# Patient Record
Sex: MALE | Race: WHITE | Employment: OTHER | URBAN - METROPOLITAN AREA
[De-identification: names, ages, dates, MRNs, and addresses within clinical notes are randomized per-mention and may not be internally consistent; named-entity substitution may affect disease eponyms.]

---

## 2017-02-13 LAB — PROSTATE SPECIFIC ANTIGEN: 4.3 NG/ML

## 2017-02-14 RX ORDER — CLOPIDOGREL BISULFATE 75 MG/1
TABLET ORAL
Qty: 90 TABLET | Refills: 2 | Status: ON HOLD | OUTPATIENT
Start: 2017-02-14 | End: 2017-06-11 | Stop reason: HOSPADM

## 2017-03-22 ENCOUNTER — OFFICE VISIT (OUTPATIENT)
Dept: FAMILY MEDICINE CLINIC | Age: 77
End: 2017-03-22

## 2017-03-22 VITALS
DIASTOLIC BLOOD PRESSURE: 92 MMHG | OXYGEN SATURATION: 98 % | SYSTOLIC BLOOD PRESSURE: 180 MMHG | WEIGHT: 167.2 LBS | TEMPERATURE: 97.7 F | HEART RATE: 80 BPM | HEIGHT: 68 IN | BODY MASS INDEX: 25.34 KG/M2 | RESPIRATION RATE: 16 BRPM

## 2017-03-22 DIAGNOSIS — K21.9 GASTROESOPHAGEAL REFLUX DISEASE WITHOUT ESOPHAGITIS: ICD-10-CM

## 2017-03-22 DIAGNOSIS — I10 UNSPECIFIED ESSENTIAL HYPERTENSION: ICD-10-CM

## 2017-03-22 DIAGNOSIS — I10 ESSENTIAL HYPERTENSION: ICD-10-CM

## 2017-03-22 DIAGNOSIS — J44.0 CHRONIC OBSTRUCTIVE PULMONARY DISEASE WITH ACUTE LOWER RESPIRATORY INFECTION (HCC): ICD-10-CM

## 2017-03-22 DIAGNOSIS — Z01.818 PRE-OP EXAM: Primary | ICD-10-CM

## 2017-03-22 DIAGNOSIS — J44.9 OBSTRUCTIVE CHRONIC BRONCHITIS WITHOUT EXACERBATION (HCC): ICD-10-CM

## 2017-03-22 DIAGNOSIS — L40.9 PSORIASIS: ICD-10-CM

## 2017-03-22 DIAGNOSIS — E78.5 HYPERLIPIDEMIA, UNSPECIFIED HYPERLIPIDEMIA TYPE: ICD-10-CM

## 2017-03-22 PROCEDURE — G8427 DOCREV CUR MEDS BY ELIG CLIN: HCPCS | Performed by: FAMILY MEDICINE

## 2017-03-22 PROCEDURE — 93000 ELECTROCARDIOGRAM COMPLETE: CPT | Performed by: FAMILY MEDICINE

## 2017-03-22 PROCEDURE — G8598 ASA/ANTIPLAT THER USED: HCPCS | Performed by: FAMILY MEDICINE

## 2017-03-22 PROCEDURE — G8420 CALC BMI NORM PARAMETERS: HCPCS | Performed by: FAMILY MEDICINE

## 2017-03-22 PROCEDURE — 3023F SPIROM DOC REV: CPT | Performed by: FAMILY MEDICINE

## 2017-03-22 PROCEDURE — G8926 SPIRO NO PERF OR DOC: HCPCS | Performed by: FAMILY MEDICINE

## 2017-03-22 PROCEDURE — 99214 OFFICE O/P EST MOD 30 MIN: CPT | Performed by: FAMILY MEDICINE

## 2017-03-22 PROCEDURE — G8484 FLU IMMUNIZE NO ADMIN: HCPCS | Performed by: FAMILY MEDICINE

## 2017-03-22 PROCEDURE — 1123F ACP DISCUSS/DSCN MKR DOCD: CPT | Performed by: FAMILY MEDICINE

## 2017-03-22 PROCEDURE — 1036F TOBACCO NON-USER: CPT | Performed by: FAMILY MEDICINE

## 2017-03-22 PROCEDURE — 4040F PNEUMOC VAC/ADMIN/RCVD: CPT | Performed by: FAMILY MEDICINE

## 2017-03-22 RX ORDER — LISINOPRIL 40 MG/1
40 TABLET ORAL DAILY
Qty: 30 TABLET | Refills: 3 | Status: ON HOLD | OUTPATIENT
Start: 2017-03-22 | End: 2017-06-10 | Stop reason: DRUGHIGH

## 2017-03-22 RX ORDER — AMLODIPINE BESYLATE 10 MG/1
10 TABLET ORAL DAILY
Qty: 30 TABLET | Refills: 3 | Status: SHIPPED | OUTPATIENT
Start: 2017-03-22 | End: 2017-07-12 | Stop reason: SDUPTHER

## 2017-03-22 RX ORDER — AMLODIPINE BESYLATE 5 MG/1
TABLET ORAL
Qty: 90 TABLET | Refills: 3 | Status: CANCELLED | OUTPATIENT
Start: 2017-03-22

## 2017-03-22 RX ORDER — LISINOPRIL 20 MG/1
20 TABLET ORAL DAILY
Qty: 90 TABLET | Refills: 3 | Status: CANCELLED | OUTPATIENT
Start: 2017-03-22

## 2017-03-27 ENCOUNTER — OFFICE VISIT (OUTPATIENT)
Dept: FAMILY MEDICINE CLINIC | Age: 77
End: 2017-03-27

## 2017-03-27 VITALS
RESPIRATION RATE: 16 BRPM | SYSTOLIC BLOOD PRESSURE: 170 MMHG | WEIGHT: 169 LBS | DIASTOLIC BLOOD PRESSURE: 80 MMHG | OXYGEN SATURATION: 98 % | HEART RATE: 78 BPM | BODY MASS INDEX: 23.66 KG/M2 | HEIGHT: 71 IN

## 2017-03-27 DIAGNOSIS — I10 ESSENTIAL HYPERTENSION: Primary | ICD-10-CM

## 2017-03-27 PROCEDURE — G8484 FLU IMMUNIZE NO ADMIN: HCPCS | Performed by: FAMILY MEDICINE

## 2017-03-27 PROCEDURE — 4040F PNEUMOC VAC/ADMIN/RCVD: CPT | Performed by: FAMILY MEDICINE

## 2017-03-27 PROCEDURE — G8420 CALC BMI NORM PARAMETERS: HCPCS | Performed by: FAMILY MEDICINE

## 2017-03-27 PROCEDURE — G8427 DOCREV CUR MEDS BY ELIG CLIN: HCPCS | Performed by: FAMILY MEDICINE

## 2017-03-27 PROCEDURE — 1123F ACP DISCUSS/DSCN MKR DOCD: CPT | Performed by: FAMILY MEDICINE

## 2017-03-27 PROCEDURE — 99213 OFFICE O/P EST LOW 20 MIN: CPT | Performed by: FAMILY MEDICINE

## 2017-03-27 PROCEDURE — 1036F TOBACCO NON-USER: CPT | Performed by: FAMILY MEDICINE

## 2017-03-27 PROCEDURE — G8598 ASA/ANTIPLAT THER USED: HCPCS | Performed by: FAMILY MEDICINE

## 2017-03-27 RX ORDER — HYDROCHLOROTHIAZIDE 12.5 MG/1
12.5 CAPSULE, GELATIN COATED ORAL DAILY
Qty: 30 CAPSULE | Refills: 3 | Status: SHIPPED | OUTPATIENT
Start: 2017-03-27 | End: 2017-09-06

## 2017-03-27 ASSESSMENT — PATIENT HEALTH QUESTIONNAIRE - PHQ9
SUM OF ALL RESPONSES TO PHQ QUESTIONS 1-9: 0
SUM OF ALL RESPONSES TO PHQ9 QUESTIONS 1 & 2: 0
1. LITTLE INTEREST OR PLEASURE IN DOING THINGS: 0
2. FEELING DOWN, DEPRESSED OR HOPELESS: 0

## 2017-03-31 ENCOUNTER — OFFICE VISIT (OUTPATIENT)
Dept: VASCULAR SURGERY | Age: 77
End: 2017-03-31

## 2017-03-31 ENCOUNTER — HOSPITAL ENCOUNTER (OUTPATIENT)
Dept: VASCULAR LAB | Age: 77
Discharge: HOME | End: 2017-03-31
Attending: SURGERY | Admitting: SURGERY

## 2017-03-31 VITALS
SYSTOLIC BLOOD PRESSURE: 132 MMHG | WEIGHT: 168 LBS | HEIGHT: 71 IN | DIASTOLIC BLOOD PRESSURE: 64 MMHG | BODY MASS INDEX: 23.52 KG/M2

## 2017-03-31 DIAGNOSIS — I65.23 CAROTID ATHEROSCLEROSIS, BILATERAL: Primary | ICD-10-CM

## 2017-03-31 DIAGNOSIS — I65.23 CAROTID ATHEROSCLEROSIS, BILATERAL: ICD-10-CM

## 2017-03-31 DIAGNOSIS — I65.23 OCCLUSION AND STENOSIS OF BILATERAL CAROTID ARTERIES: ICD-10-CM

## 2017-03-31 PROCEDURE — G8427 DOCREV CUR MEDS BY ELIG CLIN: HCPCS | Performed by: SURGERY

## 2017-03-31 PROCEDURE — 99213 OFFICE O/P EST LOW 20 MIN: CPT | Performed by: SURGERY

## 2017-03-31 PROCEDURE — 1036F TOBACCO NON-USER: CPT | Performed by: SURGERY

## 2017-03-31 PROCEDURE — 4040F PNEUMOC VAC/ADMIN/RCVD: CPT | Performed by: SURGERY

## 2017-03-31 PROCEDURE — 1123F ACP DISCUSS/DSCN MKR DOCD: CPT | Performed by: SURGERY

## 2017-03-31 PROCEDURE — G8484 FLU IMMUNIZE NO ADMIN: HCPCS | Performed by: SURGERY

## 2017-03-31 PROCEDURE — G8598 ASA/ANTIPLAT THER USED: HCPCS | Performed by: SURGERY

## 2017-03-31 PROCEDURE — G8420 CALC BMI NORM PARAMETERS: HCPCS | Performed by: SURGERY

## 2017-04-04 ENCOUNTER — TELEPHONE (OUTPATIENT)
Dept: CARDIOTHORACIC SURGERY | Age: 77
End: 2017-04-04

## 2017-06-10 PROBLEM — R31.9 HEMATURIA: Status: ACTIVE | Noted: 2017-06-10

## 2017-06-12 ENCOUNTER — TELEPHONE (OUTPATIENT)
Dept: PHARMACY | Age: 77
End: 2017-06-12

## 2017-06-13 ENCOUNTER — TELEPHONE (OUTPATIENT)
Dept: VASCULAR SURGERY | Age: 77
End: 2017-06-13

## 2017-07-12 ENCOUNTER — OFFICE VISIT (OUTPATIENT)
Dept: FAMILY MEDICINE CLINIC | Age: 77
End: 2017-07-12

## 2017-07-12 VITALS
HEART RATE: 83 BPM | WEIGHT: 162.4 LBS | TEMPERATURE: 98.4 F | HEIGHT: 67 IN | RESPIRATION RATE: 17 BRPM | DIASTOLIC BLOOD PRESSURE: 50 MMHG | BODY MASS INDEX: 25.49 KG/M2 | OXYGEN SATURATION: 98 % | SYSTOLIC BLOOD PRESSURE: 98 MMHG

## 2017-07-12 DIAGNOSIS — R31.9 HEMATURIA: ICD-10-CM

## 2017-07-12 DIAGNOSIS — E78.5 HYPERLIPIDEMIA, UNSPECIFIED HYPERLIPIDEMIA TYPE: ICD-10-CM

## 2017-07-12 DIAGNOSIS — I10 ESSENTIAL HYPERTENSION: ICD-10-CM

## 2017-07-12 DIAGNOSIS — K21.9 GASTROESOPHAGEAL REFLUX DISEASE WITHOUT ESOPHAGITIS: ICD-10-CM

## 2017-07-12 DIAGNOSIS — C61 PROSTATE CA (HCC): ICD-10-CM

## 2017-07-12 DIAGNOSIS — J44.0 CHRONIC OBSTRUCTIVE PULMONARY DISEASE WITH ACUTE LOWER RESPIRATORY INFECTION (HCC): ICD-10-CM

## 2017-07-12 DIAGNOSIS — Z01.818 PRE-OP EXAM: Primary | ICD-10-CM

## 2017-07-12 PROCEDURE — G8419 CALC BMI OUT NRM PARAM NOF/U: HCPCS | Performed by: FAMILY MEDICINE

## 2017-07-12 PROCEDURE — G8926 SPIRO NO PERF OR DOC: HCPCS | Performed by: FAMILY MEDICINE

## 2017-07-12 PROCEDURE — G8598 ASA/ANTIPLAT THER USED: HCPCS | Performed by: FAMILY MEDICINE

## 2017-07-12 PROCEDURE — 1036F TOBACCO NON-USER: CPT | Performed by: FAMILY MEDICINE

## 2017-07-12 PROCEDURE — 3023F SPIROM DOC REV: CPT | Performed by: FAMILY MEDICINE

## 2017-07-12 PROCEDURE — G8427 DOCREV CUR MEDS BY ELIG CLIN: HCPCS | Performed by: FAMILY MEDICINE

## 2017-07-12 PROCEDURE — 1123F ACP DISCUSS/DSCN MKR DOCD: CPT | Performed by: FAMILY MEDICINE

## 2017-07-12 PROCEDURE — 4040F PNEUMOC VAC/ADMIN/RCVD: CPT | Performed by: FAMILY MEDICINE

## 2017-07-12 PROCEDURE — 99214 OFFICE O/P EST MOD 30 MIN: CPT | Performed by: FAMILY MEDICINE

## 2017-07-12 PROCEDURE — 93000 ELECTROCARDIOGRAM COMPLETE: CPT | Performed by: FAMILY MEDICINE

## 2017-07-12 RX ORDER — HYDROCHLOROTHIAZIDE 12.5 MG/1
12.5 CAPSULE, GELATIN COATED ORAL DAILY
Qty: 90 CAPSULE | Refills: 3 | Status: CANCELLED | OUTPATIENT
Start: 2017-07-12

## 2017-07-12 RX ORDER — AMLODIPINE BESYLATE 10 MG/1
10 TABLET ORAL DAILY
Qty: 90 TABLET | Refills: 3 | Status: SHIPPED | OUTPATIENT
Start: 2017-07-12 | End: 2017-08-01 | Stop reason: SDUPTHER

## 2017-07-12 RX ORDER — CALCIPOTRIENE 50 UG/G
CREAM TOPICAL 2 TIMES DAILY
Status: ON HOLD | COMMUNITY
End: 2017-10-26 | Stop reason: HOSPADM

## 2017-07-12 RX ORDER — LISINOPRIL 40 MG/1
40 TABLET ORAL DAILY
Qty: 90 TABLET | Refills: 3 | Status: SHIPPED | OUTPATIENT
Start: 2017-07-12 | End: 2019-01-03 | Stop reason: SDUPTHER

## 2017-07-12 RX ORDER — BUDESONIDE AND FORMOTEROL FUMARATE DIHYDRATE 160; 4.5 UG/1; UG/1
AEROSOL RESPIRATORY (INHALATION)
Qty: 1 INHALER | Refills: 3 | Status: SHIPPED | OUTPATIENT
Start: 2017-07-12 | End: 2017-09-06 | Stop reason: SDUPTHER

## 2017-08-01 RX ORDER — LISINOPRIL 20 MG/1
40 TABLET ORAL DAILY
Qty: 30 TABLET | Refills: 5 | Status: SHIPPED | OUTPATIENT
Start: 2017-08-01 | End: 2017-08-23 | Stop reason: SDUPTHER

## 2017-08-01 RX ORDER — AMLODIPINE BESYLATE 10 MG/1
10 TABLET ORAL DAILY
Qty: 90 TABLET | Refills: 3 | Status: ON HOLD | OUTPATIENT
Start: 2017-08-01 | End: 2017-10-26

## 2017-08-10 ENCOUNTER — HOSPITAL ENCOUNTER (OUTPATIENT)
Dept: CT IMAGING | Age: 77
Discharge: HOME | End: 2017-08-10
Attending: INTERNAL MEDICINE | Admitting: INTERNAL MEDICINE

## 2017-08-10 DIAGNOSIS — R91.8 PULMONARY NODULES: ICD-10-CM

## 2017-08-10 DIAGNOSIS — R91.8 OTHER NONSPECIFIC ABNORMAL FINDING OF LUNG FIELD: ICD-10-CM

## 2017-08-23 ENCOUNTER — OFFICE VISIT (OUTPATIENT)
Dept: FAMILY MEDICINE CLINIC | Age: 77
End: 2017-08-23

## 2017-08-23 VITALS
DIASTOLIC BLOOD PRESSURE: 70 MMHG | BODY MASS INDEX: 22.82 KG/M2 | TEMPERATURE: 98.4 F | HEART RATE: 92 BPM | WEIGHT: 163 LBS | SYSTOLIC BLOOD PRESSURE: 124 MMHG | HEIGHT: 71 IN

## 2017-08-23 DIAGNOSIS — I65.22 OCCLUSION OF LEFT CAROTID ARTERY: ICD-10-CM

## 2017-08-23 DIAGNOSIS — C61 PROSTATE CA (HCC): ICD-10-CM

## 2017-08-23 DIAGNOSIS — Z01.818 PREOP EXAMINATION: Primary | ICD-10-CM

## 2017-08-23 DIAGNOSIS — R91.8 PULMONARY NODULES: ICD-10-CM

## 2017-08-23 DIAGNOSIS — J43.2 CENTRILOBULAR EMPHYSEMA (HCC): ICD-10-CM

## 2017-08-23 DIAGNOSIS — J44.0 CHRONIC OBSTRUCTIVE PULMONARY DISEASE WITH ACUTE LOWER RESPIRATORY INFECTION (HCC): ICD-10-CM

## 2017-08-23 PROCEDURE — 4040F PNEUMOC VAC/ADMIN/RCVD: CPT | Performed by: NURSE PRACTITIONER

## 2017-08-23 PROCEDURE — G8420 CALC BMI NORM PARAMETERS: HCPCS | Performed by: NURSE PRACTITIONER

## 2017-08-23 PROCEDURE — 3023F SPIROM DOC REV: CPT | Performed by: NURSE PRACTITIONER

## 2017-08-23 PROCEDURE — G8926 SPIRO NO PERF OR DOC: HCPCS | Performed by: NURSE PRACTITIONER

## 2017-08-23 PROCEDURE — 1036F TOBACCO NON-USER: CPT | Performed by: NURSE PRACTITIONER

## 2017-08-23 PROCEDURE — 99214 OFFICE O/P EST MOD 30 MIN: CPT | Performed by: NURSE PRACTITIONER

## 2017-08-23 PROCEDURE — G8427 DOCREV CUR MEDS BY ELIG CLIN: HCPCS | Performed by: NURSE PRACTITIONER

## 2017-08-23 PROCEDURE — 1123F ACP DISCUSS/DSCN MKR DOCD: CPT | Performed by: NURSE PRACTITIONER

## 2017-08-23 PROCEDURE — G8598 ASA/ANTIPLAT THER USED: HCPCS | Performed by: NURSE PRACTITIONER

## 2017-09-06 ENCOUNTER — TELEPHONE (OUTPATIENT)
Dept: PULMONOLOGY | Age: 77
End: 2017-09-06

## 2017-09-06 ENCOUNTER — OFFICE VISIT (OUTPATIENT)
Dept: PULMONOLOGY | Age: 77
End: 2017-09-06

## 2017-09-06 VITALS
HEART RATE: 95 BPM | DIASTOLIC BLOOD PRESSURE: 71 MMHG | HEIGHT: 70 IN | SYSTOLIC BLOOD PRESSURE: 129 MMHG | BODY MASS INDEX: 22.9 KG/M2 | RESPIRATION RATE: 18 BRPM | WEIGHT: 160 LBS | OXYGEN SATURATION: 99 %

## 2017-09-06 DIAGNOSIS — J44.9 MODERATE COPD (CHRONIC OBSTRUCTIVE PULMONARY DISEASE) (HCC): Primary | ICD-10-CM

## 2017-09-06 DIAGNOSIS — R91.8 PULMONARY NODULES: ICD-10-CM

## 2017-09-06 DIAGNOSIS — J44.9 COPD, MODERATE (HCC): Primary | ICD-10-CM

## 2017-09-06 PROCEDURE — 3023F SPIROM DOC REV: CPT | Performed by: INTERNAL MEDICINE

## 2017-09-06 PROCEDURE — 1036F TOBACCO NON-USER: CPT | Performed by: INTERNAL MEDICINE

## 2017-09-06 PROCEDURE — G8926 SPIRO NO PERF OR DOC: HCPCS | Performed by: INTERNAL MEDICINE

## 2017-09-06 PROCEDURE — 99213 OFFICE O/P EST LOW 20 MIN: CPT | Performed by: INTERNAL MEDICINE

## 2017-09-06 PROCEDURE — G8427 DOCREV CUR MEDS BY ELIG CLIN: HCPCS | Performed by: INTERNAL MEDICINE

## 2017-09-06 PROCEDURE — 1123F ACP DISCUSS/DSCN MKR DOCD: CPT | Performed by: INTERNAL MEDICINE

## 2017-09-06 PROCEDURE — 4040F PNEUMOC VAC/ADMIN/RCVD: CPT | Performed by: INTERNAL MEDICINE

## 2017-09-06 PROCEDURE — G8598 ASA/ANTIPLAT THER USED: HCPCS | Performed by: INTERNAL MEDICINE

## 2017-09-06 PROCEDURE — G8420 CALC BMI NORM PARAMETERS: HCPCS | Performed by: INTERNAL MEDICINE

## 2017-09-06 RX ORDER — BUDESONIDE AND FORMOTEROL FUMARATE DIHYDRATE 160; 4.5 UG/1; UG/1
AEROSOL RESPIRATORY (INHALATION)
Qty: 1 INHALER | Refills: 11 | Status: SHIPPED | OUTPATIENT
Start: 2017-09-06 | End: 2020-02-18

## 2017-09-06 RX ORDER — CLOBETASOL PROPIONATE 0.5 MG/G
OINTMENT TOPICAL 2 TIMES DAILY
Status: ON HOLD | COMMUNITY
End: 2017-10-26 | Stop reason: HOSPADM

## 2017-09-06 RX ORDER — ALBUTEROL SULFATE 90 UG/1
AEROSOL, METERED RESPIRATORY (INHALATION)
Qty: 1 INHALER | Refills: 11 | Status: SHIPPED | OUTPATIENT
Start: 2017-09-06 | End: 2018-12-09

## 2017-09-07 PROBLEM — N30.90 CYSTITIS: Status: ACTIVE | Noted: 2017-09-07

## 2017-09-11 ENCOUNTER — TELEPHONE (OUTPATIENT)
Dept: PHARMACY | Facility: CLINIC | Age: 77
End: 2017-09-11

## 2017-10-04 ENCOUNTER — TELEPHONE (OUTPATIENT)
Dept: PULMONOLOGY | Age: 77
End: 2017-10-04

## 2017-10-04 DIAGNOSIS — J44.9 CHRONIC OBSTRUCTIVE PULMONARY DISEASE, UNSPECIFIED COPD TYPE (HCC): Primary | ICD-10-CM

## 2017-10-04 NOTE — TELEPHONE ENCOUNTER
The patient wife started pulmonary rehab and feels that he would benefit from it also. He is asking if this is a possibility.

## 2017-10-05 NOTE — TELEPHONE ENCOUNTER
Pt's wife is doing Pulmonary Rehab and wanted to know if he would qualify - Pt had his PFT on 8/19/16 please advise

## 2017-10-06 ENCOUNTER — HOSPITAL ENCOUNTER (OUTPATIENT)
Dept: VASCULAR LAB | Age: 77
Discharge: HOME | End: 2017-10-06
Attending: SURGERY | Admitting: SURGERY

## 2017-10-06 ENCOUNTER — OFFICE VISIT (OUTPATIENT)
Dept: VASCULAR SURGERY | Age: 77
End: 2017-10-06

## 2017-10-06 VITALS
SYSTOLIC BLOOD PRESSURE: 132 MMHG | HEIGHT: 70 IN | BODY MASS INDEX: 23.42 KG/M2 | DIASTOLIC BLOOD PRESSURE: 60 MMHG | WEIGHT: 163.6 LBS

## 2017-10-06 DIAGNOSIS — I65.23 CAROTID ATHEROSCLEROSIS, BILATERAL: Primary | ICD-10-CM

## 2017-10-06 DIAGNOSIS — I65.23 OCCLUSION AND STENOSIS OF BILATERAL CAROTID ARTERIES: ICD-10-CM

## 2017-10-06 DIAGNOSIS — I65.23 CAROTID ATHEROSCLEROSIS, BILATERAL: ICD-10-CM

## 2017-10-06 PROCEDURE — G8598 ASA/ANTIPLAT THER USED: HCPCS | Performed by: SURGERY

## 2017-10-06 PROCEDURE — 4040F PNEUMOC VAC/ADMIN/RCVD: CPT | Performed by: SURGERY

## 2017-10-06 PROCEDURE — G8420 CALC BMI NORM PARAMETERS: HCPCS | Performed by: SURGERY

## 2017-10-06 PROCEDURE — G8484 FLU IMMUNIZE NO ADMIN: HCPCS | Performed by: SURGERY

## 2017-10-06 PROCEDURE — 1123F ACP DISCUSS/DSCN MKR DOCD: CPT | Performed by: SURGERY

## 2017-10-06 PROCEDURE — G8427 DOCREV CUR MEDS BY ELIG CLIN: HCPCS | Performed by: SURGERY

## 2017-10-06 PROCEDURE — 1036F TOBACCO NON-USER: CPT | Performed by: SURGERY

## 2017-10-06 PROCEDURE — 1111F DSCHRG MED/CURRENT MED MERGE: CPT | Performed by: SURGERY

## 2017-10-06 PROCEDURE — 99213 OFFICE O/P EST LOW 20 MIN: CPT | Performed by: SURGERY

## 2017-10-06 NOTE — PROGRESS NOTES
amLODIPine (NORVASC) 10 MG tablet Take 1 tablet by mouth daily 90 tablet 3    aspirin 81 MG tablet Take 81 mg by mouth daily      Artificial Tear Ointment (DRY EYES OP) Apply to eye      calcipotriene (DOVONEX) 0.005 % cream Apply topically 2 times daily Apply topically 2 times daily.  lisinopril (PRINIVIL;ZESTRIL) 40 MG tablet Take 1 tablet by mouth daily 90 tablet 3    Denosumab (PROLIA SC) Inject into the skin      Leuprolide Acetate (LUPRON IJ) Inject as directed every 3 months      vitamin E 400 UNIT capsule Take 400 Units by mouth daily.  calcium carbonate-vitamin D (CALCIUM 600+D) 600-200 MG-UNIT TABS Take by mouth daily       betamethasone dipropionate 0.05 % lotion Apply  topically 2 times daily. Apply topically 2 times daily.  triamcinolone (KENALOG) 0.1 % cream Apply  topically 2 times daily. Apply topically 2 times daily.  fish oil-omega-3 fatty acids 1000 MG capsule Take 2 g by mouth daily.  omeprazole (PRILOSEC) 10 MG capsule Take 10 mg by mouth daily. No current facility-administered medications for this visit. Allergies:  Review of patient's allergies indicates no known allergies. Review of Systems:   · Constitutional: there has been no unanticipated weight loss. There's been no change in energy level, sleep pattern, or activity level. · Eyes: No visual changes or diplopia. No scleral icterus. · ENT: No Headaches, hearing loss or vertigo. No mouth sores or sore throat. · Cardiovascular: Reviewed in HPI  · Respiratory: No cough or wheezing, no sputum production. No hematemesis. · Gastrointestinal: No abdominal pain, appetite loss, blood in stools. No change in bowel or bladder habits. · Genitourinary: No dysuria, trouble voiding, or hematuria. · Musculoskeletal:  No gait disturbance, weakness or joint complaints. · Integumentary: No rash or pruritis. · Neurological: No headache, diplopia, change in muscle strength, numbness or tingling. No change in gait, balance, coordination, mood, affect, memory, mentation, behavior. · Psychiatric: No anxiety, no depression. · Endocrine: No malaise, fatigue or temperature intolerance. No excessive thirst, fluid intake, or urination. No tremor. · Hematologic/Lymphatic: No abnormal bruising or bleeding, blood clots or swollen lymph nodes. · Allergic/Immunologic: No nasal congestion or hives. Physical Examination:    Vitals:    10/06/17 0951   BP: 132/60          General appearance: alert, appears stated age, cooperative and no distress  Head: Normocephalic, without obvious abnormality, atraumatic  Eyes: conjunctivae/corneas clear. PERRL, EOM's intact. Fundi benign  Neck: no adenopathy, no carotid bruit, no JVD, supple, symmetrical, trachea midline, well-healed left neck surgical scar and thyroid: not enlarged, symmetric, no tenderness/mass/nodules  Lungs: clear to auscultation bilaterally  Heart: regular rate and rhythm  Abdomen: soft, non-tender. Bowel sounds normal. No masses,  no organomegaly  Extremities: extremities normal, atraumatic, no cyanosis or edema    Pulses:   L brachial 2 R brachial 2   L radial 2 R radial 2   L femoral 2 R femoral 2   L popliteal 2 R popliteal 2   L posterior tibial 2 R posterior tibial 2   L dorsalis pedis 2 R dorsalis pedis 2   Doppler Signals:  +    Neurologic: Grossly normal    MEDICAL DECISION MAKING/TESTING  I have reviewed the testing personally and my interpretation is below. Carotid duplex today was personally reviewed and shows a right greater than 70% stenosis. Left, less than 50% stenosis.     Assessment:     Patient Active Problem List   Diagnosis    Psoriasis    Prostate CA ([address]    GERD (gastroesophageal reflux disease)    ED (erectile dysfunction)    Hypertension    Hyperlipidemia    Carotid artery occlusion and stenosis    COPD (chronic obstructive pulmonary disease) ([address]    Emphysema lung ([address]    Hypoxia    COPD exacerbation ([address]   

## 2017-10-06 NOTE — MR AVS SNAPSHOT
After Visit Summary             Vona Babinski   10/6/2017 10:00 AM   Office Visit    Description:  Male : 1940   Provider:  Gigi Correa MD   Department:  [facility] VASC [facility] ENDO              Your Follow-Up and Future Appointments         Below is a list of your follow-up and future appointments. This may not be a complete list as you may have made appointments directly with providers that we are not aware of or your providers may have made some for you. Please call your providers to confirm appointments. It is important to keep your appointments. Please bring your current insurance card, photo ID, co-pay, and all medication bottles to your appointment. If self-pay, payment is expected at the time of service. Your To-Do List     Future Appointments Provider Department Dept Phone    2018 1:00 PM Ivelisse Quezada MD Pulmonary, Critical Care & Sleep [phone]    It is important to keep your appointment. Please bring a list of your medications to your appointment. Medications will not be filled prior to seeing the physician. Please bring your current insurance card, photo ID, and co-pay. If self-pay, payment is  expected at time of service. Information from Your Visit        Department     Name Address Phone Fax    [address] [place] [address] [phone]      You Were Seen for:         Comments    Carotid atherosclerosis, bilateral   [[id number]]         Vital Signs     Blood Pressure Height Weight Body Mass Index Smoking Status       132/60 5' 10\" (1.778 m) 163 lb 9.6 oz (74.2 kg) 23.47 kg/m2 Former Smoker          Medications and Orders      Your Current Medications Are              clobetasol (TEMOVATE) 0.05 % ointment Apply topically 2 times daily Apply topically 2 times daily. hydrocortisone 2.5 % ointment apply bid to sensitive area psoriasis. Apply sparingly. budesonide-formoterol (SYMBICORT) 160-4.5 MCG/ACT AERO INHALE 2 PUFFS INTO THE LUNGS TWICE DAILY    albuterol sulfate HFA (PROVENTIL HFA) 108 (90 Base) MCG/ACT inhaler INHALE 2 PUFFS EVERY 6 HOURS AS NEEDED    amLODIPine (NORVASC) 10 MG tablet Take 1 tablet by mouth daily    aspirin 81 MG tablet Take 81 mg by mouth daily    Artificial Tear Ointment (DRY EYES OP) Apply to eye    calcipotriene (DOVONEX) 0.005 % cream Apply topically 2 times daily Apply topically 2 times daily. lisinopril (PRINIVIL;ZESTRIL) 40 MG tablet Take 1 tablet by mouth daily    Denosumab (PROLIA SC) Inject into the skin    Leuprolide Acetate (LUPRON IJ) Inject as directed every 3 months    vitamin E 400 UNIT capsule Take 400 Units by mouth daily. calcium carbonate-vitamin D (CALCIUM 600+D) 600-200 MG-UNIT TABS Take by mouth daily     betamethasone dipropionate 0.05 % lotion Apply  topically 2 times daily. Apply topically 2 times daily. triamcinolone (KENALOG) 0.1 % cream Apply  topically 2 times daily. Apply topically 2 times daily. fish oil-omega-3 fatty acids 1000 MG capsule Take 2 g by mouth daily. omeprazole (PRILOSEC) 10 MG capsule Take 10 mg by mouth daily.       Allergies           No Known Allergies         Additional Information        Basic Information     Date Of Birth Sex Race Ethnicity Preferred Language    1940 Male White Non-/Non  English      Problem List as of 10/6/2017  Date Reviewed: 9/6/2017                Hematuria    Moderate COPD (chronic obstructive pulmonary disease) (HCC)    Hypoxia    COPD (chronic obstructive pulmonary disease) (HCC)    Emphysema lung (HCC)    Carotid artery occlusion and stenosis    Psoriasis    Prostate CA ([facility]    GERD (gastroesophageal reflux disease)    ED (erectile dysfunction)    Hypertension    Hyperlipidemia      Your Goals as of 10/6/2017 at 10:06 AM              7/12/17    3/27/17    3/22/17       Lifestyle pt will try to reduce salt intake. 3/24/16       Not on track    Continue to decrease salt intake. 03/22/17   On track  On track        Immunizations as of 10/6/2017     Name Date    Influenza Virus Vaccine 10/15/2014, 11/28/2013, 10/27/2011    Influenza, High Dose 10/5/2016, 10/1/2015    Pneumococcal 13-valent Conjugate ([id number]) 11/19/2015    Pneumococcal Conjugate 7-valent 11/12/2014    Pneumococcal Polysaccharide ([id number]) 11/5/2008    Td 6/15/2015, 8/22/2005    Zoster 11/1/2012      Preventive Care        Date Due    Tetanus Combination Vaccine (1 - Tdap) 6/16/2015    Yearly Flu Vaccine (1) 9/1/2017    Cholesterol Screening 10/14/2021            MyChart Signup           Our records indicate that you have declined MyChart signup.

## 2017-10-06 NOTE — PROGRESS NOTES
Yandy Hardy MD   Denosumab (PROLIA SC) Inject into the skin   Yes Historical Provider, MD   Leuprolide Acetate (LUPRON IJ) Inject as directed every 3 months   Yes Historical Provider, MD   vitamin E 400 UNIT capsule Take 400 Units by mouth daily. Yes Historical Provider, MD   calcium carbonate-vitamin D (CALCIUM 600+D) 600-200 MG-UNIT TABS Take by mouth daily    Yes Historical Provider, MD   betamethasone dipropionate 0.05 % lotion Apply  topically 2 times daily. Apply topically 2 times daily. Yes Historical Provider, MD   triamcinolone (KENALOG) 0.1 % cream Apply  topically 2 times daily. Apply topically 2 times daily. Yes Historical Provider, MD   fish oil-omega-3 fatty acids 1000 MG capsule Take 2 g by mouth daily. Yes Historical Provider, MD   omeprazole (PRILOSEC) 10 MG capsule Take 10 mg by mouth daily. Yes Historical Provider, MD        [facility] Meds:    Current Outpatient Prescriptions   Medication Sig Dispense Refill    clobetasol (TEMOVATE) 0.05 % ointment Apply topically 2 times daily Apply topically 2 times daily.  hydrocortisone 2.5 % ointment apply bid to sensitive area psoriasis. Apply sparingly.  budesonide-formoterol (SYMBICORT) 160-4.5 MCG/ACT AERO INHALE 2 PUFFS INTO THE LUNGS TWICE DAILY 1 Inhaler 11    albuterol sulfate HFA (PROVENTIL HFA) 108 (90 Base) MCG/ACT inhaler INHALE 2 PUFFS EVERY 6 HOURS AS NEEDED 1 Inhaler 11    amLODIPine (NORVASC) 10 MG tablet Take 1 tablet by mouth daily 90 tablet 3    aspirin 81 MG tablet Take 81 mg by mouth daily      Artificial Tear Ointment (DRY EYES OP) Apply to eye      calcipotriene (DOVONEX) 0.005 % cream Apply topically 2 times daily Apply topically 2 times daily.       lisinopril (PRINIVIL;ZESTRIL) 40 MG tablet Take 1 tablet by mouth daily 90 tablet 3    Denosumab (PROLIA SC) Inject into the skin      Leuprolide Acetate (LUPRON IJ) Inject as directed every 3 months      vitamin E 400 UNIT capsule Take 400 Units by mouth daily.  calcium carbonate-vitamin D (CALCIUM 600+D) 600-200 MG-UNIT TABS Take by mouth daily       betamethasone dipropionate 0.05 % lotion Apply  topically 2 times daily. Apply topically 2 times daily.  triamcinolone (KENALOG) 0.1 % cream Apply  topically 2 times daily. Apply topically 2 times daily.  fish oil-omega-3 fatty acids 1000 MG capsule Take 2 g by mouth daily.  omeprazole (PRILOSEC) 10 MG capsule Take 10 mg by mouth daily. No current facility-administered medications for this visit. Social History:       Social History     Social History    Marital status:      Spouse name: N/A    Number of children: N/A    Years of education: N/A     Occupational History    retired,       Social History Main Topics    Smoking status: Former Smoker     Packs/day: 0.50     Years: 30.00     Types: Cigarettes     Quit date: 9/1/1987    Smokeless tobacco: Never Used    Alcohol use Yes      Comment: FEW BEERS PER WEEK    Drug use: No    Sexual activity: Yes     Partners: Female     Other Topics Concern    None     Social History Narrative       Family Histroy:      Family History   Problem Relation Age of Onset    Diabetes Mother     High Blood Pressure Mother     Heart Disease Father      myxoma    Heart Failure Father        Review of Systems:  A 14 point review of systems was completed. Pertinent positives identified in the HPI, all other review of symptoms negative. Physical Exam   Vital Signs: /60  Ht 5' 10\" (1.778 m)  Wt 163 lb 9.6 oz (74.2 kg)  BMI 23.47 kg/m2       Admission Weight: 163 lb 9.6 oz (74.2 kg)     General appearance: {general:67156::\"alert\",\"appears stated age\",\"cooperative\",\"no distress\"}  Head: {Exam; head:36906::\"Normocephalic, without obvious abnormality\",\"atraumatic\"}  Eyes: {Exam; eye:201::\"conjunctivae/corneas clear. PERRL, EOM's intact.  Fundi benign\"}  Neck: {exam; neck:32016::\"thyroid: not enlarged, symmetric, no tenderness/mass/nodules\",\"no adenopathy\",\"no carotid bruit\",\"no JVD\",\"supple, symmetrical, trachea midline\"}  Lungs: {Exam; lun::\"clear to auscultation bilaterally\"}  Heart: {Exam; heart:5510::\"regular rate and rhythm, S1, S2 normal, no murmur, click, rub or gallop\"}  Abdomen: {Exam; abdomen:46617::\"soft, non-tender.  Bowel sounds normal. No masses,  no organomegaly\"}  Extremities: {Exam; extremity:5109::\"extremities normal, atraumatic, no cyanosis or edema\"}    Pulses:   L brachial *** R brachial ***   L radial *** R radial ***   L femoral *** R femoral ***   L popliteal *** R popliteal ***   L posterior tibial *** R posterior tibial ***   L dorsalis pedis *** R dorsalis pedis ***     Neurologic: {Exam; neuro:43682::\"Grossly normal\"}     MEDICAL DECISION MAKING/TESTING    CXR:  ***    EKG:  ***    CT/MRI Scan:  ***    Carotid:  ***    Labs:    BMP:   Lab Results   Component Value Date     2017    K 4.1 2017     2017    CO2 23 2017    BUN 13 2017    CREATININE 0.9 2017      No components found for: GLUNo results found for: MG   CBC:   Lab Results   Component Value Date    WBC 8.1 2017    HGB 9.8 2017    HCT 29.6 2017    MCV 95.5 2017     2017      Coagulation:   Lab Results   Component Value Date    INR 1.04 06/10/2017    APTT 24.9 06/10/2017     Cardiac markers:   Lab Results   Component Value Date    TROPONINI <0.01 2016     Lab Results   Component Value Date    LABA1C 5.5 10/14/2016    No results found for: ALB    Lab Results   Component Value Date    BILITOT 0.5 2017    AST 14 2017    ALT 17 2017    ALKPHOS 61 2017      Lab Results   Component Value Date    CHOL 241 10/14/2016    CHOL 149 10/14/2016    HDL 92 10/14/2016    [address] 138 10/14/2016    TRIG 56 10/14/2016          Diagnosis:  Patient Active Problem List   Diagnosis    Psoriasis    Prostate CA ([facility]    GERD (gastroesophageal

## 2017-10-11 ENCOUNTER — HOSPITAL ENCOUNTER (OUTPATIENT)
Dept: PREADMISSION TESTING | Age: 77
Discharge: HOME | End: 2017-10-11
Attending: SURGERY | Admitting: SURGERY

## 2017-10-11 VITALS — BODY MASS INDEX: 23.34 KG/M2 | HEIGHT: 70 IN | WEIGHT: 163 LBS

## 2017-10-11 LAB
ABO/RH: NORMAL
ANION GAP SERPL CALCULATED.3IONS-SCNC: 11 MMOL/L (ref 3–16)
ANTIBODY SCREEN: NORMAL
APTT: 28.3 SEC (ref 24.1–34.9)
BUN BLDV-MCNC: 20 MG/DL (ref 7–20)
CALCIUM SERPL-MCNC: 9 MG/DL (ref 8.3–10.6)
CHLORIDE BLD-SCNC: 102 MMOL/L (ref 99–110)
CO2: 27 MMOL/L (ref 21–32)
CREAT SERPL-MCNC: 1.1 MG/DL (ref 0.8–1.3)
GFR AFRICAN AMERICAN: >60
GFR NON-AFRICAN AMERICAN: >60
GLUCOSE BLD-MCNC: 98 MG/DL (ref 70–99)
HCT VFR BLD CALC: 37.7 % (ref 40.5–52.5)
HEMOGLOBIN: 12.6 G/DL (ref 13.5–17.5)
INR BLD: 1.02 (ref 0.85–1.15)
MCH RBC QN AUTO: 31.1 PG (ref 26–34)
MCHC RBC AUTO-ENTMCNC: 33.4 G/DL (ref 31–36)
MCV RBC AUTO: 93 FL (ref 80–100)
PDW BLD-RTO: 13.9 % (ref 12.4–15.4)
PLATELET # BLD: 285 K/UL (ref 135–450)
PMV BLD AUTO: 7.7 FL (ref 5–10.5)
POTASSIUM SERPL-SCNC: 4.3 MMOL/L (ref 3.5–5.1)
PROTHROMBIN TIME: 11.5 SEC (ref 9.6–13)
RBC # BLD: 4.05 M/UL (ref 4.2–5.9)
SODIUM BLD-SCNC: 140 MMOL/L (ref 136–145)
WBC # BLD: 7.5 K/UL (ref 4–11)

## 2017-10-11 ASSESSMENT — COPD QUESTIONNAIRES: CAT_SEVERITY: MODERATE

## 2017-10-11 NOTE — PRE-PROCEDURE INSTRUCTIONS
Name_______________________________________Printed:____________________  Date and time of sxryttc__23-30-86 1000_Arrival Time:__0800 nain_   1. Do not eat or drink anything after 12 midnight (or____hours) prior to surgery. This includes no water, chewing gum or mints. 2. Take the following pills with a small sip of water on the morning of surgery____amlodipine omeprazole_   3. Aspirin, Ibuprofen, Advil, Naproxen, Vitamin E and other Anti-inflammatory products should be stopped for 5 days before surgery or as directed by your physician. 4. Check with your Doctor regarding stopping Plavix, Coumadin,Eliquis, Lovenox,Effient,Pradaxa,Xarelto, Fragmin or other blood thinners and follow their instructions. 5. Do not smoke, and do not drink any alcoholic beverages 24 hours prior to surgery. This includes NA Beer. 6. You may brush your teeth and gargle the morning of surgery. DO NOT SWALLOW WATER   7. You MUST make arrangements for a responsible adult to stay on site while you are here and take you home after your surgery. You will not be allowed to leave alone or drive yourself home. It is strongly suggested someone stay with you the first 24 hrs. Your surgery will be cancelled if you do not have a ride home. 8. A parent/legal guardian must accompany a child scheduled for surgery and plan to stay at the hospital until the child is discharged. Please do not bring other children with you. 9. Please wear simple, loose fitting clothing to the hospital.  Verlon Snuffer not bring valuables (money, credit cards, checkbooks, etc.) Do not wear any makeup (including no eye makeup) or nail polish on your fingers or toes. 10. DO NOT wear any jewelry or piercings on day of surgery. All body piercing jewelry must be removed. 11. If you have ___dentures, they will be removed before going to the OR; we will provide you a container.   If you wear ___contact lenses or ___glasses, they will be removed; please bring [facility]   [facility]ZNorthern [facility] CenterjessicaNov[facility] [address]    [phone] [facility]  [phone]   [address] [place] [address]       All above information reviewed with patient in person or by phone. Patient verbalizes understanding. All questions and concerns addressed.                                                                                                  Patient/Rep____________________                                                                                                                                    PRE OP INSTRUCTIONS

## 2017-10-11 NOTE — ANESTHESIA PRE-OP
Take 2 g by mouth daily. Historical Provider, MD   omeprazole (PRILOSEC) 10 MG capsule Take 10 mg by mouth daily. Historical Provider, MD       Current medications:    Current Outpatient Prescriptions   Medication Sig Dispense Refill    clobetasol (TEMOVATE) 0.05 % ointment Apply topically 2 times daily Apply topically 2 times daily.  hydrocortisone 2.5 % ointment apply bid to sensitive area psoriasis. Apply sparingly.  budesonide-formoterol (SYMBICORT) 160-4.5 MCG/ACT AERO INHALE 2 PUFFS INTO THE LUNGS TWICE DAILY 1 Inhaler 11    albuterol sulfate HFA (PROVENTIL HFA) 108 (90 Base) MCG/ACT inhaler INHALE 2 PUFFS EVERY 6 HOURS AS NEEDED 1 Inhaler 11    amLODIPine (NORVASC) 10 MG tablet Take 1 tablet by mouth daily 90 tablet 3    aspirin 81 MG tablet Take 81 mg by mouth daily      calcipotriene (DOVONEX) 0.005 % cream Apply topically 2 times daily Apply topically 2 times daily.  lisinopril (PRINIVIL;ZESTRIL) 40 MG tablet Take 1 tablet by mouth daily 90 tablet 3    Denosumab (PROLIA SC) Inject into the skin      Leuprolide Acetate (LUPRON IJ) Inject as directed every 3 months      vitamin E 400 UNIT capsule Take 400 Units by mouth daily.  calcium carbonate-vitamin D (CALCIUM 600+D) 600-200 MG-UNIT TABS Take by mouth daily       betamethasone dipropionate 0.05 % lotion Apply  topically 2 times daily. Apply topically 2 times daily.  triamcinolone (KENALOG) 0.1 % cream Apply  topically 2 times daily. Apply topically 2 times daily.  fish oil-omega-3 fatty acids 1000 MG capsule Take 2 g by mouth daily.  omeprazole (PRILOSEC) 10 MG capsule Take 10 mg by mouth daily. No current facility-administered medications for this encounter.         Allergies:  No Known Allergies    Problem List:    Patient Active Problem List   Diagnosis Code    Psoriasis L40.9    Prostate CA ([address] C61    GERD (gastroesophageal reflux disease) K21.9    ED (erectile dysfunction) N52.9   

## 2017-10-16 ENCOUNTER — OFFICE VISIT (OUTPATIENT)
Dept: FAMILY MEDICINE CLINIC | Age: 77
End: 2017-10-16

## 2017-10-16 VITALS
DIASTOLIC BLOOD PRESSURE: 60 MMHG | BODY MASS INDEX: 23.34 KG/M2 | SYSTOLIC BLOOD PRESSURE: 100 MMHG | HEART RATE: 118 BPM | HEIGHT: 70 IN | WEIGHT: 163 LBS | OXYGEN SATURATION: 96 %

## 2017-10-16 DIAGNOSIS — I65.29 OCCLUSION AND STENOSIS OF CAROTID ARTERY: Primary | ICD-10-CM

## 2017-10-16 DIAGNOSIS — J44.9 CHRONIC OBSTRUCTIVE PULMONARY DISEASE, UNSPECIFIED COPD TYPE (HCC): ICD-10-CM

## 2017-10-16 DIAGNOSIS — I10 ESSENTIAL HYPERTENSION: ICD-10-CM

## 2017-10-16 DIAGNOSIS — D50.8 OTHER IRON DEFICIENCY ANEMIA: ICD-10-CM

## 2017-10-16 PROCEDURE — G8484 FLU IMMUNIZE NO ADMIN: HCPCS | Performed by: FAMILY MEDICINE

## 2017-10-16 PROCEDURE — 99214 OFFICE O/P EST MOD 30 MIN: CPT | Performed by: FAMILY MEDICINE

## 2017-10-16 PROCEDURE — 4040F PNEUMOC VAC/ADMIN/RCVD: CPT | Performed by: FAMILY MEDICINE

## 2017-10-16 PROCEDURE — G8926 SPIRO NO PERF OR DOC: HCPCS | Performed by: FAMILY MEDICINE

## 2017-10-16 PROCEDURE — 1123F ACP DISCUSS/DSCN MKR DOCD: CPT | Performed by: FAMILY MEDICINE

## 2017-10-16 PROCEDURE — G8427 DOCREV CUR MEDS BY ELIG CLIN: HCPCS | Performed by: FAMILY MEDICINE

## 2017-10-16 PROCEDURE — 3023F SPIROM DOC REV: CPT | Performed by: FAMILY MEDICINE

## 2017-10-16 PROCEDURE — G8420 CALC BMI NORM PARAMETERS: HCPCS | Performed by: FAMILY MEDICINE

## 2017-10-16 PROCEDURE — G8598 ASA/ANTIPLAT THER USED: HCPCS | Performed by: FAMILY MEDICINE

## 2017-10-16 PROCEDURE — 1036F TOBACCO NON-USER: CPT | Performed by: FAMILY MEDICINE

## 2017-10-16 NOTE — Clinical Note
Rich Mr. Jin Aleman looks good for surgery Sl anemic- we'll have him take some slow release iron and check a CBC a month after surgery Also he needs another lipid checkprobably needs to be on a statin Thinks much [employer]

## 2017-10-16 NOTE — PROGRESS NOTES
Yes      Comment: FEW BEERS PER WEEK    Drug use: No    Sexual activity: Yes     Partners: Female     Other Topics Concern    Not on file     Social History Narrative    No narrative on file       REVIEW OF SYSTEMS:   CONSTITUTIONAL: No major weight gain or loss, fatigue, weakness, night sweats or fever. HEENT: No new vision difficulties or ringing in the ears. RESPIRATORY: No new SOB, PND, orthopnea or cough. CARDIOVASCULAR: no CP, palpitations or SOB with exertion  GI: No nausea, vomiting, diarrhea, constipation, abdominal pain or changes in bowel habits. : No urinary frequency, urgency, incontinence hematuria or dysuria. SKIN: No cyanosis or skin lesions. MUSCULOSKELETAL: No new muscle or joint pain. NEUROLOGICAL: No syncope or TIA-like symptoms. PSYCHIATRIC: No anxiety, insomnia or depression     Physical Exam   /60   Pulse 118   Ht 5' 10\" (1.778 m)   Wt 163 lb (73.9 kg)   SpO2 96%   BMI 23.39 kg/m²   Constitutional: Patient is oriented to person, place, and time. He appears in no distress. Head: Normocephalic and atraumatic. Right Ear: Tympanic membrane, external ear and ear canal normal.   Left Ear: Tympanic membrane, external ear and ear canal normal.   Nose: Nose normal.   Mouth/Throat: Oropharynx is clear and moist, and mucous membranes are normal.  There is no cervical adenopathy. There are no loose teeth. Eyes: Conjunctivae and extraocular motions are normal. Pupils are equal, round, and reactive to light bilaterally. Neck: Neck supple. No JVD present. No mass and no thyromegaly present. Right carotid bruit  Cardiovascular: Normal rate, regular rhythm, normal heart sounds and intact distal pulses. Exam reveals no gallop and no friction rub. No murmur heard. Pulmonary/Chest: Effort normal and breath sounds normal. No respiratory distress. There are no wheezes, rhonchi or rales. Abdominal: Soft, non-tender. Normal bowel sounds and aorta.  There is no organomegaly, bruit or palpable mass. Neurological: He is alert and oriented to person, place, and time. He has normal reflexes. No cranial nerve deficit. Coordination normal.   Skin: Skin is warm and dry. There is no rash or erythema. No suspicious lesions noted. Psychiatric: He has a normal mood and affect.  Speech and behavior are normal. Judgment, cognition and memory are normal.     EKG Interpretation: 7/2017  NSR, no acute changes/no change/LAD  Comparison to prior EKG: unchanged     Lab Review   Hospital Outpatient Visit on 10/11/2017   Component Date Value    Sodium 10/11/2017 140     Potassium 10/11/2017 4.3     Chloride 10/11/2017 102     CO2 10/11/2017 27     Anion Gap 10/11/2017 11     Glucose 10/11/2017 98     BUN 10/11/2017 20     CREATININE 10/11/2017 1.1     GFR Non- 10/11/2017 >60     GFR  10/11/2017 >60     Calcium 10/11/2017 9.0     WBC 10/11/2017 7.5     RBC 10/11/2017 4.05*    Hemoglobin 10/11/2017 12.6*    Hematocrit 10/11/2017 37.7*    MCV 10/11/2017 93.0     MCH 10/11/2017 31.1     MCHC 10/11/2017 33.4     RDW 10/11/2017 13.9     Platelets 85/78/9171 285     MPV 10/11/2017 7.7     Protime 10/11/2017 11.5     INR 10/11/2017 1.02     aPTT 10/11/2017 28.3     ABO/Rh 10/11/2017 A POS     Antibody Screen 10/11/2017 NEG    Admission on 09/07/2017, Discharged on 09/09/2017   Component Date Value    WBC 09/07/2017 14.9*    RBC 09/07/2017 3.98*    Hemoglobin 09/07/2017 12.4*    Hematocrit 09/07/2017 37.3*    MCV 09/07/2017 93.6     MCH 09/07/2017 31.1     MCHC 09/07/2017 33.2     RDW 09/07/2017 13.1     Platelets 78/65/7443 387     MPV 09/07/2017 7.5     Neutrophils % 09/07/2017 86.3     Lymphocytes % 09/07/2017 4.8     Monocytes % 09/07/2017 7.9     Eosinophils % 09/07/2017 0.6     Basophils % 09/07/2017 0.4     Neutrophils # 09/07/2017 12.8*    Lymphocytes # 09/07/2017 0.7*    Monocytes # 09/07/2017 1.2     Eosinophils 5.7*    Alb 09/08/2017 3.3*    Albumin/Globulin Ratio 09/08/2017 1.4     Total Bilirubin 09/08/2017 0.5     Alkaline Phosphatase 09/08/2017 61     ALT 09/08/2017 17     AST 09/08/2017 14*    Globulin 09/08/2017 2.4     WBC 09/08/2017 11.0     RBC 09/08/2017 3.14*    Hemoglobin 09/08/2017 9.4*    Hematocrit 09/08/2017 29.7*    MCV 09/08/2017 94.5     MCH 09/08/2017 30.0     MCHC 09/08/2017 31.8     RDW 09/08/2017 13.0     Platelets 97/08/3169 261     MPV 09/08/2017 7.6     Neutrophils % 09/08/2017 82.5     Lymphocytes % 09/08/2017 7.9     Monocytes % 09/08/2017 8.6     Eosinophils % 09/08/2017 0.6     Basophils % 09/08/2017 0.4     Neutrophils # 09/08/2017 9.1*    Lymphocytes # 09/08/2017 0.9*    Monocytes # 09/08/2017 0.9     Eosinophils # 09/08/2017 0.1     Basophils # 09/08/2017 0.0     WBC 09/09/2017 8.1     RBC 09/09/2017 3.10*    Hemoglobin 09/09/2017 9.8*    Hematocrit 09/09/2017 29.6*    MCV 09/09/2017 95.5     MCH 09/09/2017 31.5     MCHC 09/09/2017 33.0     RDW 09/09/2017 13.0     Platelets 21/99/8661 244     MPV 09/09/2017 7.5           Assessment:  Encounter Diagnoses   Name Primary?  Occlusion and stenosis of carotid artery Yes    Chronic obstructive pulmonary disease, unspecified COPD type ([address]     Essential hypertension         76 y.o. patient with planned surgery as above. Known risk factors for perioperative complications: HTN= stable       Plan:    1. Preoperative workup as follows: hemoglobin, hematocrit, electrolytes, creatinine, glucose. 2. Change in medication regimen before surgery: discontinue  (asa) 1-2 d before surgery. If BP low, take 1/2 dose norvasc  3. Prophylaxis for cardiac events with perioperative beta-blockers: not indicated. 4. Invasive hemodynamic monitoring perioperatively: not indicated. 5. Patient is cleared for upcoming surgery.    6. Slow release iron daily- recheck CBC/LIPID    If you have questions, please do not hesitate to call me. Sincerely,        Hitesh Motta.  Ritu Kwok M.D.

## 2017-10-27 ENCOUNTER — TELEPHONE (OUTPATIENT)
Dept: PHARMACY | Facility: CLINIC | Age: 77
End: 2017-10-27

## 2017-10-27 ENCOUNTER — CARE COORDINATION (OUTPATIENT)
Dept: CASE MANAGEMENT | Age: 77
End: 2017-10-27

## 2017-10-27 NOTE — TELEPHONE ENCOUNTER
CLINICAL PHARMACY NOTE  Post-Discharge Transitions of Care (CHETAN)    Subjective/Objective:  John Singh is a 76 y.o. male. Patient was discharged from [facility] on 10/26/17 with a diagnosis of carotid endarterectomy. Patient outreach to review discharge medications and provide medication review and management. Spoke with patient. Patient politely refused completed medication review. Patient states that his wife is a nurse and that she is taking care of all of his medications and he doesn't have any questions at this time. No further intervention indicated by PharmD. Will sign off.      Ignacio Antonio PharmD   [address] [facility]  Phone: [phone] or [phone], option 7      CLINICAL PHARMACY NOTE   POST-DISCHARGE TELEPHONE FOLLOW-UP ADDENDUM    For Pharmacy Admin Tracking Only    TCM Call Made?: Yes  [facility] Select Patient?: Yes  Total # of Interventions Recommended: 0  Total # Interventions Accepted: 0  Intervention Severity:   - Level 1 Intervention Present?: No   - Level 2 #: 0   - Level 3 #: 0  Outreach Status: Patient Refused  Care Coordinator Outreach to Patient?: Yes  Provider Contacted?: No  Time Spent (min): 5

## 2017-10-27 NOTE — CARE COORDINATION
[facility] Transitions Initial Follow Up Call    Call within 2 business days of discharge: Yes    Patient: Dorota Pabon Patient : 1940   MRN: [id number]  Reason for Admission: Carotid Endarterectomy  Discharge Date: 10/26/17 RARS: [facility] Risk Score: 12.25     Spoke with: [address] [place] [address] Drive: [address] [facility]    Non-face-to-face services provided:  Obtained and reviewed discharge summary and/or continuity of care documents    Inpatient Assessment  Care Transitions 24 Hour Call    Do you have support at home?:  Partner/Spouse/SO  Are you an active caregiver in your home?:  No  Care Transitions Interventions         Follow Up  Future Appointments  Date Time Provider Javan Chacon   2017 8:00 AM Yovanny Crow MD FF VASC/ENDO [facility]   2018 1:00 PM MD [address] [facility]       Virginie Scott RN

## 2017-10-31 ENCOUNTER — OFFICE VISIT (OUTPATIENT)
Dept: VASCULAR SURGERY | Age: 77
End: 2017-10-31

## 2017-10-31 VITALS
WEIGHT: 159 LBS | DIASTOLIC BLOOD PRESSURE: 64 MMHG | SYSTOLIC BLOOD PRESSURE: 128 MMHG | BODY MASS INDEX: 22.76 KG/M2 | HEIGHT: 70 IN

## 2017-10-31 DIAGNOSIS — I65.23 CAROTID ATHEROSCLEROSIS, BILATERAL: Primary | ICD-10-CM

## 2017-10-31 PROCEDURE — 99024 POSTOP FOLLOW-UP VISIT: CPT | Performed by: SURGERY

## 2017-10-31 NOTE — PROGRESS NOTES
[facility])   Vascular Surgery Followup    Referring Provider:  Dora Brown MD     Chief Complaint   Patient presents with    Post-Op Check        History of Present Illness:   68-year-old male here today for follow-up with history of right carotid endarterectomy October 25. He has been complaining of a upper respiratory infection. He's noted some mild increased swelling at his surgical site. He wanted this evaluated. He has no other complaints today. Denies TIA, stroke or amaurosis. Denies difficulty swallowing or talking. Past Medical History:   has a past medical history of Carotid artery occlusion and stenosis; COPD (chronic obstructive pulmonary disease) ([facility] [address] ED (erectile dysfunction); Emphysema lung ([facility] [address] GERD (gastroesophageal reflux disease); Hyperlipidemia; Hypertension; Prostate CA ([facility] [address] Psoriasis; Pulmonary nodules; and Radiation. Surgical History:   has a past surgical history that includes ostatectomy (2003); Tonsillectomy; Carotid endarterectomy (5/3/12); Cystoscopy (07/2017); Colonoscopy; Endoscopy, colon, diagnostic; and Carotid endarterectomy (Right, 10/25/2017). Social History:   reports that he quit smoking about 30 years ago. His smoking use included Cigarettes. He has a 15.00 pack-year smoking history. He has never used smokeless tobacco. He reports that he drinks alcohol. He reports that he does not use drugs. Family History:  family history includes Diabetes in his mother; Heart Disease in his father; Heart Failure in his father; High Blood Pressure in his mother. Home Medications:  Current Outpatient Prescriptions   Medication Sig Dispense Refill    oxyCODONE-acetaminophen (PERCOCET) 5-325 MG per tablet Take 1 tablet by mouth every 4 hours as needed for Pain .  20 tablet 0    amLODIPine (NORVASC) 10 MG tablet Take 0.5 tablets by mouth daily 90 tablet 3    budesonide-formoterol (SYMBICORT) 160-4.5 MCG/ACT AERO INHALE 2 PUFFS INTO THE LUNGS TWICE DAILY 1

## 2017-12-08 ENCOUNTER — TELEPHONE (OUTPATIENT)
Dept: FAMILY MEDICINE CLINIC | Age: 77
End: 2017-12-08

## 2017-12-08 DIAGNOSIS — D50.8 OTHER IRON DEFICIENCY ANEMIA: ICD-10-CM

## 2017-12-08 DIAGNOSIS — I65.29 OCCLUSION AND STENOSIS OF CAROTID ARTERY: ICD-10-CM

## 2017-12-08 LAB
CHOLESTEROL, TOTAL: 255 MG/DL (ref 0–199)
HCT VFR BLD CALC: 38.6 % (ref 40.5–52.5)
HDLC SERPL-MCNC: 76 MG/DL (ref 40–60)
HEMOGLOBIN: 12.6 G/DL (ref 13.5–17.5)
LDL CHOLESTEROL CALCULATED: 163 MG/DL
MCH RBC QN AUTO: 31.2 PG (ref 26–34)
MCHC RBC AUTO-ENTMCNC: 32.6 G/DL (ref 31–36)
MCV RBC AUTO: 95.8 FL (ref 80–100)
PDW BLD-RTO: 14.2 % (ref 12.4–15.4)
PLATELET # BLD: 262 K/UL (ref 135–450)
PMV BLD AUTO: 8.1 FL (ref 5–10.5)
RBC # BLD: 4.03 M/UL (ref 4.2–5.9)
TRIGL SERPL-MCNC: 80 MG/DL (ref 0–150)
VLDLC SERPL CALC-MCNC: 16 MG/DL
WBC # BLD: 6.8 K/UL (ref 4–11)

## 2018-01-10 ENCOUNTER — OFFICE VISIT (OUTPATIENT)
Dept: FAMILY MEDICINE CLINIC | Age: 78
End: 2018-01-10

## 2018-01-10 VITALS
BODY MASS INDEX: 23.34 KG/M2 | SYSTOLIC BLOOD PRESSURE: 138 MMHG | OXYGEN SATURATION: 97 % | WEIGHT: 163 LBS | HEIGHT: 70 IN | RESPIRATION RATE: 14 BRPM | DIASTOLIC BLOOD PRESSURE: 64 MMHG

## 2018-01-10 DIAGNOSIS — M81.0 OSTEOPOROSIS WITHOUT CURRENT PATHOLOGICAL FRACTURE, UNSPECIFIED OSTEOPOROSIS TYPE: ICD-10-CM

## 2018-01-10 DIAGNOSIS — K21.9 GASTROESOPHAGEAL REFLUX DISEASE WITHOUT ESOPHAGITIS: ICD-10-CM

## 2018-01-10 DIAGNOSIS — C61 PROSTATE CA (HCC): Primary | ICD-10-CM

## 2018-01-10 DIAGNOSIS — I10 ESSENTIAL HYPERTENSION: ICD-10-CM

## 2018-01-10 DIAGNOSIS — J44.9 MODERATE COPD (CHRONIC OBSTRUCTIVE PULMONARY DISEASE) (HCC): ICD-10-CM

## 2018-01-10 DIAGNOSIS — E78.2 MIXED HYPERLIPIDEMIA: ICD-10-CM

## 2018-01-10 PROCEDURE — G8598 ASA/ANTIPLAT THER USED: HCPCS | Performed by: FAMILY MEDICINE

## 2018-01-10 PROCEDURE — G8427 DOCREV CUR MEDS BY ELIG CLIN: HCPCS | Performed by: FAMILY MEDICINE

## 2018-01-10 PROCEDURE — 1123F ACP DISCUSS/DSCN MKR DOCD: CPT | Performed by: FAMILY MEDICINE

## 2018-01-10 PROCEDURE — G8926 SPIRO NO PERF OR DOC: HCPCS | Performed by: FAMILY MEDICINE

## 2018-01-10 PROCEDURE — 1036F TOBACCO NON-USER: CPT | Performed by: FAMILY MEDICINE

## 2018-01-10 PROCEDURE — 4040F PNEUMOC VAC/ADMIN/RCVD: CPT | Performed by: FAMILY MEDICINE

## 2018-01-10 PROCEDURE — G8484 FLU IMMUNIZE NO ADMIN: HCPCS | Performed by: FAMILY MEDICINE

## 2018-01-10 PROCEDURE — 99214 OFFICE O/P EST MOD 30 MIN: CPT | Performed by: FAMILY MEDICINE

## 2018-01-10 PROCEDURE — 3023F SPIROM DOC REV: CPT | Performed by: FAMILY MEDICINE

## 2018-01-10 PROCEDURE — G8420 CALC BMI NORM PARAMETERS: HCPCS | Performed by: FAMILY MEDICINE

## 2018-02-11 PROBLEM — K80.50 BILIARY COLIC: Status: ACTIVE | Noted: 2018-02-11

## 2018-02-11 PROBLEM — R10.9 ABDOMINAL PAIN: Status: ACTIVE | Noted: 2018-02-11

## 2018-02-12 PROBLEM — K81.9 CHOLECYSTITIS: Status: ACTIVE | Noted: 2018-02-12

## 2018-02-15 ENCOUNTER — OFFICE VISIT (OUTPATIENT)
Dept: FAMILY MEDICINE CLINIC | Age: 78
End: 2018-02-15

## 2018-02-15 ENCOUNTER — HOSPITAL ENCOUNTER (OUTPATIENT)
Dept: OTHER | Age: 78
Discharge: HOME | End: 2018-02-15
Attending: FAMILY MEDICINE | Admitting: FAMILY MEDICINE

## 2018-02-15 ENCOUNTER — CARE COORDINATION (OUTPATIENT)
Dept: CASE MANAGEMENT | Age: 78
End: 2018-02-15

## 2018-02-15 VITALS
HEART RATE: 127 BPM | BODY MASS INDEX: 22.76 KG/M2 | HEIGHT: 70 IN | DIASTOLIC BLOOD PRESSURE: 50 MMHG | WEIGHT: 159 LBS | OXYGEN SATURATION: 96 % | SYSTOLIC BLOOD PRESSURE: 130 MMHG | TEMPERATURE: 99.7 F

## 2018-02-15 DIAGNOSIS — J18.1 LOBAR PNEUMONIA (HCC): ICD-10-CM

## 2018-02-15 DIAGNOSIS — J18.9 PNEUMONIA OF LEFT LOWER LOBE DUE TO INFECTIOUS ORGANISM: ICD-10-CM

## 2018-02-15 DIAGNOSIS — J18.9 PNEUMONIA OF LEFT LOWER LOBE DUE TO INFECTIOUS ORGANISM: Primary | ICD-10-CM

## 2018-02-15 DIAGNOSIS — R68.89 FLU-LIKE SYMPTOMS: ICD-10-CM

## 2018-02-15 PROBLEM — J96.01 ACUTE HYPOXEMIC RESPIRATORY FAILURE (HCC): Status: ACTIVE | Noted: 2018-02-15

## 2018-02-15 LAB
INFLUENZA A ANTIBODY: NORMAL
INFLUENZA B ANTIBODY: NORMAL

## 2018-02-15 PROCEDURE — 1123F ACP DISCUSS/DSCN MKR DOCD: CPT | Performed by: FAMILY MEDICINE

## 2018-02-15 PROCEDURE — 1111F DSCHRG MED/CURRENT MED MERGE: CPT | Performed by: FAMILY MEDICINE

## 2018-02-15 PROCEDURE — G8427 DOCREV CUR MEDS BY ELIG CLIN: HCPCS | Performed by: FAMILY MEDICINE

## 2018-02-15 PROCEDURE — G8598 ASA/ANTIPLAT THER USED: HCPCS | Performed by: FAMILY MEDICINE

## 2018-02-15 PROCEDURE — 99214 OFFICE O/P EST MOD 30 MIN: CPT | Performed by: FAMILY MEDICINE

## 2018-02-15 PROCEDURE — G8420 CALC BMI NORM PARAMETERS: HCPCS | Performed by: FAMILY MEDICINE

## 2018-02-15 PROCEDURE — 1036F TOBACCO NON-USER: CPT | Performed by: FAMILY MEDICINE

## 2018-02-15 PROCEDURE — G8484 FLU IMMUNIZE NO ADMIN: HCPCS | Performed by: FAMILY MEDICINE

## 2018-02-15 PROCEDURE — 87804 INFLUENZA ASSAY W/OPTIC: CPT | Performed by: FAMILY MEDICINE

## 2018-02-15 PROCEDURE — 4040F PNEUMOC VAC/ADMIN/RCVD: CPT | Performed by: FAMILY MEDICINE

## 2018-02-15 RX ORDER — LEVOFLOXACIN 750 MG/1
750 TABLET ORAL DAILY
Qty: 7 TABLET | Refills: 0 | Status: ON HOLD | OUTPATIENT
Start: 2018-02-15 | End: 2018-02-18

## 2018-02-15 NOTE — PROGRESS NOTES
HYDROcodone-acetaminophen (NORCO) 5-325 MG per tablet Take 1 tablet by mouth every 4 hours as needed (For moderate pain level 4-6) for up to 7 days. 15 tablet 0    triamcinolone (KENALOG) 0.1 % ointment       amLODIPine (NORVASC) 10 MG tablet Take 0.5 tablets by mouth daily (Patient taking differently: Take 10 mg by mouth daily ) 90 tablet 3    budesonide-formoterol (SYMBICORT) 160-4.5 MCG/ACT AERO INHALE 2 PUFFS INTO THE LUNGS TWICE DAILY 1 Inhaler 11    albuterol sulfate HFA (PROVENTIL HFA) 108 (90 Base) MCG/ACT inhaler INHALE 2 PUFFS EVERY 6 HOURS AS NEEDED 1 Inhaler 11    aspirin 81 MG tablet Take 81 mg by mouth daily      lisinopril (PRINIVIL;ZESTRIL) 40 MG tablet Take 1 tablet by mouth daily 90 tablet 3    Denosumab (PROLIA SC) Inject into the skin      Leuprolide Acetate (LUPRON IJ) Inject as directed every 3 months      vitamin E 400 UNIT capsule Take 400 Units by mouth daily.  calcium carbonate-vitamin D (CALCIUM 600+D) 600-200 MG-UNIT TABS Take 2 tablets by mouth daily       fish oil-omega-3 fatty acids 1000 MG capsule Take 1 g by mouth daily       omeprazole (PRILOSEC) 10 MG capsule Take 10 mg by mouth daily. No current facility-administered medications for this visit. No Known Allergies    Review of Systems   No vomiting, +lightheadedness transiently    Vitals:  BP (!) 130/50   Pulse 127   Temp 99.7 °F (37.6 °C)   Ht 5' 10\" (1.778 m)   Wt 159 lb (72.1 kg)   SpO2 96%   BMI 22.81 kg/m²     Physical Exam   General:  Well-appearing, NAD, alert, non-toxic  HEENT:  Normocephalic, atraumatic. Pupils equal and round. CHEST/LUNGS: +crackles in L base, no wheeze, no rhonchi.  Symmetric rise  CARDIOVASCULAR: RRR,  no murmur, no rub  EXTREMETIES: Normal movement of all extremities  SKIN:  No rash, no cellulitis, no bruising, no petechiae/purpura/vesicles/pustules/abscess  PSYCH:  A+O x 3; normal affect  NEURO:  GCS 15, CN2-12 grossly intact, no focal motor/sensory deficits, no

## 2018-02-19 ENCOUNTER — CARE COORDINATION (OUTPATIENT)
Dept: CASE MANAGEMENT | Age: 78
End: 2018-02-19

## 2018-02-19 ENCOUNTER — TELEPHONE (OUTPATIENT)
Dept: PULMONOLOGY | Age: 78
End: 2018-02-19
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
## 2018-02-19 DIAGNOSIS — J18.9 HOSPITAL-ACQUIRED PNEUMONIA: Primary | ICD-10-CM

## 2018-02-19 DIAGNOSIS — R93.89 ABNORMAL CXR: ICD-10-CM

## 2018-02-19 PROCEDURE — 1111F DSCHRG MED/CURRENT MED MERGE: CPT

## 2018-02-19 NOTE — CARE COORDINATION
[facility] Transitions Initial Follow Up Call    Call within 2 business days of discharge: Yes    Patient: Polo Spear Patient : 1940   MRN: [id number]  Reason for Admission: HCAP  Discharge Date: 18 RARS: [facility] Risk Score: 22.25     Spoke with: [address] [facility]: [facility]    Non-face-to-face services provided:  Scheduled appointment with PCP-Dr. Clara Gardner and reviewed discharge summary and/or continuity of care documents  Education of patient/family/caregiver/guardian to support self-management-Medication reconciliation    Care Transitions 24 Hour Call    Schedule Follow Up Appointment with PCP:  Completed  Do you have any ongoing symptoms?:  No  Do you have a copy of your discharge instructions?:  Yes  Do you have all of your prescriptions and are they filled?:  Yes  Have you been contacted by a [facility] Pharmacist?:  No  Have you scheduled your follow up appointment?:  Yes (Comment: CTC scheduled patient with first available hospital follow up)  How are you going to get to your appointment?:  Car - family or friend to transport  Were you discharged with any Home Care or Post Acute Services:  No  Do you have support at home?:  Partner/Spouse/SO  Do you feel like you have everything you need to keep you well at home?:  Yes  Are you an active caregiver in your home?:  No  Care Transitions Interventions  No Identified Needs       Reports he is doing well, breathing better,  has all medications and taking as prescribed, states his wife is a nurse and manages all of his medications with him. Still feels a little weak and fatigued but managing okay. Hospital follow up scheduled with PCP as listed below 18;    Care transition will continue to follow  Follow Up  Future Appointments  Date Time Provider Javan Chacon   2018 11:30 AM Manuel Dempsey MD FF G&L SURG [facility]   2018 2:30 PM Tisha Ndiaye MD [facility] FP MMA   3/1/2018 11:45 Erum Welch MD

## 2018-02-20 LAB — BLOOD CULTURE, ROUTINE: NORMAL

## 2018-02-23 ENCOUNTER — CARE COORDINATION (OUTPATIENT)
Dept: CASE MANAGEMENT | Age: 78
End: 2018-02-23

## 2018-02-26 NOTE — CARE COORDINATION
[facility] Transitions Follow Up Call    2018    Patient: Sofiya Greenwood  Patient : 1940   MRN: [id number]  Reason for Admission: HCAP  Discharge Date: 18 RARS: Risk Score: 22.25       Spoke with: Rosina Lambert Transitions Subsequent and Final Call    Subsequent and Final Calls  Do you have any ongoing symptoms?:  No  Have your medications changed?:  No  Do you have any questions related to your medications?:  No  Do you currently have any active services?:  No  Do you have any needs or concerns that I can assist you with?:  No  Identified Barriers:  None  Care Transitions Interventions  No Identified Needs  Other Interventions:          Pt states doing well, no issues or concerns. F/U appts listed below. Agreed to more CTC f/u calls      Follow Up  Future Appointments  Date Time Provider Javan Chacon   2018 11:30 AM Sarah Daniels MD FF G&L SURG [facility]   2018 2:30 PM Blaine Stevens MD [facility] FP MMA   3/1/2018 11:45 AM Evelyn Bay MD PULM & CC [facility]   2018 10:30 AM Audrey Sorensen MD FF VASC/ENDO [facility]   2018 1:00 PM Evelyn Bay MD PULM & CC [facility]       Monika Brantley, RN

## 2018-02-27 ENCOUNTER — OFFICE VISIT (OUTPATIENT)
Dept: SURGERY | Age: 78
End: 2018-02-27

## 2018-02-27 VITALS — SYSTOLIC BLOOD PRESSURE: 129 MMHG | DIASTOLIC BLOOD PRESSURE: 60 MMHG | WEIGHT: 156 LBS | BODY MASS INDEX: 22.38 KG/M2

## 2018-02-27 DIAGNOSIS — K81.0 ACUTE CHOLECYSTITIS: Primary | ICD-10-CM

## 2018-02-27 PROCEDURE — 99024 POSTOP FOLLOW-UP VISIT: CPT | Performed by: SURGERY

## 2018-02-28 ENCOUNTER — OFFICE VISIT (OUTPATIENT)
Dept: FAMILY MEDICINE CLINIC | Age: 78
End: 2018-02-28

## 2018-02-28 VITALS
HEART RATE: 102 BPM | HEIGHT: 70 IN | OXYGEN SATURATION: 95 % | WEIGHT: 157 LBS | BODY MASS INDEX: 22.48 KG/M2 | SYSTOLIC BLOOD PRESSURE: 102 MMHG | DIASTOLIC BLOOD PRESSURE: 52 MMHG

## 2018-02-28 DIAGNOSIS — J18.9 HAP (HOSPITAL-ACQUIRED PNEUMONIA): ICD-10-CM

## 2018-02-28 DIAGNOSIS — R53.1 GENERAL WEAKNESS: ICD-10-CM

## 2018-02-28 DIAGNOSIS — J41.0 SIMPLE CHRONIC BRONCHITIS (HCC): ICD-10-CM
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
## 2018-02-28 DIAGNOSIS — I10 ESSENTIAL HYPERTENSION: Primary | ICD-10-CM

## 2018-02-28 DIAGNOSIS — K21.9 GASTROESOPHAGEAL REFLUX DISEASE WITHOUT ESOPHAGITIS: ICD-10-CM

## 2018-02-28 PROCEDURE — 4040F PNEUMOC VAC/ADMIN/RCVD: CPT | Performed by: FAMILY MEDICINE

## 2018-02-28 PROCEDURE — G8484 FLU IMMUNIZE NO ADMIN: HCPCS | Performed by: FAMILY MEDICINE

## 2018-02-28 PROCEDURE — 1111F DSCHRG MED/CURRENT MED MERGE: CPT | Performed by: FAMILY MEDICINE

## 2018-02-28 PROCEDURE — G8427 DOCREV CUR MEDS BY ELIG CLIN: HCPCS | Performed by: FAMILY MEDICINE

## 2018-02-28 PROCEDURE — 99214 OFFICE O/P EST MOD 30 MIN: CPT | Performed by: FAMILY MEDICINE

## 2018-02-28 PROCEDURE — G8598 ASA/ANTIPLAT THER USED: HCPCS | Performed by: FAMILY MEDICINE

## 2018-02-28 PROCEDURE — G8420 CALC BMI NORM PARAMETERS: HCPCS | Performed by: FAMILY MEDICINE

## 2018-02-28 PROCEDURE — 3023F SPIROM DOC REV: CPT | Performed by: FAMILY MEDICINE

## 2018-02-28 PROCEDURE — G8926 SPIRO NO PERF OR DOC: HCPCS | Performed by: FAMILY MEDICINE

## 2018-02-28 PROCEDURE — 1123F ACP DISCUSS/DSCN MKR DOCD: CPT | Performed by: FAMILY MEDICINE

## 2018-02-28 PROCEDURE — 1036F TOBACCO NON-USER: CPT | Performed by: FAMILY MEDICINE

## 2018-02-28 NOTE — PROGRESS NOTES
Hughie Holter is a 76 y.o. male. HPI: Here for complex medical visit/hospital follow-up  On January 1 he had a cough which is treated as an outpatient to better on February 12 he had abdominal pain went to the emergency room was found to have acute cholecystitis underwent a laparoscopic cholecystectomy was discharged on February 14  On February 15 she is seen in the office for presumed outpatient pneumonia started on antibiotics and given specific warning signs  He got worse more short of breath or cough went to the emergency room where his O2 sats were 85% and he was admitted for hospital-acquired pneumonia  He was initially treated with vancomycin, Zosyn, and Levaquin but as the cultures came back including MRSA that were all negative he was treated with Levaquin only he was sent home on outpatient Levaquin and he's here today for follow-up  His cough is better he still taken Mucinex he still feels a little weak back to maybe 80% of his regular strength  He is to see his point Dr. Dr. Anurag Farrell tomorrow for routine follow-up  Meds, vitamins and allergies reviewed with pt    ROS: No TIA's or unusual headaches, no dysphagia. No prolonged cough. No dyspnea or chest pain on exertion. No abdominal pain, change in bowel habits, black or bloody stools. No urinary tract symptoms. No new or unusual musculoskeletal symptoms. Prior to Visit Medications    Medication Sig Taking?  Authorizing Provider   guaiFENesin (MUCINEX) 600 MG extended release tablet Take 1 tablet by mouth 2 times daily Yes Grady Arvizu MD   Ferrous Sulfate (IRON SLOW RELEASE PO) Take by mouth daily Yes Historical Provider, MD   triamcinolone (KENALOG) 0.1 % ointment  Yes Historical Provider, MD   amLODIPine (NORVASC) 10 MG tablet Take 0.5 tablets by mouth daily  Patient taking differently: Take 10 mg by mouth daily  Yes Angeles Anton MD   budesonide-formoterol (SYMBICORT) 160-4.5 MCG/ACT AERO INHALE 2 PUFFS INTO THE LUNGS TWICE DAILY Yes Katelyn Scott MD   albuterol sulfate HFA (PROVENTIL HFA) 108 (90 Base) MCG/ACT inhaler INHALE 2 PUFFS EVERY 6 HOURS AS NEEDED Yes Katelyn Scott MD   aspirin 81 MG tablet Take 81 mg by mouth daily Yes Historical Provider, MD   lisinopril (PRINIVIL;ZESTRIL) 40 MG tablet Take 1 tablet by mouth daily Yes Rodrigo Torres MD   Denosumab (PROLIA SC) Inject into the skin Yes Historical Provider, MD   Leuprolide Acetate (LUPRON IJ) Inject as directed every 3 months Yes Historical Provider, MD   vitamin E 400 UNIT capsule Take 400 Units by mouth daily. Yes Historical Provider, MD   calcium carbonate-vitamin D (CALCIUM 600+D) 600-200 MG-UNIT TABS Take 2 tablets by mouth daily  Yes Historical Provider, MD   fish oil-omega-3 fatty acids 1000 MG capsule Take 1 g by mouth daily  Yes Historical Provider, MD   omeprazole (PRILOSEC) 10 MG capsule Take 10 mg by mouth daily. Yes Historical Provider, MD       Past Medical History:   Diagnosis Date    Carotid artery occlusion and stenosis 5/3/2012    COPD (chronic obstructive pulmonary disease) ([facility] [address]     ED (erectile dysfunction)     Emphysema lung ([facility] [address] 8/8/2014    GERD (gastroesophageal reflux disease)     Hyperlipidemia     pt denies    Hypertension     Prostate CA ([facility] [address] 2003    Psoriasis     Pulmonary nodules     Radiation 2004       Social History   Substance Use Topics    Smoking status: Former Smoker     Packs/day: 0.50     Years: 30.00     Types: Cigarettes     Quit date: 9/1/1987    Smokeless tobacco: Never Used    Alcohol use Yes      Comment: FEW BEERS PER WEEK       Family History   Problem Relation Age of Onset    Diabetes Mother     High Blood Pressure Mother     Heart Disease Father      myxoma    Heart Failure Father        No Known Allergies    OBJECTIVE:  BP (!) 102/52   Pulse 102   Ht 5' 10\" (1.778 m)   Wt 157 lb (71.2 kg)   SpO2 95%   BMI 22.53 kg/m²   GEN:  in NAD, Slightly weak  NECK:  Supple without adenopathy.  No bruits  CV:  Regular

## 2018-03-01 ENCOUNTER — HOSPITAL ENCOUNTER (OUTPATIENT)
Dept: OTHER | Age: 78
Discharge: HOME | End: 2018-03-01
Attending: INTERNAL MEDICINE | Admitting: INTERNAL MEDICINE

## 2018-03-01 ENCOUNTER — OFFICE VISIT (OUTPATIENT)
Dept: PULMONOLOGY | Age: 78
End: 2018-03-01

## 2018-03-01 VITALS
HEART RATE: 94 BPM | DIASTOLIC BLOOD PRESSURE: 72 MMHG | HEIGHT: 70 IN | BODY MASS INDEX: 22.48 KG/M2 | OXYGEN SATURATION: 97 % | SYSTOLIC BLOOD PRESSURE: 108 MMHG | RESPIRATION RATE: 18 BRPM | TEMPERATURE: 97.2 F | WEIGHT: 157 LBS

## 2018-03-01 DIAGNOSIS — J18.9 PNEUMONIA, UNSPECIFIED ORGANISM: ICD-10-CM

## 2018-03-01 DIAGNOSIS — Z09 HOSPITAL DISCHARGE FOLLOW-UP: Primary | ICD-10-CM

## 2018-03-01 DIAGNOSIS — J18.9 HOSPITAL-ACQUIRED PNEUMONIA: ICD-10-CM

## 2018-03-01 DIAGNOSIS — J44.9 MODERATE COPD (CHRONIC OBSTRUCTIVE PULMONARY DISEASE) (HCC): ICD-10-CM
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
## 2018-03-01 PROCEDURE — G8926 SPIRO NO PERF OR DOC: HCPCS | Performed by: INTERNAL MEDICINE

## 2018-03-01 PROCEDURE — 99214 OFFICE O/P EST MOD 30 MIN: CPT | Performed by: INTERNAL MEDICINE

## 2018-03-01 PROCEDURE — 3023F SPIROM DOC REV: CPT | Performed by: INTERNAL MEDICINE

## 2018-03-01 PROCEDURE — G8484 FLU IMMUNIZE NO ADMIN: HCPCS | Performed by: INTERNAL MEDICINE

## 2018-03-01 PROCEDURE — 4040F PNEUMOC VAC/ADMIN/RCVD: CPT | Performed by: INTERNAL MEDICINE

## 2018-03-01 PROCEDURE — G8420 CALC BMI NORM PARAMETERS: HCPCS | Performed by: INTERNAL MEDICINE

## 2018-03-01 PROCEDURE — 1111F DSCHRG MED/CURRENT MED MERGE: CPT | Performed by: INTERNAL MEDICINE

## 2018-03-01 PROCEDURE — G8598 ASA/ANTIPLAT THER USED: HCPCS | Performed by: INTERNAL MEDICINE

## 2018-03-01 PROCEDURE — 1123F ACP DISCUSS/DSCN MKR DOCD: CPT | Performed by: INTERNAL MEDICINE

## 2018-03-01 PROCEDURE — 1036F TOBACCO NON-USER: CPT | Performed by: INTERNAL MEDICINE

## 2018-03-01 PROCEDURE — G8427 DOCREV CUR MEDS BY ELIG CLIN: HCPCS | Performed by: INTERNAL MEDICINE

## 2018-03-01 NOTE — PROGRESS NOTES
Areli Pike is 76 y.o. male here for hospital follow-up, COPD. Patient required lap cholecystectomy February 2018 and post op he was readmitted to the hospital with respiratory distress  He was diagnosed with aspiration pneumonia and COPD exacerbation due to that  He was seen by Dr. Alexsandra Kolb and he was discharged on oral antibiotic  He feels better today and he is finished with his antibiotic course  He denies any shortness of breath, dyspnea on exertion, fever, chills, diaphoresis or hemoptysis  He is on room air    He was seen initially for Pulmonary Medicine consultation referred by Dr. Yang Carrera for evaluation of S. O.B  He did smoke for 30 years and worked as a  volunteer for 13 years  He has been on Symbicort 160 BID and Albuterol PRN    His CT chest was read as:  EXAMINATION:   CTA OF THE CHEST 2/15/2018 9:58 pm       TECHNIQUE:   CTA of the chest was performed after the administration of intravenous   contrast.  Multiplanar reformatted images are provided for review.  MIP   images are provided for review. Dose modulation, iterative reconstruction,   and/or weight based adjustment of the mA/kV was utilized to reduce the   radiation dose to as low as reasonably achievable.       COMPARISON:   Multiple recent prior imaging exams from 02/11/2018, 02/12/2018 ; CT   abdomen/pelvis 09/07/2017 ; CT chest 08/10/2017       HISTORY:   ORDERING PHYSICIAN PROVIDED HISTORY: dyspnea   TECHNOLOGIST PROVIDED HISTORY:   Technologist Provided Reason for Exam: shortness of breath   dyspnea   Acuity: Unknown   Type of Encounter: Unknown   Relevant Medical/Surgical History: (lap pasha Monday, released yesterday, sob   today, pcp this afternoon, antibiotics, still sob.  )       FINDINGS:   Pulmonary Arteries: Pulmonary arteries are adequately opacified for   evaluation.  No evidence of intraluminal filling defect to suggest pulmonary   embolism.  Main pulmonary artery is normal in caliber.       Mediastinum: No evidence of mediastinal lymphadenopathy.  The heart and   pericardium demonstrate no acute abnormality.  Mild multivessel coronary   calcifications. Michael Kassy is no acute abnormality of the thoracic aorta. Segmental gaseous distention of the esophagus with air-fluid levels.       Lungs/pleura: No pneumothorax, pleural effusion or airspace consolidation. Mild-to-moderate bronchial wall thickening, most pronounced centrally and at   the lung bases.  There are few scattered areas of mucoid impaction. Additional clustered nodularity along the left lung base.  3 mm subpleural   lymph node in middle lobe demonstrated a pleural tail.  Clear central   airways.  Unchanged upper lung predominant pleuroparenchymal scarring with   scattered calcifications, favored to reflect sequela of remote granulomatous   disease.       Upper Abdomen: Status post cholecystectomy with postoperative inflammatory   stranding along the gallbladder fossa.  No bile duct dilatation.       Soft Tissues/Bones: Mild symmetric gynecomastia.  No enlarged axillary or   supraclavicular lymph nodes.  Normal thyroid.  No acute osseous abnormality.    Moderate kyphoscoliosis and spondylosis.  Lower cervical spondylosis   appreciated.           Impression   Negative for acute pulmonary embolism.       Mild clustered nodularity at the left lung base, suspicious for aspiration   sequela given basal predominant bronchial wall thickening and esophageal   features suggesting dysmotility and reflux.       Partially imaged postsurgical changes of cholecystectomy     His F/U PFT 8-2016 showed moderate COPD     Past Medical History:   Diagnosis Date    Carotid artery occlusion and stenosis 5/3/2012    COPD (chronic obstructive pulmonary disease) ([address]     ED (erectile dysfunction)     Emphysema lung ([address] 8/8/2014    GERD (gastroesophageal reflux disease)     Hyperlipidemia     pt denies    Hypertension     Prostate CA ([address] 2003    Psoriasis     Pulmonary nodules  Radiation 2004     Current Outpatient Prescriptions   Medication Sig Dispense Refill    guaiFENesin (MUCINEX) 600 MG extended release tablet Take 1 tablet by mouth 2 times daily 60 tablet 0    Ferrous Sulfate (IRON SLOW RELEASE PO) Take by mouth daily      triamcinolone (KENALOG) 0.1 % ointment       amLODIPine (NORVASC) 10 MG tablet Take 0.5 tablets by mouth daily (Patient taking differently: Take 10 mg by mouth daily ) 90 tablet 3    budesonide-formoterol (SYMBICORT) 160-4.5 MCG/ACT AERO INHALE 2 PUFFS INTO THE LUNGS TWICE DAILY 1 Inhaler 11    albuterol sulfate HFA (PROVENTIL HFA) 108 (90 Base) MCG/ACT inhaler INHALE 2 PUFFS EVERY 6 HOURS AS NEEDED 1 Inhaler 11    aspirin 81 MG tablet Take 81 mg by mouth daily      lisinopril (PRINIVIL;ZESTRIL) 40 MG tablet Take 1 tablet by mouth daily 90 tablet 3    Denosumab (PROLIA SC) Inject into the skin      Leuprolide Acetate (LUPRON IJ) Inject as directed every 3 months      vitamin E 400 UNIT capsule Take 400 Units by mouth daily.  calcium carbonate-vitamin D (CALCIUM 600+D) 600-200 MG-UNIT TABS Take 2 tablets by mouth daily       fish oil-omega-3 fatty acids 1000 MG capsule Take 1 g by mouth daily       omeprazole (PRILOSEC) 10 MG capsule Take 10 mg by mouth daily. No current facility-administered medications for this visit.         Family History   Problem Relation Age of Onset    Diabetes Mother     High Blood Pressure Mother     Heart Disease Father      myxoma    Heart Failure Father      Social History     Social History    Marital status:      Spouse name: N/A    Number of children: N/A    Years of education: N/A     Occupational History    retired,       Social History Main Topics    Smoking status: Former Smoker     Packs/day: 0.50     Years: 30.00     Types: Cigarettes     Quit date: 9/1/1987    Smokeless tobacco: Never Used    Alcohol use Yes      Comment: FEW BEERS PER WEEK    Drug use: No    Sexual

## 2018-03-05 ENCOUNTER — CARE COORDINATION (OUTPATIENT)
Dept: CASE MANAGEMENT | Age: 78
End: 2018-03-05

## 2018-03-08 ENCOUNTER — TELEPHONE (OUTPATIENT)
Dept: VASCULAR SURGERY | Age: 78
End: 2018-03-08

## 2018-05-01 ENCOUNTER — HOSPITAL ENCOUNTER (OUTPATIENT)
Dept: VASCULAR LAB | Age: 78
Discharge: HOME | End: 2018-05-01
Attending: SURGERY | Admitting: SURGERY

## 2018-05-01 ENCOUNTER — OFFICE VISIT (OUTPATIENT)
Dept: VASCULAR SURGERY | Age: 78
End: 2018-05-01

## 2018-05-01 VITALS
BODY MASS INDEX: 22.9 KG/M2 | HEIGHT: 70 IN | SYSTOLIC BLOOD PRESSURE: 126 MMHG | DIASTOLIC BLOOD PRESSURE: 64 MMHG | WEIGHT: 160 LBS

## 2018-05-01 DIAGNOSIS — I65.23 CAROTID ATHEROSCLEROSIS, BILATERAL: Primary | ICD-10-CM

## 2018-05-01 DIAGNOSIS — I65.23 CAROTID ATHEROSCLEROSIS, BILATERAL: ICD-10-CM

## 2018-05-01 DIAGNOSIS — I65.23 OCCLUSION AND STENOSIS OF BILATERAL CAROTID ARTERIES: ICD-10-CM

## 2018-05-01 PROCEDURE — 99213 OFFICE O/P EST LOW 20 MIN: CPT | Performed by: SURGERY

## 2018-05-01 PROCEDURE — G8427 DOCREV CUR MEDS BY ELIG CLIN: HCPCS | Performed by: SURGERY

## 2018-05-01 PROCEDURE — G8420 CALC BMI NORM PARAMETERS: HCPCS | Performed by: SURGERY

## 2018-05-01 PROCEDURE — 1036F TOBACCO NON-USER: CPT | Performed by: SURGERY

## 2018-05-01 PROCEDURE — G8598 ASA/ANTIPLAT THER USED: HCPCS | Performed by: SURGERY

## 2018-05-01 PROCEDURE — 1123F ACP DISCUSS/DSCN MKR DOCD: CPT | Performed by: SURGERY

## 2018-05-01 PROCEDURE — 4040F PNEUMOC VAC/ADMIN/RCVD: CPT | Performed by: SURGERY

## 2018-07-23 RX ORDER — AMLODIPINE BESYLATE 10 MG/1
10 TABLET ORAL DAILY
Qty: 90 TABLET | Refills: 3 | Status: SHIPPED | OUTPATIENT
Start: 2018-07-23 | End: 2019-08-27 | Stop reason: SDUPTHER

## 2018-08-02 ENCOUNTER — HOSPITAL ENCOUNTER (OUTPATIENT)
Dept: PULMONOLOGY | Age: 78
Discharge: HOME | End: 2018-08-02
Attending: INTERNAL MEDICINE | Admitting: INTERNAL MEDICINE

## 2018-08-02 VITALS — OXYGEN SATURATION: 98 %

## 2018-08-02 DIAGNOSIS — J44.9 COPD, MODERATE (HCC): ICD-10-CM

## 2018-08-02 DIAGNOSIS — I65.1 OCCLUSION AND STENOSIS OF BASILAR ARTERY: ICD-10-CM

## 2018-08-02 RX ORDER — ALBUTEROL SULFATE 90 UG/1
4 AEROSOL, METERED RESPIRATORY (INHALATION) ONCE
Status: COMPLETED | OUTPATIENT
Start: 2018-08-02 | End: 2018-08-02

## 2018-08-02 RX ADMIN — ALBUTEROL SULFATE 4 PUFF: 90 AEROSOL, METERED RESPIRATORY (INHALATION) at 11:24

## 2018-09-07 ENCOUNTER — OFFICE VISIT (OUTPATIENT)
Dept: PULMONOLOGY | Age: 78
End: 2018-09-07

## 2018-09-07 VITALS
DIASTOLIC BLOOD PRESSURE: 62 MMHG | HEART RATE: 93 BPM | WEIGHT: 159 LBS | SYSTOLIC BLOOD PRESSURE: 115 MMHG | HEIGHT: 70 IN | RESPIRATION RATE: 18 BRPM | OXYGEN SATURATION: 95 % | BODY MASS INDEX: 22.76 KG/M2

## 2018-09-07 DIAGNOSIS — J44.9 MODERATE COPD (CHRONIC OBSTRUCTIVE PULMONARY DISEASE) (HCC): Primary | ICD-10-CM

## 2018-09-07 PROCEDURE — 4040F PNEUMOC VAC/ADMIN/RCVD: CPT | Performed by: INTERNAL MEDICINE

## 2018-09-07 PROCEDURE — G8598 ASA/ANTIPLAT THER USED: HCPCS | Performed by: INTERNAL MEDICINE

## 2018-09-07 PROCEDURE — 3023F SPIROM DOC REV: CPT | Performed by: INTERNAL MEDICINE

## 2018-09-07 PROCEDURE — 1101F PT FALLS ASSESS-DOCD LE1/YR: CPT | Performed by: INTERNAL MEDICINE

## 2018-09-07 PROCEDURE — G8427 DOCREV CUR MEDS BY ELIG CLIN: HCPCS | Performed by: INTERNAL MEDICINE

## 2018-09-07 PROCEDURE — 1036F TOBACCO NON-USER: CPT | Performed by: INTERNAL MEDICINE

## 2018-09-07 PROCEDURE — G8420 CALC BMI NORM PARAMETERS: HCPCS | Performed by: INTERNAL MEDICINE

## 2018-09-07 PROCEDURE — 1123F ACP DISCUSS/DSCN MKR DOCD: CPT | Performed by: INTERNAL MEDICINE

## 2018-09-07 PROCEDURE — G8926 SPIRO NO PERF OR DOC: HCPCS | Performed by: INTERNAL MEDICINE

## 2018-09-07 PROCEDURE — 99213 OFFICE O/P EST LOW 20 MIN: CPT | Performed by: INTERNAL MEDICINE

## 2018-09-07 NOTE — PROGRESS NOTES
gaseous distention of the esophagus with air-fluid levels.       Lungs/pleura: No pneumothorax, pleural effusion or airspace consolidation. Mild-to-moderate bronchial wall thickening, most pronounced centrally and at   the lung bases.  There are few scattered areas of mucoid impaction. Additional clustered nodularity along the left lung base.  3 mm subpleural   lymph node in middle lobe demonstrated a pleural tail.  Clear central   airways.  Unchanged upper lung predominant pleuroparenchymal scarring with   scattered calcifications, favored to reflect sequela of remote granulomatous   disease.       Upper Abdomen: Status post cholecystectomy with postoperative inflammatory   stranding along the gallbladder fossa.  No bile duct dilatation.       Soft Tissues/Bones: Mild symmetric gynecomastia.  No enlarged axillary or   supraclavicular lymph nodes.  Normal thyroid.  No acute osseous abnormality.    Moderate kyphoscoliosis and spondylosis.  Lower cervical spondylosis   appreciated.           Impression   Negative for acute pulmonary embolism.       Mild clustered nodularity at the left lung base, suspicious for aspiration   sequela given basal predominant bronchial wall thickening and esophageal   features suggesting dysmotility and reflux.       Partially imaged postsurgical changes of cholecystectomy     His F/U PFT 8-2018 showed stable moderate COPD     Past Medical History:   Diagnosis Date    Carotid artery occlusion and stenosis 5/3/2012    COPD (chronic obstructive pulmonary disease) ([address]     ED (erectile dysfunction)     Emphysema lung ([address] 8/8/2014    GERD (gastroesophageal reflux disease)     Hyperlipidemia     pt denies    Hypertension     Prostate CA ([address] 2003    Psoriasis     Pulmonary nodules     Radiation 2004     Current Outpatient Prescriptions   Medication Sig Dispense Refill    amLODIPine (NORVASC) 10 MG tablet Take 1 tablet by mouth daily 90 tablet 3    guaiFENesin (MUCINEX) 600 change, fatigue and unexpected weight change. HENT: Negative. Negative for hearing loss, ear pain, nosebleeds, congestion, facial swelling, rhinorrhea, sneezing, neck pain, neck stiffness, postnasal drip, sinus pressure and ear discharge. Eyes: Negative. Negative for photophobia, pain, discharge, redness, itching and visual disturbance. Respiratory: As per HPI  Cardiovascular: Negative. Negative for chest pain, palpitations and leg swelling. Gastrointestinal: Negative. Negative for abdominal pain, blood in stool, abdominal distention and anal bleeding. Genitourinary: Negative. Negative for dysuria, urgency, frequency, hematuria, decreased urine volume, enuresis and difficulty urinating. Musculoskeletal: Negative. Negative for myalgias, back pain, joint swelling, arthralgias and gait problem. Skin: Negative. Negative for color change and pallor. Neurological: Negative. Negative for dizziness, tremors, seizures, syncope, facial asymmetry, speech difficulty, weakness, light-headedness, numbness and headaches. Hematological: Negative. Negative for adenopathy. Psychiatric/Behavioral: Negative. Negative for hallucinations, behavioral problems, confusion and agitation. The patient is not nervous/anxious and is not hyperactive. Blood pressure 115/62, pulse 93, resp. rate 18, height 5' 10\" (1.778 m), weight 159 lb (72.1 kg), SpO2 95 %. Physical Exam   Constitutional: Oriented. Well-developed and well-nourished. No distress. HENT:   Head: Normocephalic and atraumatic. Mouth/Throat: Oropharynx is clear and moist. No oropharyngeal exudate. Eyes: Conjunctivae and extraocular motions are normal. Pupils are equal, round, and reactive to light. Right eye exhibits no discharge. Left eye exhibits no discharge. Neck: Normal range of motion. Neck supple. No JVD present. Carotid bruit is not present. No rigidity. No tracheal deviation present. No thyromegaly present.    Cardiovascular:

## 2018-09-20 ENCOUNTER — TELEPHONE (OUTPATIENT)
Dept: PULMONOLOGY | Age: 78
End: 2018-09-20

## 2018-09-20 NOTE — TELEPHONE ENCOUNTER
Dr Klever Begum please see Maryann July below in regards to hyperbaric oxygen therapy for the pt's radiation cystitis - Dr Klever Begum please advise

## 2018-09-24 ENCOUNTER — TELEPHONE (OUTPATIENT)
Dept: PULMONOLOGY | Age: 78
End: 2018-09-24

## 2018-10-08 RX ORDER — BUDESONIDE AND FORMOTEROL FUMARATE DIHYDRATE 160; 4.5 UG/1; UG/1
AEROSOL RESPIRATORY (INHALATION)
Qty: 10.2 G | Refills: 0 | OUTPATIENT
Start: 2018-10-08

## 2018-10-29 ENCOUNTER — TELEPHONE (OUTPATIENT)
Dept: FAMILY MEDICINE CLINIC | Age: 78
End: 2018-10-29

## 2018-10-29 ENCOUNTER — NURSE ONLY (OUTPATIENT)
Dept: FAMILY MEDICINE CLINIC | Age: 78
End: 2018-10-29
Payer: MEDICARE

## 2018-10-29 VITALS — DIASTOLIC BLOOD PRESSURE: 78 MMHG | HEART RATE: 103 BPM | OXYGEN SATURATION: 100 % | SYSTOLIC BLOOD PRESSURE: 142 MMHG

## 2018-10-29 DIAGNOSIS — Z00.00 WELL ADULT EXAM: Primary | ICD-10-CM

## 2018-10-29 DIAGNOSIS — I10 ESSENTIAL HYPERTENSION: Primary | ICD-10-CM

## 2018-10-29 DIAGNOSIS — Z23 NEED FOR VACCINATION: Primary | ICD-10-CM

## 2018-10-29 DIAGNOSIS — Z12.5 PROSTATE CANCER SCREENING: ICD-10-CM

## 2018-10-29 PROCEDURE — G0008 ADMIN INFLUENZA VIRUS VAC: HCPCS | Performed by: FAMILY MEDICINE

## 2018-10-29 PROCEDURE — 90662 IIV NO PRSV INCREASED AG IM: CPT | Performed by: FAMILY MEDICINE

## 2018-10-29 RX ORDER — METOPROLOL SUCCINATE 25 MG/1
25 TABLET, EXTENDED RELEASE ORAL DAILY
Qty: 30 TABLET | Refills: 3 | Status: SHIPPED | OUTPATIENT
Start: 2018-10-29 | End: 2018-11-27 | Stop reason: SDUPTHER

## 2018-11-09 DIAGNOSIS — Z12.5 PROSTATE CANCER SCREENING: ICD-10-CM

## 2018-11-09 DIAGNOSIS — Z00.00 WELL ADULT EXAM: ICD-10-CM

## 2018-11-09 LAB
A/G RATIO: 2 (ref 1.1–2.2)
ALBUMIN SERPL-MCNC: 4.4 G/DL (ref 3.4–5)
ALP BLD-CCNC: 60 U/L (ref 40–129)
ALT SERPL-CCNC: 19 U/L (ref 10–40)
ANION GAP SERPL CALCULATED.3IONS-SCNC: 13 MMOL/L (ref 3–16)
AST SERPL-CCNC: 17 U/L (ref 15–37)
BASOPHILS ABSOLUTE: 0 K/UL (ref 0–0.2)
BASOPHILS RELATIVE PERCENT: 0.8 %
BILIRUB SERPL-MCNC: 0.7 MG/DL (ref 0–1)
BUN BLDV-MCNC: 18 MG/DL (ref 7–20)
CALCIUM SERPL-MCNC: 9.2 MG/DL (ref 8.3–10.6)
CHLORIDE BLD-SCNC: 101 MMOL/L (ref 99–110)
CHOLESTEROL, TOTAL: 227 MG/DL (ref 0–199)
CO2: 25 MMOL/L (ref 21–32)
CREAT SERPL-MCNC: 0.9 MG/DL (ref 0.8–1.3)
EOSINOPHILS ABSOLUTE: 0.1 K/UL (ref 0–0.6)
EOSINOPHILS RELATIVE PERCENT: 2.3 %
ESTIMATED AVERAGE GLUCOSE: 108.3 MG/DL
GFR AFRICAN AMERICAN: >60
GFR NON-AFRICAN AMERICAN: >60
GLOBULIN: 2.2 G/DL
GLUCOSE BLD-MCNC: 99 MG/DL (ref 70–99)
HBA1C MFR BLD: 5.4 %
HCT VFR BLD CALC: 36.9 % (ref 40.5–52.5)
HDLC SERPL-MCNC: 73 MG/DL (ref 40–60)
HEMOGLOBIN: 12.4 G/DL (ref 13.5–17.5)
LDL CHOLESTEROL CALCULATED: 140 MG/DL
LYMPHOCYTES ABSOLUTE: 1.3 K/UL (ref 1–5.1)
LYMPHOCYTES RELATIVE PERCENT: 21.2 %
MCH RBC QN AUTO: 31.9 PG (ref 26–34)
MCHC RBC AUTO-ENTMCNC: 33.5 G/DL (ref 31–36)
MCV RBC AUTO: 95.3 FL (ref 80–100)
MONOCYTES ABSOLUTE: 0.6 K/UL (ref 0–1.3)
MONOCYTES RELATIVE PERCENT: 10.4 %
NEUTROPHILS ABSOLUTE: 4 K/UL (ref 1.7–7.7)
NEUTROPHILS RELATIVE PERCENT: 65.3 %
PDW BLD-RTO: 13.8 % (ref 12.4–15.4)
PLATELET # BLD: 236 K/UL (ref 135–450)
PMV BLD AUTO: 8.1 FL (ref 5–10.5)
POTASSIUM SERPL-SCNC: 4.4 MMOL/L (ref 3.5–5.1)
PROSTATE SPECIFIC ANTIGEN: 4.6 NG/ML (ref 0–4)
RBC # BLD: 3.87 M/UL (ref 4.2–5.9)
SODIUM BLD-SCNC: 139 MMOL/L (ref 136–145)
TOTAL PROTEIN: 6.6 G/DL (ref 6.4–8.2)
TRIGL SERPL-MCNC: 68 MG/DL (ref 0–150)
VLDLC SERPL CALC-MCNC: 14 MG/DL
WBC # BLD: 6.2 K/UL (ref 4–11)

## 2018-11-15 ENCOUNTER — OFFICE VISIT (OUTPATIENT)
Dept: FAMILY MEDICINE CLINIC | Age: 78
End: 2018-11-15
Payer: MEDICARE

## 2018-11-15 VITALS
HEIGHT: 70 IN | DIASTOLIC BLOOD PRESSURE: 60 MMHG | SYSTOLIC BLOOD PRESSURE: 138 MMHG | OXYGEN SATURATION: 97 % | BODY MASS INDEX: 23.42 KG/M2 | HEART RATE: 92 BPM | WEIGHT: 163.6 LBS

## 2018-11-15 DIAGNOSIS — C61 PROSTATE CA (HCC): Primary | ICD-10-CM

## 2018-11-15 DIAGNOSIS — I10 ESSENTIAL HYPERTENSION: ICD-10-CM

## 2018-11-15 DIAGNOSIS — J41.0 SIMPLE CHRONIC BRONCHITIS (HCC): ICD-10-CM

## 2018-11-15 PROCEDURE — 1123F ACP DISCUSS/DSCN MKR DOCD: CPT | Performed by: FAMILY MEDICINE

## 2018-11-15 PROCEDURE — 4040F PNEUMOC VAC/ADMIN/RCVD: CPT | Performed by: FAMILY MEDICINE

## 2018-11-15 PROCEDURE — 1101F PT FALLS ASSESS-DOCD LE1/YR: CPT | Performed by: FAMILY MEDICINE

## 2018-11-15 PROCEDURE — G8926 SPIRO NO PERF OR DOC: HCPCS | Performed by: FAMILY MEDICINE

## 2018-11-15 PROCEDURE — 3023F SPIROM DOC REV: CPT | Performed by: FAMILY MEDICINE

## 2018-11-15 PROCEDURE — G8427 DOCREV CUR MEDS BY ELIG CLIN: HCPCS | Performed by: FAMILY MEDICINE

## 2018-11-15 PROCEDURE — G8510 SCR DEP NEG, NO PLAN REQD: HCPCS | Performed by: FAMILY MEDICINE

## 2018-11-15 PROCEDURE — G8420 CALC BMI NORM PARAMETERS: HCPCS | Performed by: FAMILY MEDICINE

## 2018-11-15 PROCEDURE — 99213 OFFICE O/P EST LOW 20 MIN: CPT | Performed by: FAMILY MEDICINE

## 2018-11-15 PROCEDURE — 1036F TOBACCO NON-USER: CPT | Performed by: FAMILY MEDICINE

## 2018-11-15 PROCEDURE — G8598 ASA/ANTIPLAT THER USED: HCPCS | Performed by: FAMILY MEDICINE

## 2018-11-15 PROCEDURE — G8482 FLU IMMUNIZE ORDER/ADMIN: HCPCS | Performed by: FAMILY MEDICINE

## 2018-11-15 ASSESSMENT — PATIENT HEALTH QUESTIONNAIRE - PHQ9
1. LITTLE INTEREST OR PLEASURE IN DOING THINGS: 0
SUM OF ALL RESPONSES TO PHQ QUESTIONS 1-9: 0
SUM OF ALL RESPONSES TO PHQ9 QUESTIONS 1 & 2: 0
2. FEELING DOWN, DEPRESSED OR HOPELESS: 0
SUM OF ALL RESPONSES TO PHQ QUESTIONS 1-9: 0

## 2018-11-27 DIAGNOSIS — I10 ESSENTIAL HYPERTENSION: ICD-10-CM

## 2018-11-27 RX ORDER — METOPROLOL SUCCINATE 25 MG/1
25 TABLET, EXTENDED RELEASE ORAL DAILY
Qty: 90 TABLET | Refills: 3 | Status: SHIPPED | OUTPATIENT
Start: 2018-11-27 | End: 2020-01-14

## 2018-11-27 NOTE — TELEPHONE ENCOUNTER
Medication and Quantity requested:  metoprolol succinate (TOPROL XL) 25 MG extended release tablet    Pt requesting 90 day supply    Last Visit-11/15/18    Pharmacy and phone number updated in EPIC:  Yes    Please send rx to: [facility] [address] [facility], [address] [facility] [address]

## 2018-12-09 ENCOUNTER — APPOINTMENT (OUTPATIENT)
Dept: GENERAL RADIOLOGY | Age: 78
DRG: 189 | End: 2018-12-09
Payer: MEDICARE

## 2018-12-09 ENCOUNTER — HOSPITAL ENCOUNTER (INPATIENT)
Age: 78
LOS: 3 days | Discharge: HOME | DRG: 189 | End: 2018-12-12
Attending: EMERGENCY MEDICINE | Admitting: INTERNAL MEDICINE
Payer: MEDICARE

## 2018-12-09 DIAGNOSIS — J44.1 COPD EXACERBATION (HCC): Primary | ICD-10-CM

## 2018-12-09 PROBLEM — J20.9 ACUTE BRONCHITIS: Status: ACTIVE | Noted: 2018-12-09

## 2018-12-09 PROBLEM — Z78.9 FAILURE OF OUTPATIENT TREATMENT: Status: ACTIVE | Noted: 2018-12-09

## 2018-12-09 LAB
ANION GAP SERPL CALCULATED.3IONS-SCNC: 14 MMOL/L (ref 3–16)
BASE EXCESS VENOUS: -1.1 MMOL/L (ref -3–3)
BASOPHILS ABSOLUTE: 0 K/UL (ref 0–0.2)
BASOPHILS RELATIVE PERCENT: 0.4 %
BUN BLDV-MCNC: 19 MG/DL (ref 7–20)
CALCIUM SERPL-MCNC: 9.1 MG/DL (ref 8.3–10.6)
CARBOXYHEMOGLOBIN: 4.7 % (ref 0–1.5)
CHLORIDE BLD-SCNC: 102 MMOL/L (ref 99–110)
CO2: 23 MMOL/L (ref 21–32)
CREAT SERPL-MCNC: 1.1 MG/DL (ref 0.8–1.3)
EOSINOPHILS ABSOLUTE: 0.1 K/UL (ref 0–0.6)
EOSINOPHILS RELATIVE PERCENT: 1.2 %
GFR AFRICAN AMERICAN: >60
GFR NON-AFRICAN AMERICAN: >60
GLUCOSE BLD-MCNC: 173 MG/DL (ref 70–99)
HCO3 VENOUS: 23.8 MMOL/L (ref 23–29)
HCT VFR BLD CALC: 37 % (ref 40.5–52.5)
HEMOGLOBIN, VEN, REDUCED: 8 %
HEMOGLOBIN: 12.6 G/DL (ref 13.5–17.5)
INR BLD: 1.12 (ref 0.86–1.14)
LACTIC ACID: 2 MMOL/L (ref 0.4–2)
LYMPHOCYTES ABSOLUTE: 0.6 K/UL (ref 1–5.1)
LYMPHOCYTES RELATIVE PERCENT: 6.6 %
MCH RBC QN AUTO: 32.3 PG (ref 26–34)
MCHC RBC AUTO-ENTMCNC: 34.1 G/DL (ref 31–36)
MCV RBC AUTO: 94.6 FL (ref 80–100)
METHEMOGLOBIN VENOUS: 0.3 %
MONOCYTES ABSOLUTE: 0.8 K/UL (ref 0–1.3)
MONOCYTES RELATIVE PERCENT: 8.8 %
NEUTROPHILS ABSOLUTE: 7.5 K/UL (ref 1.7–7.7)
NEUTROPHILS RELATIVE PERCENT: 83 %
O2 CONTENT, VEN: 16 VOL %
O2 SAT, VEN: 92 %
O2 THERAPY: ABNORMAL
PCO2, VEN: 40 MMHG (ref 40–50)
PDW BLD-RTO: 13.3 % (ref 12.4–15.4)
PH VENOUS: 7.38 (ref 7.35–7.45)
PLATELET # BLD: 243 K/UL (ref 135–450)
PMV BLD AUTO: 7.9 FL (ref 5–10.5)
PO2, VEN: 63.4 MMHG (ref 25–40)
POTASSIUM SERPL-SCNC: 4 MMOL/L (ref 3.5–5.1)
PRO-BNP: 142 PG/ML (ref 0–449)
PROTHROMBIN TIME: 12.8 SEC (ref 9.8–13)
RBC # BLD: 3.91 M/UL (ref 4.2–5.9)
SODIUM BLD-SCNC: 139 MMOL/L (ref 136–145)
TCO2 CALC VENOUS: 56 MMOL/L
TROPONIN: <0.01 NG/ML
WBC # BLD: 9 K/UL (ref 4–11)

## 2018-12-09 PROCEDURE — 94640 AIRWAY INHALATION TREATMENT: CPT

## 2018-12-09 PROCEDURE — 6370000000 HC RX 637 (ALT 250 FOR IP): Performed by: INTERNAL MEDICINE

## 2018-12-09 PROCEDURE — 84484 ASSAY OF TROPONIN QUANT: CPT

## 2018-12-09 PROCEDURE — 80048 BASIC METABOLIC PNL TOTAL CA: CPT

## 2018-12-09 PROCEDURE — 94664 DEMO&/EVAL PT USE INHALER: CPT

## 2018-12-09 PROCEDURE — 96374 THER/PROPH/DIAG INJ IV PUSH: CPT

## 2018-12-09 PROCEDURE — 1200000000 HC SEMI PRIVATE

## 2018-12-09 PROCEDURE — 2580000003 HC RX 258

## 2018-12-09 PROCEDURE — 83880 ASSAY OF NATRIURETIC PEPTIDE: CPT

## 2018-12-09 PROCEDURE — 2580000003 HC RX 258: Performed by: INTERNAL MEDICINE

## 2018-12-09 PROCEDURE — 6360000002 HC RX W HCPCS: Performed by: EMERGENCY MEDICINE

## 2018-12-09 PROCEDURE — 6370000000 HC RX 637 (ALT 250 FOR IP): Performed by: NURSE PRACTITIONER

## 2018-12-09 PROCEDURE — 87040 BLOOD CULTURE FOR BACTERIA: CPT

## 2018-12-09 PROCEDURE — 6370000000 HC RX 637 (ALT 250 FOR IP): Performed by: EMERGENCY MEDICINE

## 2018-12-09 PROCEDURE — 99285 EMERGENCY DEPT VISIT HI MDM: CPT

## 2018-12-09 PROCEDURE — 93005 ELECTROCARDIOGRAM TRACING: CPT | Performed by: EMERGENCY MEDICINE

## 2018-12-09 PROCEDURE — 2700000000 HC OXYGEN THERAPY PER DAY

## 2018-12-09 PROCEDURE — 6360000002 HC RX W HCPCS: Performed by: INTERNAL MEDICINE

## 2018-12-09 PROCEDURE — 2580000003 HC RX 258: Performed by: EMERGENCY MEDICINE

## 2018-12-09 PROCEDURE — 94760 N-INVAS EAR/PLS OXIMETRY 1: CPT

## 2018-12-09 PROCEDURE — 82803 BLOOD GASES ANY COMBINATION: CPT

## 2018-12-09 PROCEDURE — 83605 ASSAY OF LACTIC ACID: CPT

## 2018-12-09 PROCEDURE — 85025 COMPLETE CBC W/AUTO DIFF WBC: CPT

## 2018-12-09 PROCEDURE — 71046 X-RAY EXAM CHEST 2 VIEWS: CPT

## 2018-12-09 PROCEDURE — 85610 PROTHROMBIN TIME: CPT

## 2018-12-09 RX ORDER — VITAMIN E 268 MG
400 CAPSULE ORAL DAILY
Status: DISCONTINUED | OUTPATIENT
Start: 2018-12-10 | End: 2018-12-12 | Stop reason: HOSPADM

## 2018-12-09 RX ORDER — IPRATROPIUM BROMIDE AND ALBUTEROL SULFATE 2.5; .5 MG/3ML; MG/3ML
1 SOLUTION RESPIRATORY (INHALATION) ONCE
Status: COMPLETED | OUTPATIENT
Start: 2018-12-09 | End: 2018-12-09

## 2018-12-09 RX ORDER — ASPIRIN 81 MG/1
81 TABLET ORAL DAILY
Status: DISCONTINUED | OUTPATIENT
Start: 2018-12-09 | End: 2018-12-12 | Stop reason: HOSPADM

## 2018-12-09 RX ORDER — PREDNISONE 10 MG/1
10 TABLET ORAL DAILY
Qty: 10 TABLET | Refills: 0 | Status: ON HOLD | OUTPATIENT
Start: 2018-12-09 | End: 2018-12-19 | Stop reason: HOSPADM

## 2018-12-09 RX ORDER — IPRATROPIUM BROMIDE AND ALBUTEROL SULFATE 2.5; .5 MG/3ML; MG/3ML
1 SOLUTION RESPIRATORY (INHALATION) EVERY 4 HOURS PRN
Status: DISCONTINUED | OUTPATIENT
Start: 2018-12-09 | End: 2018-12-12 | Stop reason: HOSPADM

## 2018-12-09 RX ORDER — 0.9 % SODIUM CHLORIDE 0.9 %
500 INTRAVENOUS SOLUTION INTRAVENOUS ONCE
Status: COMPLETED | OUTPATIENT
Start: 2018-12-09 | End: 2018-12-09

## 2018-12-09 RX ORDER — HYDRALAZINE HYDROCHLORIDE 20 MG/ML
10 INJECTION INTRAMUSCULAR; INTRAVENOUS EVERY 4 HOURS PRN
Status: DISCONTINUED | OUTPATIENT
Start: 2018-12-09 | End: 2018-12-12 | Stop reason: HOSPADM

## 2018-12-09 RX ORDER — ONDANSETRON 2 MG/ML
4 INJECTION INTRAMUSCULAR; INTRAVENOUS EVERY 6 HOURS PRN
Status: DISCONTINUED | OUTPATIENT
Start: 2018-12-09 | End: 2018-12-12 | Stop reason: HOSPADM

## 2018-12-09 RX ORDER — PANTOPRAZOLE SODIUM 40 MG/1
40 TABLET, DELAYED RELEASE ORAL
Status: DISCONTINUED | OUTPATIENT
Start: 2018-12-10 | End: 2018-12-12 | Stop reason: HOSPADM

## 2018-12-09 RX ORDER — IPRATROPIUM BROMIDE AND ALBUTEROL SULFATE 2.5; .5 MG/3ML; MG/3ML
1 SOLUTION RESPIRATORY (INHALATION)
Status: DISCONTINUED | OUTPATIENT
Start: 2018-12-09 | End: 2018-12-10

## 2018-12-09 RX ORDER — METOPROLOL SUCCINATE 25 MG/1
25 TABLET, EXTENDED RELEASE ORAL DAILY
Status: DISCONTINUED | OUTPATIENT
Start: 2018-12-09 | End: 2018-12-12 | Stop reason: HOSPADM

## 2018-12-09 RX ORDER — AMLODIPINE BESYLATE 5 MG/1
10 TABLET ORAL DAILY
Status: DISCONTINUED | OUTPATIENT
Start: 2018-12-09 | End: 2018-12-12 | Stop reason: HOSPADM

## 2018-12-09 RX ORDER — SODIUM CHLORIDE 9 MG/ML
INJECTION, SOLUTION INTRAVENOUS CONTINUOUS
Status: DISPENSED | OUTPATIENT
Start: 2018-12-09 | End: 2018-12-10

## 2018-12-09 RX ORDER — METHYLPREDNISOLONE SODIUM SUCCINATE 125 MG/2ML
125 INJECTION, POWDER, LYOPHILIZED, FOR SOLUTION INTRAMUSCULAR; INTRAVENOUS ONCE
Status: COMPLETED | OUTPATIENT
Start: 2018-12-09 | End: 2018-12-09

## 2018-12-09 RX ORDER — GUAIFENESIN 600 MG/1
600 TABLET, EXTENDED RELEASE ORAL 2 TIMES DAILY
Status: DISCONTINUED | OUTPATIENT
Start: 2018-12-09 | End: 2018-12-10

## 2018-12-09 RX ORDER — ALBUTEROL SULFATE 90 UG/1
2 AEROSOL, METERED RESPIRATORY (INHALATION) EVERY 4 HOURS PRN
Qty: 1 INHALER | Refills: 1 | Status: SHIPPED | OUTPATIENT
Start: 2018-12-09 | End: 2019-11-06

## 2018-12-09 RX ORDER — SODIUM CHLORIDE 0.9 % (FLUSH) 0.9 %
10 SYRINGE (ML) INJECTION EVERY 12 HOURS SCHEDULED
Status: DISCONTINUED | OUTPATIENT
Start: 2018-12-09 | End: 2018-12-12 | Stop reason: HOSPADM

## 2018-12-09 RX ORDER — METHYLPREDNISOLONE SODIUM SUCCINATE 40 MG/ML
40 INJECTION, POWDER, LYOPHILIZED, FOR SOLUTION INTRAMUSCULAR; INTRAVENOUS EVERY 6 HOURS
Status: DISCONTINUED | OUTPATIENT
Start: 2018-12-09 | End: 2018-12-11

## 2018-12-09 RX ORDER — LANOLIN ALCOHOL/MO/W.PET/CERES
3 CREAM (GRAM) TOPICAL ONCE
Status: COMPLETED | OUTPATIENT
Start: 2018-12-09 | End: 2018-12-09

## 2018-12-09 RX ORDER — ACETAMINOPHEN 325 MG/1
650 TABLET ORAL EVERY 4 HOURS PRN
Status: DISCONTINUED | OUTPATIENT
Start: 2018-12-09 | End: 2018-12-12 | Stop reason: HOSPADM

## 2018-12-09 RX ORDER — BENZONATATE 100 MG/1
200 CAPSULE ORAL ONCE
Status: COMPLETED | OUTPATIENT
Start: 2018-12-09 | End: 2018-12-09

## 2018-12-09 RX ORDER — SODIUM CHLORIDE 0.9 % (FLUSH) 0.9 %
10 SYRINGE (ML) INJECTION PRN
Status: DISCONTINUED | OUTPATIENT
Start: 2018-12-09 | End: 2018-12-12 | Stop reason: HOSPADM

## 2018-12-09 RX ORDER — AZITHROMYCIN 250 MG/1
250 TABLET, FILM COATED ORAL DAILY
Status: DISPENSED | OUTPATIENT
Start: 2018-12-09 | End: 2018-12-11

## 2018-12-09 RX ORDER — SODIUM CHLORIDE 9 MG/ML
INJECTION, SOLUTION INTRAVENOUS
Status: COMPLETED
Start: 2018-12-09 | End: 2018-12-09

## 2018-12-09 RX ORDER — LISINOPRIL 20 MG/1
40 TABLET ORAL DAILY
Status: DISCONTINUED | OUTPATIENT
Start: 2018-12-10 | End: 2018-12-12 | Stop reason: HOSPADM

## 2018-12-09 RX ADMIN — Medication 10 ML: at 20:12

## 2018-12-09 RX ADMIN — IPRATROPIUM BROMIDE AND ALBUTEROL SULFATE 1 AMPULE: .5; 3 SOLUTION RESPIRATORY (INHALATION) at 20:47

## 2018-12-09 RX ADMIN — METHYLPREDNISOLONE SODIUM SUCCINATE 40 MG: 40 INJECTION, POWDER, FOR SOLUTION INTRAMUSCULAR; INTRAVENOUS at 20:09

## 2018-12-09 RX ADMIN — IPRATROPIUM BROMIDE AND ALBUTEROL SULFATE 1 AMPULE: .5; 3 SOLUTION RESPIRATORY (INHALATION) at 13:45

## 2018-12-09 RX ADMIN — SODIUM CHLORIDE 500 ML: 9 INJECTION, SOLUTION INTRAVENOUS at 14:00

## 2018-12-09 RX ADMIN — ENOXAPARIN SODIUM 40 MG: 40 INJECTION SUBCUTANEOUS at 20:08

## 2018-12-09 RX ADMIN — SODIUM CHLORIDE 1000 ML: 9 INJECTION, SOLUTION INTRAVENOUS at 18:40

## 2018-12-09 RX ADMIN — METOPROLOL SUCCINATE 25 MG: 25 TABLET, FILM COATED, EXTENDED RELEASE ORAL at 20:08

## 2018-12-09 RX ADMIN — BENZONATATE 200 MG: 100 CAPSULE ORAL at 14:00

## 2018-12-09 RX ADMIN — Medication 2 PUFF: at 20:47

## 2018-12-09 RX ADMIN — IPRATROPIUM BROMIDE AND ALBUTEROL SULFATE 1 AMPULE: .5; 3 SOLUTION RESPIRATORY (INHALATION) at 23:53

## 2018-12-09 RX ADMIN — MELATONIN TAB 3 MG 3 MG: 3 TAB at 21:38

## 2018-12-09 RX ADMIN — METHYLPREDNISOLONE SODIUM SUCCINATE 125 MG: 125 INJECTION, POWDER, FOR SOLUTION INTRAMUSCULAR; INTRAVENOUS at 14:00

## 2018-12-09 RX ADMIN — GUAIFENESIN 600 MG: 600 TABLET, EXTENDED RELEASE ORAL at 20:08

## 2018-12-09 NOTE — H&P
AM tomorrow)  5. Pulm consult for SOB (follows with Dr Angelique Le)  6. Do not feel CT Chest needed at this time; consider if hypoxia does not improve with AECOPD tx or new CP  7. Mucinex bid  8. Gentle IVF for 24 hrs  9. Hydralazine IV PRN   10. Telemetry      DVT prophylaxis Lovenox  Code status Full code  Diet General  IV access  Peripheral  Rivera Catheter No    Admit as inpatient. I anticipate hospitalization spanning more than two midnights for investigation and treatment of the above medically necessary diagnoses. Discussed with patient, Dr Сергей Kaur (ED) and ED RN. D/w son. Hopefully back home by mid week if ok with Pulm.     Dori Stokes MD    12/9/2018 5:03 PM

## 2018-12-09 NOTE — PROGRESS NOTES
Advanced Care Planning Note. Purpose of Encounter: Advanced care planning in light of acute respiratory failure with hypoxia  Parties In Attendance: Patient, son  Decisional Capacity: Yes  Subjective: Patient c/o SOB  Objective: Cr 1.1  Goals of Care Determination: Patient wants full support (CPR, vent, surgery, HD, trach, PEG)  Plan:  IV steroids, Abx, nebs, Pulm consult, O2 via NC  Code Status:  Full code   Time spent on Advanced care Plannin minutes  Advanced Care Planning Documents: Completed advanced directives on chart, wife is the POA.     Elayne Israel MD  2018 5:13 PM

## 2018-12-09 NOTE — ED PROVIDER NOTES
trismus. NECK: Supple. Trachea midline. CARDIO: RRR. Radial pulse 2+. LUNGS: Respirations lightly labored, good air movement with some mild history history wheezes. ABDOMEN: Soft. Non-distended. Non-tender. EXTREMITIES: No acute deformities. SKIN: Warm and dry. NEUROLOGICAL: No gross facial drooping. Moves all 4 extremities spontaneously. PSYCHIATRIC: Normal mood.      Diagnostics   Labs:  Results for orders placed or performed during the hospital encounter of 12/09/18   CBC auto differential   Result Value Ref Range    WBC 9.0 4.0 - 11.0 K/uL    RBC 3.91 (L) 4.20 - 5.90 M/uL    Hemoglobin 12.6 (L) 13.5 - 17.5 g/dL    Hematocrit 37.0 (L) 40.5 - 52.5 %    MCV 94.6 80.0 - 100.0 fL    MCH 32.3 26.0 - 34.0 pg    MCHC 34.1 31.0 - 36.0 g/dL    RDW 13.3 12.4 - 15.4 %    Platelets 117 184 - 485 K/uL    MPV 7.9 5.0 - 10.5 fL    Neutrophils % 83.0 %    Lymphocytes % 6.6 %    Monocytes % 8.8 %    Eosinophils % 1.2 %    Basophils % 0.4 %    Neutrophils # 7.5 1.7 - 7.7 K/uL    Lymphocytes # 0.6 (L) 1.0 - 5.1 K/uL    Monocytes # 0.8 0.0 - 1.3 K/uL    Eosinophils # 0.1 0.0 - 0.6 K/uL    Basophils # 0.0 0.0 - 0.2 K/uL   Basic metabolic panel   Result Value Ref Range    Sodium 139 136 - 145 mmol/L    Potassium 4.0 3.5 - 5.1 mmol/L    Chloride 102 99 - 110 mmol/L    CO2 23 21 - 32 mmol/L    Anion Gap 14 3 - 16    Glucose 173 (H) 70 - 99 mg/dL    BUN 19 7 - 20 mg/dL    CREATININE 1.1 0.8 - 1.3 mg/dL    GFR Non-African American >60 >60    GFR African American >60 >60    Calcium 9.1 8.3 - 10.6 mg/dL   Troponin   Result Value Ref Range    Troponin <0.01 <0.01 ng/mL   Lactic acid, plasma   Result Value Ref Range    Lactic Acid 2.0 0.4 - 2.0 mmol/L   Protime-INR   Result Value Ref Range    Protime 12.8 9.8 - 13.0 sec    INR 1.12 0.86 - 1.14   Brain Natriuretic Peptide   Result Value Ref Range    Pro- 0 - 449 pg/mL   Blood gas, venous   Result Value Ref Range    pH, Kurtis 7.384 7.350 - 7.450    pCO2, Kurtis 40.0 40.0 - 50.0 for that. ProBNP is normal at 142. Chest x-ray shows no acute cardiopulmonary disease. ED Medication Orders     Start Ordered     Status Ordering Provider    12/09/18 1345 12/09/18 1332  benzonatate (TESSALON) capsule 200 mg  ONCE      Last MAR action:  Given - by Jeronimo King on 12/09/18 at 1400 [facility]    12/09/18 1300 12/09/18 1253  ipratropium-albuterol (DUONEB) nebulizer solution 1 ampule  ONCE      Last MAR action:  Given - by Natasha Laird on 12/09/18 at 1345 [facility]    12/09/18 1300 12/09/18 1253  methylPREDNISolone sodium (SOLU-MEDROL) injection 125 mg  ONCE      Last MAR action:  Given - by Jeronimo King on 12/09/18 at 1400 [facility]    12/09/18 1300 12/09/18 1254  0.9 % sodium chloride bolus  ONCE      Last MAR action:  New Bag - by Jeronimo King on 12/09/18 at 1400 [facility]          Final Impression      1.  COPD exacerbation ([facility]      DISPOSITION Decision To Discharge 12/09/2018 02:20:09 PM     (Please note that portions of this note may have been completed with a voice recognition program. Efforts were made to edit the dictations but occasionally words are mis-transcribed.)    Sowmya Lazo MD  [address] Swati Santos MD  12/09/18 5631

## 2018-12-10 LAB
ANION GAP SERPL CALCULATED.3IONS-SCNC: 9 MMOL/L (ref 3–16)
BASOPHILS ABSOLUTE: 0 K/UL (ref 0–0.2)
BASOPHILS RELATIVE PERCENT: 0.2 %
BUN BLDV-MCNC: 20 MG/DL (ref 7–20)
CALCIUM SERPL-MCNC: 8.5 MG/DL (ref 8.3–10.6)
CHLORIDE BLD-SCNC: 107 MMOL/L (ref 99–110)
CO2: 23 MMOL/L (ref 21–32)
CREAT SERPL-MCNC: 0.9 MG/DL (ref 0.8–1.3)
EKG ATRIAL RATE: 127 BPM
EKG DIAGNOSIS: NORMAL
EKG P AXIS: 77 DEGREES
EKG P-R INTERVAL: 140 MS
EKG Q-T INTERVAL: 298 MS
EKG QRS DURATION: 76 MS
EKG QTC CALCULATION (BAZETT): 433 MS
EKG R AXIS: -77 DEGREES
EKG T AXIS: 53 DEGREES
EKG VENTRICULAR RATE: 127 BPM
EOSINOPHILS ABSOLUTE: 0 K/UL (ref 0–0.6)
EOSINOPHILS RELATIVE PERCENT: 0 %
GFR AFRICAN AMERICAN: >60
GFR NON-AFRICAN AMERICAN: >60
GLUCOSE BLD-MCNC: 184 MG/DL (ref 70–99)
HCT VFR BLD CALC: 31.4 % (ref 40.5–52.5)
HEMOGLOBIN: 10.8 G/DL (ref 13.5–17.5)
LYMPHOCYTES ABSOLUTE: 0.4 K/UL (ref 1–5.1)
LYMPHOCYTES RELATIVE PERCENT: 5.2 %
MCH RBC QN AUTO: 32.3 PG (ref 26–34)
MCHC RBC AUTO-ENTMCNC: 34.4 G/DL (ref 31–36)
MCV RBC AUTO: 94.1 FL (ref 80–100)
MONOCYTES ABSOLUTE: 0.2 K/UL (ref 0–1.3)
MONOCYTES RELATIVE PERCENT: 2.6 %
NEUTROPHILS ABSOLUTE: 6.3 K/UL (ref 1.7–7.7)
NEUTROPHILS RELATIVE PERCENT: 92 %
PDW BLD-RTO: 13.1 % (ref 12.4–15.4)
PLATELET # BLD: 214 K/UL (ref 135–450)
PMV BLD AUTO: 7.6 FL (ref 5–10.5)
POTASSIUM REFLEX MAGNESIUM: 3.7 MMOL/L (ref 3.5–5.1)
PROCALCITONIN: 0.09 NG/ML (ref 0–0.15)
RBC # BLD: 3.33 M/UL (ref 4.2–5.9)
REPORT: NORMAL
RESPIRATORY PANEL PCR: NORMAL
SODIUM BLD-SCNC: 139 MMOL/L (ref 136–145)
WBC # BLD: 6.8 K/UL (ref 4–11)

## 2018-12-10 PROCEDURE — 6360000002 HC RX W HCPCS: Performed by: INTERNAL MEDICINE

## 2018-12-10 PROCEDURE — 2580000003 HC RX 258: Performed by: INTERNAL MEDICINE

## 2018-12-10 PROCEDURE — 6370000000 HC RX 637 (ALT 250 FOR IP): Performed by: INTERNAL MEDICINE

## 2018-12-10 PROCEDURE — 1200000000 HC SEMI PRIVATE

## 2018-12-10 PROCEDURE — 99223 1ST HOSP IP/OBS HIGH 75: CPT | Performed by: INTERNAL MEDICINE

## 2018-12-10 PROCEDURE — 6370000000 HC RX 637 (ALT 250 FOR IP): Performed by: NURSE PRACTITIONER

## 2018-12-10 PROCEDURE — 94667 MNPJ CHEST WALL 1ST: CPT

## 2018-12-10 PROCEDURE — 80048 BASIC METABOLIC PNL TOTAL CA: CPT

## 2018-12-10 PROCEDURE — 93010 ELECTROCARDIOGRAM REPORT: CPT | Performed by: INTERNAL MEDICINE

## 2018-12-10 PROCEDURE — 84145 PROCALCITONIN (PCT): CPT

## 2018-12-10 PROCEDURE — 87581 M.PNEUMON DNA AMP PROBE: CPT

## 2018-12-10 PROCEDURE — 87798 DETECT AGENT NOS DNA AMP: CPT

## 2018-12-10 PROCEDURE — 87486 CHLMYD PNEUM DNA AMP PROBE: CPT

## 2018-12-10 PROCEDURE — 87633 RESP VIRUS 12-25 TARGETS: CPT

## 2018-12-10 PROCEDURE — 94760 N-INVAS EAR/PLS OXIMETRY 1: CPT

## 2018-12-10 PROCEDURE — 2700000000 HC OXYGEN THERAPY PER DAY

## 2018-12-10 PROCEDURE — 36415 COLL VENOUS BLD VENIPUNCTURE: CPT

## 2018-12-10 PROCEDURE — 94640 AIRWAY INHALATION TREATMENT: CPT

## 2018-12-10 PROCEDURE — 94664 DEMO&/EVAL PT USE INHALER: CPT

## 2018-12-10 PROCEDURE — 85025 COMPLETE CBC W/AUTO DIFF WBC: CPT

## 2018-12-10 RX ORDER — GUAIFENESIN 600 MG/1
1200 TABLET, EXTENDED RELEASE ORAL 2 TIMES DAILY
Status: DISCONTINUED | OUTPATIENT
Start: 2018-12-10 | End: 2018-12-12 | Stop reason: HOSPADM

## 2018-12-10 RX ORDER — IPRATROPIUM BROMIDE AND ALBUTEROL SULFATE 2.5; .5 MG/3ML; MG/3ML
1 SOLUTION RESPIRATORY (INHALATION) 4 TIMES DAILY
Status: DISCONTINUED | OUTPATIENT
Start: 2018-12-11 | End: 2018-12-12 | Stop reason: HOSPADM

## 2018-12-10 RX ORDER — IPRATROPIUM BROMIDE AND ALBUTEROL SULFATE 2.5; .5 MG/3ML; MG/3ML
1 SOLUTION RESPIRATORY (INHALATION) EVERY 4 HOURS
Status: DISCONTINUED | OUTPATIENT
Start: 2018-12-10 | End: 2018-12-10

## 2018-12-10 RX ORDER — LANOLIN ALCOHOL/MO/W.PET/CERES
3 CREAM (GRAM) TOPICAL NIGHTLY PRN
Status: DISCONTINUED | OUTPATIENT
Start: 2018-12-10 | End: 2018-12-12 | Stop reason: HOSPADM

## 2018-12-10 RX ADMIN — AMLODIPINE BESYLATE 10 MG: 5 TABLET ORAL at 20:33

## 2018-12-10 RX ADMIN — IPRATROPIUM BROMIDE AND ALBUTEROL SULFATE 1 AMPULE: .5; 3 SOLUTION RESPIRATORY (INHALATION) at 16:17

## 2018-12-10 RX ADMIN — Medication 2 PUFF: at 07:29

## 2018-12-10 RX ADMIN — GUAIFENESIN 1200 MG: 600 TABLET, EXTENDED RELEASE ORAL at 20:33

## 2018-12-10 RX ADMIN — AZITHROMYCIN 250 MG: 250 TABLET, FILM COATED ORAL at 07:49

## 2018-12-10 RX ADMIN — Medication 10 ML: at 07:48

## 2018-12-10 RX ADMIN — ASPIRIN 81 MG: 81 TABLET, COATED ORAL at 20:35

## 2018-12-10 RX ADMIN — IPRATROPIUM BROMIDE AND ALBUTEROL SULFATE 1 AMPULE: .5; 3 SOLUTION RESPIRATORY (INHALATION) at 03:15

## 2018-12-10 RX ADMIN — Medication 10 ML: at 20:37

## 2018-12-10 RX ADMIN — IPRATROPIUM BROMIDE AND ALBUTEROL SULFATE 1 AMPULE: .5; 3 SOLUTION RESPIRATORY (INHALATION) at 11:55

## 2018-12-10 RX ADMIN — IPRATROPIUM BROMIDE AND ALBUTEROL SULFATE 1 AMPULE: .5; 3 SOLUTION RESPIRATORY (INHALATION) at 20:46

## 2018-12-10 RX ADMIN — IPRATROPIUM BROMIDE AND ALBUTEROL SULFATE 1 AMPULE: .5; 3 SOLUTION RESPIRATORY (INHALATION) at 07:28

## 2018-12-10 RX ADMIN — METHYLPREDNISOLONE SODIUM SUCCINATE 40 MG: 40 INJECTION, POWDER, FOR SOLUTION INTRAMUSCULAR; INTRAVENOUS at 02:05

## 2018-12-10 RX ADMIN — METHYLPREDNISOLONE SODIUM SUCCINATE 40 MG: 40 INJECTION, POWDER, FOR SOLUTION INTRAMUSCULAR; INTRAVENOUS at 15:05

## 2018-12-10 RX ADMIN — Medication 2 PUFF: at 20:46

## 2018-12-10 RX ADMIN — METOPROLOL SUCCINATE 25 MG: 25 TABLET, FILM COATED, EXTENDED RELEASE ORAL at 20:33

## 2018-12-10 RX ADMIN — VITAMIN E CAP 400 UNIT 400 UNITS: 400 CAP at 07:50

## 2018-12-10 RX ADMIN — METHYLPREDNISOLONE SODIUM SUCCINATE 40 MG: 40 INJECTION, POWDER, FOR SOLUTION INTRAMUSCULAR; INTRAVENOUS at 20:34

## 2018-12-10 RX ADMIN — PANTOPRAZOLE SODIUM 40 MG: 40 TABLET, DELAYED RELEASE ORAL at 06:20

## 2018-12-10 RX ADMIN — SODIUM CHLORIDE: 9 INJECTION, SOLUTION INTRAVENOUS at 15:04

## 2018-12-10 RX ADMIN — ENOXAPARIN SODIUM 40 MG: 40 INJECTION SUBCUTANEOUS at 20:33

## 2018-12-10 RX ADMIN — MELATONIN TAB 3 MG 3 MG: 3 TAB at 21:42

## 2018-12-10 RX ADMIN — LISINOPRIL 20 MG: 20 TABLET ORAL at 07:49

## 2018-12-10 RX ADMIN — METHYLPREDNISOLONE SODIUM SUCCINATE 40 MG: 40 INJECTION, POWDER, FOR SOLUTION INTRAMUSCULAR; INTRAVENOUS at 07:53

## 2018-12-10 RX ADMIN — GUAIFENESIN 600 MG: 600 TABLET, EXTENDED RELEASE ORAL at 07:50

## 2018-12-10 ASSESSMENT — PAIN SCALES - GENERAL
PAINLEVEL_OUTOF10: 0

## 2018-12-10 NOTE — PROGRESS NOTES
to assess, Tenacity: Thick, Sputum How Obtained: Spontaneous cough  Cough: Strong, productive = 1    Vital Signs   BP (!) 104/55   Pulse 92   Temp 98.2 °F (36.8 °C) (Oral)   Resp 18   Ht 5' 10\" (1.778 m)   Wt 160 lb (72.6 kg)   SpO2 93%   BMI 22.96 kg/m²   SPO2 (COPD values may differ): 88-89% on room air or greater than 92% on FiO2 28- 35% = 2    Peak Flow (asthma only): not applicable = 0    RSI: 05-06 = Q6H or QID and Q4HPRN for dyspnea        Plan       Goals: medication delivery, mobilize retained secretions and improve oxygenation  Plan of Care: change to q4h for 24 hours then re-assess  Patient admitted for Respiratory distress due to a Respiratory illness, aerosolized medication frequency changed to Q4H x 24 hours, patient to be reassessed the next day. Patient/caregiver was educated on the proper method of use of Respiratory Therapy device:  Yes      Level of understanding, patient was able to:(check appropriate box(es))   [] Verbalize understanding   [] Demonstrate understanding       [] Teach back        [] Needs reinforcement       []  No available caregiver               []  Other:     Response to education:  Good     Teaching Time:  10  minutes      Is patient being placed on Home Treatment Regimen? No     Electronically signed by SIGIFREDO YOON RCP on 12/10/2018 at 12:26 AM    Respiratory Protocol Guidelines     1. Assessment and treatment by Respiratory Therapy will be initiated for medication and therapeutic interventions upon initiation of aerosolized medication. 2. Physician will be contacted for respiratory rate (RR) greater than 35 breaths per minute. Therapy will be held for heart rate (HR) greater than 140 beats per minute, pending direction from physician. 3. Bronchodilators will be administered via Metered Dose Inhaler (MDI) with spacer when the following criteria are met:  a.  Alert and cooperative     b. HR < 140 bpm  c. RR < 30 bpm                d. Can demonstrate a 2-3

## 2018-12-10 NOTE — CONSULTS
anxiety. MUSCULOSKELETAL: No obvious misalignment or effusion of the joints. No clubbing, cyanosis of the digits. SKIN:  normal skin color, texture, turgor and no redness, warmth, or swelling. No palpable nodules    DATA:    Old records have been reviewed    CBC:  Recent Labs      12/09/18   1153  12/10/18   0547   WBC  9.0  6.8   RBC  3.91*  3.33*   HGB  12.6*  10.8*   HCT  37.0*  31.4*   PLT  243  214   MCV  94.6  94.1   MCH  32.3  32.3   MCHC  34.1  34.4   RDW  13.3  13.1      BMP:  Recent Labs      12/09/18   1153  12/10/18   0547   NA  139  139   K  4.0  3.7   CL  102  107   CO2  23  23   BUN  19  20   CREATININE  1.1  0.9   CALCIUM  9.1  8.5   GLUCOSE  173*  184*      ABG:  No results for input(s): PHART, TZG6BGL, PO2ART, HGC2QTS, K8CJEVLZ, BEART in the last 72 hours. Lab Results   Component Value Date    BNP 19 05/06/2012     Lab Results   Component Value Date    TROPONINI <0.01 12/09/2018       Cultures:     Abx:    Radiology Review:  All pertinent images / reports were reviewed as a part of this visit. Other diagnostic test:      PFTs 8/18:  Spirometry:  Flow volume loops were obstructed. The FEV-1/FVC ratio was   decreased. The FEV-1 was 1.52 liters (52% of predicted), which   was moderately decreased. The FVC was 2.9 liters (71% of   predicted), which was decreased. Response to inhaled   bronchodilators (albuterol) was significant. Lung volumes:  Lung volumes were tested by plethysmography. The total lung   capacity was 6.5 liters (102% of predicted), which was normal.   The residual volume was 3.35 liters (124% of predicted), which   was increased. The ratio of residual volume to total lung   capacity (RV/TLC) was 52, which was increased. Diffusion capacity was found to be mildly decreased.       Interpretation:   moderate obstructive airway disease with significant   bronchodilator reversibility.  Compared to study done in 8/2016,   FEV1 has decreased by 14%, FVC improved by 4% and DLCO decreased   by 12%. Clinical correlation is recommended. Assessment:     I have reviewed laboratories, medical records and images for this visit    1. Acute Hypoxemic Respiratory Failure  · Normally does not require O2  · In ER desaturated into the 80s with minimal exertion  · Has corrected with 2 LPM supplement  · Wean as tolerated    2.  COPD Exacerbation  · Wheezing and tight  · CXR is clear  · Afebrle and no leukocytosis  · Not on Abx  · Check procalcitonin  · Solumedrol  · ]Duoneb  · Dulera  · Increase Mucinex

## 2018-12-10 NOTE — PROGRESS NOTES
Bedside rounding completed with Sukhjinder Leung RN. Pt denies needs at this time. White board updated. Call light in hand and pt verbalizes correct use. All needs within reach. Bed alarm set. Will continue to monitor.  Electronically signed by Andree Hines RN on 12/10/2018 at 7:19 AM

## 2018-12-10 NOTE — PROGRESS NOTES
Vital signs obtained at this time and WNL. Pt denies further needs at this time. Call light in hand. Will continue to monitor.  Electronically signed by Vianey Fay RN on 12/10/2018 at 6:14 AM

## 2018-12-11 PROCEDURE — 2580000003 HC RX 258: Performed by: INTERNAL MEDICINE

## 2018-12-11 PROCEDURE — 1200000000 HC SEMI PRIVATE

## 2018-12-11 PROCEDURE — 94668 MNPJ CHEST WALL SBSQ: CPT

## 2018-12-11 PROCEDURE — 6360000002 HC RX W HCPCS: Performed by: INTERNAL MEDICINE

## 2018-12-11 PROCEDURE — 6370000000 HC RX 637 (ALT 250 FOR IP): Performed by: INTERNAL MEDICINE

## 2018-12-11 PROCEDURE — 6370000000 HC RX 637 (ALT 250 FOR IP): Performed by: HOSPITALIST

## 2018-12-11 PROCEDURE — 94760 N-INVAS EAR/PLS OXIMETRY 1: CPT

## 2018-12-11 PROCEDURE — 94640 AIRWAY INHALATION TREATMENT: CPT

## 2018-12-11 PROCEDURE — 99232 SBSQ HOSP IP/OBS MODERATE 35: CPT | Performed by: INTERNAL MEDICINE

## 2018-12-11 RX ORDER — PREDNISONE 20 MG/1
20 TABLET ORAL 2 TIMES DAILY
Status: DISCONTINUED | OUTPATIENT
Start: 2018-12-11 | End: 2018-12-12 | Stop reason: HOSPADM

## 2018-12-11 RX ADMIN — AMLODIPINE BESYLATE 10 MG: 5 TABLET ORAL at 23:21

## 2018-12-11 RX ADMIN — Medication 10 ML: at 23:21

## 2018-12-11 RX ADMIN — IPRATROPIUM BROMIDE AND ALBUTEROL SULFATE 1 AMPULE: .5; 3 SOLUTION RESPIRATORY (INHALATION) at 12:24

## 2018-12-11 RX ADMIN — METHYLPREDNISOLONE SODIUM SUCCINATE 40 MG: 40 INJECTION, POWDER, FOR SOLUTION INTRAMUSCULAR; INTRAVENOUS at 08:45

## 2018-12-11 RX ADMIN — PANTOPRAZOLE SODIUM 40 MG: 40 TABLET, DELAYED RELEASE ORAL at 05:45

## 2018-12-11 RX ADMIN — IPRATROPIUM BROMIDE AND ALBUTEROL SULFATE 1 AMPULE: .5; 3 SOLUTION RESPIRATORY (INHALATION) at 21:13

## 2018-12-11 RX ADMIN — IPRATROPIUM BROMIDE AND ALBUTEROL SULFATE 1 AMPULE: .5; 3 SOLUTION RESPIRATORY (INHALATION) at 08:07

## 2018-12-11 RX ADMIN — Medication 2 PUFF: at 08:07

## 2018-12-11 RX ADMIN — PREDNISONE 20 MG: 20 TABLET ORAL at 23:20

## 2018-12-11 RX ADMIN — PREDNISONE 20 MG: 20 TABLET ORAL at 11:47

## 2018-12-11 RX ADMIN — GUAIFENESIN 1200 MG: 600 TABLET, EXTENDED RELEASE ORAL at 08:45

## 2018-12-11 RX ADMIN — ENOXAPARIN SODIUM 40 MG: 40 INJECTION SUBCUTANEOUS at 23:22

## 2018-12-11 RX ADMIN — VITAMIN E CAP 400 UNIT 400 UNITS: 400 CAP at 10:57

## 2018-12-11 RX ADMIN — ASPIRIN 81 MG: 81 TABLET, COATED ORAL at 23:20

## 2018-12-11 RX ADMIN — Medication 2 PUFF: at 21:13

## 2018-12-11 RX ADMIN — METHYLPREDNISOLONE SODIUM SUCCINATE 40 MG: 40 INJECTION, POWDER, FOR SOLUTION INTRAMUSCULAR; INTRAVENOUS at 01:55

## 2018-12-11 RX ADMIN — METOPROLOL SUCCINATE 25 MG: 25 TABLET, FILM COATED, EXTENDED RELEASE ORAL at 23:21

## 2018-12-11 RX ADMIN — IPRATROPIUM BROMIDE AND ALBUTEROL SULFATE 1 AMPULE: .5; 3 SOLUTION RESPIRATORY (INHALATION) at 15:48

## 2018-12-11 RX ADMIN — Medication 10 ML: at 08:46

## 2018-12-11 RX ADMIN — GUAIFENESIN 1200 MG: 600 TABLET, EXTENDED RELEASE ORAL at 23:21

## 2018-12-11 RX ADMIN — LISINOPRIL 20 MG: 20 TABLET ORAL at 08:45

## 2018-12-11 ASSESSMENT — PAIN SCALES - GENERAL
PAINLEVEL_OUTOF10: 0

## 2018-12-11 NOTE — CARE COORDINATION
[address] Transitions Interview     2018    Patient: Venkata Baig Patient : 1940   MRN: [id number]  Reason for Admission: SOB  RARS: Readmission Risk Score: 25       Spoke with: Corey      Readmission Risk  Patient Active Problem List   Diagnosis    Psoriasis    [facility] ([facility]    GERD (gastroesophageal reflux disease)    ED (erectile dysfunction)    Hypertension    Hyperlipidemia    Occlusion and stenosis of carotid artery    COPD (chronic obstructive pulmonary disease) ([facility]    Emphysema lung (HCC)    Hypoxia    COPD exacerbation (HCC)    Pulmonary nodules    Moderate COPD (chronic obstructive pulmonary disease) (HCC)    Hematuria    Cystitis    Atherosclerosis of both carotid arteries    Abdominal pain    Biliary colic    Abdominal pain, epigastric    Acute cholecystitis    Cholecystitis    Acute hypoxemic respiratory failure (HCC)    Hospital-acquired pneumonia    Failure of outpatient treatment    Chronic obstructive pulmonary disease with acute exacerbation ([facility]    Acute bronchitis       Inpatient Assessment  Care Transitions Summary    Care Transitions Inpatient Review  Medication Review  Do you have all of your prescriptions and are they filled?:  Yes   Are you able to afford your medications?:  Yes  How often do you have difficulty taking your medications?:  I always take them as prescribed. Housing Review  Who do you live with?:  Partner/Spouse/SO  Are you an active caregiver in your home?:  No  Social Support  Do you have a ?:  No  Do you have a [address] [place] [address]?:  No  Durable Medical Equipment  Functional Review  Ability to seek help/take action for Emergent/Urgent situations i.e. fire, crime, inclement weather or health crisis. :  Independent  Ability handle personal hygiene needs (bathing/dressing/grooming): Independent  Ability to manage medications:   Independent  Ability to prepare food:  Independent  Ability

## 2018-12-11 NOTE — PROGRESS NOTES
Held Nebulizer [facility]) to patients when ANY of the following criteria are met  a. Incognizant or uncooperative          b. Patients treated with HHN at [facility]   c. Unable to demonstrate proper MDI or HFA technique     d. RR > 30 bpm   5. Bronchodilators will be delivered via Metered Dose Inhaler (MDI) or Hydrofluoroalkane (HFA) with spacer to intubated patients on mechanical ventilation. 6. Inhalation medication orders will be delivered and/or substituted as outlined below.     Aerosolized Medications Ordering and Administration Guidelines:     1. All Medications will be ordered by a physician, and their frequency and/or modality will be adjusted as defined by the patients Respiratory Severity Index (RSI) score. 2. If the patient does not have documented COPD, consider discontinuing anticholinergics when RSI is less than 9.  3. If the bronchospasm worsens (increased RSI), then the bronchodilator frequency can be increased to a maximum of every 4 hours.  If greater than every 4 hours is required, the physician will be contacted. 4. If the bronchospasm improves, the frequency of the bronchodilator can be decreased, based on the patient's RSI, but not less than home treatment regimen frequency. 5. Bronchodilator(s) will be discontinued if patient has a RSI less than 9 and has received no scheduled or as needed treatment for 72  Hrs.     Patients Ordered on a Mucolytic Agent:     1. Must always be administered with a bronchodilator.     2. Discontinue if patient experiences worsened bronchospasm, or secretions have lessened to the point that the patient is able to clear them with a cough.     Anti-inflammatory and Combination Medications:     1.  If the patient lacks prior history of lung disease, is not using inhaled anti-inflammatory medication at home, and lacks wheezing by examination or by history for at least 24 hours, contact physician for possible discontinuation.

## 2018-12-11 NOTE — PROGRESS NOTES
Patient was ambulated in the byrne. Patient walked 1000 feet with no nasal canula. Patient tolerated it well Patient after walk O2 was 94% bon room air. Patient is continuing to be on room air. Will continue to monitor.

## 2018-12-11 NOTE — PROGRESS NOTES
Bedside rounding completed with Laura Mosley RN. Pt denies needs at this time. White board updated. Call light in hand and pt verbalizes correct use. All needs within reach. Bed alarm set. Will continue to monitor.  Electronically signed by Jennifer Travis RN on 12/11/2018 at 7:21 AM

## 2018-12-12 ENCOUNTER — TELEPHONE (OUTPATIENT)
Dept: FAMILY MEDICINE CLINIC | Age: 78
End: 2018-12-12

## 2018-12-12 VITALS
BODY MASS INDEX: 22.9 KG/M2 | WEIGHT: 160 LBS | RESPIRATION RATE: 16 BRPM | TEMPERATURE: 97.7 F | OXYGEN SATURATION: 94 % | SYSTOLIC BLOOD PRESSURE: 129 MMHG | HEIGHT: 70 IN | HEART RATE: 94 BPM | DIASTOLIC BLOOD PRESSURE: 69 MMHG

## 2018-12-12 PROCEDURE — 6370000000 HC RX 637 (ALT 250 FOR IP): Performed by: INTERNAL MEDICINE

## 2018-12-12 PROCEDURE — 99232 SBSQ HOSP IP/OBS MODERATE 35: CPT | Performed by: INTERNAL MEDICINE

## 2018-12-12 PROCEDURE — 2580000003 HC RX 258: Performed by: INTERNAL MEDICINE

## 2018-12-12 PROCEDURE — 94760 N-INVAS EAR/PLS OXIMETRY 1: CPT

## 2018-12-12 PROCEDURE — 94640 AIRWAY INHALATION TREATMENT: CPT

## 2018-12-12 PROCEDURE — 94668 MNPJ CHEST WALL SBSQ: CPT

## 2018-12-12 PROCEDURE — 6370000000 HC RX 637 (ALT 250 FOR IP): Performed by: HOSPITALIST

## 2018-12-12 RX ADMIN — IPRATROPIUM BROMIDE AND ALBUTEROL SULFATE 1 AMPULE: .5; 3 SOLUTION RESPIRATORY (INHALATION) at 08:11

## 2018-12-12 RX ADMIN — PREDNISONE 20 MG: 20 TABLET ORAL at 08:28

## 2018-12-12 RX ADMIN — Medication 2 PUFF: at 08:11

## 2018-12-12 RX ADMIN — PANTOPRAZOLE SODIUM 40 MG: 40 TABLET, DELAYED RELEASE ORAL at 05:42

## 2018-12-12 RX ADMIN — Medication 10 ML: at 08:29

## 2018-12-12 RX ADMIN — LISINOPRIL 40 MG: 20 TABLET ORAL at 08:28

## 2018-12-12 RX ADMIN — GUAIFENESIN 1200 MG: 600 TABLET, EXTENDED RELEASE ORAL at 08:28

## 2018-12-12 RX ADMIN — VITAMIN E CAP 400 UNIT 400 UNITS: 400 CAP at 08:28

## 2018-12-12 ASSESSMENT — PAIN SCALES - GENERAL: PAINLEVEL_OUTOF10: 0

## 2018-12-12 NOTE — PROGRESS NOTES
Shift assessment complete. See flowsheet. VSS. Evening meds given. See MAR. Pt resting in bed. Call light within reach. Pt denies other needs at time. Will continue to monitor.

## 2018-12-12 NOTE — DISCHARGE SUMMARY
Hospital Medicine Discharge Summary    Patient: Gasper Lane     Gender: male  : 1940   Age: [de-identified] y.o. MRN: [id number]    Admitting Physician: Pankaj Zamudio MD  Discharge Physician: Antonina Aguilera MD     Code Status: Full Code     Admit Date: 2018   Discharge Date:   2018    Disposition:  Home    Discharge Diagnoses: Active Hospital Problems    Diagnosis Date Noted    Failure of outpatient treatment [Z78.9] 2018    Chronic obstructive pulmonary disease with acute exacerbation (HCC) [J44.1] 2018    Acute bronchitis [J20.9] 2018    Acute hypoxemic respiratory failure (HCC) [J96.01] 02/15/2018    Atherosclerosis of both carotid arteries [I65.23]     COPD exacerbation (HCC) [J44.1]     Emphysema lung ([address] [J43.9] 2014    Prostate CA (HCC) [C61]     GERD (gastroesophageal reflux disease) [K21.9]     Hypertension [I10]        Follow-up appointments:  two weeks    Condition at Discharge:  Stable    Hospital Course:   [de-identified] y.o. male with history of COPD (not on oxygen), h/o prostate cancer, GERD, HTN, hyperlipidemia, PAD who came to ER with complaints of SOB. Patient had cold symptoms that precipitated SOB. He was seen in [address] [place] [address] on 18 and started on Zithromax. Tomorrow is last dose of Zithromax. Was not placed on steroids. Continues to have cough with severe SOB. started COPD protocol, Pulmonology service consulted, Solumedrol 40 mg Q6H started. significantyl improved, switch to PO Prednisone, and discharged home today.     Discharge Medications:   Current Discharge Medication List      START taking these medications    Details   predniSONE (DELTASONE) 10 MG tablet Take 1 tablet by mouth daily for 5 days  Qty: 10 tablet, Refills: 0           Current Discharge Medication List      CONTINUE these medications which have CHANGED    Details   albuterol sulfate HFA (PROVENTIL HFA) 108 (90 Base) MCG/ACT inhaler Inhale 2 puffs into the lungs every 4 hours Cardiac and mediastinal silhouettes are similar to prior. Degenerative change of the spine. No evidence of acute cardiopulmonary disease. The patient was seen and examined on day of discharge and this discharge summary is in conjunction with any daily progress note from day of discharge. Time Spent on discharge is 30 minutes  in the examination, evaluation, counseling and review of medications and discharge plan. Signed:    Ras Vera MD   12/12/2018      Thank you Sheryle Petri, MD for the opportunity to be involved in this patient's care.  If you have any questions or concerns please feel free to contact me at

## 2018-12-12 NOTE — PROGRESS NOTES
P Pulmonary and Critical Care   Progress Note      Reason for Consult: Shortness of breath, cough, COPD exacerbation   Requesting Physician: Dr Hardik Price:   [facility]:                The patient is a [de-identified] y.o. male with significant past medical history of:      Diagnosis Date    Carotid artery occlusion and stenosis 5/3/2012    [facility]) [facility]     ED (erectile dysfunction)     Em[facility]sema lung ([facility] 8/8/2014    GERD (gastroesophageal reflux disease)     Hyperlipidemia     pt denies    Hypertension     [facility]    Psoriasis     Pulmonary nodules     Radiation 2004     Looks and feels much better today      Past Surgical History:        Procedure Laterality Date    CAROTID ENDARTERECTOMY  5/3/12    left    CAROTID ENDARTERECTOMY Right 10/25/2017    Dr. Kavita Adams at [facility], [address]  02/12/2018    Intraoperative Cholangiogram    COLONOSCOPY      CYSTOSCOPY  07/2017    ENDOSCOPY, COLON, DIAGNOSTIC      PROSTATECTOMY  2003    TONSILLECTOMY       Current Medications:    Current Facility-Administered Medications: predniSONE (DELTASONE) tablet 20 mg, 20 mg, Oral, BID  guaiFENesin (MUCINEX) extended release tablet 1,200 mg, 1,200 mg, Oral, BID  melatonin tablet 3 mg, 3 mg, Oral, Nightly PRN  ipratropium-albuterol (DUONEB) nebulizer solution 1 ampule, 1 ampule, Inhalation, 4x daily  amLODIPine (NORVASC) tablet 10 mg, 10 mg, Oral, Daily  aspirin EC tablet 81 mg, 81 mg, Oral, Daily  mometasone-formoterol (DULERA) 200-5 MCG/ACT inhaler 2 puff, 2 puff, Inhalation, BID  lisinopril (PRINIVIL;ZESTRIL) tablet 40 mg, 40 mg, Oral, Daily  metoprolol succinate (TOPROL XL) extended release tablet 25 mg, 25 mg, Oral, Daily  pantoprazole (PROTONIX) tablet 40 mg, 40 mg, Oral, QAM AC  vitamin E capsule 400 Units, 400 Units, Oral, Daily  sodium chloride flush 0.9 % injection 10 mL, 10 mL, Intravenous, 2 times per day  sodium chloride 0. 9   CALCIUM  9.1  8.5   GLUCOSE  173*  184*      ABG:  No results for input(s): PHART, CWD7RXK, PO2ART, HPW6IAX, E9DOOYCR, BEART in the last 72 hours. Lab Results   Component Value Date    BNP 19 05/06/2012     Lab Results   Component Value Date    TROPONINI <0.01 12/09/2018       Cultures:     Abx:    Radiology Review:  All pertinent images / reports were reviewed as a part of this visit. Other diagnostic test:      PFTs 8/18:  Spirometry:  Flow volume loops were obstructed. The FEV-1/FVC ratio was   decreased. The FEV-1 was 1.52 liters (52% of predicted), which   was moderately decreased. The FVC was 2.9 liters (71% of   predicted), which was decreased. Response to inhaled   bronchodilators (albuterol) was significant. Lung volumes:  Lung volumes were tested by plethysmography. The total lung   capacity was 6.5 liters (102% of predicted), which was normal.   The residual volume was 3.35 liters (124% of predicted), which   was increased. The ratio of residual volume to total lung   capacity (RV/TLC) was 52, which was increased. Diffusion capacity was found to be mildly decreased.       Interpretation:   moderate obstructive airway disease with significant   bronchodilator reversibility. Compared to study done in 8/2016,   FEV1 has decreased by 14%, FVC improved by 4% and DLCO decreased   by 12%. Clinical correlation is recommended. Assessment:     I have reviewed laboratories, medical records and images for this visit    1. Acute Hypoxemic Respiratory Failure  · Normally does not require O2  · In ER desaturated into the 80s with minimal exertion  · Now weaned back to room air with good saturations    2. COPD Exacerbation  · Exam has improved  · CXR is clear  · Afebrle and no leukocytosis  · Not on Abx  · Check procalcitonin  · Solumedrol ==> Prednisone  · ]Duoneb  · Dulera  · Increase Mucinex    OK with me if he discharges home.  Discussed with Dr Avel Lee

## 2018-12-13 ENCOUNTER — CARE COORDINATION (OUTPATIENT)
Dept: CASE MANAGEMENT | Age: 78
End: 2018-12-13

## 2018-12-13 ENCOUNTER — TELEPHONE (OUTPATIENT)
Dept: PULMONOLOGY | Age: 78
End: 2018-12-13

## 2018-12-14 ENCOUNTER — TELEPHONE (OUTPATIENT)
Dept: OTHER | Facility: CLINIC | Age: 78
End: 2018-12-14

## 2018-12-14 ENCOUNTER — HOSPITAL ENCOUNTER (INPATIENT)
Age: 78
LOS: 5 days | Discharge: HOME | DRG: 193 | End: 2018-12-19
Attending: EMERGENCY MEDICINE | Admitting: FAMILY MEDICINE
Payer: MEDICARE

## 2018-12-14 ENCOUNTER — APPOINTMENT (OUTPATIENT)
Dept: CT IMAGING | Age: 78
DRG: 193 | End: 2018-12-14
Payer: MEDICARE

## 2018-12-14 DIAGNOSIS — J96.01 ACUTE RESPIRATORY FAILURE WITH HYPOXEMIA (HCC): ICD-10-CM

## 2018-12-14 DIAGNOSIS — J18.9 PNEUMONIA DUE TO ORGANISM: Primary | ICD-10-CM

## 2018-12-14 LAB
A/G RATIO: 1.5 (ref 1.1–2.2)
ALBUMIN SERPL-MCNC: 4.1 G/DL (ref 3.4–5)
ALP BLD-CCNC: 60 U/L (ref 40–129)
ALT SERPL-CCNC: 29 U/L (ref 10–40)
ANION GAP SERPL CALCULATED.3IONS-SCNC: 12 MMOL/L (ref 3–16)
AST SERPL-CCNC: 21 U/L (ref 15–37)
BANDED NEUTROPHILS RELATIVE PERCENT: 2 % (ref 0–7)
BASOPHILS ABSOLUTE: 0 K/UL (ref 0–0.2)
BASOPHILS RELATIVE PERCENT: 0 %
BILIRUB SERPL-MCNC: 0.6 MG/DL (ref 0–1)
BLOOD CULTURE, ROUTINE: NORMAL
BUN BLDV-MCNC: 16 MG/DL (ref 7–20)
CALCIUM SERPL-MCNC: 8.7 MG/DL (ref 8.3–10.6)
CHLORIDE BLD-SCNC: 103 MMOL/L (ref 99–110)
CO2: 26 MMOL/L (ref 21–32)
CREAT SERPL-MCNC: 1 MG/DL (ref 0.8–1.3)
CULTURE, BLOOD 2: NORMAL
EKG ATRIAL RATE: 126 BPM
EKG DIAGNOSIS: NORMAL
EKG P AXIS: 66 DEGREES
EKG P-R INTERVAL: 118 MS
EKG Q-T INTERVAL: 318 MS
EKG QRS DURATION: 82 MS
EKG QTC CALCULATION (BAZETT): 460 MS
EKG R AXIS: -68 DEGREES
EKG T AXIS: 46 DEGREES
EKG VENTRICULAR RATE: 126 BPM
EOSINOPHILS ABSOLUTE: 0 K/UL (ref 0–0.6)
EOSINOPHILS RELATIVE PERCENT: 0 %
GFR AFRICAN AMERICAN: >60
GFR NON-AFRICAN AMERICAN: >60
GLOBULIN: 2.8 G/DL
GLUCOSE BLD-MCNC: 109 MG/DL (ref 70–99)
HCT VFR BLD CALC: 38.8 % (ref 40.5–52.5)
HEMOGLOBIN: 12.9 G/DL (ref 13.5–17.5)
INR BLD: 1.05 (ref 0.86–1.14)
LACTIC ACID, SEPSIS: 1.8 MMOL/L (ref 0.4–1.9)
LACTIC ACID, SEPSIS: 1.9 MMOL/L (ref 0.4–1.9)
LYMPHOCYTES ABSOLUTE: 2.2 K/UL (ref 1–5.1)
LYMPHOCYTES RELATIVE PERCENT: 15 %
MCH RBC QN AUTO: 31.9 PG (ref 26–34)
MCHC RBC AUTO-ENTMCNC: 33.3 G/DL (ref 31–36)
MCV RBC AUTO: 95.7 FL (ref 80–100)
MONOCYTES ABSOLUTE: 0.6 K/UL (ref 0–1.3)
MONOCYTES RELATIVE PERCENT: 4 %
NEUTROPHILS ABSOLUTE: 12 K/UL (ref 1.7–7.7)
NEUTROPHILS RELATIVE PERCENT: 79 %
PDW BLD-RTO: 13.4 % (ref 12.4–15.4)
PLATELET # BLD: 305 K/UL (ref 135–450)
PLATELET SLIDE REVIEW: ADEQUATE
PMV BLD AUTO: 7.9 FL (ref 5–10.5)
POLYCHROMASIA: ABNORMAL
POTASSIUM SERPL-SCNC: 3.6 MMOL/L (ref 3.5–5.1)
PRO-BNP: 422 PG/ML (ref 0–449)
PROTHROMBIN TIME: 12 SEC (ref 9.8–13)
RAPID INFLUENZA  B AGN: NEGATIVE
RAPID INFLUENZA A AGN: NEGATIVE
RBC # BLD: 4.05 M/UL (ref 4.2–5.9)
SLIDE REVIEW: ABNORMAL
SODIUM BLD-SCNC: 141 MMOL/L (ref 136–145)
TOTAL PROTEIN: 6.9 G/DL (ref 6.4–8.2)
TROPONIN: <0.01 NG/ML
WBC # BLD: 14.8 K/UL (ref 4–11)

## 2018-12-14 PROCEDURE — 6360000002 HC RX W HCPCS: Performed by: EMERGENCY MEDICINE

## 2018-12-14 PROCEDURE — 6370000000 HC RX 637 (ALT 250 FOR IP): Performed by: FAMILY MEDICINE

## 2018-12-14 PROCEDURE — 6360000004 HC RX CONTRAST MEDICATION: Performed by: EMERGENCY MEDICINE

## 2018-12-14 PROCEDURE — 94640 AIRWAY INHALATION TREATMENT: CPT

## 2018-12-14 PROCEDURE — 6370000000 HC RX 637 (ALT 250 FOR IP): Performed by: NURSE PRACTITIONER

## 2018-12-14 PROCEDURE — 2580000003 HC RX 258: Performed by: EMERGENCY MEDICINE

## 2018-12-14 PROCEDURE — 6360000002 HC RX W HCPCS: Performed by: FAMILY MEDICINE

## 2018-12-14 PROCEDURE — 87205 SMEAR GRAM STAIN: CPT

## 2018-12-14 PROCEDURE — 93005 ELECTROCARDIOGRAM TRACING: CPT | Performed by: NURSE PRACTITIONER

## 2018-12-14 PROCEDURE — 96365 THER/PROPH/DIAG IV INF INIT: CPT

## 2018-12-14 PROCEDURE — 6360000002 HC RX W HCPCS: Performed by: NURSE PRACTITIONER

## 2018-12-14 PROCEDURE — 83605 ASSAY OF LACTIC ACID: CPT

## 2018-12-14 PROCEDURE — 87040 BLOOD CULTURE FOR BACTERIA: CPT

## 2018-12-14 PROCEDURE — 96366 THER/PROPH/DIAG IV INF ADDON: CPT

## 2018-12-14 PROCEDURE — 83880 ASSAY OF NATRIURETIC PEPTIDE: CPT

## 2018-12-14 PROCEDURE — 84484 ASSAY OF TROPONIN QUANT: CPT

## 2018-12-14 PROCEDURE — 2580000003 HC RX 258: Performed by: FAMILY MEDICINE

## 2018-12-14 PROCEDURE — 87804 INFLUENZA ASSAY W/OPTIC: CPT

## 2018-12-14 PROCEDURE — 2580000003 HC RX 258

## 2018-12-14 PROCEDURE — 1200000000 HC SEMI PRIVATE

## 2018-12-14 PROCEDURE — 99285 EMERGENCY DEPT VISIT HI MDM: CPT

## 2018-12-14 PROCEDURE — 94762 N-INVAS EAR/PLS OXIMTRY CONT: CPT

## 2018-12-14 PROCEDURE — 71260 CT THORAX DX C+: CPT

## 2018-12-14 PROCEDURE — 85610 PROTHROMBIN TIME: CPT

## 2018-12-14 PROCEDURE — 2700000000 HC OXYGEN THERAPY PER DAY

## 2018-12-14 PROCEDURE — 85025 COMPLETE CBC W/AUTO DIFF WBC: CPT

## 2018-12-14 PROCEDURE — 36415 COLL VENOUS BLD VENIPUNCTURE: CPT

## 2018-12-14 PROCEDURE — 80053 COMPREHEN METABOLIC PANEL: CPT

## 2018-12-14 PROCEDURE — 94664 DEMO&/EVAL PT USE INHALER: CPT

## 2018-12-14 PROCEDURE — 93010 ELECTROCARDIOGRAM REPORT: CPT | Performed by: INTERNAL MEDICINE

## 2018-12-14 RX ORDER — ONDANSETRON 2 MG/ML
4 INJECTION INTRAMUSCULAR; INTRAVENOUS EVERY 6 HOURS PRN
Status: DISCONTINUED | OUTPATIENT
Start: 2018-12-14 | End: 2018-12-19 | Stop reason: HOSPADM

## 2018-12-14 RX ORDER — ACETAMINOPHEN 325 MG/1
650 TABLET ORAL EVERY 4 HOURS PRN
Status: DISCONTINUED | OUTPATIENT
Start: 2018-12-14 | End: 2018-12-19 | Stop reason: HOSPADM

## 2018-12-14 RX ORDER — IPRATROPIUM BROMIDE AND ALBUTEROL SULFATE 2.5; .5 MG/3ML; MG/3ML
1 SOLUTION RESPIRATORY (INHALATION) EVERY 4 HOURS PRN
Status: DISCONTINUED | OUTPATIENT
Start: 2018-12-14 | End: 2018-12-19 | Stop reason: HOSPADM

## 2018-12-14 RX ORDER — ASPIRIN 81 MG/1
81 TABLET, CHEWABLE ORAL DAILY
Status: DISCONTINUED | OUTPATIENT
Start: 2018-12-14 | End: 2018-12-19 | Stop reason: HOSPADM

## 2018-12-14 RX ORDER — IPRATROPIUM BROMIDE AND ALBUTEROL SULFATE 2.5; .5 MG/3ML; MG/3ML
1 SOLUTION RESPIRATORY (INHALATION)
Status: DISCONTINUED | OUTPATIENT
Start: 2018-12-14 | End: 2018-12-18

## 2018-12-14 RX ORDER — SODIUM CHLORIDE 9 MG/ML
INJECTION, SOLUTION INTRAVENOUS
Status: COMPLETED
Start: 2018-12-14 | End: 2018-12-14

## 2018-12-14 RX ORDER — LISINOPRIL 20 MG/1
20 TABLET ORAL DAILY
Status: DISCONTINUED | OUTPATIENT
Start: 2018-12-14 | End: 2018-12-19 | Stop reason: HOSPADM

## 2018-12-14 RX ORDER — METHYLPREDNISOLONE SODIUM SUCCINATE 125 MG/2ML
125 INJECTION, POWDER, LYOPHILIZED, FOR SOLUTION INTRAMUSCULAR; INTRAVENOUS ONCE
Status: DISCONTINUED | OUTPATIENT
Start: 2018-12-14 | End: 2018-12-14

## 2018-12-14 RX ORDER — AMLODIPINE BESYLATE 5 MG/1
10 TABLET ORAL DAILY
Status: DISCONTINUED | OUTPATIENT
Start: 2018-12-14 | End: 2018-12-19 | Stop reason: HOSPADM

## 2018-12-14 RX ORDER — MAGNESIUM SULFATE IN WATER 40 MG/ML
2 INJECTION, SOLUTION INTRAVENOUS ONCE
Status: COMPLETED | OUTPATIENT
Start: 2018-12-14 | End: 2018-12-14

## 2018-12-14 RX ORDER — METHYLPREDNISOLONE SODIUM SUCCINATE 40 MG/ML
40 INJECTION, POWDER, LYOPHILIZED, FOR SOLUTION INTRAMUSCULAR; INTRAVENOUS DAILY
Status: DISCONTINUED | OUTPATIENT
Start: 2018-12-14 | End: 2018-12-16

## 2018-12-14 RX ORDER — PANTOPRAZOLE SODIUM 40 MG/1
40 TABLET, DELAYED RELEASE ORAL
Status: DISCONTINUED | OUTPATIENT
Start: 2018-12-15 | End: 2018-12-19 | Stop reason: HOSPADM

## 2018-12-14 RX ORDER — AZITHROMYCIN 500 MG/1
500 INJECTION, POWDER, LYOPHILIZED, FOR SOLUTION INTRAVENOUS ONCE
Status: DISCONTINUED | OUTPATIENT
Start: 2018-12-14 | End: 2018-12-14 | Stop reason: CLARIF

## 2018-12-14 RX ORDER — METOPROLOL SUCCINATE 25 MG/1
25 TABLET, EXTENDED RELEASE ORAL DAILY
Status: DISCONTINUED | OUTPATIENT
Start: 2018-12-14 | End: 2018-12-19 | Stop reason: HOSPADM

## 2018-12-14 RX ORDER — DIPHENHYDRAMINE HYDROCHLORIDE 50 MG/ML
25 INJECTION INTRAMUSCULAR; INTRAVENOUS NIGHTLY PRN
Status: DISCONTINUED | OUTPATIENT
Start: 2018-12-14 | End: 2018-12-19 | Stop reason: HOSPADM

## 2018-12-14 RX ORDER — IPRATROPIUM BROMIDE AND ALBUTEROL SULFATE 2.5; .5 MG/3ML; MG/3ML
1 SOLUTION RESPIRATORY (INHALATION) ONCE
Status: COMPLETED | OUTPATIENT
Start: 2018-12-14 | End: 2018-12-14

## 2018-12-14 RX ORDER — IPRATROPIUM BROMIDE AND ALBUTEROL SULFATE 2.5; .5 MG/3ML; MG/3ML
1 SOLUTION RESPIRATORY (INHALATION)
Status: DISCONTINUED | OUTPATIENT
Start: 2018-12-14 | End: 2018-12-14

## 2018-12-14 RX ORDER — SODIUM CHLORIDE 0.9 % (FLUSH) 0.9 %
10 SYRINGE (ML) INJECTION PRN
Status: DISCONTINUED | OUTPATIENT
Start: 2018-12-14 | End: 2018-12-19 | Stop reason: HOSPADM

## 2018-12-14 RX ORDER — SODIUM CHLORIDE 0.9 % (FLUSH) 0.9 %
10 SYRINGE (ML) INJECTION EVERY 12 HOURS SCHEDULED
Status: DISCONTINUED | OUTPATIENT
Start: 2018-12-14 | End: 2018-12-19 | Stop reason: HOSPADM

## 2018-12-14 RX ORDER — ALBUTEROL SULFATE 2.5 MG/3ML
5 SOLUTION RESPIRATORY (INHALATION) ONCE
Status: DISCONTINUED | OUTPATIENT
Start: 2018-12-14 | End: 2018-12-14

## 2018-12-14 RX ORDER — VANCOMYCIN HYDROCHLORIDE 1 G/200ML
1000 INJECTION, SOLUTION INTRAVENOUS EVERY 12 HOURS
Status: DISCONTINUED | OUTPATIENT
Start: 2018-12-14 | End: 2018-12-16

## 2018-12-14 RX ADMIN — AZITHROMYCIN MONOHYDRATE 500 MG: 500 INJECTION, POWDER, LYOPHILIZED, FOR SOLUTION INTRAVENOUS at 16:38

## 2018-12-14 RX ADMIN — VANCOMYCIN HYDROCHLORIDE 1000 MG: 1 INJECTION, SOLUTION INTRAVENOUS at 18:34

## 2018-12-14 RX ADMIN — LISINOPRIL 20 MG: 20 TABLET ORAL at 16:38

## 2018-12-14 RX ADMIN — MAGNESIUM SULFATE HEPTAHYDRATE 2 G: 40 INJECTION, SOLUTION INTRAVENOUS at 13:46

## 2018-12-14 RX ADMIN — AMLODIPINE BESYLATE 10 MG: 5 TABLET ORAL at 16:38

## 2018-12-14 RX ADMIN — IPRATROPIUM BROMIDE AND ALBUTEROL SULFATE 1 AMPULE: .5; 3 SOLUTION RESPIRATORY (INHALATION) at 13:24

## 2018-12-14 RX ADMIN — METOPROLOL SUCCINATE 25 MG: 25 TABLET, FILM COATED, EXTENDED RELEASE ORAL at 16:39

## 2018-12-14 RX ADMIN — Medication 10 ML: at 22:08

## 2018-12-14 RX ADMIN — ALBUTEROL SULFATE 2.5 MG: 2.5 SOLUTION RESPIRATORY (INHALATION) at 13:24

## 2018-12-14 RX ADMIN — SODIUM CHLORIDE 250 ML: 9 INJECTION, SOLUTION INTRAVENOUS at 16:39

## 2018-12-14 RX ADMIN — Medication 10 ML: at 16:38

## 2018-12-14 RX ADMIN — TAZOBACTAM SODIUM AND PIPERACILLIN SODIUM 4.5 G: 500; 4 INJECTION, SOLUTION INTRAVENOUS at 15:18

## 2018-12-14 RX ADMIN — IPRATROPIUM BROMIDE AND ALBUTEROL SULFATE 1 AMPULE: .5; 3 SOLUTION RESPIRATORY (INHALATION) at 19:40

## 2018-12-14 RX ADMIN — ASPIRIN 81 MG CHEWABLE TABLET 81 MG: 81 TABLET CHEWABLE at 16:39

## 2018-12-14 RX ADMIN — DIPHENHYDRAMINE HYDROCHLORIDE 25 MG: 50 INJECTION, SOLUTION INTRAMUSCULAR; INTRAVENOUS at 22:05

## 2018-12-14 RX ADMIN — ENOXAPARIN SODIUM 40 MG: 40 INJECTION SUBCUTANEOUS at 21:49

## 2018-12-14 RX ADMIN — CEFEPIME HYDROCHLORIDE 2 G: 2 INJECTION, POWDER, FOR SOLUTION INTRAVENOUS at 17:57

## 2018-12-14 RX ADMIN — ALBUTEROL SULFATE 2.5 MG: 2.5 SOLUTION RESPIRATORY (INHALATION) at 13:23

## 2018-12-14 RX ADMIN — Medication 2 PUFF: at 19:40

## 2018-12-14 RX ADMIN — METHYLPREDNISOLONE SODIUM SUCCINATE 40 MG: 40 INJECTION, POWDER, FOR SOLUTION INTRAMUSCULAR; INTRAVENOUS at 16:38

## 2018-12-14 RX ADMIN — IOPAMIDOL 75 ML: 755 INJECTION, SOLUTION INTRAVENOUS at 14:26

## 2018-12-14 ASSESSMENT — ENCOUNTER SYMPTOMS
ABDOMINAL PAIN: 0
VOMITING: 0
SHORTNESS OF BREATH: 1
NAUSEA: 0
CHEST TIGHTNESS: 0
DIARRHEA: 0
WHEEZING: 1
COUGH: 1

## 2018-12-14 ASSESSMENT — PAIN SCALES - GENERAL: PAINLEVEL_OUTOF10: 0

## 2018-12-14 NOTE — ED PROVIDER NOTES
12/14/2018 2:57:12 PM       CT chest:  Impression:    1. Left lower lobe pneumonia. 2. No pulmonary embolism. 3. Calcific atherosclerosis coronary arteries. FINDINGS:  Pulmonary Arteries: Pulmonary arteries are adequately opacified for  evaluation.  No evidence of intraluminal filling defect to suggest pulmonary  embolism.  Main pulmonary artery is normal in caliber, 2 cm diameter. Mediastinum: No evidence of mediastinal lymphadenopathy.  A few scattered  coronary artery calcifications are present.  The heart and pericardium  demonstrate no acute abnormality.  There is no acute abnormality of the  thoracic aorta.  Ascending thoracic aorta 3.1 cm diameter, descending  thoracic aorta 2.5 cm diameter. Lungs/pleura: New left basilar alveolar and tree-in-bud configured opacities  are typical of an acute infectious process.  No pulmonary edema.  No evidence  of pleural effusion or pneumothorax. Upper Abdomen: Limited images of the upper abdomen are unremarkable. Soft Tissues/Bones: No acute bone or soft tissue abnormality. Clinical impression 1 dyspnea 2. Acute respiratory failure with hypoxemia   3. Left lower lobe hospital acquired pneumonia    Upon my evaluation, this patient had a high probability of imminent or life/limb-threatening deterioration due to acute respiratory failure with hypoxemia, which required my direct attention, intervention, and personal management. I have personally provided 42 minutes of critical care time exclusive of time spent on separately billable procedures. Time includes review of laboratory data, radiology results, discussion with consultants, and monitoring for potential decompensation. Interventions were performed as documented above.              Nick Grier MD  12/14/18 9002       Nick Grier MD  12/14/18 9967

## 2018-12-14 NOTE — TELEPHONE ENCOUNTER
THIS IS FINISHED BUT I CANNOT CLOSE PER IT SAYS INCOMPLETE NOTE BY Michelle Patel, please close encounter

## 2018-12-14 NOTE — ED NOTES
Bed: 06  Expected date:   Expected time:   Means of arrival: [facility]  Comments:  Kristopher Juarez [address] [facility], [address] [place] [address]  12/14/18 4147

## 2018-12-14 NOTE — ED PROVIDER NOTES
ASPIRIN 81 MG TABLET    Take 81 mg by mouth daily    BUDESONIDE-FORMOTEROL (SYMBICORT) 160-4.5 MCG/ACT AERO    INHALE 2 PUFFS INTO THE LUNGS TWICE DAILY    CALCIUM CARBONATE-VITAMIN D (CALCIUM 600+D) 600-200 MG-UNIT TABS    Take 2 tablets by mouth daily     DENOSUMAB (PROLIA SC)    Inject into the skin    FERROUS SULFATE (IRON SLOW RELEASE PO)    Take by mouth daily    FISH OIL-OMEGA-3 FATTY ACIDS 1000 MG CAPSULE    Take 1 g by mouth daily     GUAIFENESIN (MUCINEX) 600 MG EXTENDED RELEASE TABLET    Take 1 tablet by mouth 2 times daily    LEUPROLIDE ACETATE (LUPRON IJ)    Inject as directed every 3 months    LISINOPRIL (PRINIVIL;ZESTRIL) 40 MG TABLET    Take 1 tablet by mouth daily    METOPROLOL SUCCINATE (TOPROL XL) 25 MG EXTENDED RELEASE TABLET    Take 1 tablet by mouth daily    OMEPRAZOLE (PRILOSEC) 10 MG CAPSULE    Take 10 mg by mouth daily. PREDNISONE (DELTASONE) 10 MG TABLET    Take 1 tablet by mouth daily for 5 days    TRIAMCINOLONE (KENALOG) 0.1 % OINTMENT        VITAMIN E 400 UNIT CAPSULE    Take 400 Units by mouth daily. ALLERGIES     Patient has no known allergies.     FAMILYHISTORY       Family History   Problem Relation Age of Onset    Diabetes Mother     High Blood Pressure Mother     Heart Disease Father         myxoma    Heart Failure Father           SOCIAL HISTORY       Social History     Social History    Marital status:      Spouse name: N/A    Number of children: N/A    Years of education: N/A     Occupational History    retired,       Social History Main Topics    Smoking status: Former Smoker     Packs/day: 0.50     Years: 30.00     Types: Cigarettes     Quit date: 9/1/1987    Smokeless tobacco: Never Used    Alcohol use Yes      Comment: FEW BEERS PER WEEK    Drug use: No    Sexual activity: Yes     Partners: Female     Other Topics Concern    None     Social History Narrative    None       SCREENINGS             PHYSICAL EXAM    (up to 7 for level medications:  Medications   magnesium sulfate 2 g in 50 mL IVPB premix (2 g Intravenous New Bag 12/14/18 1346)   piperacillin-tazobactam (ZOSYN) 4.5 g in dextrose 100 mL IVPB (premix) (not administered)   vancomycin (VANCOCIN) 1000 mg in dextrose 5% 200 mL IVPB (not administered)   azithromycin (ZITHROMAX) 500 mg in dextrose 5 % 250 mL IVPB (not administered)   ipratropium-albuterol (DUONEB) nebulizer solution 1 ampule (1 ampule Inhalation Given 12/14/18 1324)   albuterol (PROVENTIL) nebulizer solution 2.5 mg (2.5 mg Nebulization Given 12/14/18 1324)   albuterol (PROVENTIL) nebulizer solution 2.5 mg (2.5 mg Nebulization Given 12/14/18 1323)   iopamidol (ISOVUE-370) 76 % injection 75 mL (75 mLs Intravenous Given 12/14/18 1426)       Briefly, this is an 73-year-old male that presents to the emergency department  with concern of shortness of breath. The patient reports that he panicked this morning when he was unable to breathe. Reports he had breakfast and then went and laid back down. States that when he got up he felt like he was unable to take a breath and he was very short of breath. He does have history of COPD, he was never a smoker but he was a  without a mask for about 13 years. The patient was just released from the hospital Wednesday of this week for COPD exacerbation. He has not had the opportunity to follow up with pulmonology since his discharge 1.5 days ago. States that he's been compliant with medications. He was given a DuoNeb in route and 125 mg of Solu-Medrol. The patient was found to be hypoxic on room air at home, he does not wear supplemental oxygen. Upon arrival here in the ER, the patient was 84% on room air, tachypneic as well as dyspneic. He is conversational dyspnea. He was placed on supplemental oxygen and room air oxygen saturation up to 93%. The patient states that he is feeling a bit better since arriving to the ER.     He is given a DuoNeb and 2 albuterol treatments. We'll also give him 2 g of magnesium sulfate, check labs chest x-ray influenza swab blood cultures and lactate. Rapid influenza negative. CBC shows a leukocytosis. CMP unremarkable. Troponin negative. BNP 42. INR 1.05. Lactate 1.9. CT chest pulmonary:  Impression:    1. Left lower lobe pneumonia. 2. No pulmonary embolism. 3. Calcific atherosclerosis coronary arteries. Patient will be treated for healthcare acquired pneumonia with azithromycin, as Zosyn, and vancomycin. Cultures were collected prior to initiation of antibiotics. The patient will be admitted. FINAL IMPRESSION      1. Pneumonia due to organism    2. Acute respiratory failure with hypoxemia [facility])          DISPOSITION/PLAN   DISPOSITION Admitted 12/14/2018 03:09:09 PM      PATIENT REFERREDTO:  Charlene Ramirez MD  [address] [place] [address]  [phone]            DISCHARGE MEDICATIONS:  New Prescriptions    No medications on file       DISCONTINUED MEDICATIONS:  Discontinued Medications    No medications on file              (Please note that portions ofthis note were completed with a voice recognition program.  Efforts were made to edit the dictations but occasionally words are mis-transcribed.)    ADELAIDA Reyez CNP (electronically signed)           ADELAIDA Reyez CNP  12/14/18 0074

## 2018-12-14 NOTE — H&P
that includes Prostatectomy (2003); Tonsillectomy; Carotid endarterectomy (5/3/12); Cystoscopy (07/2017); Colonoscopy; Endoscopy, colon, diagnostic; Carotid endarterectomy (Right, 10/25/2017); and Cholecystectomy, laparoscopic (02/12/2018). Medications:  No current facility-administered medications on file prior to encounter. Current Outpatient Prescriptions on File Prior to Encounter   Medication Sig Dispense Refill    predniSONE (DELTASONE) 10 MG tablet Take 1 tablet by mouth daily for 5 days 10 tablet 0    metoprolol succinate (TOPROL XL) 25 MG extended release tablet Take 1 tablet by mouth daily 90 tablet 3    amLODIPine (NORVASC) 10 MG tablet Take 1 tablet by mouth daily 90 tablet 3    Ferrous Sulfate (IRON SLOW RELEASE PO) Take by mouth daily      budesonide-formoterol (SYMBICORT) 160-4.5 MCG/ACT AERO INHALE 2 PUFFS INTO THE LUNGS TWICE DAILY 1 Inhaler 11    aspirin 81 MG tablet Take 81 mg by mouth daily      lisinopril (PRINIVIL;ZESTRIL) 40 MG tablet Take 1 tablet by mouth daily (Patient taking differently: Take 20 mg by mouth daily ) 90 tablet 3    vitamin E 400 UNIT capsule Take 400 Units by mouth daily.  calcium carbonate-vitamin D (CALCIUM 600+D) 600-200 MG-UNIT TABS Take 2 tablets by mouth daily       fish oil-omega-3 fatty acids 1000 MG capsule Take 1 g by mouth daily       omeprazole (PRILOSEC) 10 MG capsule Take 10 mg by mouth daily.  albuterol sulfate HFA (PROVENTIL HFA) 108 (90 Base) MCG/ACT inhaler Inhale 2 puffs into the lungs every 4 hours as needed for Wheezing or Shortness of Breath With spacer (and mask if indicated). Thanks.  1 Inhaler 1    guaiFENesin (MUCINEX) 600 MG extended release tablet Take 1 tablet by mouth 2 times daily 60 tablet 0    triamcinolone (KENALOG) 0.1 % ointment       Denosumab (PROLIA SC) Inject into the skin      Leuprolide Acetate (LUPRON IJ) Inject as directed every 3 months         Allergies:  No Known Allergies     Social History:

## 2018-12-14 NOTE — TELEPHONE ENCOUNTER
Writer contacted [address] [address] ED provider to inform of 30 day readmission risk. ED provider informed writer of likely readmission.

## 2018-12-15 LAB
REASON FOR REJECTION: NORMAL
REJECTED TEST: NORMAL

## 2018-12-15 PROCEDURE — G8996 SWALLOW CURRENT STATUS: HCPCS

## 2018-12-15 PROCEDURE — 2580000003 HC RX 258: Performed by: FAMILY MEDICINE

## 2018-12-15 PROCEDURE — 6370000000 HC RX 637 (ALT 250 FOR IP): Performed by: FAMILY MEDICINE

## 2018-12-15 PROCEDURE — 92526 ORAL FUNCTION THERAPY: CPT

## 2018-12-15 PROCEDURE — 6360000002 HC RX W HCPCS: Performed by: EMERGENCY MEDICINE

## 2018-12-15 PROCEDURE — 92610 EVALUATE SWALLOWING FUNCTION: CPT

## 2018-12-15 PROCEDURE — 94668 MNPJ CHEST WALL SBSQ: CPT

## 2018-12-15 PROCEDURE — G8997 SWALLOW GOAL STATUS: HCPCS

## 2018-12-15 PROCEDURE — 94760 N-INVAS EAR/PLS OXIMETRY 1: CPT

## 2018-12-15 PROCEDURE — 1200000000 HC SEMI PRIVATE

## 2018-12-15 PROCEDURE — 99222 1ST HOSP IP/OBS MODERATE 55: CPT | Performed by: INTERNAL MEDICINE

## 2018-12-15 PROCEDURE — 6360000002 HC RX W HCPCS: Performed by: FAMILY MEDICINE

## 2018-12-15 PROCEDURE — 94640 AIRWAY INHALATION TREATMENT: CPT

## 2018-12-15 RX ADMIN — LISINOPRIL 20 MG: 20 TABLET ORAL at 09:54

## 2018-12-15 RX ADMIN — IPRATROPIUM BROMIDE AND ALBUTEROL SULFATE 1 AMPULE: .5; 3 SOLUTION RESPIRATORY (INHALATION) at 15:35

## 2018-12-15 RX ADMIN — IPRATROPIUM BROMIDE AND ALBUTEROL SULFATE 1 AMPULE: .5; 3 SOLUTION RESPIRATORY (INHALATION) at 20:21

## 2018-12-15 RX ADMIN — PANTOPRAZOLE SODIUM 40 MG: 40 TABLET, DELAYED RELEASE ORAL at 05:48

## 2018-12-15 RX ADMIN — CEFEPIME HYDROCHLORIDE 2 G: 2 INJECTION, POWDER, FOR SOLUTION INTRAVENOUS at 05:48

## 2018-12-15 RX ADMIN — DIPHENHYDRAMINE HYDROCHLORIDE 25 MG: 50 INJECTION, SOLUTION INTRAMUSCULAR; INTRAVENOUS at 21:44

## 2018-12-15 RX ADMIN — VANCOMYCIN HYDROCHLORIDE 1000 MG: 1 INJECTION, SOLUTION INTRAVENOUS at 16:13

## 2018-12-15 RX ADMIN — Medication 2 PUFF: at 07:58

## 2018-12-15 RX ADMIN — IPRATROPIUM BROMIDE AND ALBUTEROL SULFATE 1 AMPULE: .5; 3 SOLUTION RESPIRATORY (INHALATION) at 07:58

## 2018-12-15 RX ADMIN — VANCOMYCIN HYDROCHLORIDE 1000 MG: 1 INJECTION, SOLUTION INTRAVENOUS at 02:49

## 2018-12-15 RX ADMIN — METHYLPREDNISOLONE SODIUM SUCCINATE 40 MG: 40 INJECTION, POWDER, FOR SOLUTION INTRAMUSCULAR; INTRAVENOUS at 09:54

## 2018-12-15 RX ADMIN — Medication 2 PUFF: at 20:22

## 2018-12-15 RX ADMIN — AMLODIPINE BESYLATE 10 MG: 5 TABLET ORAL at 09:53

## 2018-12-15 RX ADMIN — Medication 10 ML: at 09:54

## 2018-12-15 RX ADMIN — IPRATROPIUM BROMIDE AND ALBUTEROL SULFATE 1 AMPULE: .5; 3 SOLUTION RESPIRATORY (INHALATION) at 11:35

## 2018-12-15 RX ADMIN — ENOXAPARIN SODIUM 40 MG: 40 INJECTION SUBCUTANEOUS at 21:43

## 2018-12-15 RX ADMIN — Medication 10 ML: at 21:47

## 2018-12-15 RX ADMIN — METOPROLOL SUCCINATE 25 MG: 25 TABLET, FILM COATED, EXTENDED RELEASE ORAL at 09:54

## 2018-12-15 RX ADMIN — ASPIRIN 81 MG CHEWABLE TABLET 81 MG: 81 TABLET CHEWABLE at 09:54

## 2018-12-15 RX ADMIN — CEFEPIME HYDROCHLORIDE 2 G: 2 INJECTION, POWDER, FOR SOLUTION INTRAVENOUS at 18:05

## 2018-12-15 ASSESSMENT — PAIN SCALES - GENERAL
PAINLEVEL_OUTOF10: 0

## 2018-12-15 NOTE — PROGRESS NOTES
[facility]    12/15/2018 10:08 AM        Name: Monse Welch . Admitted: 12/14/2018  Primary Care Provider: Robles Cantu MD (Tel: [phone])                        Hospital Course:  [de-identified] y. o. male with history of COPD (not on oxygen), h/o prostate cancer, GERD, HTN, hyperlipidemia, PAD who came to ER with complaints of SOB.  Patient had cold symptoms that precipitated SOB.  He was seen in [facility] on 12/5/18 and started on Zithromax,started COPD protocol recently discharged and readmitted with LLL PNA on 12/14. Subjective: resting in the bed. No acute events overnight. Resting well. Pain control. Diet ok. Labs reviewed  Denies any chest pain sob.      Reviewed interval ancillary notes    Current Medications    vancomycin (VANCOCIN) 1000 mg in dextrose 5% 200 mL IVPB Q12H   amLODIPine (NORVASC) tablet 10 mg Daily   aspirin chewable tablet 81 mg Daily   lisinopril (PRINIVIL;ZESTRIL) tablet 20 mg Daily   pantoprazole (PROTONIX) tablet 40 mg QAM AC   metoprolol succinate (TOPROL XL) extended release tablet 25 mg Daily   cefepime (MAXIPIME) 2 g IVPB minibag Q12H   methylPREDNISolone sodium (SOLU-MEDROL) injection 40 mg Daily   mometasone-formoterol (DULERA) 100-5 MCG/ACT inhaler 2 puff BID   acetaminophen (TYLENOL) tablet 650 mg Q4H PRN   sodium chloride flush 0.9 % injection 10 mL 2 times per day   sodium chloride flush 0.9 % injection 10 mL PRN   magnesium hydroxide (MILK OF MAGNESIA) 400 MG/5ML suspension 30 mL Daily PRN   ondansetron (ZOFRAN) injection 4 mg Q6H PRN   enoxaparin (LOVENOX) injection 40 mg Nightly   ipratropium-albuterol (DUONEB) nebulizer solution 1 ampule Q4H WA   ipratropium-albuterol (DUONEB) nebulizer solution 1 ampule Q4H PRN   diphenhydrAMINE (BENADRYL) injection 25 mg Nightly PRN       Objective:  /69   Pulse 98 Temp 97.6 °F (36.4 °C) (Temporal)   Resp 18   Ht 5' 10\" (1.778 m)   Wt 157 lb 1.6 oz (71.3 kg)   SpO2 97%   BMI 22.54 kg/m²     Intake/Output Summary (Last 24 hours) at 12/15/18 1008  Last data filed at 12/14/18 1546   Gross per 24 hour   Intake              150 ml   Output                0 ml   Net              150 ml    Wt Readings from Last 3 Encounters:   12/15/18 157 lb 1.6 oz (71.3 kg)   12/09/18 160 lb (72.6 kg)   11/15/18 163 lb 9.6 oz (74.2 kg)       General appearance:  Appears comfortable  Eyes: Sclera clear. Pupils equal.  ENT: Moist oral mucosa. Trachea midline, no adenopathy. Cardiovascular: Regular rhythm, normal S1, S2. No murmur. No edema in lower extremities  Respiratory: Not using accessory muscles. Good inspiratory effort. Clear to auscultation bilaterally, no wheeze or crackles. GI: Abdomen soft, no tenderness, not distended, normal bowel sounds  Musculoskeletal: No cyanosis in digits, neck supple  Neurology: CN 2-12 grossly intact. No speech or motor deficits  Psych: Normal affect. Alert and oriented in time, place and person  Skin: Warm, dry, normal turgor    Labs and Tests:  CBC:   Recent Labs      12/14/18   1333   WBC  14.8*   HGB  12.9*   PLT  305     BMP:  Recent Labs      12/14/18   1333   NA  141   K  3.6   CL  103   CO2  26   BUN  16   CREATININE  1.0   GLUCOSE  109*     Hepatic: Recent Labs      12/14/18   1333   AST  21   ALT  29   BILITOT  0.6   ALKPHOS  60         Problem List  Active Problems:    Pneumonia  Resolved Problems:    * No resolved hospital problems. *       Assessment & Plan:   1. Recent discharge and readmitted with worsening SOB, CT Chest revealed LLL PNA, consider as HCAP, c/w cefepime and Vanc  2. c/wSolumedrol 40 mg IV daily, Duoneb,  Dulera  3.  C/w amlodipine an Lisinopril for HTN  4. resp culture pending      Diet: DIET GENERAL;  Code:Full Code  DVT PPX lovenox       Andrew Klein MD   12/15/2018 10:08 AM

## 2018-12-15 NOTE — CONSULTS
Pharmacy to dose vancomycin for pneumonia due to hospitalist. No doses given in ED. Start vancomycin 1000mg q12h based on previous trough data for this patient. Trough 12/16 0230, hold dose for trough > 20.     Maged SoD, BCPS
Oriented to person place and time. No obvious depression or anxiety. MUSCULOSKELETAL: No obvious misalignment or effusion of the joints. No clubbing, cyanosis of the digits. SKIN:  normal skin color, texture, turgor and no redness, warmth, or swelling. No palpable nodules    DATA:    Old records have been reviewed    CBC:  Recent Labs      12/14/18   1333   WBC  14.8*   RBC  4.05*   HGB  12.9*   HCT  38.8*   PLT  305   MCV  95.7   MCH  31.9   MCHC  33.3   RDW  13.4   BANDSPCT  2      BMP:  Recent Labs      12/14/18   1333   NA  141   K  3.6   CL  103   CO2  26   BUN  16   CREATININE  1.0   CALCIUM  8.7   GLUCOSE  109*      ABG:  No results for input(s): PHART, JAN4HCA, PO2ART, RWU3ZRT, I3RCEIQA, BEART in the last 72 hours. Lab Results   Component Value Date    BNP 19 05/06/2012     Lab Results   Component Value Date    TROPONINI <0.01 12/14/2018         Radiology Review:  All pertinent images / reports were reviewed as a part of this visit. Assessment:   1. CAP   2. Acute hypoxic respiratory failure  3.  Severe COPD    Plan:     - I reviewed his CT chest  - he does have LLL ASD consistent with pneumonia  - agree with cefepime and Vancomycin pending sputum since he is high risk for pseudomonas/MRSA  - will wean steroids in am  - continue current inhaled regimen  - airway clearance

## 2018-12-15 NOTE — PROGRESS NOTES
CLINICAL BEDSIDE SWALLOWING EVALUATION  Speech Therapy Department    Patient Name:  Tracie Burdick  :  1940  Pain level:  Denied at time of Eval  Medical Diagnosis:   Pneumonia [J18.9]  Pneumonia [J18.9]    Treatment Diagnosis:  Dysphagia    Pt seen during previous hospitalization at which time a Regular diet with Thin liquids was recommended, no MBS was completed at that time. Impressions:  Pt admitted from home d/t worsening SOB. CT of the chest, completed , revealed \"Left lower lobe pneumonia\". Pt seen sitting up in bed eating lunch this date (cheeseburger), alert and independently oriented x4. Pt denies any difficulty swallowing. Successive thin liquid trials revealed mildly delayed swallow initiation with minimal pharyngeal pooling suspected. Adequate oral manipulation and A-P propulsion noted with hard solid masticated trials, no oral cavity residue noted. Pt did not demonstrate any overt clinical s/s of aspiration/penetration noted. However, d/t pt's recent CT results, pt may benefit from MBS to rule out possibility of silent aspiration/penetration. Dietary Recommendations:  Continue current diet, Regular with Thin liquids    Strategies: 90 degree positioning with all p.o. intake; small bites/sips; alternate textures through meal; reduce rate of intake    Treatment/Goals: Speech therapy for dysphagia tx 3-5 times per week. ST.) Pt will tolerate recommended diet without s/s of aspiration   2.) If clinical symptoms of penetration/aspiration continue to be noted,Pt will tolerate MBS to r/o aspiration and determine appropriate diet/liquid level.    3.) Pt will tolerate diet advance to least restricted diet, as clinically indicated, with no signs of aspiration   4.) Pt will improve oral motor function via bolus control exercises 5/5    Oral motor Exam:  Dentition: Adequate  Labial/Facial:  Grossly appeared WFL  Lingual:  Grossly appeared WFL    Oral Phase:  - Min apparent premature bolus loss to pharynx    Pharyngeal Phase:  - Min apparent pharyngeal pooling  - Mildly delayed swallow initiation     Patient/Family Education:Education, results and recommendations given to the Pt and nurse, who verbalized understanding    SLP G-Codes  Functional Limitations: Swallowing  Swallow Current Status (): At least 1 percent but less than 20 percent impaired, limited or restricted  Swallow Goal Status (): 0 percent impaired, limited or restricted     Timed Code Treatment:  0 minutes    Total Treatment Time:  25 minutes    Discharge Recommendations: Speech Therapy for Speech/Dysphagia treatment at discharge.     Zoila Mandel MS, CCC-SLP #[id number]   Speech-Language Pathologist

## 2018-12-16 LAB — VANCOMYCIN TROUGH: 12.6 UG/ML (ref 10–20)

## 2018-12-16 PROCEDURE — 6360000002 HC RX W HCPCS: Performed by: FAMILY MEDICINE

## 2018-12-16 PROCEDURE — 6370000000 HC RX 637 (ALT 250 FOR IP): Performed by: INTERNAL MEDICINE

## 2018-12-16 PROCEDURE — 6370000000 HC RX 637 (ALT 250 FOR IP): Performed by: FAMILY MEDICINE

## 2018-12-16 PROCEDURE — 6360000002 HC RX W HCPCS: Performed by: EMERGENCY MEDICINE

## 2018-12-16 PROCEDURE — 1200000000 HC SEMI PRIVATE

## 2018-12-16 PROCEDURE — 99233 SBSQ HOSP IP/OBS HIGH 50: CPT | Performed by: INTERNAL MEDICINE

## 2018-12-16 PROCEDURE — 80202 ASSAY OF VANCOMYCIN: CPT

## 2018-12-16 PROCEDURE — 87070 CULTURE OTHR SPECIMN AEROBIC: CPT

## 2018-12-16 PROCEDURE — 87185 SC STD ENZYME DETCJ PER NZM: CPT

## 2018-12-16 PROCEDURE — 94640 AIRWAY INHALATION TREATMENT: CPT

## 2018-12-16 PROCEDURE — 2580000003 HC RX 258

## 2018-12-16 PROCEDURE — 87205 SMEAR GRAM STAIN: CPT

## 2018-12-16 PROCEDURE — 36415 COLL VENOUS BLD VENIPUNCTURE: CPT

## 2018-12-16 PROCEDURE — 94760 N-INVAS EAR/PLS OXIMETRY 1: CPT

## 2018-12-16 PROCEDURE — 2580000003 HC RX 258: Performed by: FAMILY MEDICINE

## 2018-12-16 RX ORDER — SODIUM CHLORIDE 9 MG/ML
INJECTION, SOLUTION INTRAVENOUS
Status: DISPENSED
Start: 2018-12-16 | End: 2018-12-17

## 2018-12-16 RX ORDER — PREDNISONE 20 MG/1
20 TABLET ORAL DAILY
Status: DISCONTINUED | OUTPATIENT
Start: 2018-12-16 | End: 2018-12-19 | Stop reason: HOSPADM

## 2018-12-16 RX ADMIN — PANTOPRAZOLE SODIUM 40 MG: 40 TABLET, DELAYED RELEASE ORAL at 06:57

## 2018-12-16 RX ADMIN — IPRATROPIUM BROMIDE AND ALBUTEROL SULFATE 1 AMPULE: .5; 3 SOLUTION RESPIRATORY (INHALATION) at 08:11

## 2018-12-16 RX ADMIN — Medication 10 ML: at 08:44

## 2018-12-16 RX ADMIN — IPRATROPIUM BROMIDE AND ALBUTEROL SULFATE 1 AMPULE: .5; 3 SOLUTION RESPIRATORY (INHALATION) at 11:46

## 2018-12-16 RX ADMIN — LISINOPRIL 20 MG: 20 TABLET ORAL at 08:44

## 2018-12-16 RX ADMIN — PREDNISONE 20 MG: 20 TABLET ORAL at 13:01

## 2018-12-16 RX ADMIN — Medication 10 ML: at 20:51

## 2018-12-16 RX ADMIN — ENOXAPARIN SODIUM 40 MG: 40 INJECTION SUBCUTANEOUS at 20:50

## 2018-12-16 RX ADMIN — CEFEPIME HYDROCHLORIDE 2 G: 2 INJECTION, POWDER, FOR SOLUTION INTRAVENOUS at 17:27

## 2018-12-16 RX ADMIN — AMLODIPINE BESYLATE 10 MG: 5 TABLET ORAL at 08:43

## 2018-12-16 RX ADMIN — CEFEPIME HYDROCHLORIDE 2 G: 2 INJECTION, POWDER, FOR SOLUTION INTRAVENOUS at 06:57

## 2018-12-16 RX ADMIN — VANCOMYCIN HYDROCHLORIDE 1000 MG: 1 INJECTION, SOLUTION INTRAVENOUS at 04:32

## 2018-12-16 RX ADMIN — METOPROLOL SUCCINATE 25 MG: 25 TABLET, FILM COATED, EXTENDED RELEASE ORAL at 08:43

## 2018-12-16 RX ADMIN — Medication 2 PUFF: at 08:11

## 2018-12-16 RX ADMIN — IPRATROPIUM BROMIDE AND ALBUTEROL SULFATE 1 AMPULE: .5; 3 SOLUTION RESPIRATORY (INHALATION) at 20:44

## 2018-12-16 RX ADMIN — IPRATROPIUM BROMIDE AND ALBUTEROL SULFATE 1 AMPULE: .5; 3 SOLUTION RESPIRATORY (INHALATION) at 15:19

## 2018-12-16 RX ADMIN — Medication 2 PUFF: at 20:44

## 2018-12-16 RX ADMIN — METHYLPREDNISOLONE SODIUM SUCCINATE 40 MG: 40 INJECTION, POWDER, FOR SOLUTION INTRAMUSCULAR; INTRAVENOUS at 08:44

## 2018-12-16 RX ADMIN — ASPIRIN 81 MG CHEWABLE TABLET 81 MG: 81 TABLET CHEWABLE at 08:45

## 2018-12-16 ASSESSMENT — PAIN SCALES - GENERAL
PAINLEVEL_OUTOF10: 0

## 2018-12-17 LAB
ANION GAP SERPL CALCULATED.3IONS-SCNC: 9 MMOL/L (ref 3–16)
BUN BLDV-MCNC: 18 MG/DL (ref 7–20)
CALCIUM SERPL-MCNC: 8.7 MG/DL (ref 8.3–10.6)
CHLORIDE BLD-SCNC: 99 MMOL/L (ref 99–110)
CO2: 29 MMOL/L (ref 21–32)
CREAT SERPL-MCNC: 0.9 MG/DL (ref 0.8–1.3)
GFR AFRICAN AMERICAN: >60
GFR NON-AFRICAN AMERICAN: >60
GLUCOSE BLD-MCNC: 113 MG/DL (ref 70–99)
POTASSIUM SERPL-SCNC: 3.9 MMOL/L (ref 3.5–5.1)
SODIUM BLD-SCNC: 137 MMOL/L (ref 136–145)

## 2018-12-17 PROCEDURE — 94668 MNPJ CHEST WALL SBSQ: CPT

## 2018-12-17 PROCEDURE — 6360000002 HC RX W HCPCS: Performed by: FAMILY MEDICINE

## 2018-12-17 PROCEDURE — 94640 AIRWAY INHALATION TREATMENT: CPT

## 2018-12-17 PROCEDURE — 6370000000 HC RX 637 (ALT 250 FOR IP): Performed by: INTERNAL MEDICINE

## 2018-12-17 PROCEDURE — 36415 COLL VENOUS BLD VENIPUNCTURE: CPT

## 2018-12-17 PROCEDURE — 94667 MNPJ CHEST WALL 1ST: CPT

## 2018-12-17 PROCEDURE — 6370000000 HC RX 637 (ALT 250 FOR IP): Performed by: FAMILY MEDICINE

## 2018-12-17 PROCEDURE — 2580000003 HC RX 258

## 2018-12-17 PROCEDURE — 99232 SBSQ HOSP IP/OBS MODERATE 35: CPT | Performed by: INTERNAL MEDICINE

## 2018-12-17 PROCEDURE — 94760 N-INVAS EAR/PLS OXIMETRY 1: CPT

## 2018-12-17 PROCEDURE — 80048 BASIC METABOLIC PNL TOTAL CA: CPT

## 2018-12-17 PROCEDURE — 1200000000 HC SEMI PRIVATE

## 2018-12-17 PROCEDURE — 2580000003 HC RX 258: Performed by: FAMILY MEDICINE

## 2018-12-17 RX ORDER — SODIUM CHLORIDE 9 MG/ML
INJECTION, SOLUTION INTRAVENOUS
Status: COMPLETED
Start: 2018-12-17 | End: 2018-12-17

## 2018-12-17 RX ADMIN — ASPIRIN 81 MG CHEWABLE TABLET 81 MG: 81 TABLET CHEWABLE at 09:02

## 2018-12-17 RX ADMIN — CEFEPIME HYDROCHLORIDE 2 G: 2 INJECTION, POWDER, FOR SOLUTION INTRAVENOUS at 04:29

## 2018-12-17 RX ADMIN — PREDNISONE 20 MG: 20 TABLET ORAL at 09:02

## 2018-12-17 RX ADMIN — LISINOPRIL 20 MG: 20 TABLET ORAL at 09:02

## 2018-12-17 RX ADMIN — IPRATROPIUM BROMIDE AND ALBUTEROL SULFATE 1 AMPULE: .5; 3 SOLUTION RESPIRATORY (INHALATION) at 08:14

## 2018-12-17 RX ADMIN — IPRATROPIUM BROMIDE AND ALBUTEROL SULFATE 1 AMPULE: .5; 3 SOLUTION RESPIRATORY (INHALATION) at 12:37

## 2018-12-17 RX ADMIN — IPRATROPIUM BROMIDE AND ALBUTEROL SULFATE 1 AMPULE: .5; 3 SOLUTION RESPIRATORY (INHALATION) at 21:33

## 2018-12-17 RX ADMIN — Medication 2 PUFF: at 21:33

## 2018-12-17 RX ADMIN — METOPROLOL SUCCINATE 25 MG: 25 TABLET, FILM COATED, EXTENDED RELEASE ORAL at 09:02

## 2018-12-17 RX ADMIN — PANTOPRAZOLE SODIUM 40 MG: 40 TABLET, DELAYED RELEASE ORAL at 05:43

## 2018-12-17 RX ADMIN — CEFEPIME HYDROCHLORIDE 2 G: 2 INJECTION, POWDER, FOR SOLUTION INTRAVENOUS at 16:18

## 2018-12-17 RX ADMIN — AMLODIPINE BESYLATE 10 MG: 5 TABLET ORAL at 20:15

## 2018-12-17 RX ADMIN — IPRATROPIUM BROMIDE AND ALBUTEROL SULFATE 1 AMPULE: .5; 3 SOLUTION RESPIRATORY (INHALATION) at 16:48

## 2018-12-17 RX ADMIN — Medication 10 ML: at 20:19

## 2018-12-17 RX ADMIN — Medication 2 PUFF: at 08:15

## 2018-12-17 RX ADMIN — SODIUM CHLORIDE: 9 INJECTION, SOLUTION INTRAVENOUS at 17:19

## 2018-12-17 RX ADMIN — ENOXAPARIN SODIUM 40 MG: 40 INJECTION SUBCUTANEOUS at 20:15

## 2018-12-17 ASSESSMENT — PAIN SCALES - GENERAL
PAINLEVEL_OUTOF10: 0
PAINLEVEL_OUTOF10: 0

## 2018-12-17 NOTE — CARE COORDINATION
Discharge Planning Assessment  SW discharge planner met with patientto discuss reason for admission, current living situation, and potential needs at the time of discharge    Demographics/Insurance verified Yes/Yes    Current type of dwelling: Private home, 2 stories     Patient from ECF/SW confirmed with:    Living arrangements: Lives with spouse    Level of function/Support: Independent     DME: No DME needs     Active with any community resources/agencies/skilled home care: No    Medication compliance issues: No    Financial issues that could impact healthcare: No    Transportation at the time of discharge: Yes    Tentative discharge plan: Home no needs     Sukhjinder KWONG, [address]

## 2018-12-18 LAB
A/G RATIO: 1.7 (ref 1.1–2.2)
ALBUMIN SERPL-MCNC: 4 G/DL (ref 3.4–5)
ALP BLD-CCNC: 51 U/L (ref 40–129)
ALT SERPL-CCNC: 26 U/L (ref 10–40)
ANION GAP SERPL CALCULATED.3IONS-SCNC: 13 MMOL/L (ref 3–16)
AST SERPL-CCNC: 15 U/L (ref 15–37)
BILIRUB SERPL-MCNC: 0.9 MG/DL (ref 0–1)
BUN BLDV-MCNC: 21 MG/DL (ref 7–20)
CALCIUM SERPL-MCNC: 8.5 MG/DL (ref 8.3–10.6)
CHLORIDE BLD-SCNC: 99 MMOL/L (ref 99–110)
CO2: 25 MMOL/L (ref 21–32)
CREAT SERPL-MCNC: 1.1 MG/DL (ref 0.8–1.3)
GFR AFRICAN AMERICAN: >60
GFR NON-AFRICAN AMERICAN: >60
GLOBULIN: 2.3 G/DL
GLUCOSE BLD-MCNC: 98 MG/DL (ref 70–99)
HCT VFR BLD CALC: 36.8 % (ref 40.5–52.5)
HEMOGLOBIN: 12.1 G/DL (ref 13.5–17.5)
MCH RBC QN AUTO: 31.5 PG (ref 26–34)
MCHC RBC AUTO-ENTMCNC: 33 G/DL (ref 31–36)
MCV RBC AUTO: 95.5 FL (ref 80–100)
PDW BLD-RTO: 13.3 % (ref 12.4–15.4)
PLATELET # BLD: 283 K/UL (ref 135–450)
PMV BLD AUTO: 7.6 FL (ref 5–10.5)
POTASSIUM SERPL-SCNC: 4 MMOL/L (ref 3.5–5.1)
RBC # BLD: 3.85 M/UL (ref 4.2–5.9)
SODIUM BLD-SCNC: 137 MMOL/L (ref 136–145)
TOTAL PROTEIN: 6.3 G/DL (ref 6.4–8.2)
WBC # BLD: 9.7 K/UL (ref 4–11)

## 2018-12-18 PROCEDURE — 94760 N-INVAS EAR/PLS OXIMETRY 1: CPT

## 2018-12-18 PROCEDURE — 85027 COMPLETE CBC AUTOMATED: CPT

## 2018-12-18 PROCEDURE — 6370000000 HC RX 637 (ALT 250 FOR IP): Performed by: FAMILY MEDICINE

## 2018-12-18 PROCEDURE — 6370000000 HC RX 637 (ALT 250 FOR IP): Performed by: INTERNAL MEDICINE

## 2018-12-18 PROCEDURE — 2580000003 HC RX 258: Performed by: FAMILY MEDICINE

## 2018-12-18 PROCEDURE — 80053 COMPREHEN METABOLIC PANEL: CPT

## 2018-12-18 PROCEDURE — 94640 AIRWAY INHALATION TREATMENT: CPT

## 2018-12-18 PROCEDURE — 36415 COLL VENOUS BLD VENIPUNCTURE: CPT

## 2018-12-18 PROCEDURE — 6360000002 HC RX W HCPCS: Performed by: FAMILY MEDICINE

## 2018-12-18 PROCEDURE — 99232 SBSQ HOSP IP/OBS MODERATE 35: CPT | Performed by: INTERNAL MEDICINE

## 2018-12-18 PROCEDURE — 1200000000 HC SEMI PRIVATE

## 2018-12-18 RX ADMIN — Medication 10 ML: at 20:03

## 2018-12-18 RX ADMIN — ASPIRIN 81 MG CHEWABLE TABLET 81 MG: 81 TABLET CHEWABLE at 08:51

## 2018-12-18 RX ADMIN — Medication 2 PUFF: at 08:35

## 2018-12-18 RX ADMIN — AMLODIPINE BESYLATE 10 MG: 5 TABLET ORAL at 20:03

## 2018-12-18 RX ADMIN — Medication 2 PUFF: at 21:21

## 2018-12-18 RX ADMIN — LISINOPRIL 20 MG: 20 TABLET ORAL at 08:51

## 2018-12-18 RX ADMIN — Medication 10 ML: at 08:51

## 2018-12-18 RX ADMIN — METOPROLOL SUCCINATE 25 MG: 25 TABLET, FILM COATED, EXTENDED RELEASE ORAL at 08:51

## 2018-12-18 RX ADMIN — PANTOPRAZOLE SODIUM 40 MG: 40 TABLET, DELAYED RELEASE ORAL at 05:03

## 2018-12-18 RX ADMIN — PREDNISONE 20 MG: 20 TABLET ORAL at 08:51

## 2018-12-18 RX ADMIN — ENOXAPARIN SODIUM 40 MG: 40 INJECTION SUBCUTANEOUS at 20:03

## 2018-12-18 RX ADMIN — CEFEPIME HYDROCHLORIDE 2 G: 2 INJECTION, POWDER, FOR SOLUTION INTRAVENOUS at 17:06

## 2018-12-18 RX ADMIN — CEFEPIME HYDROCHLORIDE 2 G: 2 INJECTION, POWDER, FOR SOLUTION INTRAVENOUS at 04:51

## 2018-12-18 RX ADMIN — IPRATROPIUM BROMIDE AND ALBUTEROL SULFATE 1 AMPULE: .5; 3 SOLUTION RESPIRATORY (INHALATION) at 11:32

## 2018-12-18 RX ADMIN — IPRATROPIUM BROMIDE AND ALBUTEROL SULFATE 1 AMPULE: .5; 3 SOLUTION RESPIRATORY (INHALATION) at 08:35

## 2018-12-18 ASSESSMENT — PAIN SCALES - GENERAL
PAINLEVEL_OUTOF10: 0

## 2018-12-18 NOTE — PROGRESS NOTES
[facility]    12/18/2018 2:42 PM        Name: Moises Ledezma . Admitted: 12/14/2018  Primary Care Provider: Phi Frazier MD (Tel: [phone])                        Subjective:  Breathing better today. Not at baseline but is feeling like he should be ready by tomorrow    Current Medications    predniSONE (DELTASONE) tablet 20 mg Daily   amLODIPine (NORVASC) tablet 10 mg Daily   aspirin chewable tablet 81 mg Daily   lisinopril (PRINIVIL;ZESTRIL) tablet 20 mg Daily   pantoprazole (PROTONIX) tablet 40 mg QAM AC   metoprolol succinate (TOPROL XL) extended release tablet 25 mg Daily   cefepime (MAXIPIME) 2 g IVPB minibag Q12H   mometasone-formoterol (DULERA) 100-5 MCG/ACT inhaler 2 puff BID   acetaminophen (TYLENOL) tablet 650 mg Q4H PRN   sodium chloride flush 0.9 % injection 10 mL 2 times per day   sodium chloride flush 0.9 % injection 10 mL PRN   magnesium hydroxide (MILK OF MAGNESIA) 400 MG/5ML suspension 30 mL Daily PRN   ondansetron (ZOFRAN) injection 4 mg Q6H PRN   enoxaparin (LOVENOX) injection 40 mg Nightly   ipratropium-albuterol (DUONEB) nebulizer solution 1 ampule Q4H PRN   diphenhydrAMINE (BENADRYL) injection 25 mg Nightly PRN       Objective:  BP (!) 142/75   Pulse 93   Temp 97.8 °F (36.6 °C) (Temporal)   Resp 16   Ht 5' 10\" (1.778 m)   Wt 153 lb 12.8 oz (69.8 kg)   SpO2 98%   BMI 22.07 kg/m²   No intake or output data in the 24 hours ending 12/18/18 1442   Wt Readings from Last 3 Encounters:   12/18/18 153 lb 12.8 oz (69.8 kg)   12/09/18 160 lb (72.6 kg)   11/15/18 163 lb 9.6 oz (74.2 kg)       General appearance:  Appears comfortable  Eyes: Sclera clear. Pupils equal.  ENT: Moist oral mucosa. Trachea midline, no adenopathy. Cardiovascular: Regular rhythm, normal S1, S2. No murmur.  No edema in lower extremities  Respiratory: Not using accessory muscles. Good inspiratory effort. Clear to auscultation bilaterally, no wheeze or crackles. GI: Abdomen soft, no tenderness, not distended, normal bowel sounds  Musculoskeletal: No cyanosis in digits, neck supple  Neurology: CN 2-12 grossly intact. No speech or motor deficits  Psych: Normal affect. Alert and oriented in time, place and person  Skin: Warm, dry, normal turgor    Labs and Tests:  CBC:   Recent Labs      12/18/18   1055   WBC  9.7   HGB  12.1*   PLT  283     BMP:    Recent Labs      12/17/18   0535  12/18/18   1055   NA  137  137   K  3.9  4.0   CL  99  99   CO2  29  25   BUN  18  21*   CREATININE  0.9  1.1   GLUCOSE  113*  98         Problem List  Active Problems:    Pneumonia  Resolved Problems:    * No resolved hospital problems. *       Assessment & Plan:     1 healthcare associated pneumonia-continue parenteral antibiotics for another 24 hours. Anticipate discharge tomorrow    2 COPD-with exacerbation.   Now on oral steroids    3 hypertension-acceptable control       Discharge planning for tomorrow case discussed with nurses during bedside rounds    Diet: DIET GENERAL;  Code:Full Code  DVT PPX lovenox       Agustín Ram MD   12/18/2018 2:42 PM

## 2018-12-18 NOTE — PROGRESS NOTES
RESPIRATORY THERAPY ASSESSMENT    Name:  Sylvia Simms Record Number:  [id number]  Age: [de-identified] y.o. Gender: male  : 1940  Today's Date:  2018  Room:  [address] [place], [place] [id number]    Assessment   Patient Admission Diagnosis      Allergies  No Known Allergies    Minimum Predicted Vital Capacity:     CHEPE          Actual Vital Capacity:      CHEPE          Pulmonary History: COPD   Home Oxygen Therapy:  room air  Home Respiratory Therapy: Albuterol and Symbicort  Current Respiratory Therapy:  Dulera BID and Duoneb Q4 WA  Treatment Type: HHN  Medications: Albuterol/Ipratropium    Respiratory Severity Index(RSI)   Patients with orders for inhalation medications, oxygen, or any therapeutic treatment modality will be placed on Respiratory Protocol. They will be assessed with the first treatment and at least every 72 hours thereafter. The following severity scale will be used to determine frequency of treatment intervention.     Smoking History: Smoking History Less than 1ppd or less than 15 pack year = 1    Social History  Social History   Substance Use Topics    Smoking status: Former Smoker     Packs/day: 0.50     Years: 30.00     Types: Cigarettes     Quit date: 1987    Smokeless tobacco: Never Used    Alcohol use Yes      Comment: FEW BEERS PER WEEK       Recent Surgical History: None = 0  Past Surgical History  Past Surgical History:   Procedure Laterality Date    CAROTID ENDARTERECTOMY  5/3/12    left    CAROTID ENDARTERECTOMY Right 10/25/2017    Dr. Young Marshall at [address] [facility], [address]  2018    Intraoperative Cholangiogram    COLONOSCOPY      CYSTOSCOPY  2017    ENDOSCOPY, COLON, DIAGNOSTIC      PROSTATECTOMY  2003    TONSILLECTOMY         Level of Consciousness: Alert, Oriented, and Cooperative = 0    Level of Activity: Walking unassisted = 0    Respiratory Pattern: Regular Pattern; RR 8-20 = 0    Breath Sounds: Diminshed bilaterally and/or crackles = 2    Sputum  Sputum Color: Unable to assess, Tenacity: Unable to assess, Sputum How Obtained: Spontaneous cough  Cough: Strong, spontaneous, non-productive = 0    Vital Signs   BP (!) 142/75   Pulse 93   Temp 97.8 °F (36.6 °C) (Temporal)   Resp 16   Ht 5' 10\" (1.778 m)   Wt 153 lb 12.8 oz (69.8 kg)   SpO2 98%   BMI 22.07 kg/m²   SPO2 (COPD values may differ): Greater than or equal to 92% on room air = 0    Peak Flow (asthma only): not applicable = 0    RSI: 0-4 = See once and convert to home regimen or discontinue        Plan       Goals: medication delivery, mobilize retained secretions, volume expansion and improve oxygenation  Plan of Care: Dulera BID and Duoneb Q4 PRN    Patient/caregiver was educated on the proper method of use of Respiratory Therapy device:  Yes      Level of understanding, patient and caregiver was able to:(check appropriate box(es))   [x] Verbalize understanding   [x] Demonstrate understanding       [] Teach back        [] Needs reinforcement       []  No available caregiver               []  Other:     Response to education:  Excellent     Teaching Time:  10  minutes      Is patient being placed on Home Treatment Regimen? No     Electronically signed by Carlos A Houston RCP on 12/18/2018 at 12:00 PM    Respiratory Protocol Guidelines     1. Assessment and treatment by Respiratory Therapy will be initiated for medication and therapeutic interventions upon initiation of aerosolized medication. 2. Physician will be contacted for respiratory rate (RR) greater than 35 breaths per minute. Therapy will be held for heart rate (HR) greater than 140 beats per minute, pending direction from physician. 3. Bronchodilators will be administered via Metered Dose Inhaler (MDI) with spacer when the following criteria are met:  a. Alert and cooperative     b. HR < 140 bpm  c. RR < 30 bpm                d. Can demonstrate a 2-3 second inspiratory hold  4.  Bronchodilators will be administered via

## 2018-12-18 NOTE — PROGRESS NOTES
P Pulmonary and Critical Care  F/U Note      Reason for Consult: pneumonia  Requesting Physician: Dr. Cunha Common:   [facility]a Rad / HPI:    Chief Complaint   Patient presents with    Shortness of Breath     Pt. comes by [facility] with shortness of breath. Pt. states he laid down to take a nap and woke up short of breath. Took home albuterol with no relief, EMS gave 125 solu-mederol and duoneb. Pt. just released on Wednesday for COPD exacerbation.      He is feeling better  Still with congestion worse in am               Current Medications:    Current Facility-Administered Medications: predniSONE (DELTASONE) tablet 20 mg, 20 mg, Oral, Daily  amLODIPine (NORVASC) tablet 10 mg, 10 mg, Oral, Daily  aspirin chewable tablet 81 mg, 81 mg, Oral, Daily  lisinopril (PRINIVIL;ZESTRIL) tablet 20 mg, 20 mg, Oral, Daily  pantoprazole (PROTONIX) tablet 40 mg, 40 mg, Oral, QAM AC  metoprolol succinate (TOPROL XL) extended release tablet 25 mg, 25 mg, Oral, Daily  cefepime (MAXIPIME) 2 g IVPB minibag, 2 g, Intravenous, Q12H  MDI Treatment, , , BID **AND** mometasone-formoterol (DULERA) 100-5 MCG/ACT inhaler 2 puff, 2 puff, Inhalation, BID  acetaminophen (TYLENOL) tablet 650 mg, 650 mg, Oral, Q4H PRN  sodium chloride flush 0.9 % injection 10 mL, 10 mL, Intravenous, 2 times per day  sodium chloride flush 0.9 % injection 10 mL, 10 mL, Intravenous, PRN  magnesium hydroxide (MILK OF MAGNESIA) 400 MG/5ML suspension 30 mL, 30 mL, Oral, Daily PRN  ondansetron (ZOFRAN) injection 4 mg, 4 mg, Intravenous, Q6H PRN  enoxaparin (LOVENOX) injection 40 mg, 40 mg, Subcutaneous, Nightly  ipratropium-albuterol (DUONEB) nebulizer solution 1 ampule, 1 ampule, Inhalation, Q4H WA  ipratropium-albuterol (DUONEB) nebulizer solution 1 ampule, 1 ampule, Inhalation, Q4H PRN  diphenhydrAMINE (BENADRYL) injection 25 mg, 25 mg, Intravenous, Nightly PRN    REVIEW OF SYSTEMS:    CONSTITUTIONAL:  negative for fevers, chills, diaphoresis, activity change, appetite change, fatigue, night sweats and unexpected weight change. EYES:  negative for blurred vision, eye discharge, visual disturbance and icterus  HEENT:  negative for hearing loss, tinnitus, ear drainage, sinus pressure, nasal congestion, epistaxis and snoring  RESPIRATORY:  See HPI  CARDIOVASCULAR:  negative for chest pain, palpitations, exertional chest pressure/discomfort, edema, syncope  GASTROINTESTINAL:  negative for nausea, vomiting, diarrhea, constipation, blood in stool and abdominal pain  GENITOURINARY:  negative for frequency, dysuria, urinary incontinence, decreased urine volume, and hematuria  HEMATOLOGIC/LYMPHATIC:  negative for easy bruising, bleeding and lymphadenopathy  ALLERGIC/IMMUNOLOGIC:  negative for recurrent infections, angioedema, anaphylaxis and drug reactions  ENDOCRINE:  negative for weight changes and diabetic symptoms including polyuria, polydipsia and polyphagia  MUSCULOSKELETAL:  negative for  pain, joint swelling, decreased range of motion and muscle weakness  NEUROLOGICAL:  negative for headaches, slurred speech, unilateral weakness  PSYCHIATRIC/BEHAVIORAL: negative for hallucinations, behavioral problems, confusion and agitation. Objective:   PHYSICAL EXAM:      VITALS:  /72   Pulse 86   Temp 97.8 °F (36.6 °C) (Temporal)   Resp 18   Ht 5' 10\" (1.778 m)   Wt 153 lb 12.8 oz (69.8 kg)   SpO2 98%   BMI 22.07 kg/m²      24HR INTAKE/OUTPUT:  No intake or output data in the 24 hours ending 12/18/18 1037  CONSTITUTIONAL:  awake, alert, cooperative, no apparent distress, and appears stated age  NECK:  Supple, symmetrical, trachea midline, no adenopathy, thyroid symmetric, not enlarged and no tenderness, skin normal  LUNGS: clear to auscultation. No accessory muscle use  CARDIOVASCULAR: S1 and S2, no edema and no JVD  ABDOMEN:  normal bowel sounds, non-distended and no masses palpated, and no tenderness to palpation.  No hepatospleenomegaly  LYMPHADENOPATHY:  no axillary or supraclavicular adenopathy. No cervical adnenopathy  PSYCHIATRIC: Oriented to person place and time. No obvious depression or anxiety. MUSCULOSKELETAL: No obvious misalignment or effusion of the joints. No clubbing, cyanosis of the digits. SKIN:  normal skin color, texture, turgor and no redness, warmth, or swelling. No palpable nodules    DATA:    Old records have been reviewed    CBC:  No results for input(s): WBC, RBC, HGB, HCT, PLT, MCV, MCH, MCHC, RDW, NRBC, SEGSPCT, BANDSPCT in the last 72 hours. BMP:  Recent Labs      12/17/18   0535   NA  137   K  3.9   CL  99   CO2  29   BUN  18   CREATININE  0.9   CALCIUM  8.7   GLUCOSE  113*        Radiology Review:  All pertinent images / reports were reviewed as a part of this visit. Assessment:   1. CAP   2. Acute hypoxic respiratory failure  3.  Severe COPD    Plan:     - sputum cultures negative  - can change cefepime to oral Augmentin at discharge  - continue prednisone 20 mg daily and taper off over 1 week  - continue current inhaled regimen  - airway clearance  - OK for discharge, F/U in office 2 weeks

## 2018-12-19 VITALS
BODY MASS INDEX: 22.02 KG/M2 | HEART RATE: 94 BPM | WEIGHT: 153.8 LBS | DIASTOLIC BLOOD PRESSURE: 74 MMHG | HEIGHT: 70 IN | OXYGEN SATURATION: 95 % | RESPIRATION RATE: 18 BRPM | SYSTOLIC BLOOD PRESSURE: 137 MMHG | TEMPERATURE: 97.4 F

## 2018-12-19 LAB
BLOOD CULTURE, ROUTINE: NORMAL
CULTURE, BLOOD 2: NORMAL
CULTURE, RESPIRATORY: NORMAL
GRAM STAIN RESULT: NORMAL

## 2018-12-19 PROCEDURE — 2580000003 HC RX 258: Performed by: FAMILY MEDICINE

## 2018-12-19 PROCEDURE — 99232 SBSQ HOSP IP/OBS MODERATE 35: CPT | Performed by: INTERNAL MEDICINE

## 2018-12-19 PROCEDURE — 6370000000 HC RX 637 (ALT 250 FOR IP): Performed by: FAMILY MEDICINE

## 2018-12-19 PROCEDURE — 6360000002 HC RX W HCPCS: Performed by: FAMILY MEDICINE

## 2018-12-19 PROCEDURE — 6370000000 HC RX 637 (ALT 250 FOR IP): Performed by: INTERNAL MEDICINE

## 2018-12-19 RX ORDER — AMOXICILLIN AND CLAVULANATE POTASSIUM 875; 125 MG/1; MG/1
1 TABLET, FILM COATED ORAL 2 TIMES DAILY
Qty: 14 TABLET | Refills: 0 | Status: SHIPPED | OUTPATIENT
Start: 2018-12-19 | End: 2018-12-26

## 2018-12-19 RX ADMIN — Medication 10 ML: at 08:09

## 2018-12-19 RX ADMIN — ASPIRIN 81 MG CHEWABLE TABLET 81 MG: 81 TABLET CHEWABLE at 08:09

## 2018-12-19 RX ADMIN — LISINOPRIL 20 MG: 20 TABLET ORAL at 08:09

## 2018-12-19 RX ADMIN — PREDNISONE 20 MG: 20 TABLET ORAL at 08:09

## 2018-12-19 RX ADMIN — CEFEPIME HYDROCHLORIDE 2 G: 2 INJECTION, POWDER, FOR SOLUTION INTRAVENOUS at 04:54

## 2018-12-19 RX ADMIN — METOPROLOL SUCCINATE 25 MG: 25 TABLET, FILM COATED, EXTENDED RELEASE ORAL at 08:09

## 2018-12-19 RX ADMIN — PANTOPRAZOLE SODIUM 40 MG: 40 TABLET, DELAYED RELEASE ORAL at 05:38

## 2018-12-19 RX ADMIN — Medication 2 PUFF: at 07:09

## 2018-12-19 NOTE — PROGRESS NOTES
Oriented to person place and time. No obvious depression or anxiety. MUSCULOSKELETAL: No obvious misalignment or effusion of the joints. No clubbing, cyanosis of the digits. SKIN:  normal skin color, texture, turgor and no redness, warmth, or swelling. No palpable nodules    DATA:    Old records have been reviewed    CBC:  Recent Labs      12/18/18   1055   WBC  9.7   RBC  3.85*   HGB  12.1*   HCT  36.8*   PLT  283   MCV  95.5   MCH  31.5   MCHC  33.0   RDW  13.3      BMP:  Recent Labs      12/17/18   0535  12/18/18   1055   NA  137  137   K  3.9  4.0   CL  99  99   CO2  29  25   BUN  18  21*   CREATININE  0.9  1.1   CALCIUM  8.7  8.5   GLUCOSE  113*  98        Radiology Review:  All pertinent images / reports were reviewed as a part of this visit. Assessment:   1. CAP   2. Acute hypoxic respiratory failure  3.  Severe COPD    Plan:     - he has improved  - change cefepime to oral Augmentin at discharge for 7 days  - taper prednisone off over 1 week  - continue current inhaled regimen  - airway clearance  - OK for discharge

## 2018-12-20 ENCOUNTER — CARE COORDINATION (OUTPATIENT)
Dept: CASE MANAGEMENT | Age: 78
End: 2018-12-20

## 2018-12-21 ENCOUNTER — OFFICE VISIT (OUTPATIENT)
Dept: PULMONOLOGY | Age: 78
End: 2018-12-21
Payer: MEDICARE

## 2018-12-21 VITALS
DIASTOLIC BLOOD PRESSURE: 76 MMHG | TEMPERATURE: 97.5 F | RESPIRATION RATE: 18 BRPM | HEIGHT: 70 IN | HEART RATE: 108 BPM | OXYGEN SATURATION: 96 % | WEIGHT: 158 LBS | SYSTOLIC BLOOD PRESSURE: 131 MMHG | BODY MASS INDEX: 22.62 KG/M2

## 2018-12-21 DIAGNOSIS — Z09 HOSPITAL DISCHARGE FOLLOW-UP: Primary | ICD-10-CM

## 2018-12-21 DIAGNOSIS — J18.9 COMMUNITY ACQUIRED PNEUMONIA OF LEFT LOWER LOBE OF LUNG: ICD-10-CM

## 2018-12-21 DIAGNOSIS — J44.9 MODERATE COPD (CHRONIC OBSTRUCTIVE PULMONARY DISEASE) (HCC): ICD-10-CM

## 2018-12-21 PROCEDURE — 1111F DSCHRG MED/CURRENT MED MERGE: CPT | Performed by: INTERNAL MEDICINE

## 2018-12-21 PROCEDURE — 1123F ACP DISCUSS/DSCN MKR DOCD: CPT | Performed by: INTERNAL MEDICINE

## 2018-12-21 PROCEDURE — 1101F PT FALLS ASSESS-DOCD LE1/YR: CPT | Performed by: INTERNAL MEDICINE

## 2018-12-21 PROCEDURE — G8420 CALC BMI NORM PARAMETERS: HCPCS | Performed by: INTERNAL MEDICINE

## 2018-12-21 PROCEDURE — G8926 SPIRO NO PERF OR DOC: HCPCS | Performed by: INTERNAL MEDICINE

## 2018-12-21 PROCEDURE — 4040F PNEUMOC VAC/ADMIN/RCVD: CPT | Performed by: INTERNAL MEDICINE

## 2018-12-21 PROCEDURE — G8598 ASA/ANTIPLAT THER USED: HCPCS | Performed by: INTERNAL MEDICINE

## 2018-12-21 PROCEDURE — 1036F TOBACCO NON-USER: CPT | Performed by: INTERNAL MEDICINE

## 2018-12-21 PROCEDURE — 3023F SPIROM DOC REV: CPT | Performed by: INTERNAL MEDICINE

## 2018-12-21 PROCEDURE — G8482 FLU IMMUNIZE ORDER/ADMIN: HCPCS | Performed by: INTERNAL MEDICINE

## 2018-12-21 PROCEDURE — 99214 OFFICE O/P EST MOD 30 MIN: CPT | Performed by: INTERNAL MEDICINE

## 2018-12-21 PROCEDURE — G8427 DOCREV CUR MEDS BY ELIG CLIN: HCPCS | Performed by: INTERNAL MEDICINE

## 2018-12-21 NOTE — COMMUNICATION BODY
Assessment:     Assessment:     Diagnosis Orders   1. Hospital discharge follow-up     2. Community acquired pneumonia of left lower lobe of lung ([facility] [address]     3. Moderate COPD (chronic obstructive pulmonary disease) ([facility] [address]             Plan:     Plan:    1. I reviewed his recent hospital stay and explained results of testing and plan of care  2. I asked him to finish his antibiotic course Augmentin as directed  3. Continue Symbicort 160 BID and Albuterol PRN  4. He continues to be symptomatic despite the use of albuterol HFA, I will order DuoNeb to be used 3-4 times per day  5. With his recurrent sore throat I recommended ENT evaluation, this might be due to GERD symptoms and I asked him to continue his PPI and follow instructions as directed  6. Referred to pulmonary rehab  7.   RTC 3 months

## 2018-12-21 NOTE — PROGRESS NOTES
Gilmer Pereyra is [de-identified] y.o. male here for hospital follow-up visit, COPD. She was admitted to the hospital with pneumonia and COPD exacerbation, he was readmitted after discharge with recurrent shortness of breath and cough  He continues to have some sore throat and congestion in the back of his mouth mainly in the morning  He denies any worsening shortness of breath or dyspnea on exertion at this time  He denies any fever, chills, diaphoresis or hemoptysis  He is on room air  He was discharged home on Augmentin course  His sputum culture was negative as well as respiratory panel    He was seen initially for Pulmonary Medicine consultation referred by Dr. Laly Richards for evaluation of S. 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  Multiplanar reformatted images are provided for review.  MIP   images are provided for review. Dose modulation, iterative reconstruction,   and/or weight based adjustment of the mA/kV was utilized to reduce the   radiation dose to as low as reasonably achievable.       COMPARISON:   None.       HISTORY:   ORDERING SYSTEM PROVIDED HISTORY: COPD EXACERBATION, UNCOMPLICATED   TECHNOLOGIST PROVIDED HISTORY:   Ordering Physician Provided Reason for Exam: Shortness of Breath (Pt. comes   by [facility] with shortness of breath. Pt. states he laid down to   take a nap and woke up short of breath.  Took home albuterol with no relief,   EMS gave 125 solumedrol and Duoneb pt. Danielle Monsivais released on Wednesday for COPD   exacerbation.)   Acuity: Unknown   Type of Exam: Unknown CT PA 02/15/2018       FINDINGS:   Pulmonary Arteries: Pulmonary arteries are adequately opacified for   evaluation.  No evidence of intraluminal filling  guaiFENesin (MUCINEX) 600 MG extended release tablet Take 1 tablet by mouth 2 times daily 60 tablet 0    Ferrous Sulfate (IRON SLOW RELEASE PO) Take by mouth daily      triamcinolone (KENALOG) 0.1 % ointment       budesonide-formoterol (SYMBICORT) 160-4.5 MCG/ACT AERO INHALE 2 PUFFS INTO THE LUNGS TWICE DAILY 1 Inhaler 11    aspirin 81 MG tablet Take 81 mg by mouth daily      lisinopril (PRINIVIL;ZESTRIL) 40 MG tablet Take 1 tablet by mouth daily (Patient taking differently: Take 20 mg by mouth daily ) 90 tablet 3    Denosumab (PROLIA SC) Inject into the skin      Leuprolide Acetate (LUPRON IJ) Inject as directed every 3 months      vitamin E 400 UNIT capsule Take 400 Units by mouth daily.  calcium carbonate-vitamin D (CALCIUM 600+D) 600-200 MG-UNIT TABS Take 2 tablets by mouth daily       fish oil-omega-3 fatty acids 1000 MG capsule Take 1 g by mouth daily       omeprazole (PRILOSEC) 10 MG capsule Take 10 mg by mouth daily. No current facility-administered medications for this visit. Family History   Problem Relation Age of Onset    Diabetes Mother     High Blood Pressure Mother     Heart Disease Father         myxoma    Heart Failure Father      Social History     Social History    Marital status:      Spouse name: N/A    Number of children: N/A    Years of education: N/A     Occupational History    retired,       Social History Main Topics    Smoking status: Former Smoker     Packs/day: 0.50     Years: 30.00     Types: Cigarettes     Quit date: 9/1/1987    Smokeless tobacco: Never Used    Alcohol use Yes      Comment: FEW BEERS PER WEEK    Drug use: No    Sexual activity: Yes     Partners: Female     Other Topics Concern    Not on file     Social History Narrative    No narrative on file         Review of Systems   Constitutional: Negative.   Negative for fever, chills, diaphoresis, activity change, appetite change, fatigue and unexpected weight

## 2018-12-21 NOTE — LETTER
[facility], [address]  [address] [facility], [address] [facility] [address]  Phone: [phone]  Fax: [phone]      December 21, 2018       Patient: Juan Carlos Velásquez   MR Number: [id number]   YOB: 1940   Date of Visit: 12/21/2018       Dear Dr. Jean Smith MD      I saw Mr. Remy Carlos today for [facility] visit. Below are the relevant portions of my assessment and plan of care. Assessment:     Diagnosis Orders   1. Hospital discharge follow-up     2. Community acquired pneumonia of left lower lobe of lung ([facility] [address]     3. Moderate COPD (chronic obstructive pulmonary disease) ([facility] [address]       Plan:    1. I reviewed his recent hospital stay and explained results of testing and plan of care  2. I asked him to finish his antibiotic course Augmentin as directed  3. Continue Symbicort 160 BID and Albuterol PRN  4. He continues to be symptomatic despite the use of albuterol HFA, I will order DuoNeb to be used 3-4 times per day  5. With his recurrent sore throat I recommended ENT evaluation, this might be due to GERD symptoms and I asked him to continue his PPI and follow instructions as directed  6. Referred to pulmonary rehab  7. RTC 3 months      If you have questions, please do not hesitate to call me. I look forward to following [facility] along with you. Sincerely,        Yolande Ospina MD    CC providers:  Jean Smith MD  [facility] [address]

## 2018-12-26 ENCOUNTER — TELEPHONE (OUTPATIENT)
Dept: PULMONOLOGY | Age: 78
End: 2018-12-26

## 2018-12-26 DIAGNOSIS — J31.2 SORE THROAT, CHRONIC: Primary | ICD-10-CM

## 2019-01-01 RX ORDER — IPRATROPIUM BROMIDE AND ALBUTEROL SULFATE 2.5; .5 MG/3ML; MG/3ML
1 SOLUTION RESPIRATORY (INHALATION) EVERY 6 HOURS PRN
Qty: 360 ML | Refills: 3 | Status: SHIPPED | OUTPATIENT
Start: 2019-01-01 | End: 2020-07-22 | Stop reason: CLARIF

## 2019-01-01 RX ORDER — IPRATROPIUM BROMIDE AND ALBUTEROL SULFATE 2.5; .5 MG/3ML; MG/3ML
1 SOLUTION RESPIRATORY (INHALATION) EVERY 6 HOURS PRN
Qty: 360 ML | Refills: 5 | Status: SHIPPED | OUTPATIENT
Start: 2019-01-01 | End: 2019-01-03

## 2019-01-02 ENCOUNTER — TELEPHONE (OUTPATIENT)
Dept: PULMONOLOGY | Age: 79
End: 2019-01-02

## 2019-01-03 ENCOUNTER — OFFICE VISIT (OUTPATIENT)
Dept: FAMILY MEDICINE CLINIC | Age: 79
End: 2019-01-03
Payer: MEDICARE

## 2019-01-03 VITALS
SYSTOLIC BLOOD PRESSURE: 122 MMHG | BODY MASS INDEX: 23.05 KG/M2 | WEIGHT: 161 LBS | OXYGEN SATURATION: 97 % | DIASTOLIC BLOOD PRESSURE: 60 MMHG | HEIGHT: 70 IN | HEART RATE: 97 BPM

## 2019-01-03 DIAGNOSIS — J96.01 ACUTE HYPOXEMIC RESPIRATORY FAILURE (HCC): ICD-10-CM

## 2019-01-03 DIAGNOSIS — K21.9 GASTROESOPHAGEAL REFLUX DISEASE WITHOUT ESOPHAGITIS: ICD-10-CM

## 2019-01-03 DIAGNOSIS — J18.9 COMMUNITY ACQUIRED PNEUMONIA OF LEFT LOWER LOBE OF LUNG: Primary | ICD-10-CM

## 2019-01-03 DIAGNOSIS — E78.2 MIXED HYPERLIPIDEMIA: ICD-10-CM

## 2019-01-03 DIAGNOSIS — J43.2 CENTRILOBULAR EMPHYSEMA (HCC): ICD-10-CM

## 2019-01-03 DIAGNOSIS — L40.9 PSORIASIS: ICD-10-CM

## 2019-01-03 DIAGNOSIS — J41.0 SIMPLE CHRONIC BRONCHITIS (HCC): ICD-10-CM

## 2019-01-03 DIAGNOSIS — I10 ESSENTIAL HYPERTENSION: ICD-10-CM

## 2019-01-03 DIAGNOSIS — J44.9 MODERATE COPD (CHRONIC OBSTRUCTIVE PULMONARY DISEASE) (HCC): ICD-10-CM

## 2019-01-03 PROCEDURE — G8482 FLU IMMUNIZE ORDER/ADMIN: HCPCS | Performed by: FAMILY MEDICINE

## 2019-01-03 PROCEDURE — 1111F DSCHRG MED/CURRENT MED MERGE: CPT | Performed by: FAMILY MEDICINE

## 2019-01-03 PROCEDURE — 1036F TOBACCO NON-USER: CPT | Performed by: FAMILY MEDICINE

## 2019-01-03 PROCEDURE — G8420 CALC BMI NORM PARAMETERS: HCPCS | Performed by: FAMILY MEDICINE

## 2019-01-03 PROCEDURE — 99214 OFFICE O/P EST MOD 30 MIN: CPT | Performed by: FAMILY MEDICINE

## 2019-01-03 PROCEDURE — 1123F ACP DISCUSS/DSCN MKR DOCD: CPT | Performed by: FAMILY MEDICINE

## 2019-01-03 PROCEDURE — G8926 SPIRO NO PERF OR DOC: HCPCS | Performed by: FAMILY MEDICINE

## 2019-01-03 PROCEDURE — 4040F PNEUMOC VAC/ADMIN/RCVD: CPT | Performed by: FAMILY MEDICINE

## 2019-01-03 PROCEDURE — G8427 DOCREV CUR MEDS BY ELIG CLIN: HCPCS | Performed by: FAMILY MEDICINE

## 2019-01-03 PROCEDURE — 1101F PT FALLS ASSESS-DOCD LE1/YR: CPT | Performed by: FAMILY MEDICINE

## 2019-01-03 PROCEDURE — 3023F SPIROM DOC REV: CPT | Performed by: FAMILY MEDICINE

## 2019-01-03 PROCEDURE — G8598 ASA/ANTIPLAT THER USED: HCPCS | Performed by: FAMILY MEDICINE

## 2019-01-03 RX ORDER — LISINOPRIL 40 MG/1
40 TABLET ORAL DAILY
Qty: 90 TABLET | Refills: 3 | Status: SHIPPED | OUTPATIENT
Start: 2019-01-03 | End: 2020-07-22

## 2019-01-03 ASSESSMENT — PATIENT HEALTH QUESTIONNAIRE - PHQ9
1. LITTLE INTEREST OR PLEASURE IN DOING THINGS: 0
2. FEELING DOWN, DEPRESSED OR HOPELESS: 0
SUM OF ALL RESPONSES TO PHQ QUESTIONS 1-9: 0
SUM OF ALL RESPONSES TO PHQ9 QUESTIONS 1 & 2: 0
SUM OF ALL RESPONSES TO PHQ QUESTIONS 1-9: 0

## 2019-01-14 ENCOUNTER — OFFICE VISIT (OUTPATIENT)
Dept: ENT CLINIC | Age: 79
End: 2019-01-14
Payer: MEDICARE

## 2019-01-14 VITALS — SYSTOLIC BLOOD PRESSURE: 122 MMHG | OXYGEN SATURATION: 96 % | HEART RATE: 99 BPM | DIASTOLIC BLOOD PRESSURE: 60 MMHG

## 2019-01-14 DIAGNOSIS — K21.9 GASTROESOPHAGEAL REFLUX DISEASE WITHOUT ESOPHAGITIS: Primary | ICD-10-CM

## 2019-01-14 PROCEDURE — G8420 CALC BMI NORM PARAMETERS: HCPCS | Performed by: OTOLARYNGOLOGY

## 2019-01-14 PROCEDURE — 99203 OFFICE O/P NEW LOW 30 MIN: CPT | Performed by: OTOLARYNGOLOGY

## 2019-01-14 PROCEDURE — 1036F TOBACCO NON-USER: CPT | Performed by: OTOLARYNGOLOGY

## 2019-01-14 PROCEDURE — 4040F PNEUMOC VAC/ADMIN/RCVD: CPT | Performed by: OTOLARYNGOLOGY

## 2019-01-14 PROCEDURE — G8427 DOCREV CUR MEDS BY ELIG CLIN: HCPCS | Performed by: OTOLARYNGOLOGY

## 2019-01-14 PROCEDURE — G8598 ASA/ANTIPLAT THER USED: HCPCS | Performed by: OTOLARYNGOLOGY

## 2019-01-14 PROCEDURE — 1111F DSCHRG MED/CURRENT MED MERGE: CPT | Performed by: OTOLARYNGOLOGY

## 2019-01-14 PROCEDURE — 1123F ACP DISCUSS/DSCN MKR DOCD: CPT | Performed by: OTOLARYNGOLOGY

## 2019-01-14 PROCEDURE — G8482 FLU IMMUNIZE ORDER/ADMIN: HCPCS | Performed by: OTOLARYNGOLOGY

## 2019-01-14 PROCEDURE — 1101F PT FALLS ASSESS-DOCD LE1/YR: CPT | Performed by: OTOLARYNGOLOGY

## 2019-01-16 ENCOUNTER — HOSPITAL ENCOUNTER (OUTPATIENT)
Dept: GENERAL RADIOLOGY | Age: 79
Discharge: HOME | End: 2019-01-16
Payer: MEDICARE

## 2019-01-16 ENCOUNTER — HOSPITAL ENCOUNTER (OUTPATIENT)
Age: 79
Discharge: HOME | End: 2019-01-16
Payer: MEDICARE

## 2019-01-16 DIAGNOSIS — J18.9 COMMUNITY ACQUIRED PNEUMONIA OF LEFT LOWER LOBE OF LUNG: ICD-10-CM

## 2019-01-16 PROCEDURE — 71046 X-RAY EXAM CHEST 2 VIEWS: CPT

## 2019-01-28 ENCOUNTER — OFFICE VISIT (OUTPATIENT)
Dept: FAMILY MEDICINE CLINIC | Age: 79
End: 2019-01-28
Payer: MEDICARE

## 2019-01-28 VITALS
HEIGHT: 70 IN | DIASTOLIC BLOOD PRESSURE: 40 MMHG | BODY MASS INDEX: 23.04 KG/M2 | OXYGEN SATURATION: 95 % | HEART RATE: 110 BPM | SYSTOLIC BLOOD PRESSURE: 100 MMHG | WEIGHT: 160.94 LBS

## 2019-01-28 DIAGNOSIS — R19.7 DIARRHEA, UNSPECIFIED TYPE: Primary | ICD-10-CM

## 2019-01-28 DIAGNOSIS — J41.0 SIMPLE CHRONIC BRONCHITIS (HCC): ICD-10-CM

## 2019-01-28 DIAGNOSIS — C61 PROSTATE CA (HCC): ICD-10-CM

## 2019-01-28 DIAGNOSIS — I10 ESSENTIAL HYPERTENSION: ICD-10-CM

## 2019-01-28 DIAGNOSIS — K62.5 RECTAL BLEED: ICD-10-CM

## 2019-01-28 LAB — HGB, POC: 12.3

## 2019-01-28 PROCEDURE — G8926 SPIRO NO PERF OR DOC: HCPCS | Performed by: FAMILY MEDICINE

## 2019-01-28 PROCEDURE — 4040F PNEUMOC VAC/ADMIN/RCVD: CPT | Performed by: FAMILY MEDICINE

## 2019-01-28 PROCEDURE — 99213 OFFICE O/P EST LOW 20 MIN: CPT | Performed by: FAMILY MEDICINE

## 2019-01-28 PROCEDURE — 1123F ACP DISCUSS/DSCN MKR DOCD: CPT | Performed by: FAMILY MEDICINE

## 2019-01-28 PROCEDURE — G8427 DOCREV CUR MEDS BY ELIG CLIN: HCPCS | Performed by: FAMILY MEDICINE

## 2019-01-28 PROCEDURE — 1036F TOBACCO NON-USER: CPT | Performed by: FAMILY MEDICINE

## 2019-01-28 PROCEDURE — 3023F SPIROM DOC REV: CPT | Performed by: FAMILY MEDICINE

## 2019-01-28 PROCEDURE — 85018 HEMOGLOBIN: CPT | Performed by: FAMILY MEDICINE

## 2019-01-28 PROCEDURE — G8598 ASA/ANTIPLAT THER USED: HCPCS | Performed by: FAMILY MEDICINE

## 2019-01-28 PROCEDURE — G8420 CALC BMI NORM PARAMETERS: HCPCS | Performed by: FAMILY MEDICINE

## 2019-01-28 PROCEDURE — 1101F PT FALLS ASSESS-DOCD LE1/YR: CPT | Performed by: FAMILY MEDICINE

## 2019-01-28 PROCEDURE — G8482 FLU IMMUNIZE ORDER/ADMIN: HCPCS | Performed by: FAMILY MEDICINE

## 2019-05-24 ENCOUNTER — HOSPITAL ENCOUNTER (OUTPATIENT)
Dept: VASCULAR LAB | Age: 79
Discharge: HOME | End: 2019-05-24
Payer: MEDICARE

## 2019-05-24 ENCOUNTER — TELEPHONE (OUTPATIENT)
Dept: FAMILY MEDICINE CLINIC | Age: 79
End: 2019-05-24

## 2019-05-24 ENCOUNTER — OFFICE VISIT (OUTPATIENT)
Dept: VASCULAR SURGERY | Age: 79
End: 2019-05-24
Payer: MEDICARE

## 2019-05-24 VITALS
WEIGHT: 164 LBS | DIASTOLIC BLOOD PRESSURE: 66 MMHG | BODY MASS INDEX: 23.48 KG/M2 | HEIGHT: 70 IN | SYSTOLIC BLOOD PRESSURE: 130 MMHG

## 2019-05-24 DIAGNOSIS — I65.23 CAROTID ATHEROSCLEROSIS, BILATERAL: ICD-10-CM

## 2019-05-24 DIAGNOSIS — I65.23 CAROTID ATHEROSCLEROSIS, BILATERAL: Primary | ICD-10-CM

## 2019-05-24 PROCEDURE — G8598 ASA/ANTIPLAT THER USED: HCPCS | Performed by: SURGERY

## 2019-05-24 PROCEDURE — G8427 DOCREV CUR MEDS BY ELIG CLIN: HCPCS | Performed by: SURGERY

## 2019-05-24 PROCEDURE — 99213 OFFICE O/P EST LOW 20 MIN: CPT | Performed by: SURGERY

## 2019-05-24 PROCEDURE — 1036F TOBACCO NON-USER: CPT | Performed by: SURGERY

## 2019-05-24 PROCEDURE — G8420 CALC BMI NORM PARAMETERS: HCPCS | Performed by: SURGERY

## 2019-05-24 PROCEDURE — 93880 EXTRACRANIAL BILAT STUDY: CPT

## 2019-05-24 PROCEDURE — 4040F PNEUMOC VAC/ADMIN/RCVD: CPT | Performed by: SURGERY

## 2019-05-24 PROCEDURE — 1123F ACP DISCUSS/DSCN MKR DOCD: CPT | Performed by: SURGERY

## 2019-05-24 RX ORDER — FUROSEMIDE 20 MG/1
20 TABLET ORAL DAILY
Qty: 30 TABLET | Refills: 3 | Status: SHIPPED | OUTPATIENT
Start: 2019-05-24 | End: 2020-02-17 | Stop reason: SDUPTHER

## 2019-05-24 RX ORDER — BUDESONIDE AND FORMOTEROL FUMARATE DIHYDRATE 160; 4.5 UG/1; UG/1
2 AEROSOL RESPIRATORY (INHALATION) 2 TIMES DAILY
COMMUNITY
End: 2019-07-03 | Stop reason: SDUPTHER

## 2019-05-24 NOTE — TELEPHONE ENCOUNTER
I read Prince Oquendo' note, have ordered a mild water pill for him to take qam or qoam.  Then he should f/u on 4-6 weeks

## 2019-05-24 NOTE — TELEPHONE ENCOUNTER
Patient said they had an appt with Dr. Shar Dillon [facility]. Surgeon, and he is supposed to be sending his information over to Dr. Michael Hidalgo and recommending patient be put on a water pill, he is wanting to discuss, please advise.

## 2019-05-24 NOTE — Clinical Note
I saw Mira Mooremino in follow-up today. His carotids looked good, but he is having some increased swelling in both legs. I offered lightweight compression, but he may benefit from a touch of diuretic. Thanksrich

## 2019-05-24 NOTE — PROGRESS NOTES
[facility])   Vascular Surgery Followup    Referring Provider:  Noelle Apodaca MD     Chief Complaint   Patient presents with    Follow-up        History of Present Illness:   78-year-old male here today for routine follow-up of carotid atherosclerosis. History significant for right carotid endarterectomy 2017 and left in 2012. Underwent carotid duplex Prior to this visit and is here today to discuss the results. He denies TIA, stroke or amaurosis. Does complain of swelling in both ankles. No other complaints    Past Medical History:   has a past medical history of Carotid artery occlusion and stenosis, COPD (chronic obstructive pulmonary disease) ([facility] [address] ED (erectile dysfunction), Emphysema lung ([facility] [address] GERD (gastroesophageal reflux disease), Hyperlipidemia, Hypertension, Prostate CA ([facility] [address] Psoriasis, Pulmonary nodules, and Radiation. Surgical History:   has a past surgical history that includes Prostatectomy (2003); Tonsillectomy; Carotid endarterectomy (5/3/12); Cystoscopy (07/2017); Colonoscopy; Endoscopy, colon, diagnostic; Carotid endarterectomy (Right, 10/25/2017); and Cholecystectomy, laparoscopic (02/12/2018). Social History:   reports that he quit smoking about 31 years ago. His smoking use included cigarettes. He has a 15.00 pack-year smoking history. He has never used smokeless tobacco. He reports that he drinks alcohol. He reports that he does not use drugs. Family History:  family history includes Diabetes in his mother; Heart Disease in his father; Heart Failure in his father; High Blood Pressure in his mother.      Home Medications:  Current Outpatient Medications   Medication Sig Dispense Refill    lisinopril (PRINIVIL;ZESTRIL) 40 MG tablet Take 1 tablet by mouth daily (Patient taking differently: Take 20 mg by mouth daily ) 90 tablet 3    ipratropium-albuterol (DUONEB) 0.5-2.5 (3) MG/3ML SOLN nebulizer solution Inhale 3 mLs into the lungs every 6 hours as needed for Other (wheezing) Dx: COPD   ICD-10: J44.9 360 mL 3    albuterol sulfate HFA (PROVENTIL HFA) 108 (90 Base) MCG/ACT inhaler Inhale 2 puffs into the lungs every 4 hours as needed for Wheezing or Shortness of Breath With spacer (and mask if indicated). Thanks. 1 Inhaler 1    metoprolol succinate (TOPROL XL) 25 MG extended release tablet Take 1 tablet by mouth daily 90 tablet 3    amLODIPine (NORVASC) 10 MG tablet Take 1 tablet by mouth daily 90 tablet 3    Ferrous Sulfate (IRON SLOW RELEASE PO) Take by mouth daily      triamcinolone (KENALOG) 0.1 % ointment       budesonide-formoterol (SYMBICORT) 160-4.5 MCG/ACT AERO INHALE 2 PUFFS INTO THE LUNGS TWICE DAILY 1 Inhaler 11    aspirin 81 MG tablet Take 81 mg by mouth daily      Denosumab (PROLIA SC) Inject into the skin      Leuprolide Acetate (LUPRON IJ) Inject as directed every 3 months      vitamin E 400 UNIT capsule Take 400 Units by mouth daily.  calcium carbonate-vitamin D (CALCIUM 600+D) 600-200 MG-UNIT TABS Take 2 tablets by mouth daily       fish oil-omega-3 fatty acids 1000 MG capsule Take 1 g by mouth daily       omeprazole (PRILOSEC) 10 MG capsule Take 20 mg by mouth 2 times daily        No current facility-administered medications for this visit. Allergies:  Patient has no known allergies. Review of Systems:   · Constitutional: there has been no unanticipated weight loss. There's been no change in energy level, sleep pattern, or activity level. · Eyes: No visual changes or diplopia. No scleral icterus. · ENT: No Headaches, hearing loss or vertigo. No mouth sores or sore throat. · Cardiovascular: Reviewed in HPI  · Respiratory: No cough or wheezing, no sputum production. No hematemesis. · Gastrointestinal: No abdominal pain, appetite loss, blood in stools. No change in bowel or bladder habits. · Genitourinary: No dysuria, trouble voiding, or hematuria.   · Musculoskeletal:  No gait disturbance, weakness or joint complaints. · Integumentary: No rash or pruritis. · Neurological: No headache, diplopia, change in muscle strength, numbness or tingling. No change in gait, balance, coordination, mood, affect, memory, mentation, behavior. · Psychiatric: No anxiety, no depression. · Endocrine: No malaise, fatigue or temperature intolerance. No excessive thirst, fluid intake, or urination. No tremor. · Hematologic/Lymphatic: No abnormal bruising or bleeding, blood clots or swollen lymph nodes. · Allergic/Immunologic: No nasal congestion or hives. Physical Examination:    There were no vitals filed for this visit. General appearance: alert, appears stated age, cooperative and no distress  Head: Normocephalic, without obvious abnormality, atraumatic  Eyes: conjunctivae/corneas clear. PERRL, EOM's intact. Fundi benign  Neck: no adenopathy, no carotid bruit, no JVD, supple, symmetrical, trachea midline and thyroid: not enlarged, symmetric, no tenderness/mass/nodules  Lungs: clear to auscultation bilaterally  Heart: regular rate and rhythm  Abdomen: soft, non-tender. Bowel sounds normal. No masses,  no organomegaly  Extremities: 1+ lower extremity edema    Pulses:   L brachial w R brachial w   L radial 2 R radial 2   L femoral 2 R femoral 2   L popliteal 2 R popliteal 2   L posterior tibial 2 R posterior tibial 2   L dorsalis pedis 2 R dorsalis pedis 2   Doppler Signals:  +    Neurologic: Grossly normal    MEDICAL DECISION MAKING/TESTING  I have reviewed the testing personally and my interpretation is below. Right   The right internal carotid artery appears to have a <50% diameter reducing   stenosis based on velocity criteria. The right vertebral artery demonstrates normal antegrade flow. The right subclavian artery is visualized and demonstrates multiphasic flow. Left   The left internal carotid artery appears to have a <50% diameter reducing   stenosis based on velocity criteria.    The left vertebral artery demonstrates normal antegrade flow. The left subclavian artery is visualized and demonstrates multiphasic flow.             Assessment:     Patient Active Problem List   Diagnosis    Psoriasis    Prostate CA ([address]    GERD (gastroesophageal reflux disease)    ED (erectile dysfunction)    Hypertension    Hyperlipidemia    Occlusion and stenosis of carotid artery    COPD (chronic obstructive pulmonary disease) (HCC)    Emphysema lung (HCC)    Hypoxia    COPD exacerbation (HCC)    Pulmonary nodules    Moderate COPD (chronic obstructive pulmonary disease) (HCC)    Hematuria    Cystitis    Atherosclerosis of both carotid arteries    Abdominal pain    Biliary colic    Abdominal pain, epigastric    Acute cholecystitis    Cholecystitis    Acute hypoxemic respiratory failure (HCC)    Hospital-acquired pneumonia    Failure of outpatient treatment    Chronic obstructive pulmonary disease with acute exacerbation (HCC)    Acute bronchitis    Pneumonia    Community acquired pneumonia of left lower lobe of lung ([facility] [address]       Plan:  1. Carotid atherosclerosis, bilateral  75-year-old male with stable a symptomatically carotid atherosclerosis. No recurrent carotid disease. Continue current medical regimen. Repeat carotid duplex scan and follow-up 1  - VL DUP CAROTID BILATERAL; Future        Thank you for allowing me to participate in the care of this individual.  Please do not hesitate to contact me with any questions. Juan Jose Marcial M.D., FACS.   5/24/2019  10:35 AM

## 2019-07-03 ENCOUNTER — OFFICE VISIT (OUTPATIENT)
Dept: FAMILY MEDICINE CLINIC | Age: 79
End: 2019-07-03
Payer: MEDICARE

## 2019-07-03 VITALS
BODY MASS INDEX: 23.85 KG/M2 | HEIGHT: 70 IN | WEIGHT: 166.6 LBS | SYSTOLIC BLOOD PRESSURE: 138 MMHG | TEMPERATURE: 98.6 F | OXYGEN SATURATION: 98 % | HEART RATE: 93 BPM | DIASTOLIC BLOOD PRESSURE: 70 MMHG

## 2019-07-03 DIAGNOSIS — I10 ESSENTIAL HYPERTENSION: Primary | ICD-10-CM

## 2019-07-03 DIAGNOSIS — J41.0 SIMPLE CHRONIC BRONCHITIS (HCC): ICD-10-CM

## 2019-07-03 DIAGNOSIS — I10 ESSENTIAL HYPERTENSION: ICD-10-CM

## 2019-07-03 DIAGNOSIS — E78.2 MIXED HYPERLIPIDEMIA: ICD-10-CM

## 2019-07-03 LAB
ANION GAP SERPL CALCULATED.3IONS-SCNC: 14 MMOL/L (ref 3–16)
BUN BLDV-MCNC: 25 MG/DL (ref 7–20)
CALCIUM SERPL-MCNC: 8.7 MG/DL (ref 8.3–10.6)
CHLORIDE BLD-SCNC: 101 MMOL/L (ref 99–110)
CO2: 24 MMOL/L (ref 21–32)
CREAT SERPL-MCNC: 1 MG/DL (ref 0.8–1.3)
GFR AFRICAN AMERICAN: >60
GFR NON-AFRICAN AMERICAN: >60
GLUCOSE BLD-MCNC: 136 MG/DL (ref 70–99)
POTASSIUM SERPL-SCNC: 4.4 MMOL/L (ref 3.5–5.1)
SODIUM BLD-SCNC: 139 MMOL/L (ref 136–145)

## 2019-07-03 PROCEDURE — 1123F ACP DISCUSS/DSCN MKR DOCD: CPT | Performed by: FAMILY MEDICINE

## 2019-07-03 PROCEDURE — G8926 SPIRO NO PERF OR DOC: HCPCS | Performed by: FAMILY MEDICINE

## 2019-07-03 PROCEDURE — G8420 CALC BMI NORM PARAMETERS: HCPCS | Performed by: FAMILY MEDICINE

## 2019-07-03 PROCEDURE — 3023F SPIROM DOC REV: CPT | Performed by: FAMILY MEDICINE

## 2019-07-03 PROCEDURE — 99213 OFFICE O/P EST LOW 20 MIN: CPT | Performed by: FAMILY MEDICINE

## 2019-07-03 PROCEDURE — 1036F TOBACCO NON-USER: CPT | Performed by: FAMILY MEDICINE

## 2019-07-03 PROCEDURE — G8427 DOCREV CUR MEDS BY ELIG CLIN: HCPCS | Performed by: FAMILY MEDICINE

## 2019-07-03 PROCEDURE — 4040F PNEUMOC VAC/ADMIN/RCVD: CPT | Performed by: FAMILY MEDICINE

## 2019-07-03 PROCEDURE — G8598 ASA/ANTIPLAT THER USED: HCPCS | Performed by: FAMILY MEDICINE

## 2019-07-03 RX ORDER — METHYLPREDNISOLONE 4 MG/1
4 TABLET ORAL SEE ADMIN INSTRUCTIONS
COMMUNITY
End: 2019-09-30 | Stop reason: ALTCHOICE

## 2019-07-03 NOTE — PROGRESS NOTES
Cole Dickinson MD   aspirin 81 MG tablet Take 81 mg by mouth daily Yes Historical Provider, MD   Denosumab (PROLIA SC) Inject into the skin Yes Historical Provider, MD   Leuprolide Acetate (LUPRON IJ) Inject as directed every 3 months Yes Historical Provider, MD   vitamin E 400 UNIT capsule Take 400 Units by mouth daily. Yes Historical Provider, MD   calcium carbonate-vitamin D (CALCIUM 600+D) 600-200 MG-UNIT TABS Take 2 tablets by mouth daily  Yes Historical Provider, MD   fish oil-omega-3 fatty acids 1000 MG capsule Take 1 g by mouth daily  Yes Historical Provider, MD   omeprazole (PRILOSEC) 10 MG capsule Take 20 mg by mouth 2 times daily  Yes Historical Provider, MD   albuterol sulfate HFA (PROVENTIL HFA) 108 (90 Base) MCG/ACT inhaler Inhale 2 puffs into the lungs every 4 hours as needed for Wheezing or Shortness of Breath With spacer (and mask if indicated). Thanks. Vania Guo MD       OBJECTIVE:  /70   Pulse 93   Temp 98.6 °F (37 °C) (Tympanic)   Ht 5' 10\" (1.778 m)   Wt 166 lb 9.6 oz (75.6 kg)   SpO2 98%   BMI 23.90 kg/m²   GEN:  in NAD  NECK:  Supple without adenopathy. No bruit  CV:  Regular rate and rhythm, S1 and S2 normal, no murmurs, clicks  PULM:  Chest is clear, no wheezing ,  symmetric air entry throughout both lung fields. EXT: No rash to 1+ bilateral pitting ankle and pedal edema  NEURO: Alert and oriented ×3    ASSESSMENT/PLAN:    Xin Yoder is a [de-identified] y.o. male. HPI:      Wt Readings from Last 3 Encounters:   07/03/19 166 lb 9.6 oz (75.6 kg)   05/24/19 164 lb (74.4 kg)   01/28/19 160 lb 15 oz (73 kg)     Meds, vitamins and allergies reviewed with Patient    ROS:  Gen:  fever  HEENT:  cold symptoms, sore throat. CV:  Denies chest pain or palpitations. Pulm:  Denies shortness of breath, cough. Abd:  Denies abdominal pain, nausea and vomiting. Skin: no rash    No Known Allergies    Prior to Visit Medications    Medication Sig Taking?  Authorizing Provider OBJECTIVE:  /70   Pulse 93   Temp 98.6 °F (37 °C) (Tympanic)   Ht 5' 10\" (1.778 m)   Wt 166 lb 9.6 oz (75.6 kg)   SpO2 98%   BMI 23.90 kg/m²   GEN:  in NAD  HEENT:  NCAT, TMs:normal and throat: clear  NECK:  Supple without adenopathy. CV:  Regular rate and rhythm, S1 and S2 normal, no murmurs, clicks  PULM:  Chest is clear, no wheezing ,  symmetric air entry throughout both lung fields. ABD: Soft, NT  EXT: No rash or edema  NEURO: Alert and oriented ×3    ASSESSMENT/PLAN:  1. Essential hypertension  Able continue current medication  - Basic Metabolic Panel; Future    2. Simple chronic bronchitis (HCC)  Stable inhalers are effective    3.  Mixed hyperlipidemia-currently not treated with medication  Consider adding a statin at next visit  Lab Results   Component Value Date    CHOL 227 (H) 11/09/2018    CHOL 255 (H) 12/08/2017    CHOL 241 (H) 10/14/2016    CHOL 149 10/14/2016     Lab Results   Component Value Date    TRIG 68 11/09/2018    TRIG 80 12/08/2017    TRIG 56 10/14/2016     Lab Results   Component Value Date    HDL 73 (H) 11/09/2018    HDL 76 (H) 12/08/2017    HDL 92 10/14/2016     Lab Results   Component Value Date    LDLCALC 140 (H) 11/09/2018    LDLCALC 163 (H) 12/08/2017    LDLCALC 138 (H) 10/14/2016     Lab Results   Component Value Date    LABVLDL 14 11/09/2018    LABVLDL 16 12/08/2017    LABVLDL 15 01/05/2011     Lab Results   Component Value Date    CHOLHDLRATIO 2.6 10/14/2016    CHOLHDLRATIO 3.3 06/03/2015    CHOLHDLRATIO 2.9 06/06/2014       #4 prostate cancer currently on Lupron and Prolia and calcium    5 peripheral edema-no evidence for congestive heart failure  Continue Lasix throughout the summer months  Elevate feet  Low-salt diet  We will check BMP today

## 2019-07-17 ENCOUNTER — TELEPHONE (OUTPATIENT)
Dept: PULMONOLOGY | Age: 79
End: 2019-07-17

## 2019-08-27 RX ORDER — AMLODIPINE BESYLATE 10 MG/1
TABLET ORAL
Qty: 90 TABLET | Refills: 3 | Status: SHIPPED | OUTPATIENT
Start: 2019-08-27 | End: 2020-09-02

## 2019-09-30 ENCOUNTER — OFFICE VISIT (OUTPATIENT)
Dept: PULMONOLOGY | Age: 79
End: 2019-09-30
Payer: MEDICARE

## 2019-09-30 VITALS
SYSTOLIC BLOOD PRESSURE: 141 MMHG | WEIGHT: 166 LBS | BODY MASS INDEX: 23.82 KG/M2 | DIASTOLIC BLOOD PRESSURE: 83 MMHG | OXYGEN SATURATION: 97 % | HEART RATE: 111 BPM

## 2019-09-30 DIAGNOSIS — K21.9 GASTROESOPHAGEAL REFLUX DISEASE WITHOUT ESOPHAGITIS: ICD-10-CM

## 2019-09-30 DIAGNOSIS — J44.9 MODERATE COPD (CHRONIC OBSTRUCTIVE PULMONARY DISEASE) (HCC): Primary | ICD-10-CM

## 2019-09-30 DIAGNOSIS — J43.2 CENTRILOBULAR EMPHYSEMA (HCC): ICD-10-CM

## 2019-09-30 PROBLEM — J96.01 ACUTE HYPOXEMIC RESPIRATORY FAILURE (HCC): Status: RESOLVED | Noted: 2018-02-15 | Resolved: 2019-09-30

## 2019-09-30 PROBLEM — J18.9 COMMUNITY ACQUIRED PNEUMONIA OF LEFT LOWER LOBE OF LUNG: Status: RESOLVED | Noted: 2018-12-21 | Resolved: 2019-09-30

## 2019-09-30 PROBLEM — J20.9 ACUTE BRONCHITIS: Status: RESOLVED | Noted: 2018-12-09 | Resolved: 2019-09-30

## 2019-09-30 PROBLEM — J44.1 CHRONIC OBSTRUCTIVE PULMONARY DISEASE WITH ACUTE EXACERBATION (HCC): Status: RESOLVED | Noted: 2018-12-09 | Resolved: 2019-09-30

## 2019-09-30 PROCEDURE — G8420 CALC BMI NORM PARAMETERS: HCPCS | Performed by: INTERNAL MEDICINE

## 2019-09-30 PROCEDURE — G8427 DOCREV CUR MEDS BY ELIG CLIN: HCPCS | Performed by: INTERNAL MEDICINE

## 2019-09-30 PROCEDURE — 99213 OFFICE O/P EST LOW 20 MIN: CPT | Performed by: INTERNAL MEDICINE

## 2019-09-30 PROCEDURE — 1036F TOBACCO NON-USER: CPT | Performed by: INTERNAL MEDICINE

## 2019-09-30 PROCEDURE — 3023F SPIROM DOC REV: CPT | Performed by: INTERNAL MEDICINE

## 2019-09-30 PROCEDURE — G8598 ASA/ANTIPLAT THER USED: HCPCS | Performed by: INTERNAL MEDICINE

## 2019-09-30 PROCEDURE — G8926 SPIRO NO PERF OR DOC: HCPCS | Performed by: INTERNAL MEDICINE

## 2019-09-30 PROCEDURE — 4040F PNEUMOC VAC/ADMIN/RCVD: CPT | Performed by: INTERNAL MEDICINE

## 2019-09-30 PROCEDURE — 1123F ACP DISCUSS/DSCN MKR DOCD: CPT | Performed by: INTERNAL MEDICINE

## 2019-09-30 RX ORDER — ALBUTEROL SULFATE 90 UG/1
2 AEROSOL, METERED RESPIRATORY (INHALATION) EVERY 6 HOURS PRN
COMMUNITY
End: 2021-05-27 | Stop reason: SDUPTHER

## 2019-10-10 ENCOUNTER — TELEPHONE (OUTPATIENT)
Dept: FAMILY MEDICINE CLINIC | Age: 79
End: 2019-10-10

## 2019-10-18 ENCOUNTER — NURSE ONLY (OUTPATIENT)
Dept: PULMONOLOGY | Age: 79
End: 2019-10-18
Payer: MEDICARE

## 2019-10-18 DIAGNOSIS — Z23 NEED FOR INFLUENZA VACCINATION: Primary | ICD-10-CM

## 2019-10-18 PROCEDURE — G0008 ADMIN INFLUENZA VIRUS VAC: HCPCS | Performed by: INTERNAL MEDICINE

## 2019-10-18 PROCEDURE — 90653 IIV ADJUVANT VACCINE IM: CPT | Performed by: INTERNAL MEDICINE

## 2019-11-06 ENCOUNTER — OFFICE VISIT (OUTPATIENT)
Dept: FAMILY MEDICINE CLINIC | Age: 79
End: 2019-11-06
Payer: MEDICARE

## 2019-11-06 VITALS
BODY MASS INDEX: 24.74 KG/M2 | TEMPERATURE: 98.3 F | RESPIRATION RATE: 14 BRPM | DIASTOLIC BLOOD PRESSURE: 60 MMHG | WEIGHT: 172.4 LBS | OXYGEN SATURATION: 98 % | HEART RATE: 105 BPM | SYSTOLIC BLOOD PRESSURE: 112 MMHG

## 2019-11-06 DIAGNOSIS — J44.9 MODERATE COPD (CHRONIC OBSTRUCTIVE PULMONARY DISEASE) (HCC): ICD-10-CM

## 2019-11-06 DIAGNOSIS — R05.9 COUGH: ICD-10-CM

## 2019-11-06 DIAGNOSIS — J00 NASOPHARYNGITIS: Primary | ICD-10-CM

## 2019-11-06 PROCEDURE — G8482 FLU IMMUNIZE ORDER/ADMIN: HCPCS | Performed by: NURSE PRACTITIONER

## 2019-11-06 PROCEDURE — G8926 SPIRO NO PERF OR DOC: HCPCS | Performed by: NURSE PRACTITIONER

## 2019-11-06 PROCEDURE — 1036F TOBACCO NON-USER: CPT | Performed by: NURSE PRACTITIONER

## 2019-11-06 PROCEDURE — 4040F PNEUMOC VAC/ADMIN/RCVD: CPT | Performed by: NURSE PRACTITIONER

## 2019-11-06 PROCEDURE — G8420 CALC BMI NORM PARAMETERS: HCPCS | Performed by: NURSE PRACTITIONER

## 2019-11-06 PROCEDURE — 99213 OFFICE O/P EST LOW 20 MIN: CPT | Performed by: NURSE PRACTITIONER

## 2019-11-06 PROCEDURE — G8598 ASA/ANTIPLAT THER USED: HCPCS | Performed by: NURSE PRACTITIONER

## 2019-11-06 PROCEDURE — 1123F ACP DISCUSS/DSCN MKR DOCD: CPT | Performed by: NURSE PRACTITIONER

## 2019-11-06 PROCEDURE — G8427 DOCREV CUR MEDS BY ELIG CLIN: HCPCS | Performed by: NURSE PRACTITIONER

## 2019-11-06 PROCEDURE — 3023F SPIROM DOC REV: CPT | Performed by: NURSE PRACTITIONER

## 2019-11-06 RX ORDER — DOXYCYCLINE HYCLATE 100 MG
100 TABLET ORAL 2 TIMES DAILY
Qty: 20 TABLET | Refills: 0 | Status: SHIPPED | OUTPATIENT
Start: 2019-11-06 | End: 2019-11-16

## 2019-11-06 ASSESSMENT — ENCOUNTER SYMPTOMS
COUGH: 1
RHINORRHEA: 1
CHEST TIGHTNESS: 0
WHEEZING: 0
SORE THROAT: 1
SHORTNESS OF BREATH: 0
SINUS PRESSURE: 0

## 2020-01-10 ENCOUNTER — TELEPHONE (OUTPATIENT)
Dept: ADMINISTRATIVE | Age: 80
End: 2020-01-10

## 2020-01-11 ENCOUNTER — OFFICE VISIT (OUTPATIENT)
Dept: FAMILY MEDICINE CLINIC | Age: 80
End: 2020-01-11
Payer: MEDICARE

## 2020-01-11 VITALS
WEIGHT: 165.4 LBS | HEART RATE: 86 BPM | BODY MASS INDEX: 23.73 KG/M2 | SYSTOLIC BLOOD PRESSURE: 114 MMHG | OXYGEN SATURATION: 94 % | DIASTOLIC BLOOD PRESSURE: 62 MMHG

## 2020-01-11 PROCEDURE — G8482 FLU IMMUNIZE ORDER/ADMIN: HCPCS | Performed by: FAMILY MEDICINE

## 2020-01-11 PROCEDURE — 1123F ACP DISCUSS/DSCN MKR DOCD: CPT | Performed by: FAMILY MEDICINE

## 2020-01-11 PROCEDURE — 1036F TOBACCO NON-USER: CPT | Performed by: FAMILY MEDICINE

## 2020-01-11 PROCEDURE — 4040F PNEUMOC VAC/ADMIN/RCVD: CPT | Performed by: FAMILY MEDICINE

## 2020-01-11 PROCEDURE — G8420 CALC BMI NORM PARAMETERS: HCPCS | Performed by: FAMILY MEDICINE

## 2020-01-11 PROCEDURE — 3023F SPIROM DOC REV: CPT | Performed by: FAMILY MEDICINE

## 2020-01-11 PROCEDURE — 99213 OFFICE O/P EST LOW 20 MIN: CPT | Performed by: FAMILY MEDICINE

## 2020-01-11 PROCEDURE — G8427 DOCREV CUR MEDS BY ELIG CLIN: HCPCS | Performed by: FAMILY MEDICINE

## 2020-01-11 PROCEDURE — G8926 SPIRO NO PERF OR DOC: HCPCS | Performed by: FAMILY MEDICINE

## 2020-01-11 RX ORDER — PREDNISONE 20 MG/1
40 TABLET ORAL DAILY
Qty: 10 TABLET | Refills: 0 | Status: SHIPPED | OUTPATIENT
Start: 2020-01-11 | End: 2020-01-16

## 2020-01-11 RX ORDER — DOXYCYCLINE HYCLATE 100 MG/1
100 CAPSULE ORAL 2 TIMES DAILY
Qty: 20 CAPSULE | Refills: 0 | Status: SHIPPED | OUTPATIENT
Start: 2020-01-11 | End: 2020-01-21

## 2020-01-11 ASSESSMENT — PATIENT HEALTH QUESTIONNAIRE - PHQ9
2. FEELING DOWN, DEPRESSED OR HOPELESS: 0
1. LITTLE INTEREST OR PLEASURE IN DOING THINGS: 0
SUM OF ALL RESPONSES TO PHQ QUESTIONS 1-9: 0
SUM OF ALL RESPONSES TO PHQ QUESTIONS 1-9: 0
SUM OF ALL RESPONSES TO PHQ9 QUESTIONS 1 & 2: 0

## 2020-01-11 NOTE — PROGRESS NOTES
SUBJECTIVE:   Rob Farrell is a 78 y.o. male who complains of congestion, productive cough, headache and low grade fevers for 2 weeks. He denies a history of nausea and vomiting. He has been using nebulizer at home for the symptoms with and without success. Patient does not smoke cigarettes. Patient has not been exposed to someone with similar symptoms. Patient's medications, allergies, past medical, surgical, social and family histories were reviewed and updated as appropriate. Review of Systems  Constitutional: negative for fevers and weight loss  Eyes: negative for irritation and redness  Ears, nose, mouth, throat, and face: negative except for as noted above  Respiratory: negative for hemoptysis and shortness of breath  Gastrointestinal: negative for diarrhea and vomiting     OBJECTIVE:  /62   Pulse 86   Wt 165 lb 6.4 oz (75 kg)   SpO2 94%   BMI 23.73 kg/m²   He appears well, NARD. TMs normal.  Throat and pharynx normal, without postnasal drip. Rhinorrhea noted. Turbinates are pink and inflamed. Sinuses are not tender. Neck supple, with adenopathy in the neck. CV: RRR, S1/S2, without m/r/g. Lungs:  Mild diffuse wheezes    ASSESSMENT:   1. COPD exacerbation ([address]        PLAN:  1. Supportive/symptomatic care reviewed with the patient. 2. Medications advised: Symtomatic treatment for the URI symptoms: Tylenol / Advil, salt water gargles, increase fluids. May also use: nasal steroid spray, mucinex  Doxy/prednsione as prescribed  3. Call or return to clinic 3-5 days if these symptoms worsen or fail to improve as anticipated.

## 2020-01-14 RX ORDER — METOPROLOL SUCCINATE 25 MG/1
25 TABLET, EXTENDED RELEASE ORAL DAILY
Qty: 90 TABLET | Refills: 1 | Status: SHIPPED | OUTPATIENT
Start: 2020-01-14 | End: 2020-07-20

## 2020-01-14 NOTE — TELEPHONE ENCOUNTER
Medication:   Requested Prescriptions     Pending Prescriptions Disp Refills    metoprolol succinate (TOPROL XL) 25 MG extended release tablet [Pharmacy Med Name: METOPROLOL ER SUCCINATE 25MG TABS] 90 tablet 3     Sig: TAKE 1 TABLET BY MOUTH DAILY       Last Filled:  11/27/18 #90, 3 RF     Patient Phone Number: [phone] (home)     Last appt: 1/11/2020 COPD exacerbation  Next appt: Visit date not found    Lab Results   Component Value Date     07/03/2019    K 4.4 07/03/2019     07/03/2019    CO2 24 07/03/2019    BUN 25 (H) 07/03/2019    CREATININE 1.0 07/03/2019    GLUCOSE 136 (H) 07/03/2019    CALCIUM 8.7 07/03/2019    PROT 6.3 (L) 12/18/2018    LABALBU 4.0 12/18/2018    BILITOT 0.9 12/18/2018    ALKPHOS 51 12/18/2018    AST 15 12/18/2018    ALT 26 12/18/2018    LABGLOM >60 07/03/2019    GFRAA >60 07/03/2019    AGRATIO 1.7 12/18/2018    GLOB 2.3 12/18/2018

## 2020-01-20 ENCOUNTER — HOSPITAL ENCOUNTER (OUTPATIENT)
Age: 80
Discharge: HOME | End: 2020-01-20
Payer: MEDICARE

## 2020-01-20 ENCOUNTER — HOSPITAL ENCOUNTER (OUTPATIENT)
Dept: GENERAL RADIOLOGY | Age: 80
Discharge: HOME | End: 2020-01-20
Payer: MEDICARE

## 2020-01-20 ENCOUNTER — TELEPHONE (OUTPATIENT)
Dept: FAMILY MEDICINE CLINIC | Age: 80
End: 2020-01-20

## 2020-01-20 PROCEDURE — 71046 X-RAY EXAM CHEST 2 VIEWS: CPT

## 2020-01-21 ENCOUNTER — OFFICE VISIT (OUTPATIENT)
Dept: FAMILY MEDICINE CLINIC | Age: 80
End: 2020-01-21
Payer: MEDICARE

## 2020-01-21 VITALS
DIASTOLIC BLOOD PRESSURE: 60 MMHG | BODY MASS INDEX: 23.62 KG/M2 | OXYGEN SATURATION: 96 % | SYSTOLIC BLOOD PRESSURE: 110 MMHG | HEART RATE: 102 BPM | WEIGHT: 165 LBS | HEIGHT: 70 IN

## 2020-01-21 LAB
INFLUENZA A ANTIBODY: NORMAL
INFLUENZA B ANTIBODY: NORMAL

## 2020-01-21 PROCEDURE — G8427 DOCREV CUR MEDS BY ELIG CLIN: HCPCS | Performed by: FAMILY MEDICINE

## 2020-01-21 PROCEDURE — 1123F ACP DISCUSS/DSCN MKR DOCD: CPT | Performed by: FAMILY MEDICINE

## 2020-01-21 PROCEDURE — 99213 OFFICE O/P EST LOW 20 MIN: CPT | Performed by: FAMILY MEDICINE

## 2020-01-21 PROCEDURE — G8482 FLU IMMUNIZE ORDER/ADMIN: HCPCS | Performed by: FAMILY MEDICINE

## 2020-01-21 PROCEDURE — G8420 CALC BMI NORM PARAMETERS: HCPCS | Performed by: FAMILY MEDICINE

## 2020-01-21 PROCEDURE — 4040F PNEUMOC VAC/ADMIN/RCVD: CPT | Performed by: FAMILY MEDICINE

## 2020-01-21 PROCEDURE — 1036F TOBACCO NON-USER: CPT | Performed by: FAMILY MEDICINE

## 2020-01-21 PROCEDURE — 87804 INFLUENZA ASSAY W/OPTIC: CPT | Performed by: FAMILY MEDICINE

## 2020-01-21 RX ORDER — PREDNISONE 10 MG/1
TABLET ORAL
Qty: 30 TABLET | Refills: 0 | Status: SHIPPED | OUTPATIENT
Start: 2020-01-21 | End: 2020-07-22 | Stop reason: CLARIF

## 2020-02-17 ENCOUNTER — OFFICE VISIT (OUTPATIENT)
Dept: FAMILY MEDICINE CLINIC | Age: 80
End: 2020-02-17
Payer: MEDICARE

## 2020-02-17 VITALS
HEIGHT: 70 IN | SYSTOLIC BLOOD PRESSURE: 138 MMHG | WEIGHT: 170 LBS | HEART RATE: 100 BPM | BODY MASS INDEX: 24.34 KG/M2 | OXYGEN SATURATION: 96 % | DIASTOLIC BLOOD PRESSURE: 64 MMHG | TEMPERATURE: 98.1 F

## 2020-02-17 PROCEDURE — G8482 FLU IMMUNIZE ORDER/ADMIN: HCPCS | Performed by: FAMILY MEDICINE

## 2020-02-17 PROCEDURE — G8420 CALC BMI NORM PARAMETERS: HCPCS | Performed by: FAMILY MEDICINE

## 2020-02-17 PROCEDURE — 1036F TOBACCO NON-USER: CPT | Performed by: FAMILY MEDICINE

## 2020-02-17 PROCEDURE — G8926 SPIRO NO PERF OR DOC: HCPCS | Performed by: FAMILY MEDICINE

## 2020-02-17 PROCEDURE — 4040F PNEUMOC VAC/ADMIN/RCVD: CPT | Performed by: FAMILY MEDICINE

## 2020-02-17 PROCEDURE — 99213 OFFICE O/P EST LOW 20 MIN: CPT | Performed by: FAMILY MEDICINE

## 2020-02-17 PROCEDURE — 3023F SPIROM DOC REV: CPT | Performed by: FAMILY MEDICINE

## 2020-02-17 PROCEDURE — G8427 DOCREV CUR MEDS BY ELIG CLIN: HCPCS | Performed by: FAMILY MEDICINE

## 2020-02-17 PROCEDURE — 1123F ACP DISCUSS/DSCN MKR DOCD: CPT | Performed by: FAMILY MEDICINE

## 2020-02-17 RX ORDER — FUROSEMIDE 20 MG/1
20 TABLET ORAL DAILY
Qty: 30 TABLET | Refills: 3 | Status: SHIPPED | OUTPATIENT
Start: 2020-02-17 | End: 2020-09-30 | Stop reason: ALTCHOICE

## 2020-02-17 NOTE — PROGRESS NOTES
Marline Lancaster is a 78 y.o. male. HPI:f/u from sick visit, prolonged uri  Better after prednisone taper nad cough gone after stopping lisiinopril  bp reading 140/70  Not on home 02  Meds, vitamins and allergies reviewed with pt    ROS: No TIA's or unusual headaches, no dysphagia. No prolonged cough. No dyspnea or chest pain on exertion. No abdominal pain, change in bowel habits, black or bloody stools. No urinary tract symptoms. No new or unusual musculoskeletal symptoms. Prior to Visit Medications    Medication Sig Taking?  Authorizing Provider   predniSONE (DELTASONE) 10 MG tablet 4,4,4,3,3,3,2,2,2,1,1,1  Po qd as dir Yes Perry Waggoner MD   metoprolol succinate (TOPROL XL) 25 MG extended release tablet TAKE 1 TABLET BY MOUTH DAILY Yes Perry Waggoner MD   albuterol sulfate  (90 Base) MCG/ACT inhaler Inhale 2 puffs into the lungs every 6 hours as needed for Wheezing Yes Historical Provider, MD   amLODIPine (NORVASC) 10 MG tablet GIVE \"KIARA\" 1 TABLET BY MOUTH DAILY Yes Perry Waggoner MD   furosemide (LASIX) 20 MG tablet Take 1 tablet by mouth daily Yes Perry Waggoner MD   lisinopril (PRINIVIL;ZESTRIL) 40 MG tablet Take 1 tablet by mouth daily  Patient taking differently: Take 20 mg by mouth daily  Yes Perry Waggoner MD   ipratropium-albuterol (DUONEB) 0.5-2.5 (3) MG/3ML SOLN nebulizer solution Inhale 3 mLs into the lungs every 6 hours as needed for Other (wheezing) Dx: COPD   ICD-10: J44.9 Yes Dieter Mchugh MD   Ferrous Sulfate (IRON SLOW RELEASE PO) Take by mouth daily Yes Historical Provider, MD   triamcinolone (KENALOG) 0.1 % ointment  Yes Historical Provider, MD   budesonide-formoterol (SYMBICORT) 160-4.5 MCG/ACT AERO INHALE 2 PUFFS INTO THE LUNGS TWICE DAILY Yes Dieter Mchugh MD   aspirin 81 MG tablet Take 81 mg by mouth daily Yes Historical Provider, MD   Denosumab (PROLIA SC) Inject into the skin Yes Historical Provider, MD   vitamin E 400 UNIT capsule Take 400 Units by

## 2020-06-11 ENCOUNTER — HOSPITAL ENCOUNTER (OUTPATIENT)
Dept: VASCULAR LAB | Age: 80
Discharge: HOME | End: 2020-06-11
Payer: MEDICARE

## 2020-06-11 PROCEDURE — 93880 EXTRACRANIAL BILAT STUDY: CPT

## 2020-06-25 ENCOUNTER — HOSPITAL ENCOUNTER (OUTPATIENT)
Dept: CT IMAGING | Age: 80
Discharge: HOME | End: 2020-06-25
Payer: MEDICARE

## 2020-06-25 ENCOUNTER — HOSPITAL ENCOUNTER (OUTPATIENT)
Dept: NUCLEAR MEDICINE | Age: 80
Discharge: HOME | End: 2020-06-25
Payer: MEDICARE

## 2020-06-25 PROCEDURE — 78306 BONE IMAGING WHOLE BODY: CPT

## 2020-06-25 PROCEDURE — 6360000004 HC RX CONTRAST MEDICATION: Performed by: UROLOGY

## 2020-06-25 PROCEDURE — A9503 TC99M MEDRONATE: HCPCS | Performed by: UROLOGY

## 2020-06-25 PROCEDURE — 3430000000 HC RX DIAGNOSTIC RADIOPHARMACEUTICAL: Performed by: UROLOGY

## 2020-06-25 PROCEDURE — 74177 CT ABD & PELVIS W/CONTRAST: CPT

## 2020-06-25 PROCEDURE — 2580000003 HC RX 258: Performed by: UROLOGY

## 2020-06-25 RX ORDER — SODIUM CHLORIDE 0.9 % (FLUSH) 0.9 %
10 SYRINGE (ML) INJECTION PRN
Status: DISCONTINUED | OUTPATIENT
Start: 2020-06-25 | End: 2020-06-26 | Stop reason: HOSPADM

## 2020-06-25 RX ORDER — TC 99M MEDRONATE 20 MG/10ML
26.4 INJECTION, POWDER, LYOPHILIZED, FOR SOLUTION INTRAVENOUS
Status: COMPLETED | OUTPATIENT
Start: 2020-06-25 | End: 2020-06-25

## 2020-06-25 RX ADMIN — Medication 10 ML: at 08:17

## 2020-06-25 RX ADMIN — TC 99M MEDRONATE 26.4 MILLICURIE: 20 INJECTION, POWDER, LYOPHILIZED, FOR SOLUTION INTRAVENOUS at 08:17

## 2020-06-25 RX ADMIN — IOHEXOL 50 ML: 240 INJECTION, SOLUTION INTRATHECAL; INTRAVASCULAR; INTRAVENOUS; ORAL at 09:15

## 2020-06-25 RX ADMIN — IOPAMIDOL 75 ML: 755 INJECTION, SOLUTION INTRAVENOUS at 09:15

## 2020-06-29 ENCOUNTER — HOSPITAL ENCOUNTER (OUTPATIENT)
Dept: GENERAL RADIOLOGY | Age: 80
Discharge: HOME | End: 2020-06-29
Payer: MEDICARE

## 2020-06-29 PROCEDURE — 77080 DXA BONE DENSITY AXIAL: CPT

## 2020-06-30 ENCOUNTER — TELEPHONE (OUTPATIENT)
Dept: VASCULAR SURGERY | Age: 80
End: 2020-06-30

## 2020-07-06 ENCOUNTER — TELEPHONE (OUTPATIENT)
Dept: FAMILY MEDICINE CLINIC | Age: 80
End: 2020-07-06

## 2020-07-06 NOTE — TELEPHONE ENCOUNTER
Patient called in requesting lab orders.     What are the labs for: annual physical    Does the patient need orders faxed no    Does patient need a follow up phone call?  yes  Verified Phone Number: [phone]

## 2020-07-17 ENCOUNTER — HOSPITAL ENCOUNTER (OUTPATIENT)
Age: 80
Discharge: HOME | End: 2020-07-17
Payer: MEDICARE

## 2020-07-17 LAB
A/G RATIO: 1.9 (ref 1.1–2.2)
ALBUMIN SERPL-MCNC: 4.5 G/DL (ref 3.4–5)
ALP BLD-CCNC: 82 U/L (ref 40–129)
ALT SERPL-CCNC: 23 U/L (ref 10–40)
ANION GAP SERPL CALCULATED.3IONS-SCNC: 11 MMOL/L (ref 3–16)
AST SERPL-CCNC: 20 U/L (ref 15–37)
BASOPHILS ABSOLUTE: 0.1 K/UL (ref 0–0.2)
BASOPHILS RELATIVE PERCENT: 0.9 %
BILIRUB SERPL-MCNC: 0.9 MG/DL (ref 0–1)
BUN BLDV-MCNC: 16 MG/DL (ref 7–20)
CALCIUM SERPL-MCNC: 9.5 MG/DL (ref 8.3–10.6)
CHLORIDE BLD-SCNC: 101 MMOL/L (ref 99–110)
CHOLESTEROL, TOTAL: 236 MG/DL (ref 0–199)
CO2: 27 MMOL/L (ref 21–32)
CREAT SERPL-MCNC: 0.8 MG/DL (ref 0.8–1.3)
EOSINOPHILS ABSOLUTE: 0.2 K/UL (ref 0–0.6)
EOSINOPHILS RELATIVE PERCENT: 2.9 %
GFR AFRICAN AMERICAN: >60
GFR NON-AFRICAN AMERICAN: >60
GLOBULIN: 2.4 G/DL
GLUCOSE BLD-MCNC: 104 MG/DL (ref 70–99)
HCT VFR BLD CALC: 41.3 % (ref 40.5–52.5)
HDLC SERPL-MCNC: 73 MG/DL (ref 40–60)
HEMOGLOBIN: 13.6 G/DL (ref 13.5–17.5)
LDL CHOLESTEROL CALCULATED: 141 MG/DL
LYMPHOCYTES ABSOLUTE: 1.4 K/UL (ref 1–5.1)
LYMPHOCYTES RELATIVE PERCENT: 22.3 %
MCH RBC QN AUTO: 31.3 PG (ref 26–34)
MCHC RBC AUTO-ENTMCNC: 32.9 G/DL (ref 31–36)
MCV RBC AUTO: 95.1 FL (ref 80–100)
MONOCYTES ABSOLUTE: 0.7 K/UL (ref 0–1.3)
MONOCYTES RELATIVE PERCENT: 11.8 %
NEUTROPHILS ABSOLUTE: 3.8 K/UL (ref 1.7–7.7)
NEUTROPHILS RELATIVE PERCENT: 62.1 %
PDW BLD-RTO: 13.7 % (ref 12.4–15.4)
PLATELET # BLD: 251 K/UL (ref 135–450)
PMV BLD AUTO: 7.9 FL (ref 5–10.5)
POTASSIUM SERPL-SCNC: 4.4 MMOL/L (ref 3.5–5.1)
RBC # BLD: 4.35 M/UL (ref 4.2–5.9)
SODIUM BLD-SCNC: 139 MMOL/L (ref 136–145)
TOTAL PROTEIN: 6.9 G/DL (ref 6.4–8.2)
TRIGL SERPL-MCNC: 110 MG/DL (ref 0–150)
VLDLC SERPL CALC-MCNC: 22 MG/DL
WBC # BLD: 6.1 K/UL (ref 4–11)

## 2020-07-17 PROCEDURE — 80061 LIPID PANEL: CPT

## 2020-07-17 PROCEDURE — 85025 COMPLETE CBC W/AUTO DIFF WBC: CPT

## 2020-07-17 PROCEDURE — 36415 COLL VENOUS BLD VENIPUNCTURE: CPT

## 2020-07-17 PROCEDURE — 80053 COMPREHEN METABOLIC PANEL: CPT

## 2020-07-20 RX ORDER — METOPROLOL SUCCINATE 25 MG/1
25 TABLET, EXTENDED RELEASE ORAL DAILY
Qty: 90 TABLET | Refills: 1 | Status: SHIPPED | OUTPATIENT
Start: 2020-07-20 | End: 2020-12-30

## 2020-07-20 NOTE — TELEPHONE ENCOUNTER
Medication:   Requested Prescriptions     Pending Prescriptions Disp Refills    metoprolol succinate (TOPROL XL) 25 MG extended release tablet [Pharmacy Med Name: METOPROLOL ER SUCCINATE 25MG TABS] 90 tablet 1     Sig: TAKE 1 TABLET BY MOUTH DAILY       Last Filled:   1/14/20 #90, 1 RF     Patient Phone Number: [phone] (home)     Last appt: 2/17/2020 prostate follow up   Next appt: 7/22/2020    Lab Results   Component Value Date     07/17/2020    K 4.4 07/17/2020     07/17/2020    CO2 27 07/17/2020    BUN 16 07/17/2020    CREATININE 0.8 07/17/2020    GLUCOSE 104 (H) 07/17/2020    CALCIUM 9.5 07/17/2020    PROT 6.9 07/17/2020    LABALBU 4.5 07/17/2020    BILITOT 0.9 07/17/2020    ALKPHOS 82 07/17/2020    AST 20 07/17/2020    ALT 23 07/17/2020    LABGLOM >60 07/17/2020    GFRAA >60 07/17/2020    AGRATIO 1.9 07/17/2020    GLOB 2.4 07/17/2020

## 2020-07-22 ENCOUNTER — OFFICE VISIT (OUTPATIENT)
Dept: FAMILY MEDICINE CLINIC | Age: 80
End: 2020-07-22
Payer: MEDICARE

## 2020-07-22 VITALS
TEMPERATURE: 97 F | WEIGHT: 177 LBS | DIASTOLIC BLOOD PRESSURE: 60 MMHG | HEIGHT: 70 IN | BODY MASS INDEX: 25.34 KG/M2 | SYSTOLIC BLOOD PRESSURE: 138 MMHG | OXYGEN SATURATION: 96 % | HEART RATE: 96 BPM

## 2020-07-22 PROCEDURE — G0439 PPPS, SUBSEQ VISIT: HCPCS | Performed by: FAMILY MEDICINE

## 2020-07-22 PROCEDURE — 4040F PNEUMOC VAC/ADMIN/RCVD: CPT | Performed by: FAMILY MEDICINE

## 2020-07-22 PROCEDURE — 1123F ACP DISCUSS/DSCN MKR DOCD: CPT | Performed by: FAMILY MEDICINE

## 2020-07-22 RX ORDER — PANTOPRAZOLE SODIUM 40 MG/1
40 TABLET, DELAYED RELEASE ORAL
Qty: 90 TABLET | Refills: 3 | Status: SHIPPED | OUTPATIENT
Start: 2020-07-22 | End: 2021-07-26

## 2020-07-22 RX ORDER — TORSEMIDE 20 MG/1
20 TABLET ORAL DAILY
Qty: 90 TABLET | Refills: 3 | Status: SHIPPED | OUTPATIENT
Start: 2020-07-22 | End: 2020-09-30 | Stop reason: ALTCHOICE

## 2020-07-22 ASSESSMENT — LIFESTYLE VARIABLES
HOW OFTEN DURING THE LAST YEAR HAVE YOU FOUND THAT YOU WERE NOT ABLE TO STOP DRINKING ONCE YOU HAD STARTED: 0
HOW MANY STANDARD DRINKS CONTAINING ALCOHOL DO YOU HAVE ON A TYPICAL DAY: 0
HAVE YOU OR SOMEONE ELSE BEEN INJURED AS A RESULT OF YOUR DRINKING: 0
HOW OFTEN DO YOU HAVE SIX OR MORE DRINKS ON ONE OCCASION: 0
HOW OFTEN DURING THE LAST YEAR HAVE YOU HAD A FEELING OF GUILT OR REMORSE AFTER DRINKING: 0
HOW OFTEN DO YOU HAVE A DRINK CONTAINING ALCOHOL: 4
HOW OFTEN DURING THE LAST YEAR HAVE YOU NEEDED AN ALCOHOLIC DRINK FIRST THING IN THE MORNING TO GET YOURSELF GOING AFTER A NIGHT OF HEAVY DRINKING: 0
AUDIT TOTAL SCORE: 4
AUDIT-C TOTAL SCORE: 4
HOW OFTEN DURING THE LAST YEAR HAVE YOU FAILED TO DO WHAT WAS NORMALLY EXPECTED FROM YOU BECAUSE OF DRINKING: 0
HOW OFTEN DURING THE LAST YEAR HAVE YOU BEEN UNABLE TO REMEMBER WHAT HAPPENED THE NIGHT BEFORE BECAUSE YOU HAD BEEN DRINKING: 0
HAS A RELATIVE, FRIEND, DOCTOR, OR ANOTHER HEALTH PROFESSIONAL EXPRESSED CONCERN ABOUT YOUR DRINKING OR SUGGESTED YOU CUT DOWN: 0

## 2020-07-22 ASSESSMENT — PATIENT HEALTH QUESTIONNAIRE - PHQ9
SUM OF ALL RESPONSES TO PHQ QUESTIONS 1-9: 0
SUM OF ALL RESPONSES TO PHQ QUESTIONS 1-9: 0

## 2020-07-22 NOTE — PATIENT INSTRUCTIONS
Personalized Preventive Plan for [place] - 7/22/2020  Medicare offers a range of preventive health benefits. Some of the tests and screenings are paid in full while other may be subject to a deductible, co-insurance, and/or copay. Some of these benefits include a comprehensive review of your medical history including lifestyle, illnesses that may run in your family, and various assessments and screenings as appropriate. After reviewing your medical record and screening and assessments performed today your provider may have ordered immunizations, labs, imaging, and/or referrals for you. A list of these orders (if applicable) as well as your Preventive Care list are included within your After Visit Summary for your review. Other Preventive Recommendations:    · A preventive eye exam performed by an eye specialist is recommended every 1-2 years to screen for glaucoma; cataracts, macular degeneration, and other eye disorders. · A preventive dental visit is recommended every 6 months. · Try to get at least 150 minutes of exercise per week or 10,000 steps per day on a pedometer . · Order or download the FREE \"Exercise & Physical Activity: Your Everyday Guide\" from [employer]. Call 3-629.542.6084 or search [employer] online. · You need 0142-7239 mg of calcium and 0412-2361 IU of vitamin D per day. It is possible to meet your calcium requirement with diet alone, but a vitamin D supplement is usually necessary to meet this goal.  · When exposed to the sun, use a sunscreen that protects against both UVA and UVB radiation with an SPF of 30 or greater. Reapply every 2 to 3 hours or after sweating, drying off with a towel, or swimming. · Always wear a seat belt when traveling in a car. Always wear a helmet when riding a bicycle or motorcycle.

## 2020-07-22 NOTE — PROGRESS NOTES
Medicare Annual Wellness Visit  Name: Becca Goodwin Date: 2020   MRN: [id number] Sex: Male   Age: 78 y.o. Ethnicity: Non-/Non    : 1940 Race: Audrene Holter is here for Medicare AWV    Screenings for behavioral, psychosocial and functional/safety risks, and cognitive dysfunction are all negative except as indicated below. These results, as well as other patient data from the [address] form, are documented in Flowsheets linked to this Encounter. No Known Allergies    Prior to Visit Medications    Medication Sig Taking? Authorizing Provider   metoprolol succinate (TOPROL XL) 25 MG extended release tablet TAKE 1 TABLET BY MOUTH DAILY Yes Clarisa Duran MD   budesonide-formoterol (SYMBICORT) 160-4.5 MCG/ACT AERO INHALE 2 PUFFS BY MOUTH TWICE DAILY Yes Tato Reeves MD   furosemide (LASIX) 20 MG tablet Take 1 tablet by mouth daily Yes Clarisa Duran MD   predniSONE (DELTASONE) 10 MG tablet 4,4,4,3,3,3,2,2,2,1,1,1  Po qd as dir Yes lCarisa Duran MD   albuterol sulfate  (90 Base) MCG/ACT inhaler Inhale 2 puffs into the lungs every 6 hours as needed for Wheezing Yes Historical Provider, MD   amLODIPine (NORVASC) 10 MG tablet GIVE \"KIARA\" 1 TABLET BY MOUTH DAILY Yes Clarisa Duran MD   ipratropium-albuterol (DUONEB) 0.5-2.5 (3) MG/3ML SOLN nebulizer solution Inhale 3 mLs into the lungs every 6 hours as needed for Other (wheezing) Dx: COPD   ICD-10: J44.9 Yes Susan Tolbert MD   Ferrous Sulfate (IRON SLOW RELEASE PO) Take by mouth daily Yes Historical Provider, MD   triamcinolone (KENALOG) 0.1 % ointment  Yes Historical Provider, MD   aspirin 81 MG tablet Take 81 mg by mouth daily Yes Historical Provider, MD   Denosumab (PROLIA SC) Inject into the skin Yes Historical Provider, MD   vitamin E 400 UNIT capsule Take 400 Units by mouth daily.  Yes Historical Provider, MD   calcium carbonate-vitamin D (CALCIUM 600+D) 600-200 MG-UNIT TABS Take 2 tablets by mouth daily  Yes Historical Provider, MD   fish oil-omega-3 fatty acids 1000 MG capsule Take 1 g by mouth daily  Yes Historical Provider, MD   omeprazole (PRILOSEC) 10 MG capsule Take 20 mg by mouth 2 times daily  Yes Historical Provider, MD       Past Medical History:   Diagnosis Date    Carotid artery occlusion and stenosis 5/3/2012    COPD [facility] obstructive [facility]) [facility]     ED (erectile dysfunction)     Emphysema lung ([facility] 8/8/2014    GERD (gastroesophageal reflux disease)     Hyperlipidemia     pt denies    Hypertension     Hypertension     Prostate CA ([facility] 2003    Psoriasis     Pulmonary nodules     Radiation 2004       Past Surgical History:   Procedure Laterality Date    CAROTID ENDARTERECTOMY  5/3/12    left    CAROTID ENDARTERECTOMY Right 10/25/2017    [facility]. Cory Keene at [facility]    [address] [place], [address]  02/12/2018    Intraoperative Cholangiogram    COLONOSCOPY      CYSTOSCOPY  07/2017    ENDOSCOPY, COLON, DIAGNOSTIC      PROSTATECTOMY  2003    TONSILLECTOMY         Family History   Problem Relation Age of Onset    Diabetes Mother     High Blood Pressure Mother     Heart Disease Father         myxoma    Heart Failure Father        CareTeam (Including outside providers/suppliers regularly involved in providing care):   Patient Care Team:  Sherren Hose, MD as PCP - General (Family Medicine)  Sherren Hose, MD as PCP - [facility] Empaneled Provider  Ariana Vazquez MD as Consulting Physician (Vascular Surgery)  Marco Hernandez MD as Surgeon (General Surgery)  Dennis Garcia MD (Pulmonology)  [address] - derm  [place]  - chiropractor  Allie Orono - opthal  dentist  Wt Readings from Last 3 Encounters:   07/22/20 177 lb (80.3 kg)   02/17/20 170 lb (77.1 kg)   01/21/20 165 lb (74.8 kg)     Vitals:    07/22/20 1310   BP: 138/60   Pulse: 96   Temp: 97 °F (36.1 °C)   SpO2: 96%   Weight: 177 lb (80.3 kg)   Height: 5' 10\" (1.778 m) Body mass index is 25.4 kg/m². Based upon direct observation of the patient, evaluation of cognition reveals recent and remote memory intact. General Appearance: alert and oriented to person, place and time, well-developed and well-nourished, in no acute distress  ENT: tympanic membrane, external ear and ear canal normal bilaterally, oropharynx clear and moist with normal mucous membranes  Neck: neck supple and non tender without mass, no thyromegaly or thyroid nodules, no cervical lymphadenopathy   Pulmonary/Chest: clear to auscultation bilaterally- no wheezes, rales or rhonchi, normal air movement, no respiratory distress  Cardiovascular: normal rate, normal S1 and S2, no gallops, intact distal pulses and no carotid bruits  Abdomen: soft, non-tender, non-distended, normal bowel sounds, no masses or organomegaly  Extremities: no cyanosis and no clubbing, 2 + ber[place] ankle off water pill for 2 days    Patient's complete Health Risk Assessment and screening values have been reviewed and are found in Flowsheets. The following problems were reviewed today and where indicated follow up appointments were made and/or referrals ordered. Positive Risk Factor Screenings with Interventions:     Health Habits/Nutrition:  Health Habits/Nutrition  Do you exercise for at least 20 minutes 2-3 times per week?: (!) No  Have you lost any weight without trying in the past 3 months?: No  Do you eat fewer than 2 meals per day?: No  Have you seen a dentist within the past year?: Yes  Body mass index is 25.4 kg/m².   Health Habits/Nutrition Interventions:  · Inadequate physical activity:  patient is not ready to increase his/her physical activity level at this time    Personalized Preventive Plan   Current Health Maintenance Status  Immunization History   Administered Date(s) Administered    Influenza Virus Vaccine 10/27/2011, 11/28/2013, 10/15/2014    Influenza, High Dose (Fluzone 65 yrs and older) 10/01/2015, 10/05/2016, 11/17/2017, 10/29/2018    Influenza, Triv, inactivated, subunit, adjuvanted, IM (Fluad 65 yrs and older) 10/18/2019    Pneumococcal Conjugate 13-valent (Beacoml68) 11/19/2015    Pneumococcal Conjugate 7-valent (Prevnar7) 11/12/2014    Pneumococcal Polysaccharide (Hlzdoresl24) 11/05/2008    Td, unspecified formulation 08/22/2005, 06/15/2015    Zoster Live (Zostavax) 11/01/2012        Health Maintenance   Topic Date Due    Annual Wellness Visit (AWV)  05/29/2019    PSA counseling  11/09/2019    Flu vaccine (1) 09/01/2020    Potassium monitoring  07/17/2021    Creatinine monitoring  07/17/2021    DTaP/Tdap/Td vaccine (3 - Tdap) 06/15/2025    Shingles Vaccine  Completed    Pneumococcal 65+ years Vaccine  Completed    Hepatitis A vaccine  Aged Out    Hepatitis B vaccine  Aged Out    Hib vaccine  Aged Out    Meningococcal (ACWY) vaccine  Aged Out     Recommendations for [employer] Due: see orders and patient instructions/AVS.  . Recommended screening schedule for the next 5-10 years is provided to the patient in written form: see Patient Instructions/AVS.  Lab Results   Component Value Date     07/17/2020    K 4.4 07/17/2020     07/17/2020    CO2 27 07/17/2020    BUN 16 07/17/2020    CREATININE 0.8 07/17/2020    GLUCOSE 104 (H) 07/17/2020    CALCIUM 9.5 07/17/2020    PROT 6.9 07/17/2020    LABALBU 4.5 07/17/2020    BILITOT 0.9 07/17/2020    ALKPHOS 82 07/17/2020    AST 20 07/17/2020    ALT 23 07/17/2020    LABGLOM >60 07/17/2020    GFRAA >60 07/17/2020    AGRATIO 1.9 07/17/2020    GLOB 2.4 07/17/2020     psa at urol 6 to 14  There are no diagnoses linked to this encounter. Lab Results   Component Value Date    WBC 6.1 07/17/2020    HGB 13.6 07/17/2020    HCT 41.3 07/17/2020    MCV 95.1 07/17/2020     07/17/2020   . [email or web address]  Encounter Diagnoses   Name Primary?     Routine general medical examination at a health care facility Yes    Mixed hyperlipidemia     Essential hypertension     Moderate COPD (chronic obstructive pulmonary disease) (HCC)    Rising and elevated PSA - h/oprostate cancer , on lupron shot  IMM UTD  GERD - not so good on   osteoporisis - on prolia , ( form lupron)    Change lsais 20 to torsemide 20  Change prilosec to protonix

## 2020-09-02 RX ORDER — AMLODIPINE BESYLATE 10 MG/1
TABLET ORAL
Qty: 90 TABLET | Refills: 3 | Status: SHIPPED | OUTPATIENT
Start: 2020-09-02 | End: 2021-09-08

## 2020-09-30 ENCOUNTER — OFFICE VISIT (OUTPATIENT)
Dept: PULMONOLOGY | Age: 80
End: 2020-09-30
Payer: MEDICARE

## 2020-09-30 VITALS — SYSTOLIC BLOOD PRESSURE: 110 MMHG | OXYGEN SATURATION: 97 % | DIASTOLIC BLOOD PRESSURE: 74 MMHG | HEART RATE: 84 BPM

## 2020-09-30 PROCEDURE — 99213 OFFICE O/P EST LOW 20 MIN: CPT | Performed by: INTERNAL MEDICINE

## 2020-09-30 PROCEDURE — 1123F ACP DISCUSS/DSCN MKR DOCD: CPT | Performed by: INTERNAL MEDICINE

## 2020-09-30 PROCEDURE — 3023F SPIROM DOC REV: CPT | Performed by: INTERNAL MEDICINE

## 2020-09-30 PROCEDURE — 90694 VACC AIIV4 NO PRSRV 0.5ML IM: CPT | Performed by: INTERNAL MEDICINE

## 2020-09-30 PROCEDURE — G8417 CALC BMI ABV UP PARAM F/U: HCPCS | Performed by: INTERNAL MEDICINE

## 2020-09-30 PROCEDURE — G8427 DOCREV CUR MEDS BY ELIG CLIN: HCPCS | Performed by: INTERNAL MEDICINE

## 2020-09-30 PROCEDURE — G0008 ADMIN INFLUENZA VIRUS VAC: HCPCS | Performed by: INTERNAL MEDICINE

## 2020-09-30 PROCEDURE — 4040F PNEUMOC VAC/ADMIN/RCVD: CPT | Performed by: INTERNAL MEDICINE

## 2020-09-30 PROCEDURE — G8926 SPIRO NO PERF OR DOC: HCPCS | Performed by: INTERNAL MEDICINE

## 2020-09-30 PROCEDURE — 1036F TOBACCO NON-USER: CPT | Performed by: INTERNAL MEDICINE

## 2020-09-30 NOTE — PROGRESS NOTES
[facility]  Follow Up Note  Montez Vazquez MD       Natasha Miller   YOB: 1940    Date of Visit:  9/30/2020    Assessment/Plan:  1. Moderate COPD (chronic obstructive pulmonary disease) ([facility] [address]  PFT 8/18:  Spirometry:  Flow volume loops were obstructed. The FEV-1/FVC ratio was   decreased. The FEV-1 was 1.52 liters (52% of predicted), which   was moderately decreased. The FVC was 2.9 liters (71% of   predicted), which was decreased. Response to inhaled   bronchodilators (albuterol) was significant. Lung volumes:  Lung volumes were tested by plethysmography. The total lung   capacity was 6.5 liters (102% of predicted), which was normal.   The residual volume was 3.35 liters (124% of predicted), which   was increased. The ratio of residual volume to total lung   capacity (RV/TLC) was 52, which was increased. Diffusion capacity was found to be mildly decreased.       Interpretation:   moderate obstructive airway disease with significant   bronchodilator reversibility. Compared to study done in 8/2016,   FEV1 has decreased by 14%, FVC improved by 4% and DLCO decreased   by 12%. Clinical correlation is recommended    Symbicort  Duoneb  Albuterol     2. Centrilobular emphysema ([facility] [address]  CT finding    3. Gastroesophageal reflux disease without esophagitis  On Prilosec      Chief Complaint   Patient presents with    Shortness of Breath     1 year       HPI  The patient presents with a chief complaint of moderate shortness of breath related to mild COPD of many years duration. He has mild associated cough. Exertion is a modifying factor. He did have a bronchitis in January. He saw Dr Tony Barbour for this and used HHN with effectiveness. Review of Systems  No Chest pain, Nausea or vomiting reported    History  I have reviewed past medical, surgical, social and family history. This is documented elsewhere in the medical record.      Physical Exam:  Well developed, well nourished  Alert and oriented  Sclera is clear  No cervical adenopathy  No JVD. Chest examination is clear. Cardiac examination reveals regular rate and rhythm without murmur, gallop or rub. The abdomen is soft, nontender and nondistended. There is no clubbing, cyanosis or edema of the extremities. There is no obvious skin rash. No focal neuro deficicts  Normal mood and affect    Allergies   Allergen Reactions    Lisinopril      cough     Prior to Visit Medications    Medication Sig Taking? Authorizing Provider   amLODIPine (NORVASC) 10 MG tablet TAKE 1 TABLET BY MOUTH EVERY DAY Yes Gianni Fernandez MD   pantoprazole (PROTONIX) 40 MG tablet Take 1 tablet by mouth every morning (before breakfast) Yes Gianni Fernandez MD   metoprolol succinate (TOPROL XL) 25 MG extended release tablet TAKE 1 TABLET BY MOUTH DAILY Yes Gianni Fernandez MD   budesonide-formoterol (SYMBICORT) 160-4.5 MCG/ACT AERO INHALE 2 PUFFS BY MOUTH TWICE DAILY Yes Erwin Tineo MD   albuterol sulfate  (90 Base) MCG/ACT inhaler Inhale 2 puffs into the lungs every 6 hours as needed for Wheezing Yes Historical Provider, MD   Ferrous Sulfate (IRON SLOW RELEASE PO) Take by mouth daily Yes Historical Provider, MD   triamcinolone (KENALOG) 0.1 % ointment  Yes Historical Provider, MD   aspirin 81 MG tablet Take 81 mg by mouth daily Yes Historical Provider, MD   Denosumab (PROLIA SC) Inject into the skin Yes Historical Provider, MD   vitamin E 400 UNIT capsule Take 400 Units by mouth daily. Yes Historical Provider, MD   calcium carbonate-vitamin D (CALCIUM 600+D) 600-200 MG-UNIT TABS Take 2 tablets by mouth daily  Yes Historical Provider, MD   fish oil-omega-3 fatty acids 1000 MG capsule Take 1 g by mouth daily  Yes Historical Provider, MD       Vitals:    09/30/20 1344   BP: 110/74   Pulse: 84   SpO2: 97%     There is no height or weight on file to calculate BMI.      Wt Readings from Last 3 Encounters:   07/22/20 177 lb (80.3 kg)   20 170 lb (77.1 kg)   20 165 lb (74.8 kg)     BP Readings from Last 3 Encounters:   20 110/74   20 138/60   20 138/64        Social History     Tobacco Use   Smoking Status Former Smoker    Packs/day: 0.50    Years: 30.00    Pack years: 15.00    Types: Cigarettes    Last attempt to quit: 1987    Years since quittin.1   Smokeless Tobacco Never Used

## 2020-09-30 NOTE — PROGRESS NOTES
Vaccine Information Sheet, \"Influenza - Inactivated\"  given to Charlesetta Alpers, or parent/legal guardian of  Charlesetta Alpers and verbalized understanding. Patient responses:    Have you ever had a reaction to a flu vaccine? No  Do you have any current illness? No  Have you ever had [place] Syndrome? No  Do you have a serious allergy to any of the follow: Neomycin, Polymyxin, Thimerosal, eggs or egg products? No    Flu vaccine given per order. Please see immunization tab. Risks and benefits explained. Current VIS given.       Immunizations Administered     Name Date Dose Route    Influenza, Quadv, adjuvanted, 65 yrs +, IM, PF (Fluad) 9/30/2020 0.5 mL Intramuscular    Site: Deltoid- Left    Lot: [id number]    NDC: 77953-793-59

## 2020-12-30 RX ORDER — METOPROLOL SUCCINATE 25 MG/1
25 TABLET, EXTENDED RELEASE ORAL DAILY
Qty: 90 TABLET | Refills: 1 | Status: SHIPPED | OUTPATIENT
Start: 2020-12-30 | End: 2021-07-14

## 2020-12-30 NOTE — TELEPHONE ENCOUNTER
Medication:   Requested Prescriptions     Pending Prescriptions Disp Refills    metoprolol succinate (TOPROL XL) 25 MG extended release tablet [Pharmacy Med Name: METOPROLOL ER SUCCINATE 25MG TABS] 90 tablet 1     Sig: TAKE 1 TABLET BY MOUTH DAILY       Last Filled:  7/20/20 #90, 1 RF     Patient Phone Number: [phone] (home)     Last appt: 7/22/2020 AWV   Next appt: Visit date not found    Lab Results   Component Value Date     07/17/2020    K 4.4 07/17/2020     07/17/2020    CO2 27 07/17/2020    BUN 16 07/17/2020    CREATININE 0.8 07/17/2020    GLUCOSE 104 (H) 07/17/2020    CALCIUM 9.5 07/17/2020    PROT 6.9 07/17/2020    LABALBU 4.5 07/17/2020    BILITOT 0.9 07/17/2020    ALKPHOS 82 07/17/2020    AST 20 07/17/2020    ALT 23 07/17/2020    LABGLOM >60 07/17/2020    GFRAA >60 07/17/2020    AGRATIO 1.9 07/17/2020    GLOB 2.4 07/17/2020

## 2021-03-08 RX ORDER — BUDESONIDE AND FORMOTEROL FUMARATE DIHYDRATE 160; 4.5 UG/1; UG/1
2 AEROSOL RESPIRATORY (INHALATION) 2 TIMES DAILY
Qty: 1 INHALER | Refills: 11 | Status: SHIPPED | OUTPATIENT
Start: 2021-03-08 | End: 2021-04-29

## 2021-05-27 ENCOUNTER — TELEPHONE (OUTPATIENT)
Dept: PULMONOLOGY | Age: 81
End: 2021-05-27

## 2021-05-27 RX ORDER — ALBUTEROL SULFATE 90 UG/1
2 AEROSOL, METERED RESPIRATORY (INHALATION) EVERY 6 HOURS PRN
Qty: 25.5 G | Refills: 5 | Status: SHIPPED | OUTPATIENT
Start: 2021-05-27 | End: 2022-10-10 | Stop reason: SDUPTHER

## 2021-05-27 RX ORDER — ALBUTEROL SULFATE 90 UG/1
2 AEROSOL, METERED RESPIRATORY (INHALATION) EVERY 6 HOURS PRN
Qty: 1 INHALER | Refills: 6 | Status: SHIPPED | OUTPATIENT
Start: 2021-05-27 | End: 2021-05-27

## 2021-05-27 NOTE — TELEPHONE ENCOUNTER
Pt called and said he needs a refill sent in to [facility].  albuterol sulfate  (90 Base) MCG/ACT inhaler [[id number]]   Sent to [facility], [address] [place] [id number]   Call pt with an questions at [phone]

## 2021-06-14 ENCOUNTER — TELEPHONE (OUTPATIENT)
Dept: PULMONOLOGY | Age: 81
End: 2021-06-14

## 2021-07-06 ENCOUNTER — HOSPITAL ENCOUNTER (OUTPATIENT)
Dept: VASCULAR LAB | Age: 81
Discharge: HOME | End: 2021-07-06
Payer: MEDICARE

## 2021-07-06 DIAGNOSIS — I65.23 CAROTID ATHEROSCLEROSIS, BILATERAL: ICD-10-CM

## 2021-07-06 PROCEDURE — 93880 EXTRACRANIAL BILAT STUDY: CPT

## 2021-07-08 ENCOUNTER — TELEPHONE (OUTPATIENT)
Dept: VASCULAR SURGERY | Age: 81
End: 2021-07-08

## 2021-07-08 DIAGNOSIS — I65.23 CAROTID ATHEROSCLEROSIS, BILATERAL: Primary | ICD-10-CM

## 2021-07-08 NOTE — TELEPHONE ENCOUNTER
Discussed results of carotid duplex which shows no progression in carotid disease with less than 50% bilateral ICA stenosis. Continue surveillance with repeat carotid duplex in 1 year. Will cancel apt for 7/27.     Electronically signed by ADELAIDA Manjarrez CNP on 7/8/2021 at 12:42 PM

## 2021-07-14 DIAGNOSIS — R53.83 FATIGUE, UNSPECIFIED TYPE: ICD-10-CM

## 2021-07-14 DIAGNOSIS — I10 ESSENTIAL HYPERTENSION: ICD-10-CM

## 2021-07-14 DIAGNOSIS — Z12.5 SCREENING FOR PROSTATE CANCER: ICD-10-CM

## 2021-07-14 DIAGNOSIS — R97.20 ELEVATED PROSTATE SPECIFIC ANTIGEN (PSA): ICD-10-CM

## 2021-07-14 DIAGNOSIS — E78.2 MIXED HYPERLIPIDEMIA: Primary | ICD-10-CM

## 2021-07-14 RX ORDER — METOPROLOL SUCCINATE 25 MG/1
TABLET, EXTENDED RELEASE ORAL
Qty: 90 TABLET | Refills: 0 | Status: SHIPPED | OUTPATIENT
Start: 2021-07-14 | End: 2021-10-11

## 2021-07-14 NOTE — TELEPHONE ENCOUNTER
Medication:   Requested Prescriptions     Pending Prescriptions Disp Refills    metoprolol succinate (TOPROL XL) 25 MG extended release tablet [Pharmacy Med Name: METOPROLOL ER SUCCINATE 25MG TABS] 90 tablet 1     Sig: GIVE \"KIARA\" 1 TABLET BY MOUTH DAILY       Last Filled:  12/30/20 #90, 1 RF     Patient Phone Number: [phone] (home)     Last appt: 7/22/2020 AWV   Next appt: Visit date not found - patient scheduled AWV 8/19/21.  Lab orders teed up    Lab Results   Component Value Date     07/17/2020    K 4.4 07/17/2020     07/17/2020    CO2 27 07/17/2020    BUN 16 07/17/2020    CREATININE 0.8 07/17/2020    GLUCOSE 104 (H) 07/17/2020    CALCIUM 9.5 07/17/2020    PROT 6.9 07/17/2020    LABALBU 4.5 07/17/2020    BILITOT 0.9 07/17/2020    ALKPHOS 82 07/17/2020    AST 20 07/17/2020    ALT 23 07/17/2020    LABGLOM >60 07/17/2020    GFRAA >60 07/17/2020    AGRATIO 1.9 07/17/2020    GLOB 2.4 07/17/2020

## 2021-08-19 ENCOUNTER — OFFICE VISIT (OUTPATIENT)
Dept: FAMILY MEDICINE CLINIC | Age: 81
End: 2021-08-19
Payer: MEDICARE

## 2021-08-19 VITALS
SYSTOLIC BLOOD PRESSURE: 119 MMHG | OXYGEN SATURATION: 98 % | HEIGHT: 70 IN | HEART RATE: 98 BPM | DIASTOLIC BLOOD PRESSURE: 71 MMHG | WEIGHT: 171 LBS | BODY MASS INDEX: 24.48 KG/M2

## 2021-08-19 DIAGNOSIS — K21.9 GASTROESOPHAGEAL REFLUX DISEASE WITHOUT ESOPHAGITIS: ICD-10-CM

## 2021-08-19 DIAGNOSIS — I10 ESSENTIAL HYPERTENSION: ICD-10-CM

## 2021-08-19 DIAGNOSIS — J43.2 CENTRILOBULAR EMPHYSEMA (HCC): ICD-10-CM

## 2021-08-19 DIAGNOSIS — E78.2 MIXED HYPERLIPIDEMIA: ICD-10-CM

## 2021-08-19 DIAGNOSIS — R13.19 ESOPHAGEAL DYSPHAGIA: ICD-10-CM

## 2021-08-19 DIAGNOSIS — J44.9 MODERATE COPD (CHRONIC OBSTRUCTIVE PULMONARY DISEASE) (HCC): ICD-10-CM

## 2021-08-19 DIAGNOSIS — C61 PROSTATE CA (HCC): ICD-10-CM

## 2021-08-19 DIAGNOSIS — Z00.00 ROUTINE GENERAL MEDICAL EXAMINATION AT A HEALTH CARE FACILITY: Primary | ICD-10-CM

## 2021-08-19 PROCEDURE — 1123F ACP DISCUSS/DSCN MKR DOCD: CPT | Performed by: FAMILY MEDICINE

## 2021-08-19 PROCEDURE — G0439 PPPS, SUBSEQ VISIT: HCPCS | Performed by: FAMILY MEDICINE

## 2021-08-19 PROCEDURE — 4040F PNEUMOC VAC/ADMIN/RCVD: CPT | Performed by: FAMILY MEDICINE

## 2021-08-19 RX ORDER — LOPERAMIDE HYDROCHLORIDE 2 MG/1
TABLET ORAL
COMMUNITY

## 2021-08-19 RX ORDER — NITROFURANTOIN MACROCRYSTALS 100 MG/1
CAPSULE ORAL
Status: ON HOLD | COMMUNITY
Start: 2021-06-22 | End: 2022-01-03

## 2021-08-19 RX ORDER — CHLORAL HYDRATE 500 MG
CAPSULE ORAL
COMMUNITY

## 2021-08-19 SDOH — ECONOMIC STABILITY: FOOD INSECURITY: WITHIN THE PAST 12 MONTHS, THE FOOD YOU BOUGHT JUST DIDN'T LAST AND YOU DIDN'T HAVE MONEY TO GET MORE.: NEVER TRUE

## 2021-08-19 SDOH — ECONOMIC STABILITY: FOOD INSECURITY: WITHIN THE PAST 12 MONTHS, YOU WORRIED THAT YOUR FOOD WOULD RUN OUT BEFORE YOU GOT MONEY TO BUY MORE.: NEVER TRUE

## 2021-08-19 ASSESSMENT — LIFESTYLE VARIABLES
AUDIT TOTAL SCORE: 2
HOW OFTEN DURING THE LAST YEAR HAVE YOU NEEDED AN ALCOHOLIC DRINK FIRST THING IN THE MORNING TO GET YOURSELF GOING AFTER A NIGHT OF HEAVY DRINKING: 0
HOW OFTEN DURING THE LAST YEAR HAVE YOU FAILED TO DO WHAT WAS NORMALLY EXPECTED FROM YOU BECAUSE OF DRINKING: 0
HOW OFTEN DO YOU HAVE A DRINK CONTAINING ALCOHOL: 1
HOW OFTEN DURING THE LAST YEAR HAVE YOU BEEN UNABLE TO REMEMBER WHAT HAPPENED THE NIGHT BEFORE BECAUSE YOU HAD BEEN DRINKING: 0
HOW OFTEN DO YOU HAVE SIX OR MORE DRINKS ON ONE OCCASION: 0
HOW OFTEN DURING THE LAST YEAR HAVE YOU FOUND THAT YOU WERE NOT ABLE TO STOP DRINKING ONCE YOU HAD STARTED: 0
HAVE YOU OR SOMEONE ELSE BEEN INJURED AS A RESULT OF YOUR DRINKING: 0
HOW MANY STANDARD DRINKS CONTAINING ALCOHOL DO YOU HAVE ON A TYPICAL DAY: 1
HOW OFTEN DURING THE LAST YEAR HAVE YOU HAD A FEELING OF GUILT OR REMORSE AFTER DRINKING: 0
AUDIT-C TOTAL SCORE: 2
HAS A RELATIVE, FRIEND, DOCTOR, OR ANOTHER HEALTH PROFESSIONAL EXPRESSED CONCERN ABOUT YOUR DRINKING OR SUGGESTED YOU CUT DOWN: 0

## 2021-08-19 ASSESSMENT — PATIENT HEALTH QUESTIONNAIRE - PHQ9
2. FEELING DOWN, DEPRESSED OR HOPELESS: 0
SUM OF ALL RESPONSES TO PHQ QUESTIONS 1-9: 0
1. LITTLE INTEREST OR PLEASURE IN DOING THINGS: 0
SUM OF ALL RESPONSES TO PHQ9 QUESTIONS 1 & 2: 0

## 2021-08-19 ASSESSMENT — SOCIAL DETERMINANTS OF HEALTH (SDOH): HOW HARD IS IT FOR YOU TO PAY FOR THE VERY BASICS LIKE FOOD, HOUSING, MEDICAL CARE, AND HEATING?: NOT HARD AT ALL

## 2021-08-19 NOTE — PROGRESS NOTES
Medicare Annual Wellness Visit  Name: Lesley Carrillo Date: 2021   MRN: [id number] Sex: Male   Age: 78 y.o. Ethnicity: Non- / Non    : 1940 Race: White (non-)      Flip Chacon is here for Medicare AWV    Screenings for behavioral, psychosocial and functional/safety risks, and cognitive dysfunction are all negative except as indicated below. These results, as well as other patient data from the [address] form, are documented in Flowsheets linked to this Encounter. Allergies   Allergen Reactions    Lisinopril      cough       Prior to Visit Medications    Medication Sig Taking? Authorizing Provider   ferrous sulfate (SLOW FE) 142 (45 Fe) MG extended release tablet Take by mouth Yes Historical Provider, MD   Omega-3 Fatty Acids (FISH OIL) 1000 MG CAPS by NOT APPLICABLE route Yes Historical Provider, MD   loperamide (IMODIUM A-D) 2 MG tablet Take by mouth Yes Historical Provider, MD   nitrofurantoin (MACRODANTIN) 100 MG capsule TAKE 1 CAPSULE BY MOUTH 2 TIMES A WEEK Yes Historical Provider, MD   pantoprazole (PROTONIX) 40 MG tablet TAKE 1 TABLET BY MOUTH EVERY MORNING BEFORE BREAKFAST Yes Karli Polk MD   metoprolol succinate (TOPROL XL) 25 MG extended release tablet GIVE \"KIARA\" 1 TABLET BY MOUTH DAILY Yes Karli Polk MD   albuterol sulfate  (90 Base) MCG/ACT inhaler INHALE 2 PUFFS INTO THE LUNGS EVERY 6 HOURS AS NEEDED FOR WHEEZING Yes Sherlie Lombard, MD   budesonide-formoterol (SYMBICORT) 160-4.5 MCG/ACT AERO INHALE 2 PUFFS BY MOUTH TWICE DAILY Yes Sherlie Lombard, MD   amLODIPine (NORVASC) 10 MG tablet TAKE 1 TABLET BY MOUTH EVERY DAY Yes Karli Polk MD   Ferrous Sulfate (IRON SLOW RELEASE PO) Take by mouth daily Yes Historical Provider, MD   triamcinolone (KENALOG) 0.1 % ointment  Yes Historical Provider, MD   vitamin E 400 UNIT capsule Take 400 Units by mouth daily.  Yes Historical Provider, MD   calcium carbonate-vitamin D (CALCIUM 600+D) 600-200 MG-UNIT TABS Take 2 tablets by mouth daily  Yes Historical Provider, MD   fish oil-omega-3 fatty acids 1000 MG capsule Take 1 g by mouth daily  Yes Historical Provider, MD       Past Medical History:   Diagnosis Date    Carotid artery occlusion and stenosis 5/3/2012    COPD [facility]) [facility] [address] [facility]     ED (erectile dysfunction)     Em[facility] 8/8/2014    GERD (gastroesophageal reflux disease)     Hyperlipidemia     pt denies    Hypertension     Hypertension     Prostate CA ([facility] [address] 2003    Psoriasis     Pulmonary nodules     Radiation 2004       Past Surgical History:   Procedure Laterality Date    CAROTID ENDARTERECTOMY  5/3/12    left    CAROTID ENDARTERECTOMY Right 10/25/2017    Dr. Rico P[facility]rez at [facility]    [address] [facility], [address]  02/12/2018    Intraoperative Cholangiogram    COLONOSCOPY      CYSTOSCOPY  07/2017    ENDOSCOPY, COLON, DIAGNOSTIC      PROSTATECTOMY  2003    TONSILLECTOMY         Family History   Problem Relation Age of Onset    Diabetes Mother     High Blood Pressure Mother     Heart Disease Father         myxoma    Heart Failure Father        CareTeam (Including outside providers/suppliers regularly involved in providing care):   Patient Care Team:  Shima Wallace MD as PCP - General (Family Medicine)  Shima Wallace MD as PCP - [facility] Empaneled Provider  Arlet Felder MD as Consulting Physician (Vascular Surgery)  KAYA Garcia MD (Pulmonology)  Derm - [facility]  Dentist  opthal - [place]  chiro - [facility]  Wt Readings from Last 3 Encounters:   08/19/21 171 lb (77.6 kg)   07/22/20 177 lb (80.3 kg)   02/17/20 170 lb (77.1 kg)     Vitals:    08/19/21 1328   BP: 119/71   Pulse: 98   SpO2: 98%   Weight: 171 lb (77.6 kg)   Height: 5' 10\" (1.778 m)     Body mass index is 24.54 kg/m².     Based upon direct observation of the patient, evaluation of cognition reveals recent and remote memory intact. General Appearance: alert and oriented to person, place and time, well-developed and well-nourished, in no acute distress  ENT: tympanic membrane, external ear and ear canal normal bilaterally, oropharynx clear and moist with normal mucous membranes  Neck: neck supple and non tender without mass, no thyromegaly or thyroid nodules, no cervical lymphadenopathy   Pulmonary/Chest: clear to auscultation bilaterally- no wheezes, rales or rhonchi, normal air movement, no respiratory distress  Cardiovascular: normal rate, normal S1 and S2, no gallops, intact distal pulses and no carotid bruits  Abdomen: soft, non-tender, non-distended, normal bowel sounds, no masses or organomegaly  Extremities: no cyanosis and no clubbing    Patient's complete Health Risk Assessment and screening values have been reviewed and are found in Flowsheets. The following problems were reviewed today and where indicated follow up appointments were made and/or referrals ordered. Positive Risk Factor Screenings with Interventions:               No Positive Risk Factors identified today.     Personalized Preventive Plan   Current Health Maintenance Status  Immunization History   Administered Date(s) Administered    COVID-19Estevan PF, 30mcg/0.3mL 01/28/2021, 02/18/2021    Influenza Virus Vaccine 10/27/2011, 11/28/2013, 10/15/2014    Influenza, High Dose (Fluzone 65 yrs and older) 10/01/2015, 10/05/2016, 11/17/2017, 10/29/2018    Influenza, Quadv, adjuvanted, 65 yrs +, IM, PF (Fluad) 09/30/2020    Influenza, Triv, inactivated, subunit, adjuvanted, IM (Fluad 65 yrs and older) 10/18/2019    Pneumococcal Conjugate 13-valent ([id number]) 11/19/2015    Pneumococcal Conjugate 7-valent (Prevnar7) 11/12/2014    Pneumococcal Polysaccharide ([id number]) 11/05/2008    Td, unspecified formulation 08/22/2005, 06/15/2015    Zoster Live (Zostavax) 11/01/2012        Health Maintenance   Topic Date Due    Annual Wellness Visit (AWV) Never done    PSA counseling  11/09/2019    Flu vaccine (1) 09/01/2021    DTaP/Tdap/Td vaccine (3 - Tdap) 06/15/2025    Shingles Vaccine  Completed    Pneumococcal 65+ years Vaccine  Completed    COVID-19 Vaccine  Completed    Hepatitis A vaccine  Aged Out    Hepatitis B vaccine  Aged Out    Hib vaccine  Aged Out    Meningococcal (ACWY) vaccine  Aged Out     Recommendations for [employer] Due: see orders and patient instructions/AVS.  . Recommended screening schedule for the next 5-10 years is provided to the patient in written form: see Patient Claudene Providence was seen today for medicare aw. Labs reviewed  Diagnoses and all orders for this visit:  Routine general medical examination at a health care facility  Centrilobular emphysema [facility])  Prostate CA [facility])  Encounter Diagnoses   Name Primary?     [facility]rilobular [facility]sema ([facility] [address]     Prostate CA ([facility] [address]     Routine general medical examination at a health care facility Yes    Esophageal dysphagia     Mixed hyperlipidemia     Essential hypertension     Gastroesophageal reflux disease without esophagitis     Moderate COPD (chronic obstructive pulmonary disease) (HCC)    Frequent uti - on antibiotic suppression  IMM UTD    covid booster in Oct should be considered  Gets new shot rather than lupron form urol

## 2021-08-19 NOTE — PATIENT INSTRUCTIONS
Personalized Preventive Plan for Tavon Ramírez - 8/19/2021  Medicare offers a range of preventive health benefits. Some of the tests and screenings are paid in full while other may be subject to a deductible, co-insurance, and/or copay. Some of these benefits include a comprehensive review of your medical history including lifestyle, illnesses that may run in your family, and various assessments and screenings as appropriate. After reviewing your medical record and screening and assessments performed today your provider may have ordered immunizations, labs, imaging, and/or referrals for you. A list of these orders (if applicable) as well as your Preventive Care list are included within your After Visit Summary for your review. Other Preventive Recommendations:    · A preventive eye exam performed by an eye specialist is recommended every 1-2 years to screen for glaucoma; cataracts, macular degeneration, and other eye disorders. · A preventive dental visit is recommended every 6 months. · Try to get at least 150 minutes of exercise per week or 10,000 steps per day on a pedometer . · Order or download the FREE \"Exercise & Physical Activity: Your Everyday Guide\" from [facility] [employer]. Call 4-736.680.6111 or search [facility] [employer] online. · You need 0792-4007 mg of calcium and 5746-9521 IU of vitamin D per day. It is possible to meet your calcium requirement with diet alone, but a vitamin D supplement is usually necessary to meet this goal.  · When exposed to the sun, use a sunscreen that protects against both UVA and UVB radiation with an SPF of 30 or greater. Reapply every 2 to 3 hours or after sweating, drying off with a towel, or swimming. · Always wear a seat belt when traveling in a car. Always wear a helmet when riding a bicycle or motorcycle.

## 2021-09-08 RX ORDER — AMLODIPINE BESYLATE 10 MG/1
TABLET ORAL
Qty: 90 TABLET | Refills: 3 | Status: SHIPPED | OUTPATIENT
Start: 2021-09-08 | End: 2022-08-31

## 2021-09-08 NOTE — TELEPHONE ENCOUNTER
Medication:   Requested Prescriptions     Pending Prescriptions Disp Refills    amLODIPine (NORVASC) 10 MG tablet [Pharmacy Med Name: AMLODIPINE BESYLATE 10MG TABLETS] 90 tablet 3     Sig: TAKE 1 TABLET BY MOUTH EVERY DAY       Last Filled:  9/2/20 #90, 3 RF     Patient Phone Number: [phone] (home)     Last appt: 8/19/2021  AWV   Next appt: Visit date not found    Lab Results   Component Value Date     08/12/2021    K 4.3 08/12/2021     08/12/2021    CO2 28 08/12/2021    BUN 14 08/12/2021    CREATININE 0.8 08/12/2021    GLUCOSE 100 (H) 08/12/2021    CALCIUM 9.2 08/12/2021    PROT 6.6 08/12/2021    LABALBU 4.6 08/12/2021    BILITOT 1.0 08/12/2021    ALKPHOS 80 08/12/2021    AST 18 08/12/2021    ALT 18 08/12/2021    LABGLOM >60 08/12/2021    GFRAA >60 08/12/2021    AGRATIO 2.3 (H) 08/12/2021    GLOB 2.0 08/12/2021

## 2021-10-01 ENCOUNTER — OFFICE VISIT (OUTPATIENT)
Dept: PULMONOLOGY | Age: 81
End: 2021-10-01
Payer: MEDICARE

## 2021-10-01 VITALS — HEART RATE: 86 BPM | OXYGEN SATURATION: 95 %

## 2021-10-01 DIAGNOSIS — K21.9 GASTROESOPHAGEAL REFLUX DISEASE WITHOUT ESOPHAGITIS: ICD-10-CM

## 2021-10-01 DIAGNOSIS — J44.9 MODERATE COPD (CHRONIC OBSTRUCTIVE PULMONARY DISEASE) (HCC): Primary | ICD-10-CM

## 2021-10-01 DIAGNOSIS — J43.2 CENTRILOBULAR EMPHYSEMA (HCC): ICD-10-CM

## 2021-10-01 PROCEDURE — G8926 SPIRO NO PERF OR DOC: HCPCS | Performed by: INTERNAL MEDICINE

## 2021-10-01 PROCEDURE — 1036F TOBACCO NON-USER: CPT | Performed by: INTERNAL MEDICINE

## 2021-10-01 PROCEDURE — 1123F ACP DISCUSS/DSCN MKR DOCD: CPT | Performed by: INTERNAL MEDICINE

## 2021-10-01 PROCEDURE — 3023F SPIROM DOC REV: CPT | Performed by: INTERNAL MEDICINE

## 2021-10-01 PROCEDURE — G8420 CALC BMI NORM PARAMETERS: HCPCS | Performed by: INTERNAL MEDICINE

## 2021-10-01 PROCEDURE — 4040F PNEUMOC VAC/ADMIN/RCVD: CPT | Performed by: INTERNAL MEDICINE

## 2021-10-01 PROCEDURE — G8427 DOCREV CUR MEDS BY ELIG CLIN: HCPCS | Performed by: INTERNAL MEDICINE

## 2021-10-01 PROCEDURE — 99213 OFFICE O/P EST LOW 20 MIN: CPT | Performed by: INTERNAL MEDICINE

## 2021-10-01 PROCEDURE — G8484 FLU IMMUNIZE NO ADMIN: HCPCS | Performed by: INTERNAL MEDICINE

## 2021-10-01 RX ORDER — CIPROFLOXACIN 250 MG/1
250 TABLET, FILM COATED ORAL
Status: ON HOLD | COMMUNITY
End: 2022-01-03

## 2021-10-01 RX ORDER — ASPIRIN 81 MG/1
81 TABLET ORAL DAILY
COMMUNITY

## 2021-10-01 RX ORDER — DIPHENHYDRAMINE HCL 25 MG
25 TABLET ORAL EVERY 6 HOURS PRN
COMMUNITY
End: 2022-08-19

## 2021-10-01 NOTE — PROGRESS NOTES
[facility]  Follow Up Note  Phoebe Sanabria MD       Luke Hernandez   YOB: 1940    Date of Visit:  10/1/2021    Assessment/Plan:  1. Moderate COPD (chronic obstructive pulmonary disease) ([address]  PFT 8/18:  Spirometry:  Flow volume loops were obstructed. The FEV-1/FVC ratio was   decreased. The FEV-1 was 1.52 liters (52% of predicted), which   was moderately decreased. The FVC was 2.9 liters (71% of   predicted), which was decreased. Response to inhaled   bronchodilators (albuterol) was significant. Lung volumes:  Lung volumes were tested by plethysmography. The total lung   capacity was 6.5 liters (102% of predicted), which was normal.   The residual volume was 3.35 liters (124% of predicted), which   was increased. The ratio of residual volume to total lung   capacity (RV/TLC) was 52, which was increased. Diffusion capacity was found to be mildly decreased.       Interpretation:   moderate obstructive airway disease with significant   bronchodilator reversibility. Compared to study done in 8/2016,   FEV1 has decreased by 14%, FVC improved by 4% and DLCO decreased   by 12%. Clinical correlation is recommended    Symbicort  Duoneb  Albuterol     2. Centrilobular emphysema ([address]  CT finding    3. Gastroesophageal reflux disease without esophagitis  On Prilosec    He is vaccinated against COVID and is eligible for the booster. Also will get flu shot    Chief Complaint   Patient presents with    Shortness of Breath     1 year       HPI  The patient presents with a chief complaint of moderate shortness of breath related to mild COPD of many years duration. He has mild associated cough. Exertion is a modifying factor. He has not had any       Review of Systems  No Chest pain, Nausea or vomiting reported    History  I have reviewed past medical, surgical, social and family history. This is documented elsewhere in the medical record.      Physical Exam:  Well developed, well nourished  Alert and oriented  Sclera is clear  No cervical adenopathy  No JVD. Chest examination is clear. Cardiac examination reveals regular rate and rhythm without murmur, gallop or rub. The abdomen is soft, nontender and nondistended. There is no clubbing, cyanosis or edema of the extremities. There is no obvious skin rash. No focal neuro deficicts  Normal mood and affect    Allergies   Allergen Reactions    Lisinopril      cough     Prior to Visit Medications    Medication Sig Taking?  Authorizing Provider   aspirin 81 MG EC tablet Take 81 mg by mouth daily Yes Historical Provider, MD   ciprofloxacin (CIPRO) 250 MG tablet Take 250 mg by mouth three times a week Yes Historical Provider, MD   Calcium-Magnesium-Vitamin D (CALCIUM 1200+D3 PO) Take by mouth Yes Historical Provider, MD   Probiotic Product (PROBIOTIC DAILY PO) Take by mouth Yes Historical Provider, MD   diphenhydrAMINE (BENADRYL) 25 MG tablet Take 25 mg by mouth every 6 hours as needed for Itching Yes Historical Provider, MD   amLODIPine (NORVASC) 10 MG tablet TAKE 1 TABLET BY MOUTH EVERY DAY Yes Pepito Alexander MD   ferrous sulfate (SLOW FE) 142 (45 Fe) MG extended release tablet Take by mouth Yes Historical Provider, MD   Omega-3 Fatty Acids (FISH OIL) 1000 MG CAPS by NOT APPLICABLE route Yes Historical Provider, MD   loperamide (IMODIUM A-D) 2 MG tablet Take by mouth Yes Historical Provider, MD   leuprolide acetate, 4 Month, (ELIGARD) 30 MG injection Inject 30 mg into the skin once Yes Historical Provider, MD   pantoprazole (PROTONIX) 40 MG tablet TAKE 1 TABLET BY MOUTH EVERY MORNING BEFORE BREAKFAST Yes Pepito Alexander MD   metoprolol succinate (TOPROL XL) 25 MG extended release tablet GIVE \"KIARA\" 1 TABLET BY MOUTH DAILY Yes Pepito Alexander MD   albuterol sulfate  (90 Base) MCG/ACT inhaler INHALE 2 PUFFS INTO THE LUNGS EVERY 6 HOURS AS NEEDED FOR WHEEZING Yes Gilford Sia, MD budesonide-formoterol (SYMBICORT) 160-4.5 MCG/ACT AERO INHALE 2 PUFFS BY MOUTH TWICE DAILY Yes Sabine Cornelius MD   triamcinolone (KENALOG) 0.1 % ointment  Yes Historical Provider, MD   vitamin E 400 UNIT capsule Take 400 Units by mouth daily. Yes Historical Provider, MD   nitrofurantoin (MACRODANTIN) 100 MG capsule TAKE 1 CAPSULE BY MOUTH 2 TIMES A WEEK  Patient not taking: Reported on 10/1/2021  Historical Provider, MD       Vitals:    10/01/21 1318   Pulse: 86   SpO2: 95%     There is no height or weight on file to calculate BMI.      Wt Readings from Last 3 Encounters:   21 171 lb (77.6 kg)   20 177 lb (80.3 kg)   20 170 lb (77.1 kg)     BP Readings from Last 3 Encounters:   21 119/71   20 110/74   20 138/60        Social History     Tobacco Use   Smoking Status Former Smoker    Packs/day: 0.50    Years: 30.00    Pack years: 15.00    Types: Cigarettes    Quit date: 1987    Years since quittin.1   Smokeless Tobacco Never Used

## 2021-10-05 LAB — PROSTATE SPECIFIC ANTIGEN: 5.1 NG/ML (ref 0–4)

## 2021-10-09 DIAGNOSIS — I10 ESSENTIAL HYPERTENSION: ICD-10-CM

## 2021-10-11 RX ORDER — METOPROLOL SUCCINATE 25 MG/1
TABLET, EXTENDED RELEASE ORAL
Qty: 90 TABLET | Refills: 2 | Status: SHIPPED | OUTPATIENT
Start: 2021-10-11 | End: 2022-08-01

## 2021-10-11 NOTE — TELEPHONE ENCOUNTER
Medication:   Requested Prescriptions     Pending Prescriptions Disp Refills    metoprolol succinate (TOPROL XL) 25 MG extended release tablet [Pharmacy Med Name: METOPROLOL ER SUCCINATE 25MG TABS] 90 tablet 0     Sig: TAKE 1 TABLET BY MOUTH EVERY DAY       Last Filled:  07/14/2021    Patient Phone Number: [phone] (home)     Last appt: 8/19/2021   Next appt: Visit date not found    Lab Results   Component Value Date     08/12/2021    K 4.3 08/12/2021     08/12/2021    CO2 28 08/12/2021    BUN 14 08/12/2021    CREATININE 0.8 08/12/2021    GLUCOSE 100 (H) 08/12/2021    CALCIUM 9.2 08/12/2021    PROT 6.6 08/12/2021    LABALBU 4.6 08/12/2021    BILITOT 1.0 08/12/2021    ALKPHOS 80 08/12/2021    AST 18 08/12/2021    ALT 18 08/12/2021    LABGLOM >60 08/12/2021    GFRAA >60 08/12/2021    AGRATIO 2.3 (H) 08/12/2021    GLOB 2.0 08/12/2021

## 2021-10-22 RX ORDER — PANTOPRAZOLE SODIUM 40 MG/1
TABLET, DELAYED RELEASE ORAL
Qty: 90 TABLET | Refills: 0 | Status: SHIPPED | OUTPATIENT
Start: 2021-10-22 | End: 2022-01-17

## 2021-11-04 ENCOUNTER — IMMUNIZATION (OUTPATIENT)
Dept: PULMONOLOGY | Age: 81
End: 2021-11-04
Payer: MEDICARE

## 2021-11-04 DIAGNOSIS — Z23 NEED FOR INFLUENZA VACCINATION: Primary | ICD-10-CM

## 2021-11-04 PROCEDURE — G0008 ADMIN INFLUENZA VIRUS VAC: HCPCS | Performed by: INTERNAL MEDICINE

## 2021-11-04 PROCEDURE — 90694 VACC AIIV4 NO PRSRV 0.5ML IM: CPT | Performed by: INTERNAL MEDICINE

## 2021-11-08 ENCOUNTER — HOSPITAL ENCOUNTER (OUTPATIENT)
Dept: GENERAL RADIOLOGY | Age: 81
Discharge: HOME | End: 2021-11-08
Payer: MEDICARE

## 2021-11-08 DIAGNOSIS — R13.14 DYSPHAGIA, PHARYNGOESOPHAGEAL PHASE: ICD-10-CM

## 2021-11-08 PROCEDURE — 74220 X-RAY XM ESOPHAGUS 1CNTRST: CPT

## 2021-12-31 ENCOUNTER — ANESTHESIA EVENT (OUTPATIENT)
Dept: ENDOSCOPY | Age: 81
End: 2021-12-31
Payer: MEDICARE

## 2022-01-03 ENCOUNTER — ANESTHESIA (OUTPATIENT)
Dept: ENDOSCOPY | Age: 82
End: 2022-01-03
Payer: MEDICARE

## 2022-01-03 ENCOUNTER — HOSPITAL ENCOUNTER (OUTPATIENT)
Age: 82
Setting detail: OUTPATIENT SURGERY
Discharge: HOME | End: 2022-01-03
Attending: INTERNAL MEDICINE | Admitting: INTERNAL MEDICINE
Payer: MEDICARE

## 2022-01-03 VITALS
WEIGHT: 160 LBS | HEART RATE: 75 BPM | HEIGHT: 70 IN | DIASTOLIC BLOOD PRESSURE: 82 MMHG | OXYGEN SATURATION: 100 % | RESPIRATION RATE: 16 BRPM | BODY MASS INDEX: 22.9 KG/M2 | SYSTOLIC BLOOD PRESSURE: 138 MMHG

## 2022-01-03 VITALS — OXYGEN SATURATION: 98 % | SYSTOLIC BLOOD PRESSURE: 133 MMHG | DIASTOLIC BLOOD PRESSURE: 63 MMHG

## 2022-01-03 DIAGNOSIS — R10.13 EPIGASTRIC PAIN: ICD-10-CM

## 2022-01-03 PROCEDURE — 6360000002 HC RX W HCPCS: Performed by: NURSE ANESTHETIST, CERTIFIED REGISTERED

## 2022-01-03 PROCEDURE — 2709999900 HC NON-CHARGEABLE SUPPLY: Performed by: INTERNAL MEDICINE

## 2022-01-03 PROCEDURE — 7100000011 HC PHASE II RECOVERY - ADDTL 15 MIN: Performed by: INTERNAL MEDICINE

## 2022-01-03 PROCEDURE — 3609013500 HC EGD REMOVAL TUMOR POLYP/OTHER LESION SNARE TECH: Performed by: INTERNAL MEDICINE

## 2022-01-03 PROCEDURE — 3700000000 HC ANESTHESIA ATTENDED CARE: Performed by: INTERNAL MEDICINE

## 2022-01-03 PROCEDURE — 3700000001 HC ADD 15 MINUTES (ANESTHESIA): Performed by: INTERNAL MEDICINE

## 2022-01-03 PROCEDURE — 88305 TISSUE EXAM BY PATHOLOGIST: CPT

## 2022-01-03 PROCEDURE — 2580000003 HC RX 258: Performed by: ANESTHESIOLOGY

## 2022-01-03 PROCEDURE — 2720000010 HC SURG SUPPLY STERILE: Performed by: INTERNAL MEDICINE

## 2022-01-03 PROCEDURE — 7100000010 HC PHASE II RECOVERY - FIRST 15 MIN: Performed by: INTERNAL MEDICINE

## 2022-01-03 PROCEDURE — 2500000003 HC RX 250 WO HCPCS: Performed by: NURSE ANESTHETIST, CERTIFIED REGISTERED

## 2022-01-03 PROCEDURE — 3609015300 HC ESOPHAGEAL DILATION MALONEY: Performed by: INTERNAL MEDICINE

## 2022-01-03 RX ORDER — SODIUM CHLORIDE, SODIUM LACTATE, POTASSIUM CHLORIDE, CALCIUM CHLORIDE 600; 310; 30; 20 MG/100ML; MG/100ML; MG/100ML; MG/100ML
INJECTION, SOLUTION INTRAVENOUS CONTINUOUS
Status: DISCONTINUED | OUTPATIENT
Start: 2022-01-03 | End: 2022-01-03 | Stop reason: HOSPADM

## 2022-01-03 RX ORDER — PROPOFOL 10 MG/ML
INJECTION, EMULSION INTRAVENOUS CONTINUOUS PRN
Status: DISCONTINUED | OUTPATIENT
Start: 2022-01-03 | End: 2022-01-03 | Stop reason: SDUPTHER

## 2022-01-03 RX ORDER — LIDOCAINE HYDROCHLORIDE 10 MG/ML
INJECTION, SOLUTION INFILTRATION; PERINEURAL PRN
Status: DISCONTINUED | OUTPATIENT
Start: 2022-01-03 | End: 2022-01-03 | Stop reason: SDUPTHER

## 2022-01-03 RX ORDER — PROPOFOL 10 MG/ML
INJECTION, EMULSION INTRAVENOUS PRN
Status: DISCONTINUED | OUTPATIENT
Start: 2022-01-03 | End: 2022-01-03 | Stop reason: SDUPTHER

## 2022-01-03 RX ADMIN — LIDOCAINE HYDROCHLORIDE 60 MG: 10 INJECTION, SOLUTION INFILTRATION; PERINEURAL at 13:56

## 2022-01-03 RX ADMIN — SODIUM CHLORIDE, POTASSIUM CHLORIDE, SODIUM LACTATE AND CALCIUM CHLORIDE: 600; 310; 30; 20 INJECTION, SOLUTION INTRAVENOUS at 12:50

## 2022-01-03 RX ADMIN — PROPOFOL 100 MG: 10 INJECTION, EMULSION INTRAVENOUS at 13:56

## 2022-01-03 RX ADMIN — PROPOFOL 50 MG: 10 INJECTION, EMULSION INTRAVENOUS at 13:58

## 2022-01-03 RX ADMIN — PROPOFOL 125 MCG/KG/MIN: 10 INJECTION, EMULSION INTRAVENOUS at 13:56

## 2022-01-03 ASSESSMENT — PULMONARY FUNCTION TESTS
PIF_VALUE: 1
PIF_VALUE: 1
PIF_VALUE: 0
PIF_VALUE: 0
PIF_VALUE: 1
PIF_VALUE: 0
PIF_VALUE: 1
PIF_VALUE: 0
PIF_VALUE: 1

## 2022-01-03 ASSESSMENT — COPD QUESTIONNAIRES: CAT_SEVERITY: MODERATE

## 2022-01-03 ASSESSMENT — PAIN - FUNCTIONAL ASSESSMENT: PAIN_FUNCTIONAL_ASSESSMENT: 0-10

## 2022-01-03 ASSESSMENT — PAIN SCALES - GENERAL
PAINLEVEL_OUTOF10: 0
PAINLEVEL_OUTOF10: 0

## 2022-01-03 ASSESSMENT — LIFESTYLE VARIABLES: SMOKING_STATUS: 0

## 2022-01-03 NOTE — H&P
Gastroenterology Note             Pre-operative History and Physical    Patient: Jaydon Solano  : 1940  CSN: [id number]    History Obtained From:  patient and/or guardian. HISTORY OF PRESENT ILLNESS:    The patient is a 78 y.o. male  here for esophageal dysphagia. Past Medical History:    Past Medical History:   Diagnosis Date    Carotid artery occlusion and stenosis 5/3/2012    Centrilobular emphysema ([address] 2014    COPD (chronic obstructive pulmonary disease) ([address]     ED (erectile dysfunction)     Emphysema lung ([address] 2014    GERD (gastroesophageal reflux disease)     Hyperlipidemia     pt denies    Hypertension     Hypertension     Prostate CA ([address] 2003    surgery then radiation    [facility]     Psoriasis     Pulmonary nodules     Radiation 2004    Urinary incontinence      Past Surgical History:    Past Surgical History:   Procedure Laterality Date    CAROTID ENDARTERECTOMY  5/3/12    left    CAROTID ENDARTERECTOMY Right 10/25/2017    Dr. Jonathan Swartz at [address] [facility], [address]  2018    Intraoperative Cholangiogram    COLONOSCOPY      CYSTOSCOPY  2017    ENDOSCOPY, COLON, DIAGNOSTIC      PROSTATECTOMY      SKIN BIOPSY      melanoma left arm    TONSILLECTOMY       Medications Prior to Admission:   No current facility-administered medications on file prior to encounter.      Current Outpatient Medications on File Prior to Encounter   Medication Sig Dispense Refill    pantoprazole (PROTONIX) 40 MG tablet TAKE 1 TABLET BY MOUTH EVERY MORNING BEFORE BREAKFAST 90 tablet 0    metoprolol succinate (TOPROL XL) 25 MG extended release tablet TAKE 1 TABLET BY MOUTH EVERY DAY 90 tablet 2    aspirin 81 MG EC tablet Take 81 mg by mouth daily      Calcium-Magnesium-Vitamin D (CALCIUM 1200+D3 PO) Take by mouth      Probiotic Product (PROBIOTIC DAILY PO) Take by mouth      diphenhydrAMINE (BENADRYL) 25 MG tablet Take 25 mg by Health  1/3/2022    Please note that some or all of this record was generated using voice recognition software. If there are any questions about the content of this document, please contact the author as some errors in translation may have occurred.

## 2022-01-03 NOTE — ANESTHESIA POSTPROCEDURE EVALUATION
Department of Anesthesiology  Postprocedure Note    Patient: Micheal Pope  MRN: [id number]  YOB: 1940  Date of evaluation: 1/3/2022  Time:  5:08 PM     Procedure Summary     Date: 01/03/22 Room / Location: [facility]    Anesthesia Start: 2905 Anesthesia Stop: 0981    Procedures:       ESOPHAGEAL DILATION Roxyyareli Rain (N/A )      EGD POLYP SNARE (N/A ) Diagnosis:       Epigastric pain      (Epigastric pain [R10.13])    Surgeons: Tho Singh MD Responsible Provider: Carmen Brizuela MD    Anesthesia Type: MAC ASA Status: 3          Anesthesia Type: MAC    Mabel Phase I: Mabel Score: 10    Mabel Phase II: Mabel Score: 10    Last vitals: Reviewed and per EMR flowsheets.        Anesthesia Post Evaluation    Patient location during evaluation: PACU  Patient participation: complete - patient participated  Level of consciousness: awake and alert  Pain score: 0  Airway patency: patent  Nausea & Vomiting: no nausea and no vomiting  Complications: no  Cardiovascular status: hemodynamically stable  Respiratory status: acceptable  Hydration status: euvolemic

## 2022-01-03 NOTE — ANESTHESIA PRE PROCEDURE
Department of Anesthesiology  Preprocedure Note       Name:  Jaydon Solano   Age:  78 y.o.  :  1940                                          MRN:  [id number]         Date:  1/3/2022      Surgeon: Sadi Delgado):  Tho Crocker MD    Procedure: Procedure(s):  ESOPHAGOGASTRODUODENOSCOPY    Medications prior to admission:   Prior to Admission medications    Medication Sig Start Date End Date Taking?  Authorizing Provider   pantoprazole (PROTONIX) 40 MG tablet TAKE 1 TABLET BY MOUTH EVERY MORNING BEFORE BREAKFAST 10/22/21   Goran Butterfield MD   metoprolol succinate (TOPROL XL) 25 MG extended release tablet TAKE 1 TABLET BY MOUTH EVERY DAY 10/11/21   Goran Butterfield MD   aspirin 81 MG EC tablet Take 81 mg by mouth daily    Historical Provider, MD   ciprofloxacin (CIPRO) 250 MG tablet Take 250 mg by mouth three times a week    Historical Provider, MD   Calcium-Magnesium-Vitamin D (CALCIUM 1200+D3 PO) Take by mouth    Historical Provider, MD   Probiotic Product (PROBIOTIC DAILY PO) Take by mouth    Historical Provider, MD   diphenhydrAMINE (BENADRYL) 25 MG tablet Take 25 mg by mouth every 6 hours as needed for Itching    Historical Provider, MD   amLODIPine (NORVASC) 10 MG tablet TAKE 1 TABLET BY MOUTH EVERY DAY 21   Goran Butterfield MD   ferrous sulfate (SLOW FE) 142 (45 Fe) MG extended release tablet Take by mouth    Historical Provider, MD   Omega-3 Fatty Acids (FISH OIL) 1000 MG CAPS by NOT APPLICABLE route    Historical Provider, MD   loperamide (IMODIUM A-D) 2 MG tablet Take by mouth    Historical Provider, MD   nitrofurantoin (MACRODANTIN) 100 MG capsule TAKE 1 CAPSULE BY MOUTH 2 TIMES A WEEK  Patient not taking: Reported on 10/1/2021 6/22/21   Historical Provider, MD   leuprolide acetate, 4 Month, (ELIGARD) 30 MG injection Inject 30 mg into the skin once    Historical Provider, MD   albuterol sulfate  (90 Base) MCG/ACT inhaler INHALE 2 PUFFS INTO THE LUNGS EVERY 6 HOURS AS NEEDED FOR WHEEZING 5/27/21   Deneen Jacob MD   budesonide-formoterol [facility]) 160-4.5 MCG/ACT AERO INHALE 2 PUFFS BY MOUTH TWICE DAILY 4/29/21   Deneen Jacob MD   triamcinolone (KENALOG) 0.1 % ointment  12/13/17   Historical Provider, MD   vitamin E 400 UNIT capsule Take 400 Units by mouth daily. Historical Provider, MD       Current medications:    No current facility-administered medications for this encounter. Allergies:     Allergies   Allergen Reactions    Lisinopril      cough       Problem List:    Patient Active Problem List   Diagnosis Code    Psoriasis L40.9    Prostate CA ([address] C61    GERD (gastroesophageal reflux disease) K21.9    ED (erectile dysfunction) N52.9    Hypertension I10    Hyperlipidemia E78.5    Occlusion and stenosis of carotid artery I65.29    Centrilobular emphysema (HCC) J43.2    Hypoxia R09.02    Pulmonary nodules R91.8    Moderate COPD (chronic obstructive pulmonary disease) (HCC) J44.9    Hematuria R31.9    Cystitis N30.90    Atherosclerosis of both carotid arteries I65.23    Abdominal pain O02.3    Biliary colic Z24.21    Abdominal pain, epigastric R10.13    Acute cholecystitis K81.0    Cholecystitis K81.9    Hospital-acquired pneumonia J18.9, Y95    Failure of outpatient treatment Z78.9    Pneumonia J18.9    Carotid artery occlusion and stenosis I65.29       Past Medical History:        Diagnosis Date    Carotid artery occlusion and stenosis 5/3/2012    Centrilobular emphysema ([address] 8/8/2014    COPD (chronic obstructive pulmonary disease) ([address]     ED (erectile dysfunction)     Emphysema lung ([address] 8/8/2014    GERD (gastroesophageal reflux disease)     Hyperlipidemia     pt denies    Hypertension     Hypertension     Prostate CA ([address] 2003    Prostate CA ([address]     Psoriasis     Pulmonary nodules     Radiation 2004       Past Surgical History:        Procedure Laterality Date    CAROTID ENDARTERECTOMY  5/3/12    left    CAROTID ENDARTERECTOMY Right 10/25/2017    Dr. Flakita Barbosa at [facility]    [address], [address]  2018    Intraoperative Cholangiogram    COLONOSCOPY      CYSTOSCOPY  2017    ENDOSCOPY, COLON, DIAGNOSTIC      PROSTATECTOMY  2003    TONSILLECTOMY         Social History:    Social History     Tobacco Use    Smoking status: Former Smoker     Packs/day: 0.50     Years: 30.00     Pack years: 15.00     Types: Cigarettes     Quit date: 1987     Years since quittin.3    Smokeless tobacco: Never Used   Substance Use Topics    Alcohol use: Yes     Comment: FEW BEERS PER WEEK                                Counseling given: Not Answered      Vital Signs (Current):   Vitals:    22 1203   BP: (!) 147/78   Pulse: 89   Resp: 16   SpO2: 99%   Weight: 160 lb (72.6 kg)   Height: 5' 10\" (1.778 m)                                              BP Readings from Last 3 Encounters:   22 (!) 147/78   21 119/71   20 110/74       NPO Status: Time of last liquid consumption: 230                        Time of last solid consumption: 2300                                                      BMI:   Wt Readings from Last 3 Encounters:   22 160 lb (72.6 kg)   21 171 lb (77.6 kg)   20 177 lb (80.3 kg)     Body mass index is 22.96 kg/m².     CBC:   Lab Results   Component Value Date    WBC 6.3 2021    RBC 4.05 2021    HGB 13.2 2021    HCT 38.9 2021    MCV 95.9 2021    RDW 13.3 2021     2021       CMP:   Lab Results   Component Value Date     2021    K 4.3 2021    K 3.7 12/10/2018     2021    CO2 28 2021    BUN 14 2021    CREATININE 0.8 2021    GFRAA >60 2021    GFRAA >60 2012    AGRATIO 2.3 2021    LABGLOM >60 2021    LABGLOM 73 10/14/2016    GLUCOSE 100 2021    GLUCOSE 102 2012    PROT 6.6 2021    PROT 7.1 2011    CALCIUM 9.2 2021 BILITOT 1.0 08/12/2021    ALKPHOS 80 08/12/2021    AST 18 08/12/2021    ALT 18 08/12/2021       POC Tests: No results for input(s): POCGLU, POCNA, POCK, POCCL, POCBUN, POCHEMO, POCHCT in the last 72 hours. Coags:   Lab Results   Component Value Date    PROTIME 12.0 12/14/2018    INR 1.05 12/14/2018    APTT 27.5 02/11/2018       HCG (If Applicable): No results found for: PREGTESTUR, PREGSERUM, HCG, HCGQUANT     ABGs: No results found for: PHART, PO2ART, YJD3YRV, FST6MPL, BEART, E0PADBYR     Type & Screen (If Applicable):  Lab Results   Component Value Date    LABABO A 05/01/2012    [address] Positive 05/01/2012       Drug/Infectious Status (If Applicable):  No results found for: HIV, HEPCAB    COVID-19 Screening (If Applicable): No results found for: COVID19        Anesthesia Evaluation  Patient summary reviewed and Nursing notes reviewed no history of anesthetic complications:   Airway: Mallampati: II  TM distance: >3 FB   Neck ROM: full  Mouth opening: > = 3 FB Dental:    (+) implants      Pulmonary: breath sounds clear to auscultation  (+) COPD: moderate,      (-) not a current smoker (remote hx )                           Cardiovascular:  Exercise tolerance: good (>4 METS),   (+) hypertension:,     (-) past MI and  GUTHRIE    NYHA Classification: II    Rhythm: regular  Rate: normal           Beta Blocker:  Dose within 24 Hrs      ROS comment:        Neuro/Psych:   (+) TIA (5 yrs  right    underwent CEA ),    (-) CVA           GI/Hepatic/Renal:   (+) GERD: well controlled,          ROS comment: Intermittent dysphagia . Endo/Other: Negative Endo/Other ROS                    Abdominal:             Vascular: Other Findings:             Anesthesia Plan      MAC     ASA 3       Induction: intravenous. MIPS: Prophylactic antiemetics administered. Anesthetic plan and risks discussed with patient. Plan discussed with CRNA.     Attending anesthesiologist reviewed and agrees with Preprocedure content              Scout Garcia,    1/3/2022

## 2022-01-03 NOTE — PROCEDURES
Endoscopy Note    Patient: Dale Reed  : 1940  CSN: [id number]    Procedure: Esophagogastroduodenoscopy with esophageal dilation, polypectomy    Date:  1/3/2022     Surgeon:  Barry Ariza MD     Referring Physician:  Pamela Haro MD    Preoperative Diagnosis:  Epigastric pain [R10.13]    Postoperative Diagnosis:  * No post-op diagnosis entered *    Anesthesia:  MAC    EBL: minimal to none. Indications: This is a 78y.o. year old male who presents today with dysphagia. Description of Procedure:  Informed consent was obtained from the patient after explanation of indications, benefits and possible risks and complications of the procedure. The patient was then taken to the endoscopy suite, placed in the left lateral decubitus position and the above IV sedation was administrered. The Olympus video endoscope was passed through the hypopharynx into the esophagus. The scope was advanced all the way to the second portion of the duodenum. The GE junction was at approximately 38 cms. No hiatal hernia. The scope was slowly withdrawn and mucosa was carefully evaluated including a retroflex view of the proximal stomach. Multiple gastric polyps seen I the body of the stomach - size 4mm to 10mm - 3 polyps measuring 10mm or greater were removed by cold snare. Then a Yu dilator size 52F was advanced without resistance and repeat endo confirmed a intact esophagus. The patient was decompressed and the scope was then withdrawn. Gastric or Duodenal ulcer present: No    The patient tolerated the procedure well and was taken to the post anesthesia care unit in good condition. There were no immediate complications. Impression:  -  Multiple gastric body polyps  Dysphagia - probably due to esophageal spasm      Recommendations: -Continue acid suppression. Follow up biopsy results. Follow up with me as needed.     Barry Ariza MD, MD  [address]    Please note that some or all of this record was generated using voice recognition software. If there are any questions about the content of this document, please contact the author as some errors in translation may have occurred.

## 2022-03-01 ENCOUNTER — HOSPITAL ENCOUNTER (OUTPATIENT)
Dept: CT IMAGING | Age: 82
Discharge: HOME | End: 2022-03-01
Payer: MEDICARE

## 2022-03-01 ENCOUNTER — HOSPITAL ENCOUNTER (OUTPATIENT)
Dept: NUCLEAR MEDICINE | Age: 82
Discharge: HOME | End: 2022-03-01
Payer: MEDICARE

## 2022-03-01 DIAGNOSIS — C61 MALIGNANT NEOPLASM OF PROSTATE (HCC): ICD-10-CM

## 2022-03-01 PROCEDURE — 78306 BONE IMAGING WHOLE BODY: CPT

## 2022-03-01 PROCEDURE — A9503 TC99M MEDRONATE: HCPCS | Performed by: UROLOGY

## 2022-03-01 PROCEDURE — 3430000000 HC RX DIAGNOSTIC RADIOPHARMACEUTICAL: Performed by: UROLOGY

## 2022-03-01 PROCEDURE — 74176 CT ABD & PELVIS W/O CONTRAST: CPT

## 2022-03-01 RX ORDER — TC 99M MEDRONATE 20 MG/10ML
27.2 INJECTION, POWDER, LYOPHILIZED, FOR SOLUTION INTRAVENOUS
Status: COMPLETED | OUTPATIENT
Start: 2022-03-01 | End: 2022-03-01

## 2022-03-01 RX ADMIN — TC 99M MEDRONATE 27.2 MILLICURIE: 20 INJECTION, POWDER, LYOPHILIZED, FOR SOLUTION INTRAVENOUS at 07:45

## 2022-03-08 ENCOUNTER — TELEMEDICINE (OUTPATIENT)
Dept: FAMILY MEDICINE CLINIC | Age: 82
End: 2022-03-08
Payer: MEDICARE

## 2022-03-08 DIAGNOSIS — J44.1 COPD EXACERBATION (HCC): ICD-10-CM

## 2022-03-08 DIAGNOSIS — R05.9 COUGH: Primary | ICD-10-CM

## 2022-03-08 DIAGNOSIS — J44.9 MODERATE COPD (CHRONIC OBSTRUCTIVE PULMONARY DISEASE) (HCC): ICD-10-CM

## 2022-03-08 PROCEDURE — 99213 OFFICE O/P EST LOW 20 MIN: CPT | Performed by: STUDENT IN AN ORGANIZED HEALTH CARE EDUCATION/TRAINING PROGRAM

## 2022-03-08 PROCEDURE — 4040F PNEUMOC VAC/ADMIN/RCVD: CPT | Performed by: STUDENT IN AN ORGANIZED HEALTH CARE EDUCATION/TRAINING PROGRAM

## 2022-03-08 PROCEDURE — 1123F ACP DISCUSS/DSCN MKR DOCD: CPT | Performed by: STUDENT IN AN ORGANIZED HEALTH CARE EDUCATION/TRAINING PROGRAM

## 2022-03-08 PROCEDURE — G8427 DOCREV CUR MEDS BY ELIG CLIN: HCPCS | Performed by: STUDENT IN AN ORGANIZED HEALTH CARE EDUCATION/TRAINING PROGRAM

## 2022-03-08 RX ORDER — DOXYCYCLINE HYCLATE 100 MG
100 TABLET ORAL 2 TIMES DAILY
Qty: 20 TABLET | Refills: 0 | Status: SHIPPED | OUTPATIENT
Start: 2022-03-08 | End: 2022-03-18

## 2022-03-08 RX ORDER — PREDNISONE 20 MG/1
TABLET ORAL
Qty: 18 TABLET | Refills: 0 | Status: SHIPPED | OUTPATIENT
Start: 2022-03-08 | End: 2022-08-19

## 2022-03-08 NOTE — PROGRESS NOTES
[address] [facility] Note    Date: 3/8/2022      Assessment/Plan:     1. Cough  -     COVID-19; Future  2. Moderate COPD (chronic obstructive pulmonary disease) ([facility]  3. COPD exacerbation ([facility]     Orders Placed This Encounter   Medications    doxycycline hyclate (VIBRA-TABS) 100 MG tablet     Sig: Take 1 tablet by mouth 2 times daily for 10 days     Dispense:  20 tablet     Refill:  0    predniSONE (DELTASONE) 20 MG tablet     Sig: 3 tablets daily for 3 days, 2 tablets daily for 3 days, then 1 tablet daily for 3 days     Dispense:  18 tablet     Refill:  0     Orders Placed This Encounter   Procedures    COVID-19       Doxy   pred taper  Continue neb/albuterol Q4-6 hrs for 48 hrs then prn  To go to ER if chest pain or trouble breathing  COVID test  Follow up regular visit w/ PCP    Return if symptoms worsen or fail to improve. Due to the current coronavirus pandemic, this telephone/video visit was insisted, with patient's  (and/or legal guardian's) consent, to reduce the patient's risk of exposure to COVID-19 and provide necessary medical care. The patient was at home while the provider was either at home or at the clinic. Services were provided through a synchronous discussion over the telephone and/or video chat to substitute for in person clinic visit, and coded as such. The patient (and/or legal guardian) has also been advised to contact this office for worsening conditions or problems, and seek emergency medical treatment and/or call 911 if deemed necessary.                  Subjective/Objective:     Chief Complaint   Patient presents with    Congestion     using nebulizer Q6H     Nasal Congestion     thick mucus       HPI     Coughing up a lot of dark greenish phlegm  Been in hospital several times hx of pneumonia  Has a lot of runny nose and nasal drainage   Hx of COPD  Using nebulizer  No chest pain or shortness of breath, breathing is at baseline  Has albuterol at home and symbicort  Started late Sunday -3 days ago afternoon   Oxygen 94% this morning   Temp 98.8 this morning      Wt Readings from Last 3 Encounters:   01/03/22 160 lb (72.6 kg)   08/19/21 171 lb (77.6 kg)   07/22/20 177 lb (80.3 kg)     There is no height or weight on file to calculate BMI. BP Readings from Last 3 Encounters:   01/03/22 133/63   01/03/22 138/82   08/19/21 119/71     The ASCVD Risk score (Tashi Manuel., et al., 2013) failed to calculate for the following reasons:     The 2013 ASCVD risk score is only valid for ages 36 to 78    ROS: denies nausea/vomiting, fevers, chills, chest pain, diarrhea, constipation, blood in the urine or stool         Patient Active Problem List   Diagnosis    Psoriasis    Prostate CA ([address]    GERD (gastroesophageal reflux disease)    ED (erectile dysfunction)    Hypertension    Hyperlipidemia    Occlusion and stenosis of carotid artery    Centrilobular emphysema (HCC)    Hypoxia    Pulmonary nodules    Moderate COPD (chronic obstructive pulmonary disease) (HCC)    Hematuria    Cystitis    Atherosclerosis of both carotid arteries    Abdominal pain    Biliary colic    Abdominal pain, epigastric    Acute cholecystitis    Cholecystitis    Hospital-acquired pneumonia    Failure of outpatient treatment    Pneumonia    Carotid artery occlusion and stenosis     Past Medical History:   Diagnosis Date    Carotid artery occlusion and stenosis 5/3/2012    Centrilobular emphysema ([address] 8/8/2014    COPD (chronic obstructive pulmonary disease) ([address]     ED (erectile dysfunction)     Emphysema lung ([address] 8/8/2014    GERD (gastroesophageal reflux disease)     Hyperlipidemia     pt denies    Hypertension     Hypertension     Prostate CA ([address] 2003    surgery then radiation    Prostate CA ([address]     Psoriasis     Pulmonary nodules     Radiation 2004    Urinary incontinence        Past Surgical History:   Procedure Laterality Date    CAROTID ENDARTERECTOMY  5/3/12    left    CAROTID ENDARTERECTOMY Right 10/25/2017    Dr. Sadi Osorio at [address], [address]  02/12/2018    Intraoperative Cholangiogram    COLONOSCOPY      CYSTOSCOPY  07/2017    ENDOSCOPY, COLON, DIAGNOSTIC      ESOPHAGEAL DILATATION N/A 1/3/2022    ESOPHAGEAL DILATION GILL performed by Laura Holland MD at [address]  2003    SKIN BIOPSY  2021    melanoma left arm    TONSILLECTOMY      UPPER GASTROINTESTINAL ENDOSCOPY N/A 1/3/2022    EGD POLYP SNARE performed by Laura Holland MD at [address] ENDOSCOPY       Current Outpatient Medications   Medication Sig Dispense Refill    doxycycline hyclate (VIBRA-TABS) 100 MG tablet Take 1 tablet by mouth 2 times daily for 10 days 20 tablet 0    predniSONE (DELTASONE) 20 MG tablet 3 tablets daily for 3 days, 2 tablets daily for 3 days, then 1 tablet daily for 3 days 18 tablet 0    pantoprazole (PROTONIX) 40 MG tablet TAKE 1 TABLET BY MOUTH EVERY MORNING BEFORE BREAKFAST 90 tablet 0    metoprolol succinate (TOPROL XL) 25 MG extended release tablet TAKE 1 TABLET BY MOUTH EVERY DAY 90 tablet 2    aspirin 81 MG EC tablet Take 81 mg by mouth daily      Calcium-Magnesium-Vitamin D (CALCIUM 1200+D3 PO) Take by mouth      Probiotic Product (PROBIOTIC DAILY PO) Take by mouth      diphenhydrAMINE (BENADRYL) 25 MG tablet Take 25 mg by mouth every 6 hours as needed for Itching      amLODIPine (NORVASC) 10 MG tablet TAKE 1 TABLET BY MOUTH EVERY DAY 90 tablet 3    ferrous sulfate (SLOW FE) 142 (45 Fe) MG extended release tablet Take by mouth      Omega-3 Fatty Acids (FISH OIL) 1000 MG CAPS by NOT APPLICABLE route      loperamide (IMODIUM A-D) 2 MG tablet Take by mouth      leuprolide acetate, 4 Month, (ELIGARD) 30 MG injection Inject 30 mg into the skin once      albuterol sulfate  (90 Base) MCG/ACT inhaler INHALE 2 PUFFS INTO THE LUNGS EVERY 6 HOURS AS NEEDED FOR WHEEZING 25.5 g 5  budesonide-formoterol (SYMBICORT) 160-4.5 MCG/ACT AERO INHALE 2 PUFFS BY MOUTH TWICE DAILY 10.2 g 6    triamcinolone (KENALOG) 0.1 % ointment       vitamin E 400 UNIT capsule Take 400 Units by mouth daily. No current facility-administered medications for this visit. Allergies   Allergen Reactions    Lisinopril      cough       Social History     Socioeconomic History    Marital status:      Spouse name: None    Number of children: None    Years of education: None    Highest education level: None   Occupational History    Occupation: retired,    Tobacco Use    Smoking status: Former Smoker     Packs/day: 0.50     Years: 30.00     Pack years: 15.00     Types: Cigarettes     Quit date: 1987     Years since quittin.5    Smokeless tobacco: Never Used   Vaping Use    Vaping Use: Never used   Substance and Sexual Activity    Alcohol use: Yes     Comment: FEW BEERS PER WEEK    Drug use: No    Sexual activity: Yes     Partners: Female   Other Topics Concern    None   Social History Narrative    None     Social Determinants of Health     Financial Resource Strain: Low Risk     Difficulty of Paying Living Expenses: Not hard at all   Food Insecurity: No Food Insecurity    Worried About Running Out of Food in the Last Year: Never true    Brandi of Food in the Last Year: Never true   Transportation Needs:     Lack of Transportation (Medical): Not on file    Lack of Transportation (Non-Medical):  Not on file   Physical Activity:     Days of Exercise per Week: Not on file    Minutes of Exercise per Session: Not on file   Stress:     Feeling of Stress : Not on file   Social Connections:     Frequency of Communication with Friends and Family: Not on file    Frequency of Social Gatherings with Friends and Family: Not on file    Attends Yazidism Services: Not on file    Active Member of Clubs or Organizations: Not on file    Attends Club or Organization Meetings: Not on file    Marital Status: Not on file   Intimate Partner Violence:     Fear of Current or Ex-Partner: Not on file    Emotionally Abused: Not on file    Physically Abused: Not on file    Sexually Abused: Not on file   Housing Stability:     Unable to Pay for Housing in the Last Year: Not on file    Number of [place] in the Last Year: Not on file    Unstable Housing in the Last Year: Not on file     Family History   Problem Relation Age of Onset    Diabetes Mother     High Blood Pressure Mother     Heart Disease Father         myxoma    Heart Failure Father          Vitals: There were no vitals taken for this visit. Physical Exam   There were no vitals taken for this visit. GEN:  alert and pleasant, in NAD  HEENT:  NCAT, EOM intact, no facial asymmetry   NECK:  good range of motion  RR: in NAD over video, normal respiratory rate, talking in complete sentences over video  EXT: No rash or edema observed over video  NEURO: Alert oriented to person/place/date and time, normal mood and affect, able to follow commands  No focal changes over video     Orders Placed This Encounter   Procedures    COVID-19     Standing Status:   Future     Standing Expiration Date:   3/8/2023     Scheduling Instructions:      1) Due to current limited availability of the COVID-19 test, tests will be prioritized based on responses to questions above. Testing may be delayed due to volume. 2) Print and instruct patient to adhere to CDC home isolation program. (Link Above)              3) Set up or refer patient for a monitoring program.              4) Have patient sign up for and leverage MyChart (if not previously done). Order Specific Question:   Is this test for diagnosis or screening? Answer:   Diagnosis of ill patient     Order Specific Question:   Symptomatic for COVID-19 as defined by CDC?      Answer:   Yes     Order Specific Question:   Date of Symptom Onset     Answer:   3/6/2022     Order Specific Question:   Hospitalized for COVID-19? Answer:   No     Order Specific Question:   Admitted to ICU for COVID-19? Answer:   No     Order Specific Question:   Employed in healthcare setting? Answer:   No     Order Specific Question:   Resident in a congregate (group) care setting? Answer:   No     Order Specific Question:   Pregnant: Answer:   No       Jessica Irving MD    3/8/2022 1:05 PM    Documentation was done using voice recognition dragon software. Every effort was made to ensure accuracy; however, inadvertent, unintentional computerized transcription errors may be present.

## 2022-03-09 LAB — SARS-COV-2, PCR: NOT DETECTED

## 2022-03-28 RX ORDER — BUDESONIDE AND FORMOTEROL FUMARATE DIHYDRATE 160; 4.5 UG/1; UG/1
AEROSOL RESPIRATORY (INHALATION)
Qty: 30.6 G | Refills: 5 | Status: SHIPPED | OUTPATIENT
Start: 2022-03-28 | End: 2022-11-04

## 2022-03-28 RX ORDER — BUDESONIDE AND FORMOTEROL FUMARATE DIHYDRATE 160; 4.5 UG/1; UG/1
AEROSOL RESPIRATORY (INHALATION)
Qty: 30.6 G | Refills: 5 | OUTPATIENT
Start: 2022-03-28

## 2022-03-28 RX ORDER — BUDESONIDE AND FORMOTEROL FUMARATE DIHYDRATE 160; 4.5 UG/1; UG/1
AEROSOL RESPIRATORY (INHALATION)
Qty: 10.2 G | Refills: 6 | Status: SHIPPED | OUTPATIENT
Start: 2022-03-28 | End: 2022-03-28

## 2022-04-21 RX ORDER — PANTOPRAZOLE SODIUM 40 MG/1
TABLET, DELAYED RELEASE ORAL
Qty: 90 TABLET | Refills: 3 | Status: SHIPPED | OUTPATIENT
Start: 2022-04-21

## 2022-04-21 NOTE — TELEPHONE ENCOUNTER
Medication:   Requested Prescriptions     Pending Prescriptions Disp Refills    pantoprazole (PROTONIX) 40 MG tablet [Pharmacy Med Name: PANTOPRAZOLE 40MG TABLETS] 90 tablet 0     Sig: TAKE 1 TABLET BY MOUTH EVERY MORNING BEFORE BREAKFAST        Last Filled:  90 x 0 RF01/17/22    Patient Phone Number: [phone] (home)     Last appt: 3/8/2022   Next appt: Visit date not found    Last OARRS: No flowsheet data found.

## 2022-05-05 ENCOUNTER — TELEPHONE (OUTPATIENT)
Dept: SURGERY | Age: 82
End: 2022-05-05

## 2022-05-05 NOTE — TELEPHONE ENCOUNTER
Spoke with patient, pt will call back to schedule a Carotid US in the [place] office after 7/8/22.

## 2022-06-09 LAB — PSA, TOTAL: 5.07 NG/ML (ref 0–4)

## 2022-07-12 ENCOUNTER — PROCEDURE VISIT (OUTPATIENT)
Dept: VASCULAR SURGERY | Age: 82
End: 2022-07-12

## 2022-07-12 DIAGNOSIS — I65.23 CAROTID ATHEROSCLEROSIS, BILATERAL: ICD-10-CM

## 2022-07-13 ENCOUNTER — TELEPHONE (OUTPATIENT)
Dept: VASCULAR SURGERY | Age: 82
End: 2022-07-13

## 2022-07-13 DIAGNOSIS — I65.23 CAROTID ATHEROSCLEROSIS, BILATERAL: Primary | ICD-10-CM

## 2022-07-13 NOTE — TELEPHONE ENCOUNTER
Left message with results of carotid duplex which shows stable carotid artery disease. Plan to repeat carotid duplex in 1 year.     Electronically signed by ADELAIDA Su CNP on 7/13/2022 at 9:15 AM

## 2022-07-30 DIAGNOSIS — I10 ESSENTIAL HYPERTENSION: ICD-10-CM

## 2022-08-01 RX ORDER — METOPROLOL SUCCINATE 25 MG/1
TABLET, EXTENDED RELEASE ORAL
Qty: 90 TABLET | Refills: 2 | Status: SHIPPED | OUTPATIENT
Start: 2022-08-01 | End: 2022-10-19

## 2022-08-01 NOTE — TELEPHONE ENCOUNTER
Medication:   Requested Prescriptions     Pending Prescriptions Disp Refills    metoprolol succinate (TOPROL XL) 25 MG extended release tablet [Pharmacy Med Name: METOPROLOL ER SUCCINATE 25MG TABS] 90 tablet 2     Sig: TAKE 1 TABLET BY MOUTH EVERY DAY       Last Filled: 10/11/2021      Patient Phone Number: [phone] (home)     Last appt: 3/8/2022   Next appt: 8/19/2022    Lab Results   Component Value Date     02/01/2022    K 4.1 02/01/2022     02/01/2022    CO2 31 02/01/2022    BUN 24 (H) 02/01/2022    CREATININE 1.0 02/01/2022    GLUCOSE 105 02/01/2022    CALCIUM 9.4 02/01/2022    PROT 7.0 02/01/2022    LABALBU 4.7 02/01/2022    BILITOT 1.8 (H) 02/01/2022    ALKPHOS 69 02/01/2022    AST 23 02/01/2022    ALT 26 02/01/2022    LABGLOM >60 08/12/2021    GFRAA >60 08/12/2021    AGRATIO 2.3 (H) 08/12/2021    GLOB 2.0 08/12/2021

## 2022-08-19 ENCOUNTER — OFFICE VISIT (OUTPATIENT)
Dept: FAMILY MEDICINE CLINIC | Age: 82
End: 2022-08-19
Payer: MEDICARE

## 2022-08-19 VITALS
HEART RATE: 81 BPM | BODY MASS INDEX: 23.1 KG/M2 | WEIGHT: 161 LBS | SYSTOLIC BLOOD PRESSURE: 165 MMHG | OXYGEN SATURATION: 96 % | DIASTOLIC BLOOD PRESSURE: 70 MMHG

## 2022-08-19 DIAGNOSIS — E78.2 MIXED HYPERLIPIDEMIA: ICD-10-CM

## 2022-08-19 DIAGNOSIS — C61 PROSTATE CA (HCC): ICD-10-CM

## 2022-08-19 DIAGNOSIS — I10 PRIMARY HYPERTENSION: ICD-10-CM

## 2022-08-19 DIAGNOSIS — J44.9 MODERATE COPD (CHRONIC OBSTRUCTIVE PULMONARY DISEASE) (HCC): Primary | ICD-10-CM

## 2022-08-19 PROCEDURE — 99214 OFFICE O/P EST MOD 30 MIN: CPT | Performed by: FAMILY MEDICINE

## 2022-08-19 PROCEDURE — 3023F SPIROM DOC REV: CPT | Performed by: FAMILY MEDICINE

## 2022-08-19 PROCEDURE — 1036F TOBACCO NON-USER: CPT | Performed by: FAMILY MEDICINE

## 2022-08-19 PROCEDURE — G8420 CALC BMI NORM PARAMETERS: HCPCS | Performed by: FAMILY MEDICINE

## 2022-08-19 PROCEDURE — G8427 DOCREV CUR MEDS BY ELIG CLIN: HCPCS | Performed by: FAMILY MEDICINE

## 2022-08-19 PROCEDURE — 1123F ACP DISCUSS/DSCN MKR DOCD: CPT | Performed by: FAMILY MEDICINE

## 2022-08-19 ASSESSMENT — PATIENT HEALTH QUESTIONNAIRE - PHQ9
SUM OF ALL RESPONSES TO PHQ QUESTIONS 1-9: 0
SUM OF ALL RESPONSES TO PHQ QUESTIONS 1-9: 0
1. LITTLE INTEREST OR PLEASURE IN DOING THINGS: 0
SUM OF ALL RESPONSES TO PHQ9 QUESTIONS 1 & 2: 0
SUM OF ALL RESPONSES TO PHQ QUESTIONS 1-9: 0
2. FEELING DOWN, DEPRESSED OR HOPELESS: 0
SUM OF ALL RESPONSES TO PHQ QUESTIONS 1-9: 0

## 2022-08-19 NOTE — PROGRESS NOTES
Ulices Pettit is a 78 y.o. male. HPI: Here for complex medical visit  Sees urology every 4 months for prostate cancer follow-up. Had radiation therapy but his PSA remains 4-5 needs on current medication now. Cystoscopy showed bladder wall scarring and damage and he does suffer from intermittent gross hematuria and the need to self cath as needed  Says his energy is good and he is not depressed  Denies any confusion or fatigue that is not to be expected at age 78  Still active at home doing projects around the house and outside in the yard  Breathing is good on Symbicort daily rarely has uses rescue inhaler  Blood pressure readings, at home are usually good with a systolic averaging 129 but is high today  Meds, vitamins and allergies reviewed with pt    ROS: No TIA's or unusual headaches, no dysphagia. No prolonged cough. No dyspnea or chest pain on exertion. No abdominal pain, change in bowel habits, black or bloody stools. No urinary tract symptoms. No new or unusual musculoskeletal symptoms. Prior to Visit Medications    Medication Sig Taking?  Authorizing Provider   metoprolol succinate (TOPROL XL) 25 MG extended release tablet TAKE 1 TABLET BY MOUTH EVERY DAY  Felipe Malhotra MD   pantoprazole (PROTONIX) 40 MG tablet TAKE 1 TABLET BY MOUTH EVERY MORNING BEFORE BREAKFAST  Felipe Malhotra MD   budesonide-formoterol (SYMBICORT) 160-4.5 MCG/ACT AERO INHALE 2 PUFFS INTO THE LUNGS BY MOUTH TWICE DAILY  Vanessa Lester MD   predniSONE (DELTASONE) 20 MG tablet 3 tablets daily for 3 days, 2 tablets daily for 3 days, then 1 tablet daily for 3 days  Harsh Thomas MD   aspirin 81 MG EC tablet Take 81 mg by mouth daily  Historical Provider, MD   Calcium-Magnesium-Vitamin D (CALCIUM 1200+D3 PO) Take by mouth  Historical Provider, MD   Probiotic Product (PROBIOTIC DAILY PO) Take by mouth  Historical Provider, MD   diphenhydrAMINE (BENADRYL) 25 MG tablet Take 25 mg by mouth every 6 hours as needed for Itching  Historical Provider, MD   amLODIPine (NORVASC) 10 MG tablet TAKE 1 TABLET BY MOUTH EVERY DAY  Darrell Evans MD   ferrous sulfate (SLOW FE) 142 (45 Fe) MG extended release tablet Take by mouth  Historical Provider, MD   Omega-3 Fatty Acids (FISH OIL) 1000 MG CAPS by NOT APPLICABLE route  Historical Provider, MD   loperamide (IMODIUM A-D) 2 MG tablet Take by mouth  Historical Provider, MD   leuprolide acetate, 4 Month, (ELIGARD) 30 MG injection Inject 30 mg into the skin once  Historical Provider, MD   albuterol sulfate  (90 Base) MCG/ACT inhaler INHALE 2 PUFFS INTO THE LUNGS EVERY 6 HOURS AS NEEDED FOR WHEEZING  Rosanna Feliz MD   triamcinolone (KENALOG) 0.1 % ointment   Historical Provider, MD   vitamin E 400 UNIT capsule Take 400 Units by mouth daily.   Historical Provider, MD       Past Medical History:   Diagnosis Date    Carotid artery occlusion and stenosis 5/3/2012    Centrilobular emphysema ([address] 2014    COPD (chronic obstructive pulmonary disease) ([address]     ED (erectile dysfunction)     Emphysema lung ([address] 2014    GERD (gastroesophageal reflux disease)     Hyperlipidemia     pt denies    Hypertension     Hypertension     Prostate CA ([address] 2003    surgery then radiation    Prostate CA ([facility]     Psoriasis     Pulmonary nodules     Radiation 2004    Urinary incontinence        Social History     Tobacco Use    Smoking status: Former     Packs/day: 0.50     Years: 30.00     Pack years: 15.00     Types: Cigarettes     Quit date: 1987     Years since quittin.9    Smokeless tobacco: Never   Vaping Use    Vaping Use: Never used   Substance Use Topics    Alcohol use: Yes     Comment: FEW BEERS PER WEEK    Drug use: No       Family History   Problem Relation Age of Onset    Diabetes Mother     High Blood Pressure Mother     Heart Disease Father         myxoma    Heart Failure Father        Allergies   Allergen Reactions    Lisinopril      cough OBJECTIVE:  There were no vitals taken for this visit. GEN:  in NAD pleasant slightly flat affect  CV:  Regular rate and rhythm, S1 and S2 normal, no murmurs, clicks  PULM:  Chest is clear, no wheezing ,  symmetric air entry throughout both lung fields. EXT: No rash or edema  NEURO: nonfocal  Lab Results   Component Value Date    PSA 5.10 (H) 10/05/2021    PSA 4.60 (H) 11/09/2018    PSA 4.3 02/13/2017      ASSESSMENT/PLAN:  1. Prostate CA ([address]  Sees urol q 4 mo for [id number]  Some radiation damage to bloadder, had gross heamturia, has to self cath at times  On new meds, following psa    2. Moderate COPD (chronic obstructive pulmonary disease) (HCC)  Stable on symbicort    3. Primary hypertension  Not at goal  Inc toprol form 25  to 50  Check readings at home, return to office in 6 to 8 weeks for physical    4.  Mixed hyperlipidemia  No meds, hdl is strong    5 gerd  Try to wean ppi    6 h/o bilat CEA - last CDUS 7/22 - less than 50% blockage    7 immunizations/health maintenance-up-to-date

## 2022-08-31 RX ORDER — AMLODIPINE BESYLATE 10 MG/1
TABLET ORAL
Qty: 90 TABLET | Refills: 3 | Status: SHIPPED | OUTPATIENT
Start: 2022-08-31

## 2022-08-31 NOTE — TELEPHONE ENCOUNTER
Medication:   Requested Prescriptions     Pending Prescriptions Disp Refills    amLODIPine (NORVASC) 10 MG tablet [Pharmacy Med Name: AMLODIPINE BESYLATE 10MG TABLETS] 90 tablet 3     Sig: TAKE 1 TABLET BY MOUTH EVERY DAY       Last Filled:  9/8/2021    Patient Phone Number: [phone] (home)     Last appt: 8/19/2022   Next appt: 10/19/2022    Lab Results   Component Value Date     02/01/2022    K 4.1 02/01/2022     02/01/2022    CO2 31 02/01/2022    BUN 24 (H) 02/01/2022    CREATININE 1.0 02/01/2022    GLUCOSE 105 02/01/2022    CALCIUM 9.4 02/01/2022    PROT 7.0 02/01/2022    LABALBU 4.7 02/01/2022    BILITOT 1.8 (H) 02/01/2022    ALKPHOS 69 02/01/2022    AST 23 02/01/2022    ALT 26 02/01/2022    LABGLOM >60 08/12/2021    GFRAA >60 08/12/2021    AGRATIO 2.3 (H) 08/12/2021    GLOB 2.0 08/12/2021

## 2022-09-23 ENCOUNTER — TELEPHONE (OUTPATIENT)
Dept: FAMILY MEDICINE CLINIC | Age: 82
End: 2022-09-23

## 2022-09-23 DIAGNOSIS — Z00.00 ENCOUNTER FOR ANNUAL WELLNESS EXAM IN MEDICARE PATIENT: Primary | ICD-10-CM

## 2022-09-23 NOTE — TELEPHONE ENCOUNTER
Medication and Quantity requested:      metoprolol succinate (TOPROL XL) 25 MG extended release tablet      Last Visit  08/19/2022    Pharmacy and phone number updated in EPIC:  yes    [address] [address]          [address] GREAT AND NOW THAT HE HAS DOUBLED UP AS INSTRUCTED HE WILL BE OUT OF HIS MEDICATION BY   Monday THE 26TH      HE WOULD LIKE THE NEW SCRIPT TO REFLECT THE NEW DOSAGE

## 2022-09-23 NOTE — TELEPHONE ENCOUNTER
----- Message from Herber Gar sent at 9/23/2022 11:30 AM EDT -----  Subject: Referral Request    Reason for referral request? referral for full panel blood work   Provider patient wants to be referred to(if known):     Provider Phone Number(if known):     Additional Information for Provider?   ---------------------------------------------------------------------------  --------------  [email or web address]    [phone]; OK to leave message on voicemail, OK to respond with   electronic message via [employer] portal (only for patients who have   registered [employer] account)  ---------------------------------------------------------------------------  --------------

## 2022-09-26 DIAGNOSIS — I10 PRIMARY HYPERTENSION: Primary | ICD-10-CM

## 2022-09-26 RX ORDER — METOPROLOL SUCCINATE 50 MG/1
50 TABLET, EXTENDED RELEASE ORAL DAILY
Qty: 90 TABLET | Refills: 3 | Status: SHIPPED | OUTPATIENT
Start: 2022-09-26

## 2022-10-10 ENCOUNTER — OFFICE VISIT (OUTPATIENT)
Dept: PULMONOLOGY | Age: 82
End: 2022-10-10
Payer: MEDICARE

## 2022-10-10 VITALS — OXYGEN SATURATION: 96 % | SYSTOLIC BLOOD PRESSURE: 129 MMHG | HEART RATE: 84 BPM | DIASTOLIC BLOOD PRESSURE: 78 MMHG

## 2022-10-10 DIAGNOSIS — K21.9 GASTROESOPHAGEAL REFLUX DISEASE WITHOUT ESOPHAGITIS: ICD-10-CM

## 2022-10-10 DIAGNOSIS — J44.9 MODERATE COPD (CHRONIC OBSTRUCTIVE PULMONARY DISEASE) (HCC): Primary | ICD-10-CM

## 2022-10-10 DIAGNOSIS — J43.2 CENTRILOBULAR EMPHYSEMA (HCC): ICD-10-CM

## 2022-10-10 PROCEDURE — G8427 DOCREV CUR MEDS BY ELIG CLIN: HCPCS | Performed by: INTERNAL MEDICINE

## 2022-10-10 PROCEDURE — G8420 CALC BMI NORM PARAMETERS: HCPCS | Performed by: INTERNAL MEDICINE

## 2022-10-10 PROCEDURE — 1123F ACP DISCUSS/DSCN MKR DOCD: CPT | Performed by: INTERNAL MEDICINE

## 2022-10-10 PROCEDURE — 3023F SPIROM DOC REV: CPT | Performed by: INTERNAL MEDICINE

## 2022-10-10 PROCEDURE — 99213 OFFICE O/P EST LOW 20 MIN: CPT | Performed by: INTERNAL MEDICINE

## 2022-10-10 PROCEDURE — 1036F TOBACCO NON-USER: CPT | Performed by: INTERNAL MEDICINE

## 2022-10-10 PROCEDURE — G8484 FLU IMMUNIZE NO ADMIN: HCPCS | Performed by: INTERNAL MEDICINE

## 2022-10-10 RX ORDER — IPRATROPIUM BROMIDE AND ALBUTEROL SULFATE 2.5; .5 MG/3ML; MG/3ML
1 SOLUTION RESPIRATORY (INHALATION) EVERY 4 HOURS
Qty: 360 ML | Refills: 11 | Status: SHIPPED | OUTPATIENT
Start: 2022-10-10

## 2022-10-10 RX ORDER — ALBUTEROL SULFATE 90 UG/1
2 AEROSOL, METERED RESPIRATORY (INHALATION) EVERY 6 HOURS PRN
Qty: 25.5 G | Refills: 5 | Status: SHIPPED | OUTPATIENT
Start: 2022-10-10

## 2022-10-10 NOTE — PROGRESS NOTES
[facility]  Follow Up Note  MD Nayely Hanson Delmar   YOB: 1940    Date of Visit:  10/10/2022    Assessment/Plan:  1. Moderate COPD (chronic obstructive pulmonary disease) ([address]  PFT 8/18:  Spirometry:  Flow volume loops were obstructed. The FEV-1/FVC ratio was   decreased. The FEV-1 was 1.52 liters (52% of predicted), which   was moderately decreased. The FVC was 2.9 liters (71% of   predicted), which was decreased. Response to inhaled   bronchodilators (albuterol) was significant. Lung volumes:  Lung volumes were tested by plethysmography. The total lung   capacity was 6.5 liters (102% of predicted), which was normal.   The residual volume was 3.35 liters (124% of predicted), which   was increased. The ratio of residual volume to total lung   capacity (RV/TLC) was 52, which was increased. Diffusion capacity was found to be mildly decreased. Interpretation:   moderate obstructive airway disease with significant   bronchodilator reversibility. Compared to study done in 8/2016,   FEV1 has decreased by 14%, FVC improved by 4% and DLCO decreased   by 12%. Clinical correlation is recommended    Symbicort  Duoneb  Albuterol     2. Centrilobular emphysema ([facility]  CT finding    3. Gastroesophageal reflux disease without esophagitis  On Prilosec    He is vaccinated against COVID and boosted  He will take the flu shot in November    1 year    Chief Complaint   Patient presents with    COPD     Annual f.u. Got the COVID Booster 2 weeks ago         HPI  The patient presents with a chief complaint of moderate shortness of breath related to mild COPD of many years duration. He has mild associated cough. Exertion is a modifying factor. He has not had any       Review of Systems  No Chest pain, Nausea or vomiting reported    History  I have reviewed past medical, surgical, social and family history.  This is documented elsewhere in the medical record. Physical Exam:  Well developed, well nourished  Alert and oriented  Sclera is clear  No cervical adenopathy  No JVD. Chest examination is clear. Cardiac examination reveals regular rate and rhythm without murmur, gallop or rub. The abdomen is soft, nontender and nondistended. There is no clubbing, cyanosis or edema of the extremities. There is no obvious skin rash. No focal neuro deficicts  Normal mood and affect    Allergies   Allergen Reactions    Lisinopril      cough     Prior to Visit Medications    Medication Sig Taking?  Authorizing Provider   metoprolol succinate (TOPROL XL) 50 MG extended release tablet Take 1 tablet by mouth daily  Hever Oliveira MD   amLODIPine (NORVASC) 10 MG tablet TAKE 1 TABLET BY MOUTH EVERY DAY  Hever Oliveira MD   metoprolol succinate (TOPROL XL) 25 MG extended release tablet TAKE 1 TABLET BY MOUTH EVERY DAY  Hever Oliveira MD   pantoprazole (PROTONIX) 40 MG tablet TAKE 1 TABLET BY MOUTH EVERY MORNING BEFORE BREAKFAST  Hever Oliveira MD   budesonide-formoterol (SYMBICORT) 160-4.5 MCG/ACT AERO INHALE 2 PUFFS INTO THE LUNGS BY MOUTH TWICE DAILY  Eileen Horvath MD   aspirin 81 MG EC tablet Take 81 mg by mouth daily  Historical Provider, MD   Calcium-Magnesium-Vitamin D (CALCIUM 1200+D3 PO) Take by mouth  Historical Provider, MD   Probiotic Product (PROBIOTIC DAILY PO) Take by mouth  Historical Provider, MD   ferrous sulfate (SLOW FE) 142 (45 Fe) MG extended release tablet Take by mouth  Historical Provider, MD   Omega-3 Fatty Acids (FISH OIL) 1000 MG CAPS by NOT APPLICABLE route  Historical Provider, MD   loperamide (IMODIUM A-D) 2 MG tablet Take by mouth  Historical Provider, MD   leuprolide acetate, 4 Month, (ELIGARD) 30 MG injection Inject 30 mg into the skin once  Historical Provider, MD   albuterol sulfate  (90 Base) MCG/ACT inhaler INHALE 2 PUFFS INTO THE LUNGS EVERY 6 HOURS AS NEEDED FOR WHEEZING  Eileen Horvath MD   vitamin E 400 UNIT capsule Take 400 Units by mouth daily. Historical Provider, MD       Vitals:    10/10/22 1301   BP: 129/78   Pulse: 84   SpO2: 96%     There is no height or weight on file to calculate BMI.      Wt Readings from Last 3 Encounters:   22 161 lb (73 kg)   22 160 lb (72.6 kg)   21 171 lb (77.6 kg)     BP Readings from Last 3 Encounters:   10/10/22 129/78   22 (!) 165/70   22 133/63        Social History     Tobacco Use   Smoking Status Former    Packs/day: 0.50    Years: 30.00    Pack years: 15.00    Types: Cigarettes    Quit date: 1987    Years since quittin.1   Smokeless Tobacco Never

## 2022-10-13 ENCOUNTER — TELEPHONE (OUTPATIENT)
Dept: FAMILY MEDICINE CLINIC | Age: 82
End: 2022-10-13

## 2022-10-13 LAB — PSA, TOTAL: 7.63 NG/ML (ref 0–4)

## 2022-10-13 NOTE — TELEPHONE ENCOUNTER
----- Message from Diane Ireland sent at 10/12/2022  4:56 PM EDT -----  Subject: Referral Request    Reason for referral request? request referral for blood work   Provider patient wants to be referred to(if known): Samara Ramsey    Provider Phone Number(if known):     Additional Information for Provider?   ---------------------------------------------------------------------------  --------------  [address] [employer]    [phone]; OK to leave message on voicemail  ---------------------------------------------------------------------------  --------------

## 2022-10-14 ENCOUNTER — TELEPHONE (OUTPATIENT)
Dept: FAMILY MEDICINE CLINIC | Age: 82
End: 2022-10-14

## 2022-10-14 DIAGNOSIS — E78.2 MIXED HYPERLIPIDEMIA: ICD-10-CM

## 2022-10-14 DIAGNOSIS — I10 PRIMARY HYPERTENSION: ICD-10-CM

## 2022-10-14 DIAGNOSIS — Z12.5 PROSTATE CANCER SCREENING: ICD-10-CM

## 2022-10-14 DIAGNOSIS — Z00.00 WELL ADULT EXAM: ICD-10-CM

## 2022-10-14 DIAGNOSIS — Z00.00 WELL ADULT EXAM: Primary | ICD-10-CM

## 2022-10-14 DIAGNOSIS — R53.83 FATIGUE, UNSPECIFIED TYPE: ICD-10-CM

## 2022-10-14 LAB
BASOPHILS ABSOLUTE: 0.1 K/UL (ref 0–0.2)
BASOPHILS RELATIVE PERCENT: 0.8 %
EOSINOPHILS ABSOLUTE: 0.1 K/UL (ref 0–0.6)
EOSINOPHILS RELATIVE PERCENT: 1.6 %
HCT VFR BLD CALC: 38.4 % (ref 40.5–52.5)
HEMOGLOBIN: 12.4 G/DL (ref 13.5–17.5)
LYMPHOCYTES ABSOLUTE: 1 K/UL (ref 1–5.1)
LYMPHOCYTES RELATIVE PERCENT: 14.6 %
MCH RBC QN AUTO: 31 PG (ref 26–34)
MCHC RBC AUTO-ENTMCNC: 32.4 G/DL (ref 31–36)
MCV RBC AUTO: 95.6 FL (ref 80–100)
MONOCYTES ABSOLUTE: 1 K/UL (ref 0–1.3)
MONOCYTES RELATIVE PERCENT: 15.2 %
NEUTROPHILS ABSOLUTE: 4.5 K/UL (ref 1.7–7.7)
NEUTROPHILS RELATIVE PERCENT: 67.8 %
PDW BLD-RTO: 13.6 % (ref 12.4–15.4)
PLATELET # BLD: 270 K/UL (ref 135–450)
PMV BLD AUTO: 7.8 FL (ref 5–10.5)
RBC # BLD: 4.01 M/UL (ref 4.2–5.9)
WBC # BLD: 6.6 K/UL (ref 4–11)

## 2022-10-14 NOTE — TELEPHONE ENCOUNTER
Pt called to see if labs were put in to get done before physical with matt on the 19th.  Labs still say pendng in the system and pt would like to come in today if needed

## 2022-10-15 LAB
A/G RATIO: 2.2 (ref 1.1–2.2)
ALBUMIN SERPL-MCNC: 4.4 G/DL (ref 3.4–5)
ALP BLD-CCNC: 74 U/L (ref 40–129)
ALT SERPL-CCNC: 15 U/L (ref 10–40)
ANION GAP SERPL CALCULATED.3IONS-SCNC: 16 MMOL/L (ref 3–16)
AST SERPL-CCNC: 19 U/L (ref 15–37)
BILIRUB SERPL-MCNC: 0.5 MG/DL (ref 0–1)
BUN BLDV-MCNC: 12 MG/DL (ref 7–20)
CALCIUM SERPL-MCNC: 8.6 MG/DL (ref 8.3–10.6)
CHLORIDE BLD-SCNC: 100 MMOL/L (ref 99–110)
CHOLESTEROL, FASTING: 214 MG/DL (ref 0–199)
CO2: 25 MMOL/L (ref 21–32)
CREAT SERPL-MCNC: 0.9 MG/DL (ref 0.8–1.3)
ESTIMATED AVERAGE GLUCOSE: 111.2 MG/DL
GFR AFRICAN AMERICAN: >60
GFR NON-AFRICAN AMERICAN: >60
GLUCOSE FASTING: 97 MG/DL (ref 70–99)
HBA1C MFR BLD: 5.5 %
HDLC SERPL-MCNC: 69 MG/DL (ref 40–60)
LDL CHOLESTEROL CALCULATED: 127 MG/DL
POTASSIUM SERPL-SCNC: 4.8 MMOL/L (ref 3.5–5.1)
PROSTATE SPECIFIC ANTIGEN: 7.27 NG/ML (ref 0–4)
SODIUM BLD-SCNC: 141 MMOL/L (ref 136–145)
TOTAL PROTEIN: 6.4 G/DL (ref 6.4–8.2)
TRIGLYCERIDE, FASTING: 88 MG/DL (ref 0–150)
TSH REFLEX: 1.95 UIU/ML (ref 0.27–4.2)
VLDLC SERPL CALC-MCNC: 18 MG/DL

## 2022-10-19 ENCOUNTER — OFFICE VISIT (OUTPATIENT)
Dept: FAMILY MEDICINE CLINIC | Age: 82
End: 2022-10-19
Payer: MEDICARE

## 2022-10-19 VITALS
HEART RATE: 94 BPM | SYSTOLIC BLOOD PRESSURE: 142 MMHG | BODY MASS INDEX: 23.05 KG/M2 | DIASTOLIC BLOOD PRESSURE: 80 MMHG | WEIGHT: 161 LBS | HEIGHT: 70 IN | OXYGEN SATURATION: 98 %

## 2022-10-19 DIAGNOSIS — K21.9 GASTROESOPHAGEAL REFLUX DISEASE WITHOUT ESOPHAGITIS: ICD-10-CM

## 2022-10-19 DIAGNOSIS — Z23 NEED FOR VACCINATION: Primary | ICD-10-CM

## 2022-10-19 DIAGNOSIS — Z00.00 MEDICARE ANNUAL WELLNESS VISIT, SUBSEQUENT: ICD-10-CM

## 2022-10-19 DIAGNOSIS — C61 PROSTATE CA (HCC): ICD-10-CM

## 2022-10-19 DIAGNOSIS — J44.9 MODERATE COPD (CHRONIC OBSTRUCTIVE PULMONARY DISEASE) (HCC): ICD-10-CM

## 2022-10-19 DIAGNOSIS — R53.83 FATIGUE, UNSPECIFIED TYPE: ICD-10-CM

## 2022-10-19 DIAGNOSIS — I10 PRIMARY HYPERTENSION: ICD-10-CM

## 2022-10-19 DIAGNOSIS — L40.9 PSORIASIS: ICD-10-CM

## 2022-10-19 DIAGNOSIS — E78.2 MIXED HYPERLIPIDEMIA: ICD-10-CM

## 2022-10-19 PROCEDURE — 90694 VACC AIIV4 NO PRSRV 0.5ML IM: CPT | Performed by: FAMILY MEDICINE

## 2022-10-19 PROCEDURE — G0439 PPPS, SUBSEQ VISIT: HCPCS | Performed by: FAMILY MEDICINE

## 2022-10-19 PROCEDURE — G8484 FLU IMMUNIZE NO ADMIN: HCPCS | Performed by: FAMILY MEDICINE

## 2022-10-19 PROCEDURE — G0008 ADMIN INFLUENZA VIRUS VAC: HCPCS | Performed by: FAMILY MEDICINE

## 2022-10-19 PROCEDURE — 1123F ACP DISCUSS/DSCN MKR DOCD: CPT | Performed by: FAMILY MEDICINE

## 2022-10-19 RX ORDER — APALUTAMIDE 60 MG/1
TABLET, FILM COATED ORAL
COMMUNITY
Start: 2022-10-17

## 2022-10-19 SDOH — ECONOMIC STABILITY: FOOD INSECURITY: WITHIN THE PAST 12 MONTHS, YOU WORRIED THAT YOUR FOOD WOULD RUN OUT BEFORE YOU GOT MONEY TO BUY MORE.: NEVER TRUE

## 2022-10-19 SDOH — ECONOMIC STABILITY: FOOD INSECURITY: WITHIN THE PAST 12 MONTHS, THE FOOD YOU BOUGHT JUST DIDN'T LAST AND YOU DIDN'T HAVE MONEY TO GET MORE.: NEVER TRUE

## 2022-10-19 ASSESSMENT — PATIENT HEALTH QUESTIONNAIRE - PHQ9
SUM OF ALL RESPONSES TO PHQ9 QUESTIONS 1 & 2: 2
SUM OF ALL RESPONSES TO PHQ QUESTIONS 1-9: 2
SUM OF ALL RESPONSES TO PHQ QUESTIONS 1-9: 2
2. FEELING DOWN, DEPRESSED OR HOPELESS: 0
1. LITTLE INTEREST OR PLEASURE IN DOING THINGS: 2
SUM OF ALL RESPONSES TO PHQ QUESTIONS 1-9: 2
SUM OF ALL RESPONSES TO PHQ QUESTIONS 1-9: 2

## 2022-10-19 ASSESSMENT — LIFESTYLE VARIABLES
HOW MANY STANDARD DRINKS CONTAINING ALCOHOL DO YOU HAVE ON A TYPICAL DAY: 1 OR 2
HOW OFTEN DO YOU HAVE A DRINK CONTAINING ALCOHOL: 2-4 TIMES A MONTH

## 2022-10-19 ASSESSMENT — SOCIAL DETERMINANTS OF HEALTH (SDOH): HOW HARD IS IT FOR YOU TO PAY FOR THE VERY BASICS LIKE FOOD, HOUSING, MEDICAL CARE, AND HEATING?: NOT HARD AT ALL

## 2022-10-19 NOTE — PATIENT INSTRUCTIONS
Personalized Preventive Plan for [facility] - 10/19/2022  Medicare offers a range of preventive health benefits. Some of the tests and screenings are paid in full while other may be subject to a deductible, co-insurance, and/or copay. Some of these benefits include a comprehensive review of your medical history including lifestyle, illnesses that may run in your family, and various assessments and screenings as appropriate. After reviewing your medical record and screening and assessments performed today your provider may have ordered immunizations, labs, imaging, and/or referrals for you. A list of these orders (if applicable) as well as your Preventive Care list are included within your After Visit Summary for your review. Other Preventive Recommendations:    A preventive eye exam performed by an eye specialist is recommended every 1-2 years to screen for glaucoma; cataracts, macular degeneration, and other eye disorders. A preventive dental visit is recommended every 6 months. Try to get at least 150 minutes of exercise per week or 10,000 steps per day on a pedometer . Order or download the FREE \"Exercise & Physical Activity: Your Everyday Guide\" from [employer]. Call 2-873.432.3267 or search [employer] online. You need 7051-8281 mg of calcium and 8035-7294 IU of vitamin D per day. It is possible to meet your calcium requirement with diet alone, but a vitamin D supplement is usually necessary to meet this goal.  When exposed to the sun, use a sunscreen that protects against both UVA and UVB radiation with an SPF of 30 or greater. Reapply every 2 to 3 hours or after sweating, drying off with a towel, or swimming. Always wear a seat belt when traveling in a car. Always wear a helmet when riding a bicycle or motorcycle.

## 2022-11-04 RX ORDER — BUDESONIDE AND FORMOTEROL FUMARATE DIHYDRATE 160; 4.5 UG/1; UG/1
AEROSOL RESPIRATORY (INHALATION)
Qty: 30.6 G | Refills: 5 | Status: SHIPPED | OUTPATIENT
Start: 2022-11-04

## 2022-11-29 ENCOUNTER — HOSPITAL ENCOUNTER (OUTPATIENT)
Dept: GENERAL RADIOLOGY | Age: 82
Discharge: HOME | End: 2022-11-29
Payer: MEDICARE

## 2022-11-29 DIAGNOSIS — M81.0 AGE-RELATED OSTEOPOROSIS WITHOUT CURRENT PATHOLOGICAL FRACTURE: ICD-10-CM

## 2022-11-29 PROCEDURE — 77080 DXA BONE DENSITY AXIAL: CPT

## 2022-11-30 RX ORDER — BUDESONIDE AND FORMOTEROL FUMARATE DIHYDRATE 160; 4.5 UG/1; UG/1
AEROSOL RESPIRATORY (INHALATION)
Qty: 10.2 G | OUTPATIENT
Start: 2022-11-30

## 2022-12-02 ENCOUNTER — NURSE ONLY (OUTPATIENT)
Dept: FAMILY MEDICINE CLINIC | Age: 82
End: 2022-12-02
Payer: MEDICARE

## 2022-12-02 ENCOUNTER — TELEPHONE (OUTPATIENT)
Dept: FAMILY MEDICINE CLINIC | Age: 82
End: 2022-12-02

## 2022-12-02 DIAGNOSIS — R09.81 CONGESTION OF NASAL SINUS: ICD-10-CM

## 2022-12-02 DIAGNOSIS — R05.9 COUGH IN ADULT: Primary | ICD-10-CM

## 2022-12-02 LAB
INFLUENZA A ANTIBODY: NORMAL
INFLUENZA B ANTIBODY: NORMAL

## 2022-12-02 PROCEDURE — 87804 INFLUENZA ASSAY W/OPTIC: CPT | Performed by: FAMILY MEDICINE

## 2022-12-02 NOTE — TELEPHONE ENCOUNTER
S  Patient with SOB and fatigue ,nasal congestion, thinks he may have the flu, uses nebulizer multiple times a day  B   COPD , chronic PNA  A   Patient will come in to have a COVID and flu swab   FYI  Please advise

## 2022-12-04 LAB — SARS-COV-2: NOT DETECTED

## 2023-01-30 ENCOUNTER — TELEPHONE (OUTPATIENT)
Dept: FAMILY MEDICINE CLINIC | Age: 83
End: 2023-01-30

## 2023-01-30 NOTE — TELEPHONE ENCOUNTER
Patients wife called saying she tested positive for covid last week and MD sent her paxlovid.  tested positive this morning and wants to know if MD can send paxlovid to pharmacy on file. Please advise.     Patience eSn [phone]

## 2023-02-13 ENCOUNTER — TELEPHONE (OUTPATIENT)
Dept: FAMILY MEDICINE CLINIC | Age: 83
End: 2023-02-13

## 2023-02-13 RX ORDER — AMOXICILLIN AND CLAVULANATE POTASSIUM 875; 125 MG/1; MG/1
1 TABLET, FILM COATED ORAL 2 TIMES DAILY
Qty: 14 TABLET | Refills: 0 | Status: SHIPPED | OUTPATIENT
Start: 2023-02-13 | End: 2023-02-20

## 2023-02-13 NOTE — TELEPHONE ENCOUNTER
Pt  called and stated two weeks ago today he came down with Covid and pt stated he is still experiencing head and chest congestion      He is wonder if  can prescribe him something for this to his pharmacy [facility] [address]    Please advise and call patient whether this would be possible or not

## 2023-04-04 PROBLEM — C61 PROSTATE CANCER METASTATIC TO LUNG (HCC): Status: ACTIVE | Noted: 2023-04-04

## 2023-04-04 PROBLEM — C78.00 PROSTATE CANCER METASTATIC TO LUNG (HCC): Status: ACTIVE | Noted: 2023-04-04

## 2023-04-04 PROBLEM — C77.8: Status: ACTIVE | Noted: 2023-04-04

## 2023-04-04 PROBLEM — C61: Status: ACTIVE | Noted: 2023-04-04

## 2023-05-30 ENCOUNTER — OFFICE VISIT (OUTPATIENT)
Dept: FAMILY MEDICINE CLINIC | Age: 83
End: 2023-05-30

## 2023-05-30 ENCOUNTER — APPOINTMENT (OUTPATIENT)
Dept: CT IMAGING | Age: 83
End: 2023-05-30
Payer: MEDICARE

## 2023-05-30 ENCOUNTER — HOSPITAL ENCOUNTER (INPATIENT)
Age: 83
LOS: 2 days | Discharge: HOME | End: 2023-06-02
Attending: STUDENT IN AN ORGANIZED HEALTH CARE EDUCATION/TRAINING PROGRAM | Admitting: STUDENT IN AN ORGANIZED HEALTH CARE EDUCATION/TRAINING PROGRAM
Payer: MEDICARE

## 2023-05-30 ENCOUNTER — APPOINTMENT (OUTPATIENT)
Dept: GENERAL RADIOLOGY | Age: 83
End: 2023-05-30
Payer: MEDICARE

## 2023-05-30 VITALS
SYSTOLIC BLOOD PRESSURE: 100 MMHG | OXYGEN SATURATION: 99 % | HEART RATE: 65 BPM | BODY MASS INDEX: 22.1 KG/M2 | DIASTOLIC BLOOD PRESSURE: 60 MMHG | WEIGHT: 154 LBS

## 2023-05-30 DIAGNOSIS — A41.9 SEPTICEMIA (HCC): ICD-10-CM

## 2023-05-30 DIAGNOSIS — C77.8 MALIGNANT NEOPLASM OF PROSTATE METASTATIC TO LYMPH NODES OF MULTIPLE SITES (HCC): ICD-10-CM

## 2023-05-30 DIAGNOSIS — J44.9 MODERATE COPD (CHRONIC OBSTRUCTIVE PULMONARY DISEASE) (HCC): ICD-10-CM

## 2023-05-30 DIAGNOSIS — N39.0 URINARY TRACT INFECTION IN MALE: ICD-10-CM

## 2023-05-30 DIAGNOSIS — N42.89 PROSTATE MASS: ICD-10-CM

## 2023-05-30 DIAGNOSIS — K56.609 COLON OBSTRUCTION (HCC): ICD-10-CM

## 2023-05-30 DIAGNOSIS — C61 MALIGNANT NEOPLASM OF PROSTATE METASTATIC TO LYMPH NODES OF MULTIPLE SITES (HCC): ICD-10-CM

## 2023-05-30 DIAGNOSIS — D84.9 IMMUNOCOMPROMISED (HCC): Primary | ICD-10-CM

## 2023-05-30 LAB
ABO + RH BLD: NORMAL
ALBUMIN SERPL-MCNC: 3.8 G/DL (ref 3.4–5)
ALBUMIN/GLOB SERPL: 1.4 {RATIO} (ref 1.1–2.2)
ALP SERPL-CCNC: 60 U/L (ref 40–129)
ALT SERPL-CCNC: 25 U/L (ref 10–40)
ANION GAP SERPL CALCULATED.3IONS-SCNC: 14 MMOL/L (ref 3–16)
AST SERPL-CCNC: 19 U/L (ref 15–37)
BACTERIA URNS QL MICRO: ABNORMAL /HPF
BASOPHILS # BLD: 0 K/UL (ref 0–0.2)
BASOPHILS NFR BLD: 0 %
BILIRUB SERPL-MCNC: 0.8 MG/DL (ref 0–1)
BILIRUB UR QL STRIP.AUTO: NEGATIVE
BLD GP AB SCN SERPL QL: NORMAL
BUN SERPL-MCNC: 19 MG/DL (ref 7–20)
CALCIUM SERPL-MCNC: 8.7 MG/DL (ref 8.3–10.6)
CHLORIDE SERPL-SCNC: 97 MMOL/L (ref 99–110)
CLARITY UR: ABNORMAL
CO2 SERPL-SCNC: 23 MMOL/L (ref 21–32)
COLOR UR: ABNORMAL
CREAT SERPL-MCNC: 1.1 MG/DL (ref 0.8–1.3)
DEPRECATED RDW RBC AUTO: 14.2 % (ref 12.4–15.4)
EOSINOPHIL # BLD: 0 K/UL (ref 0–0.6)
EOSINOPHIL NFR BLD: 0 %
EPI CELLS #/AREA URNS AUTO: 0 /HPF (ref 0–5)
GFR SERPLBLD CREATININE-BSD FMLA CKD-EPI: >60 ML/MIN/{1.73_M2}
GLUCOSE SERPL-MCNC: 123 MG/DL (ref 70–99)
GLUCOSE UR STRIP.AUTO-MCNC: NEGATIVE MG/DL
HCT VFR BLD AUTO: 34 % (ref 40.5–52.5)
HGB BLD-MCNC: 11.3 G/DL (ref 13.5–17.5)
HGB UR QL STRIP.AUTO: ABNORMAL
HYALINE CASTS #/AREA URNS AUTO: 0 /LPF (ref 0–8)
KETONES UR STRIP.AUTO-MCNC: NEGATIVE MG/DL
LACTATE BLDV-SCNC: 0.9 MMOL/L (ref 0.4–1.9)
LEUKOCYTE ESTERASE UR QL STRIP.AUTO: ABNORMAL
LYMPHOCYTES # BLD: 1 K/UL (ref 1–5.1)
LYMPHOCYTES NFR BLD: 5 %
MCH RBC QN AUTO: 30.2 PG (ref 26–34)
MCHC RBC AUTO-ENTMCNC: 33.1 G/DL (ref 31–36)
MCV RBC AUTO: 91.1 FL (ref 80–100)
METAMYELOCYTES NFR BLD MANUAL: 3 %
MONOCYTES # BLD: 0.2 K/UL (ref 0–1.3)
MONOCYTES NFR BLD: 1 %
MYELOCYTES NFR BLD MANUAL: 7 %
NEUTROPHILS # BLD: 18 K/UL (ref 1.7–7.7)
NEUTROPHILS NFR BLD: 82 %
NITRITE UR QL STRIP.AUTO: NEGATIVE
PH UR STRIP.AUTO: 6 [PH] (ref 5–8)
PLATELET # BLD AUTO: 376 K/UL (ref 135–450)
PLATELET BLD QL SMEAR: ADEQUATE
PMV BLD AUTO: 7.1 FL (ref 5–10.5)
POTASSIUM SERPL-SCNC: 3.8 MMOL/L (ref 3.5–5.1)
PROCALCITONIN SERPL IA-MCNC: 0.19 NG/ML (ref 0–0.15)
PROMYELOCYTES NFR BLD MANUAL: 2 %
PROT SERPL-MCNC: 6.6 G/DL (ref 6.4–8.2)
PROT UR STRIP.AUTO-MCNC: >=300 MG/DL
RBC # BLD AUTO: 3.73 M/UL (ref 4.2–5.9)
RBC CLUMPS #/AREA URNS AUTO: 678 /HPF (ref 0–4)
RBC MORPH BLD: NORMAL
SLIDE REVIEW: ABNORMAL
SODIUM SERPL-SCNC: 134 MMOL/L (ref 136–145)
SP GR UR STRIP.AUTO: 1.01 (ref 1–1.03)
UA COMPLETE W REFLEX CULTURE PNL UR: YES
UA DIPSTICK W REFLEX MICRO PNL UR: YES
URN SPEC COLLECT METH UR: ABNORMAL
UROBILINOGEN UR STRIP-ACNC: 0.2 E.U./DL
WBC # BLD AUTO: 19.2 K/UL (ref 4–11)
WBC #/AREA URNS AUTO: 523 /HPF (ref 0–5)

## 2023-05-30 PROCEDURE — 6360000002 HC RX W HCPCS: Performed by: NURSE PRACTITIONER

## 2023-05-30 PROCEDURE — 81001 URINALYSIS AUTO W/SCOPE: CPT

## 2023-05-30 PROCEDURE — 86901 BLOOD TYPING SEROLOGIC RH(D): CPT

## 2023-05-30 PROCEDURE — 86900 BLOOD TYPING SEROLOGIC ABO: CPT

## 2023-05-30 PROCEDURE — 71045 X-RAY EXAM CHEST 1 VIEW: CPT

## 2023-05-30 PROCEDURE — 85025 COMPLETE CBC W/AUTO DIFF WBC: CPT

## 2023-05-30 PROCEDURE — 36415 COLL VENOUS BLD VENIPUNCTURE: CPT

## 2023-05-30 PROCEDURE — 6370000000 HC RX 637 (ALT 250 FOR IP): Performed by: NURSE PRACTITIONER

## 2023-05-30 PROCEDURE — 80053 COMPREHEN METABOLIC PANEL: CPT

## 2023-05-30 PROCEDURE — 74177 CT ABD & PELVIS W/CONTRAST: CPT

## 2023-05-30 PROCEDURE — 96367 TX/PROPH/DG ADDL SEQ IV INF: CPT

## 2023-05-30 PROCEDURE — 96365 THER/PROPH/DIAG IV INF INIT: CPT

## 2023-05-30 PROCEDURE — 83605 ASSAY OF LACTIC ACID: CPT

## 2023-05-30 PROCEDURE — 2580000003 HC RX 258: Performed by: NURSE PRACTITIONER

## 2023-05-30 PROCEDURE — 6360000004 HC RX CONTRAST MEDICATION: Performed by: NURSE PRACTITIONER

## 2023-05-30 PROCEDURE — 87040 BLOOD CULTURE FOR BACTERIA: CPT

## 2023-05-30 PROCEDURE — 87186 SC STD MICRODIL/AGAR DIL: CPT

## 2023-05-30 PROCEDURE — 99285 EMERGENCY DEPT VISIT HI MDM: CPT

## 2023-05-30 PROCEDURE — 84145 PROCALCITONIN (PCT): CPT

## 2023-05-30 PROCEDURE — 87077 CULTURE AEROBIC IDENTIFY: CPT

## 2023-05-30 PROCEDURE — 87086 URINE CULTURE/COLONY COUNT: CPT

## 2023-05-30 PROCEDURE — 86850 RBC ANTIBODY SCREEN: CPT

## 2023-05-30 RX ORDER — ESCITALOPRAM OXALATE 10 MG/1
10 TABLET ORAL DAILY
Qty: 30 TABLET | Refills: 3 | Status: SHIPPED | OUTPATIENT
Start: 2023-05-30

## 2023-05-30 RX ORDER — ACETAMINOPHEN 500 MG
1000 TABLET ORAL ONCE
Status: COMPLETED | OUTPATIENT
Start: 2023-05-30 | End: 2023-05-30

## 2023-05-30 RX ORDER — 0.9 % SODIUM CHLORIDE 0.9 %
30 INTRAVENOUS SOLUTION INTRAVENOUS ONCE
Status: COMPLETED | OUTPATIENT
Start: 2023-05-30 | End: 2023-05-30

## 2023-05-30 RX ORDER — TRIMETHOPRIM 100 MG/1
100 TABLET ORAL 3 TIMES DAILY
COMMUNITY
End: 2023-05-30 | Stop reason: ALTCHOICE

## 2023-05-30 RX ORDER — TRAZODONE HYDROCHLORIDE 50 MG/1
50 TABLET ORAL NIGHTLY
COMMUNITY
Start: 2023-05-01

## 2023-05-30 RX ORDER — TRIMETHOPRIM 100 MG/1
100 TABLET ORAL SEE ADMIN INSTRUCTIONS
Status: ON HOLD | COMMUNITY
End: 2023-06-02 | Stop reason: HOSPADM

## 2023-05-30 RX ORDER — PREDNISONE 1 MG/1
5 TABLET ORAL 2 TIMES DAILY
COMMUNITY
Start: 2023-04-17

## 2023-05-30 RX ADMIN — VANCOMYCIN HYDROCHLORIDE 1000 MG: 1 INJECTION, POWDER, LYOPHILIZED, FOR SOLUTION INTRAVENOUS at 21:15

## 2023-05-30 RX ADMIN — IOPAMIDOL 75 ML: 755 INJECTION, SOLUTION INTRAVENOUS at 22:39

## 2023-05-30 RX ADMIN — CEFEPIME 2000 MG: 2 INJECTION, POWDER, FOR SOLUTION INTRAVENOUS at 20:55

## 2023-05-30 RX ADMIN — SODIUM CHLORIDE 1000 ML: 9 INJECTION, SOLUTION INTRAVENOUS at 20:53

## 2023-05-30 RX ADMIN — ACETAMINOPHEN 1000 MG: 500 TABLET ORAL at 20:54

## 2023-05-30 ASSESSMENT — ENCOUNTER SYMPTOMS
DIARRHEA: 1
BLOOD IN STOOL: 1
ABDOMINAL PAIN: 0
SHORTNESS OF BREATH: 0
CHEST TIGHTNESS: 0
NAUSEA: 1
VOMITING: 0
COUGH: 1

## 2023-05-30 ASSESSMENT — PAIN - FUNCTIONAL ASSESSMENT: PAIN_FUNCTIONAL_ASSESSMENT: NONE - DENIES PAIN

## 2023-05-30 NOTE — PROGRESS NOTES
Moderate COPD (chronic obstructive pulmonary disease) ([facility] [address]  Well, not hypoxic  Continue as needed inhaler    3 Anemia-mild  Might benefit from iron tablets    4Depression  lexapro started  Can offer counseling as well

## 2023-05-31 PROBLEM — R19.7 DIARRHEA: Status: ACTIVE | Noted: 2023-05-31

## 2023-05-31 LAB
ANION GAP SERPL CALCULATED.3IONS-SCNC: 12 MMOL/L (ref 3–16)
BASOPHILS # BLD: 0 K/UL (ref 0–0.2)
BASOPHILS NFR BLD: 0 %
BUN SERPL-MCNC: 15 MG/DL (ref 7–20)
CALCIUM SERPL-MCNC: 8 MG/DL (ref 8.3–10.6)
CHLORIDE SERPL-SCNC: 100 MMOL/L (ref 99–110)
CO2 SERPL-SCNC: 22 MMOL/L (ref 21–32)
CREAT SERPL-MCNC: 1.1 MG/DL (ref 0.8–1.3)
DEPRECATED RDW RBC AUTO: 14.3 % (ref 12.4–15.4)
EOSINOPHIL # BLD: 0 K/UL (ref 0–0.6)
EOSINOPHIL NFR BLD: 0 %
GFR SERPLBLD CREATININE-BSD FMLA CKD-EPI: >60 ML/MIN/{1.73_M2}
GLUCOSE SERPL-MCNC: 79 MG/DL (ref 70–99)
HCT VFR BLD AUTO: 29.5 % (ref 40.5–52.5)
HGB BLD-MCNC: 9.5 G/DL (ref 13.5–17.5)
INR PPP: 1.17 (ref 0.84–1.16)
LYMPHOCYTES # BLD: 1.6 K/UL (ref 1–5.1)
LYMPHOCYTES NFR BLD: 7 %
MCH RBC QN AUTO: 29.9 PG (ref 26–34)
MCHC RBC AUTO-ENTMCNC: 32.1 G/DL (ref 31–36)
MCV RBC AUTO: 93.3 FL (ref 80–100)
MONOCYTES # BLD: 1.1 K/UL (ref 0–1.3)
MONOCYTES NFR BLD: 5 %
MYELOCYTES NFR BLD MANUAL: 1 %
NEUTROPHILS # BLD: 20.2 K/UL (ref 1.7–7.7)
NEUTROPHILS NFR BLD: 87 %
PLATELET # BLD AUTO: 287 K/UL (ref 135–450)
PLATELET BLD QL SMEAR: ADEQUATE
PMV BLD AUTO: 7.2 FL (ref 5–10.5)
POTASSIUM SERPL-SCNC: 3.7 MMOL/L (ref 3.5–5.1)
PROTHROMBIN TIME: 14.9 SEC (ref 11.5–14.8)
RBC # BLD AUTO: 3.16 M/UL (ref 4.2–5.9)
RBC MORPH BLD: NORMAL
SLIDE REVIEW: ABNORMAL
SODIUM SERPL-SCNC: 134 MMOL/L (ref 136–145)
WBC # BLD AUTO: 22.9 K/UL (ref 4–11)

## 2023-05-31 PROCEDURE — 83630 LACTOFERRIN FECAL (QUAL): CPT

## 2023-05-31 PROCEDURE — 87506 IADNA-DNA/RNA PROBE TQ 6-11: CPT

## 2023-05-31 PROCEDURE — 6370000000 HC RX 637 (ALT 250 FOR IP): Performed by: STUDENT IN AN ORGANIZED HEALTH CARE EDUCATION/TRAINING PROGRAM

## 2023-05-31 PROCEDURE — 87336 ENTAMOEB HIST DISPR AG IA: CPT

## 2023-05-31 PROCEDURE — 2580000003 HC RX 258: Performed by: STUDENT IN AN ORGANIZED HEALTH CARE EDUCATION/TRAINING PROGRAM

## 2023-05-31 PROCEDURE — 6370000000 HC RX 637 (ALT 250 FOR IP): Performed by: PHYSICIAN ASSISTANT

## 2023-05-31 PROCEDURE — 87493 C DIFF AMPLIFIED PROBE: CPT

## 2023-05-31 PROCEDURE — 94640 AIRWAY INHALATION TREATMENT: CPT

## 2023-05-31 PROCEDURE — 85610 PROTHROMBIN TIME: CPT

## 2023-05-31 PROCEDURE — 87324 CLOSTRIDIUM AG IA: CPT

## 2023-05-31 PROCEDURE — 80048 BASIC METABOLIC PNL TOTAL CA: CPT

## 2023-05-31 PROCEDURE — 2500000003 HC RX 250 WO HCPCS: Performed by: STUDENT IN AN ORGANIZED HEALTH CARE EDUCATION/TRAINING PROGRAM

## 2023-05-31 PROCEDURE — 1200000000 HC SEMI PRIVATE

## 2023-05-31 PROCEDURE — 87449 NOS EACH ORGANISM AG IA: CPT

## 2023-05-31 PROCEDURE — 94761 N-INVAS EAR/PLS OXIMETRY MLT: CPT

## 2023-05-31 PROCEDURE — 85025 COMPLETE CBC W/AUTO DIFF WBC: CPT

## 2023-05-31 PROCEDURE — 87328 CRYPTOSPORIDIUM AG IA: CPT

## 2023-05-31 PROCEDURE — 36415 COLL VENOUS BLD VENIPUNCTURE: CPT

## 2023-05-31 PROCEDURE — 6360000002 HC RX W HCPCS: Performed by: STUDENT IN AN ORGANIZED HEALTH CARE EDUCATION/TRAINING PROGRAM

## 2023-05-31 PROCEDURE — 83993 ASSAY FOR CALPROTECTIN FECAL: CPT

## 2023-05-31 PROCEDURE — 51798 US URINE CAPACITY MEASURE: CPT

## 2023-05-31 RX ORDER — MAGNESIUM SULFATE IN WATER 40 MG/ML
2000 INJECTION, SOLUTION INTRAVENOUS PRN
Status: DISCONTINUED | OUTPATIENT
Start: 2023-05-31 | End: 2023-06-02 | Stop reason: HOSPADM

## 2023-05-31 RX ORDER — PEG-3350, SODIUM SULFATE, SODIUM CHLORIDE, POTASSIUM CHLORIDE, SODIUM ASCORBATE AND ASCORBIC ACID 7.5-2.691G
100 KIT ORAL ONCE
Status: COMPLETED | OUTPATIENT
Start: 2023-05-31 | End: 2023-05-31

## 2023-05-31 RX ORDER — POTASSIUM CHLORIDE 7.45 MG/ML
10 INJECTION INTRAVENOUS PRN
Status: DISCONTINUED | OUTPATIENT
Start: 2023-05-31 | End: 2023-06-02 | Stop reason: HOSPADM

## 2023-05-31 RX ORDER — SODIUM CHLORIDE 0.9 % (FLUSH) 0.9 %
5-40 SYRINGE (ML) INJECTION PRN
Status: DISCONTINUED | OUTPATIENT
Start: 2023-05-31 | End: 2023-06-02 | Stop reason: HOSPADM

## 2023-05-31 RX ORDER — SODIUM CHLORIDE, SODIUM LACTATE, POTASSIUM CHLORIDE, CALCIUM CHLORIDE 600; 310; 30; 20 MG/100ML; MG/100ML; MG/100ML; MG/100ML
INJECTION, SOLUTION INTRAVENOUS CONTINUOUS
Status: ACTIVE | OUTPATIENT
Start: 2023-05-31 | End: 2023-05-31

## 2023-05-31 RX ORDER — FERROUS SULFATE 325(65) MG
142 TABLET ORAL DAILY
Status: DISCONTINUED | OUTPATIENT
Start: 2023-05-31 | End: 2023-06-02 | Stop reason: HOSPADM

## 2023-05-31 RX ORDER — TRAZODONE HYDROCHLORIDE 50 MG/1
50 TABLET ORAL NIGHTLY
Status: DISCONTINUED | OUTPATIENT
Start: 2023-05-31 | End: 2023-06-02 | Stop reason: HOSPADM

## 2023-05-31 RX ORDER — METOPROLOL SUCCINATE 50 MG/1
50 TABLET, EXTENDED RELEASE ORAL DAILY
Status: DISCONTINUED | OUTPATIENT
Start: 2023-05-31 | End: 2023-06-02 | Stop reason: HOSPADM

## 2023-05-31 RX ORDER — ENOXAPARIN SODIUM 100 MG/ML
40 INJECTION SUBCUTANEOUS EVERY EVENING
Status: DISCONTINUED | OUTPATIENT
Start: 2023-06-01 | End: 2023-06-02 | Stop reason: HOSPADM

## 2023-05-31 RX ORDER — SODIUM CHLORIDE 0.9 % (FLUSH) 0.9 %
5-40 SYRINGE (ML) INJECTION EVERY 12 HOURS SCHEDULED
Status: DISCONTINUED | OUTPATIENT
Start: 2023-05-31 | End: 2023-06-02 | Stop reason: HOSPADM

## 2023-05-31 RX ORDER — AMLODIPINE BESYLATE 5 MG/1
10 TABLET ORAL DAILY
Status: DISCONTINUED | OUTPATIENT
Start: 2023-05-31 | End: 2023-06-02 | Stop reason: HOSPADM

## 2023-05-31 RX ORDER — SODIUM CHLORIDE 9 MG/ML
INJECTION, SOLUTION INTRAVENOUS PRN
Status: DISCONTINUED | OUTPATIENT
Start: 2023-05-31 | End: 2023-06-02 | Stop reason: HOSPADM

## 2023-05-31 RX ORDER — METRONIDAZOLE 500 MG/100ML
500 INJECTION, SOLUTION INTRAVENOUS EVERY 8 HOURS
Status: DISCONTINUED | OUTPATIENT
Start: 2023-05-31 | End: 2023-06-01

## 2023-05-31 RX ORDER — ONDANSETRON 2 MG/ML
4 INJECTION INTRAMUSCULAR; INTRAVENOUS EVERY 6 HOURS PRN
Status: DISCONTINUED | OUTPATIENT
Start: 2023-05-31 | End: 2023-06-02 | Stop reason: HOSPADM

## 2023-05-31 RX ORDER — ACETAMINOPHEN 325 MG/1
650 TABLET ORAL EVERY 6 HOURS PRN
Status: DISCONTINUED | OUTPATIENT
Start: 2023-05-31 | End: 2023-06-02 | Stop reason: HOSPADM

## 2023-05-31 RX ORDER — ONDANSETRON 4 MG/1
4 TABLET, ORALLY DISINTEGRATING ORAL EVERY 8 HOURS PRN
Status: DISCONTINUED | OUTPATIENT
Start: 2023-05-31 | End: 2023-06-02 | Stop reason: HOSPADM

## 2023-05-31 RX ORDER — ACETAMINOPHEN 650 MG/1
650 SUPPOSITORY RECTAL EVERY 6 HOURS PRN
Status: DISCONTINUED | OUTPATIENT
Start: 2023-05-31 | End: 2023-06-02 | Stop reason: HOSPADM

## 2023-05-31 RX ORDER — ALBUTEROL SULFATE 90 UG/1
2 AEROSOL, METERED RESPIRATORY (INHALATION) EVERY 6 HOURS PRN
Status: DISCONTINUED | OUTPATIENT
Start: 2023-05-31 | End: 2023-06-02 | Stop reason: HOSPADM

## 2023-05-31 RX ADMIN — METOPROLOL SUCCINATE 50 MG: 50 TABLET, EXTENDED RELEASE ORAL at 08:51

## 2023-05-31 RX ADMIN — PEG-3350, SODIUM SULFATE, SODIUM CHLORIDE, POTASSIUM CHLORIDE, SODIUM ASCORBATE AND ASCORBIC ACID 100 G: KIT at 20:31

## 2023-05-31 RX ADMIN — TRAZODONE HYDROCHLORIDE 50 MG: 50 TABLET ORAL at 20:30

## 2023-05-31 RX ADMIN — Medication 10 ML: at 20:25

## 2023-05-31 RX ADMIN — AMLODIPINE BESYLATE 10 MG: 5 TABLET ORAL at 08:51

## 2023-05-31 RX ADMIN — METRONIDAZOLE 500 MG: 500 INJECTION, SOLUTION INTRAVENOUS at 01:53

## 2023-05-31 RX ADMIN — ACETAMINOPHEN 650 MG: 325 TABLET ORAL at 15:40

## 2023-05-31 RX ADMIN — TRAZODONE HYDROCHLORIDE 50 MG: 50 TABLET ORAL at 01:55

## 2023-05-31 RX ADMIN — ACETAMINOPHEN 650 MG: 325 TABLET ORAL at 03:22

## 2023-05-31 RX ADMIN — Medication 2 PUFF: at 22:07

## 2023-05-31 RX ADMIN — SODIUM CHLORIDE, POTASSIUM CHLORIDE, SODIUM LACTATE AND CALCIUM CHLORIDE: 600; 310; 30; 20 INJECTION, SOLUTION INTRAVENOUS at 01:53

## 2023-05-31 RX ADMIN — PEG-3350, SODIUM SULFATE, SODIUM CHLORIDE, POTASSIUM CHLORIDE, SODIUM ASCORBATE AND ASCORBIC ACID 100 G: KIT at 16:59

## 2023-05-31 RX ADMIN — FERROUS SULFATE TAB 325 MG (65 MG ELEMENTAL FE) 162.5 MG: 325 (65 FE) TAB at 08:50

## 2023-05-31 RX ADMIN — METRONIDAZOLE 500 MG: 500 INJECTION, SOLUTION INTRAVENOUS at 08:58

## 2023-05-31 RX ADMIN — METRONIDAZOLE 500 MG: 500 INJECTION, SOLUTION INTRAVENOUS at 17:19

## 2023-05-31 RX ADMIN — ONDANSETRON 4 MG: 2 INJECTION INTRAMUSCULAR; INTRAVENOUS at 03:16

## 2023-05-31 RX ADMIN — Medication 2 PUFF: at 12:03

## 2023-05-31 RX ADMIN — ACETAMINOPHEN 650 MG: 325 TABLET ORAL at 08:51

## 2023-05-31 RX ADMIN — CEFTRIAXONE SODIUM 1000 MG: 1 INJECTION, POWDER, FOR SOLUTION INTRAMUSCULAR; INTRAVENOUS at 06:25

## 2023-05-31 ASSESSMENT — PAIN SCALES - GENERAL
PAINLEVEL_OUTOF10: 0
PAINLEVEL_OUTOF10: 0

## 2023-05-31 NOTE — ED NOTES
RN attempted x 2 to insert colón, 14 f and then 16 f coude, without success. Pt is going to straight cath for urine as he has to straight cath at home.       Geni Rodríguez RN  05/30/23 2128

## 2023-05-31 NOTE — FLOWSHEET NOTE
Pt arrived to floor from ed. Pt ambulates per self with steady gait. Pt straight cath self with his own supplies after cleaning with betadine for 500 of yellow cloudy urine. Pt oriented to room and POC discussed with pt. Pt denies pain or needs at this time.

## 2023-05-31 NOTE — ED PROVIDER NOTES
[address]        Pt Name: Octaviano Toure  MRN: [id number]  [place] 1940  Date of evaluation: 5/30/2023  Provider: ADELAIDA Wiggins - SERA  PCP: Jojo Wells MD  Note Started: 8:48 PM EDT 5/30/23      TAPAN. I have evaluated this patient. CHIEF COMPLAINT       Chief Complaint   Patient presents with    Fever     Pt states fever 102.6 at home with c/o nausea and generally not feeling well. Hx of prostate CA. Pt getting chemo. Pt due again next monday       HISTORY OF PRESENT ILLNESS: 1 or more Elements     History from : Patient    Limitations to history : None    Octaviano Toure is a 78 y.o. male who presents to the emergency department with complaint of feeling poorly, diarrhea, and fever. Patient is a patient of Dr. Richard Weinberg, [facility], being treated for metastatic prostate cancer. Chemotherapy is ongoing, he has received 2 doses, 2 weeks apart. Reports that he has had diarrhea for more than 1 week but the stool has turned bloody today. He does report bright red blood mixed with \"water\". Reports he is nauseated. Oral temperature greater than 102 at home, no medications prior to arrival.  He does have a cough, history of copd. Denies any lightheadedness, dizziness, visual disturbances. No chest pain or pressure. No neck or back pain. No shortness of breath. No vomiting, constipation, or dysuria. No rash. Nursing Notes were all reviewed and agreed with or any disagreements were addressed in the HPI. REVIEW OF SYSTEMS :      Review of Systems   Constitutional:  Positive for chills, fatigue and fever. Negative for activity change. Respiratory:  Positive for cough. Negative for chest tightness and shortness of breath. Cardiovascular:  Negative for chest pain. Gastrointestinal:  Positive for blood in stool, diarrhea and nausea. Negative for abdominal pain and vomiting. Genitourinary:  Negative for dysuria.    All other

## 2023-05-31 NOTE — FLOWSHEET NOTE
4 Eyes Skin Assessment     NAME:  Parvin Kimball  YOB: 1940  MEDICAL RECORD NUMBER:  [id number]    The patient is being assessed for  Admission    I agree that at least one RN has performed a thorough Head to Toe Skin Assessment on the patient. ALL assessment sites listed below have been assessed. Areas assessed by both nurses:    Head, Face, Ears, Shoulders, Back, Chest, Arms, Elbows, Hands, Sacrum. Buttock, Coccyx, Ischium, and Legs. Feet and Heels        Does the Patient have a Wound?  No noted wound(s)       Daryl Prevention initiated by RN: No  Wound Care Orders initiated by RN: No    Pressure Injury (Stage 3,4, Unstageable, DTI, NWPT, and Complex wounds) if present, place Wound referral order by RN under : No    New Ostomies, if present place, Ostomy referral order under : No     Nurse 1 eSignature: Electronically signed by Kevyn Everett RN on 5/31/23 at 2:03 AM EDT    **SHARE this note so that the co-signing nurse can place an eSignature**    Nurse 2 eSignature: Electronically signed by Louann Olsen RN on 5/31/23 at 2:51 AM EDT

## 2023-05-31 NOTE — H&P
History and Physical      Name:  Fausto Vail /Age/Sex: 1940  (78 y.o. male)   MRN & CSN:  [id number] & [id number] Encounter Date/Time: 2023 12:38 AM EDT   Location:  [facility] PCP: Monique Cornejo MD       Hospital Day: 2    Assessment and Plan:     Patient is a 38-year-old male who presented with diarrhea. # Sepsis secondary to intra-abdominal infection  - Endorsed worsening diarrhea (>5-6 BMs/daily) for past few days, which became blood-tinged for one day. No abdominal pain, recent antibiotics, travel or sick contacts. Fever of 102 °F at home. - Clinically hypovolemic, borderline fever, moderate leukocytosis of 19.2, LA normal.   - Started on IVF and Vanc/Cefepime in the ED, changed to Rocephin/Flagyl.  - Will rule-out CDI. GI consulted, follow-up. # Metastatic prostate cancer  - CT showed large area of irregular decreased attenuation in the prostate appears new compared to  imaging, with bilateral HUN, and metastatic disease to lungs. - Followed by H/O, currently receiving chemotherapy every 2 weeks. - Preform SC about 4 times daily. # Acute complicate cystitis  - Likely secondary to above with frequent SC.   - On Rocephin (    # Essential hypretension  - Continue home Norvasc with holding parameters. Checklist:  Advanced directive: full  Diet: cardiac  DVT ppx: Lovenox    Disposition: admit to inpatient. Estimated discharge: 2-3 day(s). Current living situation: home. Expected disposition: home. Spoke with ED provider who recommended admission for the patient and I agree with that plan. Personally reviewed lab studies and imaging. EKG interpreted personally and results as stated above. Imaging that was interpreted personally and results as stated above.     History of Present Illness:     Chief Complaint: diarrhea    Patient is a 38-year-old male with a PMHx of metastatic prostate cancer (on chemotherapy) and HTN who presented to the ED with worsening

## 2023-05-31 NOTE — PLAN OF CARE
Problem: Pain  Goal: Verbalizes/displays adequate comfort level or baseline comfort level  Outcome: Progressing  Flowsheets (Taken 5/31/2023 0204)  Verbalizes/displays adequate comfort level or baseline comfort level:   Assess pain using appropriate pain scale   Encourage patient to monitor pain and request assistance     Problem: Skin/Tissue Integrity  Goal: Absence of new skin breakdown  Description: 1. Monitor for areas of redness and/or skin breakdown  2. Assess vascular access sites hourly  3. Every 4-6 hours minimum:  Change oxygen saturation probe site  4. Every 4-6 hours:  If on nasal continuous positive airway pressure, respiratory therapy assess nares and determine need for appliance change or resting period.   Outcome: Progressing     Problem: ABCDS Injury Assessment  Goal: Absence of physical injury  Outcome: Progressing  Flowsheets (Taken 5/31/2023 0204)  Absence of Physical Injury: Implement safety measures based on patient assessment     Problem: Gastrointestinal - Adult  Goal: Maintains or returns to baseline bowel function  Outcome: Progressing  Flowsheets (Taken 5/31/2023 0204)  Maintains or returns to baseline bowel function: Assess bowel function     Problem: Genitourinary - Adult  Goal: Absence of urinary retention  Outcome: Progressing  Flowsheets (Taken 5/31/2023 0204)  Absence of urinary retention: Monitor intake/output and perform bladder scan as needed     Problem: Infection - Adult  Goal: Absence of infection at discharge  Outcome: Progressing  Flowsheets (Taken 5/31/2023 0204)  Absence of infection at discharge: Assess and monitor for signs and symptoms of infection

## 2023-06-01 ENCOUNTER — ANESTHESIA EVENT (OUTPATIENT)
Dept: ENDOSCOPY | Age: 83
End: 2023-06-01
Payer: MEDICARE

## 2023-06-01 ENCOUNTER — ANESTHESIA (OUTPATIENT)
Dept: ENDOSCOPY | Age: 83
End: 2023-06-01
Payer: MEDICARE

## 2023-06-01 LAB
ANION GAP SERPL CALCULATED.3IONS-SCNC: 15 MMOL/L (ref 3–16)
BASOPHILS # BLD: 0 K/UL (ref 0–0.2)
BASOPHILS NFR BLD: 0 %
BUN SERPL-MCNC: 13 MG/DL (ref 7–20)
C DIFF TOX A+B STL QL IA: NORMAL
CALCIUM SERPL-MCNC: 8 MG/DL (ref 8.3–10.6)
CHLORIDE SERPL-SCNC: 104 MMOL/L (ref 99–110)
CO2 SERPL-SCNC: 21 MMOL/L (ref 21–32)
CREAT SERPL-MCNC: 1 MG/DL (ref 0.8–1.3)
CRYPTOSP AG STL QL IA: NORMAL
DEPRECATED RDW RBC AUTO: 14.9 % (ref 12.4–15.4)
E HISTOLYT AG STL QL IA: NORMAL
EOSINOPHIL # BLD: 0 K/UL (ref 0–0.6)
EOSINOPHIL NFR BLD: 0 %
G LAMBLIA AG STL QL IA: NORMAL
GFR SERPLBLD CREATININE-BSD FMLA CKD-EPI: >60 ML/MIN/{1.73_M2}
GI PATHOGENS PNL STL NAA+PROBE: NORMAL
GLUCOSE SERPL-MCNC: 77 MG/DL (ref 70–99)
HCT VFR BLD AUTO: 30.9 % (ref 40.5–52.5)
HGB BLD-MCNC: 10 G/DL (ref 13.5–17.5)
LACTOFERRIN STL QL IA: NORMAL
LYMPHOCYTES # BLD: 0.5 K/UL (ref 1–5.1)
LYMPHOCYTES NFR BLD: 3 %
MCH RBC QN AUTO: 29.9 PG (ref 26–34)
MCHC RBC AUTO-ENTMCNC: 32.3 G/DL (ref 31–36)
MCV RBC AUTO: 92.6 FL (ref 80–100)
MONOCYTES # BLD: 1.6 K/UL (ref 0–1.3)
MONOCYTES NFR BLD: 9 %
MYELOCYTES NFR BLD MANUAL: 2 %
NEUTROPHILS # BLD: 15.6 K/UL (ref 1.7–7.7)
NEUTROPHILS NFR BLD: 85 %
NEUTS BAND NFR BLD MANUAL: 1 % (ref 0–7)
PLATELET # BLD AUTO: 281 K/UL (ref 135–450)
PLATELET BLD QL SMEAR: ADEQUATE
PMV BLD AUTO: 6.6 FL (ref 5–10.5)
POTASSIUM SERPL-SCNC: 3.3 MMOL/L (ref 3.5–5.1)
RBC # BLD AUTO: 3.33 M/UL (ref 4.2–5.9)
RBC MORPH BLD: NORMAL
SLIDE REVIEW: ABNORMAL
SODIUM SERPL-SCNC: 140 MMOL/L (ref 136–145)
WBC # BLD AUTO: 17.7 K/UL (ref 4–11)

## 2023-06-01 PROCEDURE — 7100000001 HC PACU RECOVERY - ADDTL 15 MIN: Performed by: INTERNAL MEDICINE

## 2023-06-01 PROCEDURE — 2580000003 HC RX 258: Performed by: NURSE ANESTHETIST, CERTIFIED REGISTERED

## 2023-06-01 PROCEDURE — 2500000003 HC RX 250 WO HCPCS: Performed by: NURSE ANESTHETIST, CERTIFIED REGISTERED

## 2023-06-01 PROCEDURE — 2709999900 HC NON-CHARGEABLE SUPPLY: Performed by: INTERNAL MEDICINE

## 2023-06-01 PROCEDURE — 2500000003 HC RX 250 WO HCPCS: Performed by: STUDENT IN AN ORGANIZED HEALTH CARE EDUCATION/TRAINING PROGRAM

## 2023-06-01 PROCEDURE — 0DBL8ZX EXCISION OF TRANSVERSE COLON, VIA NATURAL OR ARTIFICIAL OPENING ENDOSCOPIC, DIAGNOSTIC: ICD-10-PCS | Performed by: INTERNAL MEDICINE

## 2023-06-01 PROCEDURE — 85025 COMPLETE CBC W/AUTO DIFF WBC: CPT

## 2023-06-01 PROCEDURE — 2580000003 HC RX 258: Performed by: STUDENT IN AN ORGANIZED HEALTH CARE EDUCATION/TRAINING PROGRAM

## 2023-06-01 PROCEDURE — 88305 TISSUE EXAM BY PATHOLOGIST: CPT

## 2023-06-01 PROCEDURE — 6370000000 HC RX 637 (ALT 250 FOR IP): Performed by: INTERNAL MEDICINE

## 2023-06-01 PROCEDURE — 2580000003 HC RX 258: Performed by: INTERNAL MEDICINE

## 2023-06-01 PROCEDURE — 2500000003 HC RX 250 WO HCPCS: Performed by: INTERNAL MEDICINE

## 2023-06-01 PROCEDURE — 36415 COLL VENOUS BLD VENIPUNCTURE: CPT

## 2023-06-01 PROCEDURE — 3700000001 HC ADD 15 MINUTES (ANESTHESIA): Performed by: INTERNAL MEDICINE

## 2023-06-01 PROCEDURE — 3609010300 HC COLONOSCOPY W/BIOPSY SINGLE/MULTIPLE: Performed by: INTERNAL MEDICINE

## 2023-06-01 PROCEDURE — 1200000000 HC SEMI PRIVATE

## 2023-06-01 PROCEDURE — 6360000002 HC RX W HCPCS: Performed by: STUDENT IN AN ORGANIZED HEALTH CARE EDUCATION/TRAINING PROGRAM

## 2023-06-01 PROCEDURE — 94640 AIRWAY INHALATION TREATMENT: CPT

## 2023-06-01 PROCEDURE — 7100000000 HC PACU RECOVERY - FIRST 15 MIN: Performed by: INTERNAL MEDICINE

## 2023-06-01 PROCEDURE — 3700000000 HC ANESTHESIA ATTENDED CARE: Performed by: INTERNAL MEDICINE

## 2023-06-01 PROCEDURE — 3609010600 HC COLONOSCOPY POLYPECTOMY SNARE/COLD BIOPSY: Performed by: INTERNAL MEDICINE

## 2023-06-01 PROCEDURE — 6360000002 HC RX W HCPCS: Performed by: NURSE ANESTHETIST, CERTIFIED REGISTERED

## 2023-06-01 PROCEDURE — 6370000000 HC RX 637 (ALT 250 FOR IP): Performed by: STUDENT IN AN ORGANIZED HEALTH CARE EDUCATION/TRAINING PROGRAM

## 2023-06-01 PROCEDURE — 2580000003 HC RX 258: Performed by: ANESTHESIOLOGY

## 2023-06-01 PROCEDURE — 80048 BASIC METABOLIC PNL TOTAL CA: CPT

## 2023-06-01 RX ORDER — PROPOFOL 10 MG/ML
INJECTION, EMULSION INTRAVENOUS PRN
Status: DISCONTINUED | OUTPATIENT
Start: 2023-06-01 | End: 2023-06-01 | Stop reason: SDUPTHER

## 2023-06-01 RX ORDER — SODIUM CHLORIDE 9 MG/ML
INJECTION, SOLUTION INTRAVENOUS CONTINUOUS
Status: DISCONTINUED | OUTPATIENT
Start: 2023-06-01 | End: 2023-06-01 | Stop reason: HOSPADM

## 2023-06-01 RX ORDER — VANCOMYCIN HYDROCHLORIDE 50 MG/ML
125 KIT ORAL EVERY 6 HOURS SCHEDULED
Status: DISCONTINUED | OUTPATIENT
Start: 2023-06-01 | End: 2023-06-02 | Stop reason: HOSPADM

## 2023-06-01 RX ORDER — LIDOCAINE HYDROCHLORIDE 20 MG/ML
INJECTION, SOLUTION EPIDURAL; INFILTRATION; INTRACAUDAL; PERINEURAL PRN
Status: DISCONTINUED | OUTPATIENT
Start: 2023-06-01 | End: 2023-06-01 | Stop reason: SDUPTHER

## 2023-06-01 RX ORDER — SODIUM CHLORIDE 9 MG/ML
INJECTION, SOLUTION INTRAVENOUS CONTINUOUS PRN
Status: DISCONTINUED | OUTPATIENT
Start: 2023-06-01 | End: 2023-06-01 | Stop reason: SDUPTHER

## 2023-06-01 RX ADMIN — Medication 1 PACKET: at 12:57

## 2023-06-01 RX ADMIN — METOPROLOL SUCCINATE 50 MG: 50 TABLET, EXTENDED RELEASE ORAL at 11:32

## 2023-06-01 RX ADMIN — PROPOFOL 50 MG: 10 INJECTION, EMULSION INTRAVENOUS at 09:47

## 2023-06-01 RX ADMIN — PROPOFOL 50 MG: 10 INJECTION, EMULSION INTRAVENOUS at 09:36

## 2023-06-01 RX ADMIN — METRONIDAZOLE 500 MG: 500 INJECTION, SOLUTION INTRAVENOUS at 11:50

## 2023-06-01 RX ADMIN — Medication 10 ML: at 20:18

## 2023-06-01 RX ADMIN — Medication 125 MG: at 12:57

## 2023-06-01 RX ADMIN — Medication 2 PUFF: at 21:50

## 2023-06-01 RX ADMIN — SODIUM CHLORIDE: 9 INJECTION, SOLUTION INTRAVENOUS at 08:05

## 2023-06-01 RX ADMIN — Medication 125 MG: at 18:18

## 2023-06-01 RX ADMIN — SODIUM CHLORIDE: 9 INJECTION, SOLUTION INTRAVENOUS at 09:31

## 2023-06-01 RX ADMIN — METRONIDAZOLE 500 MG: 500 INJECTION, SOLUTION INTRAVENOUS at 00:52

## 2023-06-01 RX ADMIN — PROPOFOL 25 MG: 10 INJECTION, EMULSION INTRAVENOUS at 09:33

## 2023-06-01 RX ADMIN — PROPOFOL 25 MG: 10 INJECTION, EMULSION INTRAVENOUS at 09:31

## 2023-06-01 RX ADMIN — AMLODIPINE BESYLATE 10 MG: 5 TABLET ORAL at 11:32

## 2023-06-01 RX ADMIN — LIDOCAINE HYDROCHLORIDE 100 MG: 20 INJECTION, SOLUTION EPIDURAL; INFILTRATION; INTRACAUDAL; PERINEURAL at 09:31

## 2023-06-01 RX ADMIN — TRAZODONE HYDROCHLORIDE 50 MG: 50 TABLET ORAL at 20:18

## 2023-06-01 RX ADMIN — CEFTRIAXONE SODIUM 1000 MG: 1 INJECTION, POWDER, FOR SOLUTION INTRAMUSCULAR; INTRAVENOUS at 05:34

## 2023-06-01 RX ADMIN — Medication 125 MG: at 23:03

## 2023-06-01 RX ADMIN — PROPOFOL 50 MG: 10 INJECTION, EMULSION INTRAVENOUS at 09:42

## 2023-06-01 RX ADMIN — PROPOFOL 50 MG: 10 INJECTION, EMULSION INTRAVENOUS at 09:39

## 2023-06-01 ASSESSMENT — LIFESTYLE VARIABLES: SMOKING_STATUS: 0

## 2023-06-01 ASSESSMENT — COPD QUESTIONNAIRES: CAT_SEVERITY: MODERATE

## 2023-06-01 ASSESSMENT — PAIN - FUNCTIONAL ASSESSMENT: PAIN_FUNCTIONAL_ASSESSMENT: 0-10

## 2023-06-01 NOTE — ANESTHESIA POSTPROCEDURE EVALUATION
Department of Anesthesiology  Postprocedure Note    Patient: Hammad Villatoro  MRN: [id number]  YOB: 1940  Date of evaluation: 6/1/2023      Procedure Summary     Date: 06/01/23 Room / Location: [address] [place] [address]    Anesthesia Start: 2413 Anesthesia Stop: 1005    Procedures:       COLONOSCOPY POLYPECTOMY SNARE/COLD BIOPSY      COLONOSCOPY WITH BIOPSY Diagnosis:       Colon obstruction ([address]      (Colon obstruction ([address] [V55.340])    Surgeons: Aidan Reed MD Responsible Provider: Rose Emery MD    Anesthesia Type: MAC ASA Status: 3          Anesthesia Type: No value filed.     Mabel Phase I: Mabel Score: 9    Mabel Phase II:        Anesthesia Post Evaluation    Patient location during evaluation: PACU  Patient participation: complete - patient participated  Level of consciousness: awake  Airway patency: patent  Nausea & Vomiting: no vomiting and no nausea  Complications: no  Cardiovascular status: hemodynamically stable  Respiratory status: acceptable  Hydration status: stable  Multimodal analgesia pain management approach

## 2023-06-01 NOTE — CARE COORDINATION
Discharge Planning Note  Patient's chart reviewed for discharge needs and it appears that patient has minimal needs for discharge at this time. Risk Score of 15 % . Patient status post Colonoscopy this morning. Discussed with patients nurse and requested that case management be notified if discharge needs are identified. Case management will continue to follow progress and update discharge plan as needed.

## 2023-06-01 NOTE — ANESTHESIA PRE PROCEDURE
K81.0    Cholecystitis K81.9    Hospital-acquired pneumonia J18.9, Y95    Failure of outpatient treatment Z78.9    Pneumonia J18.9    Carotid artery occlusion and stenosis I65.29    Malignant neoplasm of prostate metastatic to lymph nodes of multiple sites ([address] C61, C77.8    Prostate cancer metastatic to lung ([address] C61, C78.00    Diarrhea R19.7       Past Medical History:        Diagnosis Date    Carotid artery occlusion and stenosis 5/3/2012    Centrilobular emphysema ([address] 2014    COPD (chronic obstructive pulmonary disease) ([address]     ED (erectile dysfunction)     Emphysema lung ([address] 2014    GERD (gastroesophageal reflux disease)     Hyperlipidemia     pt denies    Hypertension     Hypertension     Prostate CA ([address] 2003    surgery then radiation    Prostate CA ([address]     Prostate cancer metastatic to lung ([address] 2023    Psoriasis     Pulmonary nodules     Radiation     Urinary incontinence        Past Surgical History:        Procedure Laterality Date    CAROTID ENDARTERECTOMY  5/3/12    left    CAROTID ENDARTERECTOMY Right 10/25/2017    Dr. Li Dorrance at [address], [address]  2018    Intraoperative Cholangiogram    COLONOSCOPY      CYSTOSCOPY  2017    ENDOSCOPY, COLON, DIAGNOSTIC      ESOPHAGEAL DILATATION N/A 1/3/2022    ESOPHAGEAL DILATION Angelica Downs performed by Chryl Bence, MD at [facility] SKIN BIOPSY      melanoma left arm    TONSILLECTOMY      UPPER GASTROINTESTINAL ENDOSCOPY N/A 1/3/2022    EGD POLYP SNARE performed by Chryl Bence, MD at [facility] ENDOSCOPY       Social History:    Social History     Tobacco Use    Smoking status: Former     Packs/day: 0.50     Years: 30.00     Pack years: 15.00     Types: Cigarettes     Quit date: 1987     Years since quittin.7    Smokeless tobacco: Never   Substance Use Topics    Alcohol use: Yes     Comment: FEW BEERS PER WEEK

## 2023-06-02 VITALS
BODY MASS INDEX: 21.9 KG/M2 | OXYGEN SATURATION: 96 % | SYSTOLIC BLOOD PRESSURE: 143 MMHG | RESPIRATION RATE: 18 BRPM | HEART RATE: 102 BPM | DIASTOLIC BLOOD PRESSURE: 78 MMHG | TEMPERATURE: 98.2 F | HEIGHT: 70 IN | WEIGHT: 153 LBS

## 2023-06-02 LAB
ANION GAP SERPL CALCULATED.3IONS-SCNC: 9 MMOL/L (ref 3–16)
BACTERIA UR CULT: ABNORMAL
BASOPHILS # BLD: 0 K/UL (ref 0–0.2)
BASOPHILS NFR BLD: 0 %
BUN SERPL-MCNC: 12 MG/DL (ref 7–20)
C DIFF TOX GENS STL QL NAA+PROBE: ABNORMAL
CALCIUM SERPL-MCNC: 8 MG/DL (ref 8.3–10.6)
CHLORIDE SERPL-SCNC: 103 MMOL/L (ref 99–110)
CO2 SERPL-SCNC: 26 MMOL/L (ref 21–32)
CREAT SERPL-MCNC: 0.8 MG/DL (ref 0.8–1.3)
DEPRECATED RDW RBC AUTO: 14.4 % (ref 12.4–15.4)
EOSINOPHIL # BLD: 0.1 K/UL (ref 0–0.6)
EOSINOPHIL NFR BLD: 1 %
GFR SERPLBLD CREATININE-BSD FMLA CKD-EPI: >60 ML/MIN/{1.73_M2}
GLUCOSE SERPL-MCNC: 100 MG/DL (ref 70–99)
HCT VFR BLD AUTO: 29 % (ref 40.5–52.5)
HGB BLD-MCNC: 9.4 G/DL (ref 13.5–17.5)
LYMPHOCYTES # BLD: 1.3 K/UL (ref 1–5.1)
LYMPHOCYTES NFR BLD: 9 %
MAGNESIUM SERPL-MCNC: 2 MG/DL (ref 1.8–2.4)
MCH RBC QN AUTO: 29.7 PG (ref 26–34)
MCHC RBC AUTO-ENTMCNC: 32.4 G/DL (ref 31–36)
MCV RBC AUTO: 91.7 FL (ref 80–100)
MONOCYTES # BLD: 1.6 K/UL (ref 0–1.3)
MONOCYTES NFR BLD: 11 %
MYELOCYTES NFR BLD MANUAL: 2 %
NEUTROPHILS # BLD: 11.4 K/UL (ref 1.7–7.7)
NEUTROPHILS NFR BLD: 77 %
ORGANISM: ABNORMAL
ORGANISM: ABNORMAL
PATH INTERP BLD-IMP: NORMAL
PLATELET # BLD AUTO: 280 K/UL (ref 135–450)
PLATELET BLD QL SMEAR: ADEQUATE
PMV BLD AUTO: 6.9 FL (ref 5–10.5)
POTASSIUM SERPL-SCNC: 3 MMOL/L (ref 3.5–5.1)
POTASSIUM SERPL-SCNC: 3.5 MMOL/L (ref 3.5–5.1)
RBC # BLD AUTO: 3.16 M/UL (ref 4.2–5.9)
RBC MORPH BLD: NORMAL
SLIDE REVIEW: ABNORMAL
SODIUM SERPL-SCNC: 138 MMOL/L (ref 136–145)
WBC # BLD AUTO: 14.4 K/UL (ref 4–11)

## 2023-06-02 PROCEDURE — 85025 COMPLETE CBC W/AUTO DIFF WBC: CPT

## 2023-06-02 PROCEDURE — 84132 ASSAY OF SERUM POTASSIUM: CPT

## 2023-06-02 PROCEDURE — 6370000000 HC RX 637 (ALT 250 FOR IP): Performed by: INTERNAL MEDICINE

## 2023-06-02 PROCEDURE — 80048 BASIC METABOLIC PNL TOTAL CA: CPT

## 2023-06-02 PROCEDURE — 83735 ASSAY OF MAGNESIUM: CPT

## 2023-06-02 PROCEDURE — 2580000003 HC RX 258: Performed by: INTERNAL MEDICINE

## 2023-06-02 PROCEDURE — 36415 COLL VENOUS BLD VENIPUNCTURE: CPT

## 2023-06-02 PROCEDURE — 6360000002 HC RX W HCPCS: Performed by: INTERNAL MEDICINE

## 2023-06-02 PROCEDURE — 94640 AIRWAY INHALATION TREATMENT: CPT

## 2023-06-02 PROCEDURE — 94761 N-INVAS EAR/PLS OXIMETRY MLT: CPT

## 2023-06-02 RX ORDER — POTASSIUM CHLORIDE 750 MG/1
40 TABLET, FILM COATED, EXTENDED RELEASE ORAL ONCE
Status: COMPLETED | OUTPATIENT
Start: 2023-06-02 | End: 2023-06-02

## 2023-06-02 RX ORDER — POTASSIUM CHLORIDE 1500 MG/1
40 TABLET, EXTENDED RELEASE ORAL DAILY
Qty: 60 TABLET | Refills: 3 | Status: SHIPPED | OUTPATIENT
Start: 2023-06-02 | End: 2023-06-05

## 2023-06-02 RX ORDER — VANCOMYCIN HYDROCHLORIDE 50 MG/ML
125 KIT ORAL EVERY 6 HOURS
Qty: 90 ML | Refills: 0 | Status: SHIPPED | OUTPATIENT
Start: 2023-06-02 | End: 2023-06-11

## 2023-06-02 RX ADMIN — POTASSIUM CHLORIDE 10 MEQ: 7.46 INJECTION, SOLUTION INTRAVENOUS at 10:58

## 2023-06-02 RX ADMIN — Medication 125 MG: at 12:38

## 2023-06-02 RX ADMIN — Medication 1 PACKET: at 08:24

## 2023-06-02 RX ADMIN — CEFTRIAXONE SODIUM 1000 MG: 1 INJECTION, POWDER, FOR SOLUTION INTRAMUSCULAR; INTRAVENOUS at 06:22

## 2023-06-02 RX ADMIN — Medication 125 MG: at 05:11

## 2023-06-02 RX ADMIN — Medication 2 PUFF: at 08:53

## 2023-06-02 RX ADMIN — METOPROLOL SUCCINATE 50 MG: 50 TABLET, EXTENDED RELEASE ORAL at 08:25

## 2023-06-02 RX ADMIN — POTASSIUM CHLORIDE 10 MEQ: 7.46 INJECTION, SOLUTION INTRAVENOUS at 12:41

## 2023-06-02 RX ADMIN — AMLODIPINE BESYLATE 10 MG: 5 TABLET ORAL at 08:25

## 2023-06-02 RX ADMIN — POTASSIUM CHLORIDE 10 MEQ: 7.46 INJECTION, SOLUTION INTRAVENOUS at 08:31

## 2023-06-02 RX ADMIN — POTASSIUM CHLORIDE 40 MEQ: 750 TABLET, FILM COATED, EXTENDED RELEASE ORAL at 15:40

## 2023-06-02 RX ADMIN — POTASSIUM CHLORIDE 40 MEQ: 750 TABLET, FILM COATED, EXTENDED RELEASE ORAL at 12:38

## 2023-06-02 ASSESSMENT — PAIN SCALES - GENERAL
PAINLEVEL_OUTOF10: 0
PAINLEVEL_OUTOF10: 0

## 2023-06-02 NOTE — CONSULTS
Urology Consult Note  [facility]     Patient: Enedelia Sharpe MRN: [id number]  Room/Bed: [facility]2245/3482-14   YOB: 1940  Age/Sex: 78 y.o.male  Admission Date: 5/30/2023     Date of Service:  6/2/2023    Consulting Provider: ADELAIDA Cooper CNP  Admitting/Requesting Physician: Sukumar Brandt MD  Primary Care Physician: Janessa Castro MD    Reason for Consult: Bilateral hydronephrosis    ASSESSMENT/PLAN     79 yo M with complex  history as follows. Advanced prostate cancer s/p RRP, radiation, s/p hyperbaric oxygen therapy, on eligard, erleada, and nubeqa, hx of bladder neck contracture, penile cancer, and hx of  recurrent UTI. Straight cathing himself 7x a day at baseline for a hx of NGB. Now admitted with sepsis 2/2 to C-diff and possible acute cystitis. Workup in the ED showing bladder distension with bl hydronephrosis and a large area of irregular decreased attenuation in the prostate. He is alert and oriented on exam, has been catheterizing himself while admitted. Cr is 0.8 today. IMPRESSION:  1. Large area of irregular decreased attenuation in the prostate appears new  compared to 2022 imaging, this may be due to underlying neoplasm, necrosis or  abscess. 2. Mildly distended bladder with diffuse wall thickening and surrounding  inflammatory change, consistent with cystitis. 3. Bilateral hydroureteronephrosis without stone identified, which may be due  to a functional or mechanical obstructing process at the level of the  bladder/prostate. 4. Findings compatible with pulmonary metastatic disease with solid and  cavitary nodules. 5. Scattered sclerotic bone lesions suggestive of metastatic disease. recc  Will need to reconsider the ppx antibiotics for uti, patient now with c-diff. He is cathing with his own supplies inpatient, reports no problems with this. He is responding well to antibiotics, his wbc and fever curve are trending down.  No surgical intervention
See Admin Instructions Take one tablet every Monday, Wednesday, Friday in the prevention of UTIs. traZODone (DESYREL) 50 MG tablet Take 1 tablet by mouth nightly      predniSONE (DELTASONE) 5 MG tablet Take 1 tablet by mouth 2 times daily      escitalopram (LEXAPRO) 10 MG tablet Take 1 tablet by mouth daily 30 tablet 3    SYMBICORT 160-4.5 MCG/ACT AERO INHALE 2 PUFFS INTO THE LUNGS BY MOUTH TWICE DAILY 30.6 g 5    ERLEADA 60 MG TABS  (Patient not taking: Reported on 5/30/2023)      ipratropium-albuterol (DUONEB) 0.5-2.5 (3) MG/3ML SOLN nebulizer solution Inhale 3 mLs into the lungs every 4 hours DX:COPD J44.9 360 mL 11    albuterol sulfate HFA (PROVENTIL;VENTOLIN;PROAIR) 108 (90 Base) MCG/ACT inhaler Inhale 2 puffs into the lungs every 6 hours as needed for Wheezing 25.5 g 5    metoprolol succinate (TOPROL XL) 50 MG extended release tablet Take 1 tablet by mouth daily 90 tablet 3    amLODIPine (NORVASC) 10 MG tablet TAKE 1 TABLET BY MOUTH EVERY DAY 90 tablet 3    pantoprazole (PROTONIX) 40 MG tablet TAKE 1 TABLET BY MOUTH EVERY MORNING BEFORE BREAKFAST (Patient taking differently: Take 20 mg by mouth daily 1 po qd) 90 tablet 3    aspirin 81 MG EC tablet Take 1 tablet by mouth daily (Patient not taking: Reported on 5/30/2023)      Calcium-Magnesium-Vitamin D (CALCIUM 1200+D3 PO) Take by mouth      Probiotic Product (PROBIOTIC DAILY PO) Take by mouth      ferrous sulfate (SLOW FE) 142 (45 Fe) MG extended release tablet Take 142 mg by mouth daily      Omega-3 Fatty Acids (FISH OIL) 1000 MG CAPS by NOT APPLICABLE route (Patient not taking: Reported on 5/30/2023)      loperamide (IMODIUM A-D) 2 MG tablet Take by mouth      leuprolide acetate, 4 Month, (ELIGARD) 30 MG injection Inject 0.5 mLs into the skin once Once every 4 months.       vitamin E 400 UNIT capsule Take 1 capsule by mouth daily          Allergies  Allergies   Allergen Reactions    Lisinopril      cough      Social   Social History     Tobacco Use

## 2023-06-02 NOTE — PROGRESS NOTES
05/31/23 0545   RT Protocol   History Pulmonary Disease 1   Respiratory pattern 0   Breath sounds 2   Cough 0   Indications for Bronchodilator Therapy On home bronchodilators   Bronchodilator Assessment Score 3
Colonoscopy on chart, 2 polyps removed, mild radiation proctitis and mild internal hermorrhoids likely explain blood with bm that resolved overnight with prep, no sign infection/ no colitis. Rec  benefiber 1 tbsp daily  call office 1 week for biopsy results  overall appears mild radiation proctitis and small hemorrhoids source intermittent blood with bm that resolved with prep; both small/mild likely do not need further treatment.   follow up stool studies but no apparent c diff or colitis infections endoscopically  hold off future surveillance colonoscopy due to advanced age
Gastroenterology Progress Note      Dutch Hernandez is a 78 y.o. male patient. 1. Immunocompromised ([address]    2. Urinary tract infection in male    3. Septicemia ([facility]    4. Prostate mass    5. Colon obstruction ([facility])        SUBJECTIVE:  no diarrhea or blood per rectum after colonoscopy. No abdominal pain. Physical    VITALS:  BP (!) 143/78   Pulse (!) 102   Temp 98.2 °F (36.8 °C) (Oral)   Resp 18   Ht 5' 10\" (1.778 m)   Wt 153 lb (69.4 kg)   SpO2 96%   BMI 21.95 kg/m²   TEMPERATURE:  Current - Temp: 98.2 °F (36.8 °C); Max - Temp  Av.3 °F (36.8 °C)  Min: 97.8 °F (36.6 °C)  Max: 98.8 °F (37.1 °C)    NAD  RRR   Lungs CTA Bilaterally, normal effort  Abdomen soft, ND, NT, no HSM, Bowel sounds normal       Data    Data Review:    Recent Labs     23  1048 23  0537   WBC 22.9* 17.7* 14.4*   HGB 9.5* 10.0* 9.4*   HCT 29.5* 30.9* 29.0*   MCV 93.3 92.6 91.7    281 280     Recent Labs     23  0723 23  1048 23  0537   * 140 138   K 3.7 3.3* 3.0*    104 103   CO2 22 21 26   BUN 15 13 12   CREATININE 1.1 1.0 0.8     Recent Labs     23   AST 19   ALT 25   BILITOT 0.8   ALKPHOS 60     No results for input(s): LIPASE, AMYLASE in the last 72 hours. Recent Labs     23   PROTIME 14.9*   INR 1.17*     No results for input(s): PTT in the last 72 hours. ASSESSMENT / PLAN      Diarrhea with hematochezia - he does report chronic intermittent diarrhea for 20 years and will take Imodium as needed. Sounds like he initially had constipation after starting Taxotere and now diarrhea with blood. Taxotere does have high incidence of causing diarrhea. This med was last given 5/15. No colon abnormality noted on CT.  colonoscopy  with 2 polyps (removed), mild radiation proctitis, and mild internal hemorrhoids which likely explain the blood with BMs. But no colitis.  Stool GI bacterial pathogens PCR, and EIA for
Hospitalist Progress Note      Name:  Iván Montana /Age/Sex: 1940  (78 y.o. male)   MRN & CSN:  [id number] & [id number] Admission Date/Time: 2023  7:44 PM   Location:  [facility] PCP: Juan Pope MD         Hospital Day: 3    Assessment and Plan:   Iván Montana is a 78 y.o.  male with past medical history of metastatic prostate cancer s/p surgery/, on chemotherapy, chronic urinary retention on self cath, hypertension, was admitted on 2023 for evaluation of worsening diarrheal episodes with hematochezia. Patient also reported to be febrile at home. Urinalysis suggestive of UTI. Patient did meet sepsis criteria. GI was consulted. Patient was tested positive for C. difficile. CT abdomen/pelvis  1. Large area of irregular decreased attenuation in the prostate appears new compared to  imaging, this may be due to underlying neoplasm, necrosis or abscess. 2. Mildly distended bladder with diffuse wall thickening and surrounding  inflammatory change, consistent with cystitis. 3. Bilateral hydroureteronephrosis without stone identified, which may be due to a functional or mechanical obstructing process at the level of the  bladder/prostate. 4. Findings compatible with pulmonary metastatic disease with solid and  cavitary nodules. 5. Scattered sclerotic bone lesions suggestive of metastatic disease. Colonoscopy 2023  S/p 2 polyp removal, mild radiation proctitis and mild internal hemorrhoids    Assessment    Sepsis likely secondary to C. difficile/possible UTI  Diarrhea, likely secondary to C. difficile with hematochezia, likely secondary to hemorrhoids  Metastatic prostate cancer with chronic urinary retention  Bilateral hydroureteronephrosis  Hypertension  Hypokalemia    Plan    Continue IV empiric antibiotics ceftriaxone while awaiting urine culture.   Discontinue Flagyl/vancomycin  Reported by RN that labs informed her of positive C. difficile
Nutrition Note    RECOMMENDATIONS  PO Diet: Regular as tolerated  ONS: Ensure BID     NUTRITION ASSESSMENT   Pt triggered for nutrition intervention d/t poor appetite & wt loss reported on admission. Pt reports has not felt like eating much recently. Reports 15 lb wt loss, however noted 9 lb wt loss in past 7 months, per EMR, which is not considered significant. Pt was NPO this am for colonoscopy. Is willing to try Ensure BID to promote adequate intake. Will monitor for po & supp intake. Nutrition Related Findings: No edema noted; k+ 3.3  Wounds: None  Nutrition Education:  Education not indicated   Nutrition Goals: PO intake 50% or greater     MALNUTRITION ASSESSMENT   Acute Illness  Malnutrition Status: At risk for malnutrition (Comment)    NUTRITION DIAGNOSIS   Increased nutrient needs related to catabolic illness as evidenced by weight loss, poor intake prior to admission, intake 0-25%      CURRENT NUTRITION THERAPIES  ADULT DIET; Regular  ADULT ORAL NUTRITION SUPPLEMENT; Breakfast, Dinner; Standard High Calorie/High Protein Oral Supplement     PO Intake: 1-25%   PO Supplement Intake:None Ordered    ANTHROPOMETRICS  Current Height: 5' 10\" (177.8 cm)  Current Weight - Scale: 152 lb (68.9 kg)    Ideal Body Weight (IBW): 166 lbs  (75 kg)    Usual Bodyweight 161 lb (73 kg)       BMI: 21.8      COMPARATIVE STANDARDS  Energy (kcal):  1725- 2070     Protein (g):  83- 138g       Fluid (mL/day):  1 ml/kcal    The patient will be monitored per nutrition standards of care. Consult dietitian if additional nutrition interventions are needed prior to RD reassessment.      Morales Tai RD, LD    Contact: [phone]
Patient was admitted after midnight. Refer to H&P for details. Patient CT scan finding and diarrhea discussed with him in details. Patient has not establish care with urology outpatient. He was advised to keep that appointment. Agree with current management. GI evaluation pending.
Pharmacy Home Medication Reconciliation Note    A medication reconciliation has been completed for Shakila Whiteside 1940    Pharmacy: [address]  Information provided by: patient    The patient's home medication list is as follows: No current facility-administered medications on file prior to encounter. Current Outpatient Medications on File Prior to Encounter   Medication Sig Dispense Refill    trimethoprim (TRIMPEX) 100 MG tablet Take 1 tablet by mouth See Admin Instructions Take one tablet every Monday, Wednesday, Friday in the prevention of UTIs.       traZODone (DESYREL) 50 MG tablet Take 1 tablet by mouth nightly      predniSONE (DELTASONE) 5 MG tablet Take 1 tablet by mouth 2 times daily      escitalopram (LEXAPRO) 10 MG tablet Take 1 tablet by mouth daily 30 tablet 3    [DISCONTINUED] trimethoprim (TRIMPEX) 100 MG tablet Take 1 tablet by mouth 3 times daily (Patient not taking: Reported on 5/30/2023)      SYMBICORT 160-4.5 MCG/ACT AERO INHALE 2 PUFFS INTO THE LUNGS BY MOUTH TWICE DAILY 30.6 g 5    ERLEADA 60 MG TABS  (Patient not taking: Reported on 5/30/2023)      ipratropium-albuterol (DUONEB) 0.5-2.5 (3) MG/3ML SOLN nebulizer solution Inhale 3 mLs into the lungs every 4 hours DX:COPD J44.9 360 mL 11    albuterol sulfate HFA (PROVENTIL;VENTOLIN;PROAIR) 108 (90 Base) MCG/ACT inhaler Inhale 2 puffs into the lungs every 6 hours as needed for Wheezing 25.5 g 5    metoprolol succinate (TOPROL XL) 50 MG extended release tablet Take 1 tablet by mouth daily 90 tablet 3    amLODIPine (NORVASC) 10 MG tablet TAKE 1 TABLET BY MOUTH EVERY DAY 90 tablet 3    pantoprazole (PROTONIX) 40 MG tablet TAKE 1 TABLET BY MOUTH EVERY MORNING BEFORE BREAKFAST (Patient taking differently: Take 20 mg by mouth daily 1 po qd) 90 tablet 3    aspirin 81 MG EC tablet Take 1 tablet by mouth daily (Patient not taking: Reported on 5/30/2023)      Calcium-Magnesium-Vitamin D (CALCIUM 1200+D3 PO) Take
Physician Progress Note      Maurizio Colon  CSN #:                  [id number]  :                       1940  ADMIT DATE:       2023 7:44 PM  [address] [place] DATE:  RESPONDING  PROVIDER #:        Zabrina Rosales MD          QUERY TEXT:    Pt admitted with UTI. Pt noted to perform intermittent self catheterizations   and has possible UTI per attending note. If possible, please document in the   progress notes and discharge summary if you are evaluating and/or treating any   of the following: The medical record reflects the following:  Risk Factors: ISC, metastatic prostate cancer, urinary retention  Clinical Indicators: risk factors and self catheterizations, Urine cx -10,000   CFU/ml Klebsiella pneumoniae  Treatment: IVF's, urology consult-pending, labs, ABX, supportive care    Thank you, Robert Capone RN BSN  Options provided:  -- UTI due to self catheterization  -- UTI not due to self catheterization  -- Other - I will add my own diagnosis  -- Disagree - Not applicable / Not valid  -- Disagree - Clinically unable to determine / Unknown  -- Refer to Clinical Documentation Reviewer    PROVIDER RESPONSE TEXT:    Provider disagreed with this query.   No UTI    Query created by: Mike Hines on 2023 9:39 AM      Electronically signed by:  Zabrina Rosales MD 2023 2:15 PM
Pt arrived from Endo to PACU bay 2. Report received from Endo staff. Pt  arousable to voice. Pt on 2L 02, NSR, and VSS. Will continue to monitor.
Pt stable and able to be transferred from PACU to room 5581. A&O , VSS, with no complaints at this time. 5T called and notified that pt is being transferred out of PACU and to room.
Pt transferred to room 5581 at this time. A&O with no signs of distress. Report given to 5T RN. V/u and denies questions or further needs at this time.
Shift assessment complete, VSS, A&Ox4. Morning medications administered per MAR. Patient reports no pain at this time. Patient's potassium 3.0 this, initiated replacement protocol via IV. Patient bed alarm is off as patient is A&Ox4 and steady. Patient states he straight caths himself. Patient states no further needs at this time. Call light and personal belongings within reach.
Shift assessment completed. Routine vitals obtained. Scheduled medications and PRN Tylenol given. Patient is awake, alert and oriented. Respirations are easy and unlabored. Patient does not appear to be in distress, resting comfortably at this time. Call light within reach. Fall precautions in place.
Teaching / education initiated regarding perioperative experience, expectations, and pain management during stay. Patient verbalized understanding.
Vss  Pt resting in bed at this time. Transportation is here to take pt down for his EGD and Colonoscopy.
date  Continue IV hydration  GI evaluation appreciated. Status post colonoscopy 6/1/2023. Results noted as above. Given CT finding of prostate, bilateral hydroureteronephrosis, urinary retention, urology was consulted, appreciate recommendations. Recommend outpatient follow-up. Continue amlodipine/Toprol-XL  Continue trazodone nightly  Monitor electrolytes and supplement per protocol      Diet ADULT DIET; Regular  ADULT ORAL NUTRITION SUPPLEMENT; Breakfast, Dinner; Standard High Calorie/High Protein Oral Supplement   DVT Prophylaxis [] Lovenox, []  Heparin, [x] SCDs, [] Ambulation   GI Prophylaxis [] PPI,  [] H2 Blocker,  [] Carafate,  [] Diet/Tube Feeds   Code Status Full Code   Disposition Patient requires continued admission due to GI evaluation/urology evaluation   MDM [] Low, [] Moderate,[x]  High  Patient's risk as above      History of Present Illness:     Chief Complaint: Diarrhea    Patient seen and examined at bedside. Denies any abdominal pain, nausea, vomiting or diarrhea. Able to tolerate p.o. Denies any urinary symptoms. Objective: Intake/Output Summary (Last 24 hours) at 6/2/2023 0848  Last data filed at 6/1/2023 1005  Gross per 24 hour   Intake 300 ml   Output --   Net 300 ml      Vitals:   Vitals:    06/02/23 1151   BP:    Pulse: (!) 102   Resp:    Temp:    SpO2:      Physical Exam:   GEN Awake male, sitting upright in bed in no apparent distress. Appears given age. RESP bilateral entry fair. No wheezing or crackles. CARDIO/VASC S1/S2 auscultated. Regular rate . No peripheral edema. GI Abdomen is soft, nontender, nondistended  NEURO AOx3. No gross focal neurological deficit.     Medications:   Medications:    psyllium  1 packet Oral Daily    vancomycin  125 mg Oral 4 times per day    cefTRIAXone (ROCEPHIN) IV  1,000 mg IntraVENous Q24H    amLODIPine  10 mg Oral Daily    [Held by provider] ferrous sulfate  162.5 mg Oral Daily    metoprolol succinate  50 mg Oral Daily

## 2023-06-02 NOTE — PLAN OF CARE
Problem: Pain  Goal: Verbalizes/displays adequate comfort level or baseline comfort level  6/2/2023 0818 by Ilana Ridley RN  Outcome: Progressing  6/2/2023 0208 by NICHOLAS Yun  Outcome: Progressing     Problem: Skin/Tissue Integrity  Goal: Absence of new skin breakdown  Description: 1. Monitor for areas of redness and/or skin breakdown  2. Assess vascular access sites hourly  3. Every 4-6 hours minimum:  Change oxygen saturation probe site  4. Every 4-6 hours:  If on nasal continuous positive airway pressure, respiratory therapy assess nares and determine need for appliance change or resting period.   6/2/2023 0818 by Ilana Ridley RN  Outcome: Progressing  6/2/2023 0208 by Teja Rider  Outcome: Progressing     Problem: Safety - Adult  Goal: Free from fall injury  6/2/2023 0818 by Ilana Ridley RN  Outcome: Progressing  6/2/2023 0208 by Teja Rider  Outcome: Progressing     Problem: ABCDS Injury Assessment  Goal: Absence of physical injury  6/2/2023 0818 by Ilana Ridley RN  Outcome: Progressing  6/2/2023 0208 by Teja Rider  Outcome: Progressing     Problem: Gastrointestinal - Adult  Goal: Maintains or returns to baseline bowel function  6/2/2023 0818 by Ilana Ridley RN  Outcome: Progressing  Flowsheets (Taken 6/2/2023 0800)  Maintains or returns to baseline bowel function:   Assess bowel function   Encourage oral fluids to ensure adequate hydration  6/2/2023 0208 by NICHOLAS Yun  Outcome: Progressing     Problem: Genitourinary - Adult  Goal: Absence of urinary retention  6/2/2023 0818 by Ilana Ridley RN  Outcome: Progressing  Flowsheets (Taken 6/2/2023 0800)  Absence of urinary retention:   Assess patients ability to void and empty bladder   Monitor intake/output and perform bladder scan as needed  6/2/2023 0208 by NICHOLAS REYES  Outcome: Progressing     Problem: Infection - Adult  Goal: Absence of infection at discharge  6/2/2023 0818 by Ilana Ridley RN  Outcome:

## 2023-06-02 NOTE — DISCHARGE SUMMARY
Discharge Summary           Name:  Yury Trinh /Age/Sex: 1940  (78 y.o. male)   MRN & CSN:  [id number] & [id number] Admission Date/Time: 2023  7:44 PM   Attending:  Tre Gallego MD Discharging Physician: Tre Gallego MD     Hospital Course:   Yury Trinh is a 78 y.o.  male with past medical history of metastatic prostate cancer s/p surgery/, on chemotherapy, chronic urinary retention on self cath, hypertension, was admitted on 2023 for evaluation of worsening diarrheal episodes with hematochezia. Patient also reported to be febrile at home. Urinalysis suggestive of UTI. Patient did meet sepsis criteria. GI was consulted. Patient was tested positive for C. difficile. Started on p.o. vancomycin. Patient underwent colonoscopy noted as below. Urinalysis was noted to be abnormal, patient was started on IV antibiotics for possible UTI however urine culture not significant. Patient was evaluated by urology and recommended outpatient follow-up. Potassium low today, supplemented. Patient strongly advised to follow-up closely with PCP/GI/urology as outpatient. Verbalized understanding. The patient expressed appropriate understanding of and agreement with the discharge recommendations, medications, and plan. Patient is currently being discharged in stable condition on 2023     CT abdomen/pelvis  1. Large area of irregular decreased attenuation in the prostate appears new compared to  imaging, this may be due to underlying neoplasm, necrosis or abscess. 2. Mildly distended bladder with diffuse wall thickening and surrounding  inflammatory change, consistent with cystitis. 3. Bilateral hydroureteronephrosis without stone identified, which may be due to a functional or mechanical obstructing process at the level of the  bladder/prostate. 4. Findings compatible with pulmonary metastatic disease with solid and  cavitary nodules.   5. Scattered sclerotic bone lesions suggestive of

## 2023-06-02 NOTE — PLAN OF CARE
Problem: Pain  Goal: Verbalizes/displays adequate comfort level or baseline comfort level  Outcome: Progressing     Problem: Skin/Tissue Integrity  Goal: Absence of new skin breakdown  Description: 1. Monitor for areas of redness and/or skin breakdown  2. Assess vascular access sites hourly  3. Every 4-6 hours minimum:  Change oxygen saturation probe site  4. Every 4-6 hours:  If on nasal continuous positive airway pressure, respiratory therapy assess nares and determine need for appliance change or resting period.   Outcome: Progressing     Problem: Safety - Adult  Goal: Free from fall injury  Outcome: Progressing     Problem: ABCDS Injury Assessment  Goal: Absence of physical injury  Outcome: Progressing     Problem: Gastrointestinal - Adult  Goal: Maintains or returns to baseline bowel function  Outcome: Progressing     Problem: Genitourinary - Adult  Goal: Absence of urinary retention  Outcome: Progressing     Problem: Infection - Adult  Goal: Absence of infection at discharge  Outcome: Progressing     Problem: Chronic Conditions and Co-morbidities  Goal: Patient's chronic conditions and co-morbidity symptoms are monitored and maintained or improved  Outcome: Progressing     Problem: Nutrition Deficit:  Goal: Optimize nutritional status  Outcome: Progressing

## 2023-06-03 LAB
BACTERIA BLD CULT ORG #2: NORMAL
BACTERIA BLD CULT: NORMAL
CALPROTECTIN STL-MCNT: 12 UG/G

## 2023-06-05 ENCOUNTER — CARE COORDINATION (OUTPATIENT)
Dept: CASE MANAGEMENT | Age: 83
End: 2023-06-05

## 2023-06-05 DIAGNOSIS — A09 DIARRHEA OF INFECTIOUS ORIGIN: Primary | ICD-10-CM

## 2023-06-05 PROCEDURE — 1111F DSCHRG MED/CURRENT MED MERGE: CPT | Performed by: FAMILY MEDICINE

## 2023-06-05 NOTE — CARE COORDINATION
911. The patient agrees to contact the PCP office for questions related to their healthcare. Advance Care Planning:   Does patient have an Advance Directive: reviewed and current. Medication reconciliation was performed with patient, who verbalizes understanding of administration of home medications. Medications reviewed, 1111F entered: yes    Was patient discharged with a pulse oximeter? no    Non-face-to-face services provided:  Obtained and reviewed discharge summary and/or continuity of care documents    Offered patient enrollment in the Remote Patient Monitoring (RPM) program for in-home monitoring:  future call . Care Transitions 24 Hour Call    Do you have a copy of your discharge instructions?: Yes  Do you have all of your prescriptions and are they filled?: No  Have you scheduled your follow up appointment?: Yes  How are you going to get to your appointment?: Car - family or friend to transport  Do you have support at home?: Partner/Spouse/SO  Do you feel like you have everything you need to keep you well at home?: Yes  Are you an active caregiver in your home?: No  Care Transitions Interventions  No Identified Needs         Discussed follow-up appointments. If no appointment was previously scheduled, appointment scheduling offered: No.   Is follow up appointment scheduled within 7 days of discharge? Yes. Follow Up  Future Appointments   Date Time Provider Javan Chacon   6/6/2023  3:00 PM Marlo Steel MD [facility] [place] [facility]   7/25/2023  9:45 AM [facility] VASC & ENDO, VASCULAR LAB SCHED [facility] VASC/ENDO [facility]   10/10/2023  1:00 PM Carina Karimi MD PULM & [facility]       Care Transition Nurse provided contact information. Plan for follow-up call in 5-7 days based on severity of symptoms and risk factors.   Plan for next call: self management-cdiff, vancomycin, f/u appts, discuss RPM    Bc Cunningham RN

## 2023-06-06 ENCOUNTER — OFFICE VISIT (OUTPATIENT)
Dept: FAMILY MEDICINE CLINIC | Age: 83
End: 2023-06-06

## 2023-06-06 VITALS
BODY MASS INDEX: 22.24 KG/M2 | DIASTOLIC BLOOD PRESSURE: 80 MMHG | WEIGHT: 155 LBS | SYSTOLIC BLOOD PRESSURE: 120 MMHG | HEART RATE: 109 BPM | OXYGEN SATURATION: 95 %

## 2023-06-06 DIAGNOSIS — Z09 HOSPITAL DISCHARGE FOLLOW-UP: Primary | ICD-10-CM

## 2023-06-06 NOTE — PROGRESS NOTES
Post-Discharge Transitional Care Follow Up      Suzie Garcia   YOB: 1940    Date of Office Visit:  6/6/2023  Date of Hospital Admission: 5/30/23  Date of Hospital Discharge: 6/2/23  Readmission Risk Score (high >=14%. Medium >=10%):Readmission Risk Score: 16.8      Care management risk score Rising risk (score 2-5) and Complex Care (Scores >=6): No Risk Score On File     Non face to face  following discharge, date last encounter closed (first attempt may have been earlier): 06/05/2023     Call initiated 2 business days of discharge: Yes     Hospital discharge follow-up  -     DC DISCHARGE MEDS RECONCILED W/ CURRENT OUTPATIENT MED LIST    Medical Decision Making: high complexity  No follow-ups on file. On this date 6/6/2023 I have spent 15 minutes reviewing previous notes, test results and face to face with the patient discussing the diagnosis and importance of compliance with the treatment plan as well as documenting on the day of the visit. Subjective:   HPI here with wife for hospital follow-up  Had weakness, watery diarrhea, brbpr  Inpatient course: Discharge summary reviewed- see chart.   Found to have cdiff, sepsis  Low K, UTI-potassium replaced, back on his 3 days a week antibiotic for urinary tract infection prophylaxis because he does self-catheterization  Colon - mild c diff, mild int hemorrhoid    Interval history/Current status: loose stools are firming up  Filnished kcl  Still weak  Still on  3 d/week atb and self cath    Patient Active Problem List   Diagnosis    Psoriasis    Prostate cancer metastatic to bone (HCC)    GERD (gastroesophageal reflux disease)    ED (erectile dysfunction)    Hypertension    Hyperlipidemia    Occlusion and stenosis of carotid artery    Centrilobular emphysema (HCC)    Hypoxia    Pulmonary nodules    Moderate COPD (chronic obstructive pulmonary disease) (HCC)    Hematuria    Cystitis    Atherosclerosis of both carotid arteries    Abdominal pain

## 2023-06-07 DIAGNOSIS — Z09 HOSPITAL DISCHARGE FOLLOW-UP: ICD-10-CM

## 2023-06-07 LAB
ACANTHOCYTES BLD QL SMEAR: ABNORMAL
ANION GAP SERPL CALCULATED.3IONS-SCNC: 11 MMOL/L (ref 3–16)
ANISOCYTOSIS BLD QL SMEAR: ABNORMAL
BASOPHILS # BLD: 0.1 K/UL (ref 0–0.2)
BASOPHILS NFR BLD: 1 %
BUN SERPL-MCNC: 21 MG/DL (ref 7–20)
BURR CELLS BLD QL SMEAR: ABNORMAL
CALCIUM SERPL-MCNC: 9.1 MG/DL (ref 8.3–10.6)
CHLORIDE SERPL-SCNC: 100 MMOL/L (ref 99–110)
CO2 SERPL-SCNC: 26 MMOL/L (ref 21–32)
CREAT SERPL-MCNC: 1 MG/DL (ref 0.8–1.3)
DACRYOCYTES BLD QL SMEAR: ABNORMAL
DEPRECATED RDW RBC AUTO: 15.4 % (ref 12.4–15.4)
EOSINOPHIL # BLD: 0.3 K/UL (ref 0–0.6)
EOSINOPHIL NFR BLD: 2 %
GFR SERPLBLD CREATININE-BSD FMLA CKD-EPI: >60 ML/MIN/{1.73_M2}
GLUCOSE SERPL-MCNC: 77 MG/DL (ref 70–99)
HCT VFR BLD AUTO: 35 % (ref 40.5–52.5)
HGB BLD-MCNC: 11.4 G/DL (ref 13.5–17.5)
LYMPHOCYTES # BLD: 1.7 K/UL (ref 1–5.1)
LYMPHOCYTES NFR BLD: 12 %
MCH RBC QN AUTO: 30.9 PG (ref 26–34)
MCHC RBC AUTO-ENTMCNC: 32.7 G/DL (ref 31–36)
MCV RBC AUTO: 94.7 FL (ref 80–100)
MONOCYTES # BLD: 0.6 K/UL (ref 0–1.3)
MONOCYTES NFR BLD: 4 %
MYELOCYTES NFR BLD MANUAL: 2 %
NEUTROPHILS # BLD: 11.3 K/UL (ref 1.7–7.7)
NEUTROPHILS NFR BLD: 79 %
PLATELET # BLD AUTO: 440 K/UL (ref 135–450)
PLATELET BLD QL SMEAR: ADEQUATE
PMV BLD AUTO: 7.5 FL (ref 5–10.5)
POLYCHROMASIA BLD QL SMEAR: ABNORMAL
POTASSIUM SERPL-SCNC: 4.9 MMOL/L (ref 3.5–5.1)
RBC # BLD AUTO: 3.69 M/UL (ref 4.2–5.9)
SLIDE REVIEW: ABNORMAL
SODIUM SERPL-SCNC: 137 MMOL/L (ref 136–145)
WBC # BLD AUTO: 13.9 K/UL (ref 4–11)

## 2023-06-19 ENCOUNTER — HOSPITAL ENCOUNTER (INPATIENT)
Age: 83
LOS: 3 days | Discharge: HOME | DRG: 722 | End: 2023-06-22
Attending: EMERGENCY MEDICINE | Admitting: INTERNAL MEDICINE
Payer: MEDICARE

## 2023-06-19 ENCOUNTER — TELEPHONE (OUTPATIENT)
Dept: OTHER | Facility: CLINIC | Age: 83
End: 2023-06-19

## 2023-06-19 ENCOUNTER — APPOINTMENT (OUTPATIENT)
Dept: CT IMAGING | Age: 83
DRG: 722 | End: 2023-06-19
Payer: MEDICARE

## 2023-06-19 ENCOUNTER — APPOINTMENT (OUTPATIENT)
Dept: GENERAL RADIOLOGY | Age: 83
DRG: 722 | End: 2023-06-19
Payer: MEDICARE

## 2023-06-19 DIAGNOSIS — D70.9 NEUTROPENIC FEVER (HCC): ICD-10-CM

## 2023-06-19 DIAGNOSIS — R50.81 NEUTROPENIC FEVER (HCC): ICD-10-CM

## 2023-06-19 DIAGNOSIS — A41.9 SEPTICEMIA (HCC): Primary | ICD-10-CM

## 2023-06-19 LAB
ALBUMIN SERPL-MCNC: 3.4 G/DL (ref 3.4–5)
ALBUMIN/GLOB SERPL: 1.2 {RATIO} (ref 1.1–2.2)
ALP SERPL-CCNC: 78 U/L (ref 40–129)
ALT SERPL-CCNC: 36 U/L (ref 10–40)
ANION GAP SERPL CALCULATED.3IONS-SCNC: 10 MMOL/L (ref 3–16)
AST SERPL-CCNC: 35 U/L (ref 15–37)
BACTERIA URNS QL MICRO: ABNORMAL /HPF
BASOPHILS # BLD: 0 K/UL (ref 0–0.2)
BASOPHILS NFR BLD: 0.4 %
BILIRUB SERPL-MCNC: 1.1 MG/DL (ref 0–1)
BILIRUB UR QL STRIP.AUTO: NEGATIVE
BUN SERPL-MCNC: 15 MG/DL (ref 7–20)
CALCIUM SERPL-MCNC: 8.6 MG/DL (ref 8.3–10.6)
CHLORIDE SERPL-SCNC: 99 MMOL/L (ref 99–110)
CLARITY UR: ABNORMAL
CO2 SERPL-SCNC: 28 MMOL/L (ref 21–32)
COLOR UR: YELLOW
CREAT SERPL-MCNC: 1 MG/DL (ref 0.8–1.3)
DEPRECATED RDW RBC AUTO: 16.2 % (ref 12.4–15.4)
EOSINOPHIL # BLD: 0 K/UL (ref 0–0.6)
EOSINOPHIL NFR BLD: 0.3 %
EPI CELLS #/AREA URNS AUTO: 2 /HPF (ref 0–5)
FLUAV RNA RESP QL NAA+PROBE: NOT DETECTED
FLUBV RNA RESP QL NAA+PROBE: NOT DETECTED
GFR SERPLBLD CREATININE-BSD FMLA CKD-EPI: >60 ML/MIN/{1.73_M2}
GLUCOSE SERPL-MCNC: 120 MG/DL (ref 70–99)
GLUCOSE UR STRIP.AUTO-MCNC: NEGATIVE MG/DL
HCT VFR BLD AUTO: 28 % (ref 40.5–52.5)
HGB BLD-MCNC: 9.4 G/DL (ref 13.5–17.5)
HGB UR QL STRIP.AUTO: ABNORMAL
HYALINE CASTS #/AREA URNS AUTO: 8 /LPF (ref 0–8)
INR PPP: 1.12 (ref 0.84–1.16)
KETONES UR STRIP.AUTO-MCNC: ABNORMAL MG/DL
LACTATE BLDV-SCNC: 1.2 MMOL/L (ref 0.4–1.9)
LEUKOCYTE ESTERASE UR QL STRIP.AUTO: ABNORMAL
LIPASE SERPL-CCNC: 22 U/L (ref 13–60)
LYMPHOCYTES # BLD: 0.4 K/UL (ref 1–5.1)
LYMPHOCYTES NFR BLD: 15.7 %
MCH RBC QN AUTO: 30.9 PG (ref 26–34)
MCHC RBC AUTO-ENTMCNC: 33.5 G/DL (ref 31–36)
MCV RBC AUTO: 92.1 FL (ref 80–100)
MONOCYTES # BLD: 0.2 K/UL (ref 0–1.3)
MONOCYTES NFR BLD: 10.1 %
NEUTROPHILS # BLD: 1.7 K/UL (ref 1.7–7.7)
NEUTROPHILS NFR BLD: 73.5 %
NITRITE UR QL STRIP.AUTO: POSITIVE
PH UR STRIP.AUTO: 5.5 [PH] (ref 5–8)
PLATELET # BLD AUTO: 235 K/UL (ref 135–450)
PMV BLD AUTO: 7.7 FL (ref 5–10.5)
POTASSIUM SERPL-SCNC: 4 MMOL/L (ref 3.5–5.1)
PROCALCITONIN SERPL IA-MCNC: 0.4 NG/ML (ref 0–0.15)
PROT SERPL-MCNC: 6.3 G/DL (ref 6.4–8.2)
PROT UR STRIP.AUTO-MCNC: 300 MG/DL
PROTHROMBIN TIME: 14.4 SEC (ref 11.5–14.8)
RBC # BLD AUTO: 3.04 M/UL (ref 4.2–5.9)
RBC CLUMPS #/AREA URNS AUTO: 473 /HPF (ref 0–4)
SARS-COV-2 RNA RESP QL NAA+PROBE: NOT DETECTED
SODIUM SERPL-SCNC: 137 MMOL/L (ref 136–145)
SP GR UR STRIP.AUTO: 1.01 (ref 1–1.03)
UA COMPLETE W REFLEX CULTURE PNL UR: YES
UA DIPSTICK W REFLEX MICRO PNL UR: YES
URN SPEC COLLECT METH UR: ABNORMAL
UROBILINOGEN UR STRIP-ACNC: 0.2 E.U./DL
WBC # BLD AUTO: 2.3 K/UL (ref 4–11)
WBC #/AREA URNS AUTO: 592 /HPF (ref 0–5)

## 2023-06-19 PROCEDURE — 36415 COLL VENOUS BLD VENIPUNCTURE: CPT

## 2023-06-19 PROCEDURE — 85025 COMPLETE CBC W/AUTO DIFF WBC: CPT

## 2023-06-19 PROCEDURE — 87040 BLOOD CULTURE FOR BACTERIA: CPT

## 2023-06-19 PROCEDURE — 71045 X-RAY EXAM CHEST 1 VIEW: CPT

## 2023-06-19 PROCEDURE — 1200000000 HC SEMI PRIVATE

## 2023-06-19 PROCEDURE — 6370000000 HC RX 637 (ALT 250 FOR IP): Performed by: EMERGENCY MEDICINE

## 2023-06-19 PROCEDURE — 80053 COMPREHEN METABOLIC PANEL: CPT

## 2023-06-19 PROCEDURE — 81001 URINALYSIS AUTO W/SCOPE: CPT

## 2023-06-19 PROCEDURE — 99285 EMERGENCY DEPT VISIT HI MDM: CPT

## 2023-06-19 PROCEDURE — 96365 THER/PROPH/DIAG IV INF INIT: CPT

## 2023-06-19 PROCEDURE — 96366 THER/PROPH/DIAG IV INF ADDON: CPT

## 2023-06-19 PROCEDURE — 83690 ASSAY OF LIPASE: CPT

## 2023-06-19 PROCEDURE — 2580000003 HC RX 258: Performed by: EMERGENCY MEDICINE

## 2023-06-19 PROCEDURE — 87086 URINE CULTURE/COLONY COUNT: CPT

## 2023-06-19 PROCEDURE — 85610 PROTHROMBIN TIME: CPT

## 2023-06-19 PROCEDURE — 84145 PROCALCITONIN (PCT): CPT

## 2023-06-19 PROCEDURE — 74176 CT ABD & PELVIS W/O CONTRAST: CPT

## 2023-06-19 PROCEDURE — 83605 ASSAY OF LACTIC ACID: CPT

## 2023-06-19 PROCEDURE — 96368 THER/DIAG CONCURRENT INF: CPT

## 2023-06-19 PROCEDURE — 87636 SARSCOV2 & INF A&B AMP PRB: CPT

## 2023-06-19 PROCEDURE — 6360000002 HC RX W HCPCS: Performed by: EMERGENCY MEDICINE

## 2023-06-19 PROCEDURE — 93005 ELECTROCARDIOGRAM TRACING: CPT | Performed by: EMERGENCY MEDICINE

## 2023-06-19 RX ORDER — MORPHINE SULFATE 4 MG/ML
4 INJECTION, SOLUTION INTRAMUSCULAR; INTRAVENOUS
Status: DISCONTINUED | OUTPATIENT
Start: 2023-06-19 | End: 2023-06-20 | Stop reason: HOSPADM

## 2023-06-19 RX ORDER — ONDANSETRON 4 MG/1
4 TABLET, ORALLY DISINTEGRATING ORAL ONCE
Status: COMPLETED | OUTPATIENT
Start: 2023-06-19 | End: 2023-06-19

## 2023-06-19 RX ORDER — ACETAMINOPHEN 500 MG
1000 TABLET ORAL ONCE
Status: COMPLETED | OUTPATIENT
Start: 2023-06-19 | End: 2023-06-19

## 2023-06-19 RX ORDER — 0.9 % SODIUM CHLORIDE 0.9 %
30 INTRAVENOUS SOLUTION INTRAVENOUS ONCE
Status: COMPLETED | OUTPATIENT
Start: 2023-06-19 | End: 2023-06-19

## 2023-06-19 RX ADMIN — VANCOMYCIN HYDROCHLORIDE 1000 MG: 1 INJECTION, POWDER, LYOPHILIZED, FOR SOLUTION INTRAVENOUS at 21:06

## 2023-06-19 RX ADMIN — SODIUM CHLORIDE 2109 ML: 9 INJECTION, SOLUTION INTRAVENOUS at 20:27

## 2023-06-19 RX ADMIN — ONDANSETRON 4 MG: 4 TABLET, ORALLY DISINTEGRATING ORAL at 20:28

## 2023-06-19 RX ADMIN — CEFEPIME 2000 MG: 2 INJECTION, POWDER, FOR SOLUTION INTRAVENOUS at 20:28

## 2023-06-19 RX ADMIN — ACETAMINOPHEN 1000 MG: 500 TABLET ORAL at 20:28

## 2023-06-19 ASSESSMENT — ENCOUNTER SYMPTOMS
SHORTNESS OF BREATH: 0
VOMITING: 0
RHINORRHEA: 0
NAUSEA: 0
COUGH: 0
DIARRHEA: 0
ABDOMINAL PAIN: 0

## 2023-06-19 ASSESSMENT — LIFESTYLE VARIABLES
HOW MANY STANDARD DRINKS CONTAINING ALCOHOL DO YOU HAVE ON A TYPICAL DAY: PATIENT DOES NOT DRINK
HOW OFTEN DO YOU HAVE A DRINK CONTAINING ALCOHOL: NEVER

## 2023-06-19 ASSESSMENT — PAIN - FUNCTIONAL ASSESSMENT: PAIN_FUNCTIONAL_ASSESSMENT: NONE - DENIES PAIN

## 2023-06-19 NOTE — ED PROVIDER NOTES
I independently saw performed a substantive portion of the visit (history, physical, and MDM) on [employer]. All diagnostic, treatment, and disposition decisions were made by myself in conjunction with the advanced practice provider. I have participated in the medical decision making and directed the treatment plan and disposition of the patient. For further details of [facility] emergency department encounter, please see the advanced practice provider's documentation. William Pabon MD, am the primary physician provider of record. CHIEF COMPLAINT  Chief Complaint   Patient presents with    Fever     Pt came from home by [facility], pt had chemo last Monday, fever started yesterday, 102.1 fever in triage. Hx of prostate cancer. Briefly, Tadeo Shelton is a 78 y.o. male  who presents to the ED complaining of chills and rigors, with fever starting yesterday and arrives febrile today. He got chemotherapy on Monday for metastatic prostate cancer. He has a cough as well as some generalized abdominal discomfort. He recently had C. difficile but his diarrhea has improved. FOCUSED PHYSICAL EXAMINATION  /61   Pulse 96   Temp 98.8 °F (37.1 °C) (Oral)   Resp 18   Ht 5' 10\" (1.778 m)   Wt 155 lb (70.3 kg)   SpO2 98%   BMI 22.24 kg/m²    Focused physical examination notable for mild acute distress, well-appearing, well-nourished, normal speech and mentation without obvious facial droop, no obvious rash. No obvious cranial nerve deficits on my initial exam.  Tachycardic regular rhythm. Coughing noted with mild scattered rhonchi. Abdomen is soft with mild diffuse tenderness and mild distention, no peritonitis. He appears pale.       EKG performed in ED:  The 12 lead EKG was interpreted by me independent of a cardiologist as follows:  Rate: tachycardia with a rate of 124  Rhythm: sinus  Axis: left deviation  Intervals: normal FL, narrow QRS, normal QTc  ST segments:

## 2023-06-20 LAB
ALBUMIN SERPL-MCNC: 2.9 G/DL (ref 3.4–5)
ALBUMIN/GLOB SERPL: 0.9 {RATIO} (ref 1.1–2.2)
ALP SERPL-CCNC: 78 U/L (ref 40–129)
ALT SERPL-CCNC: 34 U/L (ref 10–40)
ANION GAP SERPL CALCULATED.3IONS-SCNC: 14 MMOL/L (ref 3–16)
AST SERPL-CCNC: 29 U/L (ref 15–37)
BASOPHILS # BLD: 0 K/UL (ref 0–0.2)
BASOPHILS NFR BLD: 0.6 %
BILIRUB SERPL-MCNC: 0.9 MG/DL (ref 0–1)
BUN SERPL-MCNC: 13 MG/DL (ref 7–20)
CALCIUM SERPL-MCNC: 8.1 MG/DL (ref 8.3–10.6)
CHLORIDE SERPL-SCNC: 102 MMOL/L (ref 99–110)
CO2 SERPL-SCNC: 21 MMOL/L (ref 21–32)
CREAT SERPL-MCNC: 0.9 MG/DL (ref 0.8–1.3)
DEPRECATED RDW RBC AUTO: 15.8 % (ref 12.4–15.4)
EKG ATRIAL RATE: 124 BPM
EKG DIAGNOSIS: NORMAL
EKG P AXIS: 70 DEGREES
EKG P-R INTERVAL: 124 MS
EKG Q-T INTERVAL: 310 MS
EKG QRS DURATION: 82 MS
EKG QTC CALCULATION (BAZETT): 445 MS
EKG R AXIS: -68 DEGREES
EKG T AXIS: 76 DEGREES
EKG VENTRICULAR RATE: 124 BPM
EOSINOPHIL # BLD: 0 K/UL (ref 0–0.6)
EOSINOPHIL NFR BLD: 0.3 %
GFR SERPLBLD CREATININE-BSD FMLA CKD-EPI: >60 ML/MIN/{1.73_M2}
GLUCOSE SERPL-MCNC: 91 MG/DL (ref 70–99)
HCT VFR BLD AUTO: 27.7 % (ref 40.5–52.5)
HGB BLD-MCNC: 9.1 G/DL (ref 13.5–17.5)
LYMPHOCYTES # BLD: 0.6 K/UL (ref 1–5.1)
LYMPHOCYTES NFR BLD: 24.5 %
MCH RBC QN AUTO: 30.5 PG (ref 26–34)
MCHC RBC AUTO-ENTMCNC: 32.7 G/DL (ref 31–36)
MCV RBC AUTO: 93.2 FL (ref 80–100)
MONOCYTES # BLD: 0.3 K/UL (ref 0–1.3)
MONOCYTES NFR BLD: 10.8 %
NEUTROPHILS # BLD: 1.7 K/UL (ref 1.7–7.7)
NEUTROPHILS NFR BLD: 63.8 %
PLATELET # BLD AUTO: 213 K/UL (ref 135–450)
PMV BLD AUTO: 8.2 FL (ref 5–10.5)
POTASSIUM SERPL-SCNC: 4.2 MMOL/L (ref 3.5–5.1)
PROT SERPL-MCNC: 6.2 G/DL (ref 6.4–8.2)
RBC # BLD AUTO: 2.97 M/UL (ref 4.2–5.9)
SODIUM SERPL-SCNC: 137 MMOL/L (ref 136–145)
VANCOMYCIN SERPL-MCNC: 4.9 UG/ML
WBC # BLD AUTO: 2.6 K/UL (ref 4–11)

## 2023-06-20 PROCEDURE — 85025 COMPLETE CBC W/AUTO DIFF WBC: CPT

## 2023-06-20 PROCEDURE — 2580000003 HC RX 258: Performed by: NURSE PRACTITIONER

## 2023-06-20 PROCEDURE — 6370000000 HC RX 637 (ALT 250 FOR IP): Performed by: NURSE PRACTITIONER

## 2023-06-20 PROCEDURE — 6360000002 HC RX W HCPCS: Performed by: INTERNAL MEDICINE

## 2023-06-20 PROCEDURE — 94640 AIRWAY INHALATION TREATMENT: CPT

## 2023-06-20 PROCEDURE — 80202 ASSAY OF VANCOMYCIN: CPT

## 2023-06-20 PROCEDURE — 6370000000 HC RX 637 (ALT 250 FOR IP): Performed by: INTERNAL MEDICINE

## 2023-06-20 PROCEDURE — 0202U NFCT DS 22 TRGT SARS-COV-2: CPT

## 2023-06-20 PROCEDURE — 36415 COLL VENOUS BLD VENIPUNCTURE: CPT

## 2023-06-20 PROCEDURE — 93010 ELECTROCARDIOGRAM REPORT: CPT | Performed by: INTERNAL MEDICINE

## 2023-06-20 PROCEDURE — 2580000003 HC RX 258: Performed by: INTERNAL MEDICINE

## 2023-06-20 PROCEDURE — 6360000002 HC RX W HCPCS: Performed by: NURSE PRACTITIONER

## 2023-06-20 PROCEDURE — 80053 COMPREHEN METABOLIC PANEL: CPT

## 2023-06-20 PROCEDURE — 1200000000 HC SEMI PRIVATE

## 2023-06-20 PROCEDURE — 94761 N-INVAS EAR/PLS OXIMETRY MLT: CPT

## 2023-06-20 RX ORDER — ALBUTEROL SULFATE 90 UG/1
2 AEROSOL, METERED RESPIRATORY (INHALATION) EVERY 6 HOURS PRN
Status: DISCONTINUED | OUTPATIENT
Start: 2023-06-20 | End: 2023-06-22 | Stop reason: HOSPADM

## 2023-06-20 RX ORDER — IPRATROPIUM BROMIDE AND ALBUTEROL SULFATE 2.5; .5 MG/3ML; MG/3ML
1 SOLUTION RESPIRATORY (INHALATION) EVERY 4 HOURS
Status: DISCONTINUED | OUTPATIENT
Start: 2023-06-20 | End: 2023-06-20

## 2023-06-20 RX ORDER — ACETAMINOPHEN 650 MG/1
650 SUPPOSITORY RECTAL EVERY 6 HOURS PRN
Status: DISCONTINUED | OUTPATIENT
Start: 2023-06-20 | End: 2023-06-22 | Stop reason: HOSPADM

## 2023-06-20 RX ORDER — AMLODIPINE BESYLATE 5 MG/1
10 TABLET ORAL NIGHTLY
Status: DISCONTINUED | OUTPATIENT
Start: 2023-06-20 | End: 2023-06-22 | Stop reason: HOSPADM

## 2023-06-20 RX ORDER — SODIUM CHLORIDE, SODIUM LACTATE, POTASSIUM CHLORIDE, CALCIUM CHLORIDE 600; 310; 30; 20 MG/100ML; MG/100ML; MG/100ML; MG/100ML
INJECTION, SOLUTION INTRAVENOUS CONTINUOUS
Status: DISCONTINUED | OUTPATIENT
Start: 2023-06-20 | End: 2023-06-21

## 2023-06-20 RX ORDER — POLYETHYLENE GLYCOL 3350 17 G/17G
17 POWDER, FOR SOLUTION ORAL DAILY PRN
Status: DISCONTINUED | OUTPATIENT
Start: 2023-06-20 | End: 2023-06-22 | Stop reason: HOSPADM

## 2023-06-20 RX ORDER — ESCITALOPRAM OXALATE 10 MG/1
10 TABLET ORAL DAILY
Status: DISCONTINUED | OUTPATIENT
Start: 2023-06-20 | End: 2023-06-22 | Stop reason: HOSPADM

## 2023-06-20 RX ORDER — SODIUM CHLORIDE 0.9 % (FLUSH) 0.9 %
5-40 SYRINGE (ML) INJECTION EVERY 12 HOURS SCHEDULED
Status: DISCONTINUED | OUTPATIENT
Start: 2023-06-20 | End: 2023-06-22 | Stop reason: HOSPADM

## 2023-06-20 RX ORDER — PANTOPRAZOLE SODIUM 40 MG/1
40 TABLET, DELAYED RELEASE ORAL DAILY
Status: DISCONTINUED | OUTPATIENT
Start: 2023-06-20 | End: 2023-06-22 | Stop reason: HOSPADM

## 2023-06-20 RX ORDER — SODIUM CHLORIDE 9 MG/ML
INJECTION, SOLUTION INTRAVENOUS PRN
Status: DISCONTINUED | OUTPATIENT
Start: 2023-06-20 | End: 2023-06-22 | Stop reason: HOSPADM

## 2023-06-20 RX ORDER — TRAZODONE HYDROCHLORIDE 50 MG/1
50 TABLET ORAL NIGHTLY
Status: DISCONTINUED | OUTPATIENT
Start: 2023-06-20 | End: 2023-06-22 | Stop reason: HOSPADM

## 2023-06-20 RX ORDER — LACTOBACILLUS RHAMNOSUS GG 10B CELL
1 CAPSULE ORAL
Status: DISCONTINUED | OUTPATIENT
Start: 2023-06-20 | End: 2023-06-21

## 2023-06-20 RX ORDER — ONDANSETRON 4 MG/1
4 TABLET, ORALLY DISINTEGRATING ORAL EVERY 8 HOURS PRN
Status: DISCONTINUED | OUTPATIENT
Start: 2023-06-20 | End: 2023-06-22 | Stop reason: HOSPADM

## 2023-06-20 RX ORDER — ENOXAPARIN SODIUM 100 MG/ML
40 INJECTION SUBCUTANEOUS DAILY
Status: DISCONTINUED | OUTPATIENT
Start: 2023-06-20 | End: 2023-06-22 | Stop reason: HOSPADM

## 2023-06-20 RX ORDER — PREDNISONE 5 MG/1
5 TABLET ORAL DAILY
Status: DISCONTINUED | OUTPATIENT
Start: 2023-06-20 | End: 2023-06-22 | Stop reason: HOSPADM

## 2023-06-20 RX ORDER — ACETAMINOPHEN 325 MG/1
650 TABLET ORAL EVERY 6 HOURS PRN
Status: DISCONTINUED | OUTPATIENT
Start: 2023-06-20 | End: 2023-06-22 | Stop reason: HOSPADM

## 2023-06-20 RX ORDER — ONDANSETRON 2 MG/ML
4 INJECTION INTRAMUSCULAR; INTRAVENOUS EVERY 6 HOURS PRN
Status: DISCONTINUED | OUTPATIENT
Start: 2023-06-20 | End: 2023-06-22 | Stop reason: HOSPADM

## 2023-06-20 RX ORDER — SODIUM CHLORIDE 0.9 % (FLUSH) 0.9 %
5-40 SYRINGE (ML) INJECTION PRN
Status: DISCONTINUED | OUTPATIENT
Start: 2023-06-20 | End: 2023-06-22 | Stop reason: HOSPADM

## 2023-06-20 RX ORDER — METOPROLOL SUCCINATE 50 MG/1
50 TABLET, EXTENDED RELEASE ORAL DAILY
Status: DISCONTINUED | OUTPATIENT
Start: 2023-06-20 | End: 2023-06-22 | Stop reason: HOSPADM

## 2023-06-20 RX ORDER — IPRATROPIUM BROMIDE AND ALBUTEROL SULFATE 2.5; .5 MG/3ML; MG/3ML
1 SOLUTION RESPIRATORY (INHALATION) 4 TIMES DAILY
Status: DISCONTINUED | OUTPATIENT
Start: 2023-06-20 | End: 2023-06-22 | Stop reason: HOSPADM

## 2023-06-20 RX ADMIN — Medication 1 CAPSULE: at 08:45

## 2023-06-20 RX ADMIN — IPRATROPIUM BROMIDE AND ALBUTEROL SULFATE 1 DOSE: 2.5; .5 SOLUTION RESPIRATORY (INHALATION) at 12:16

## 2023-06-20 RX ADMIN — CEFEPIME 2000 MG: 2 INJECTION, POWDER, FOR SOLUTION INTRAVENOUS at 19:46

## 2023-06-20 RX ADMIN — SODIUM CHLORIDE, POTASSIUM CHLORIDE, SODIUM LACTATE AND CALCIUM CHLORIDE: 600; 310; 30; 20 INJECTION, SOLUTION INTRAVENOUS at 01:25

## 2023-06-20 RX ADMIN — IPRATROPIUM BROMIDE AND ALBUTEROL SULFATE 1 DOSE: 2.5; .5 SOLUTION RESPIRATORY (INHALATION) at 16:00

## 2023-06-20 RX ADMIN — VANCOMYCIN HYDROCHLORIDE 1000 MG: 1 INJECTION, POWDER, LYOPHILIZED, FOR SOLUTION INTRAVENOUS at 10:15

## 2023-06-20 RX ADMIN — ENOXAPARIN SODIUM 40 MG: 100 INJECTION SUBCUTANEOUS at 08:46

## 2023-06-20 RX ADMIN — IPRATROPIUM BROMIDE AND ALBUTEROL SULFATE 1 DOSE: 2.5; .5 SOLUTION RESPIRATORY (INHALATION) at 07:44

## 2023-06-20 RX ADMIN — ACETAMINOPHEN 650 MG: 325 TABLET ORAL at 19:44

## 2023-06-20 RX ADMIN — ESCITALOPRAM OXALATE 10 MG: 10 TABLET ORAL at 08:44

## 2023-06-20 RX ADMIN — ACETAMINOPHEN 650 MG: 325 TABLET ORAL at 05:16

## 2023-06-20 RX ADMIN — CEFEPIME 2000 MG: 2 INJECTION, POWDER, FOR SOLUTION INTRAVENOUS at 08:51

## 2023-06-20 RX ADMIN — PANTOPRAZOLE SODIUM 40 MG: 40 TABLET, DELAYED RELEASE ORAL at 08:44

## 2023-06-20 RX ADMIN — TRAZODONE HYDROCHLORIDE 50 MG: 50 TABLET ORAL at 01:22

## 2023-06-20 RX ADMIN — Medication 2 PUFF: at 21:21

## 2023-06-20 RX ADMIN — IPRATROPIUM BROMIDE AND ALBUTEROL SULFATE 1 DOSE: 2.5; .5 SOLUTION RESPIRATORY (INHALATION) at 21:13

## 2023-06-20 RX ADMIN — PREDNISONE 5 MG: 5 TABLET ORAL at 08:44

## 2023-06-20 RX ADMIN — SODIUM CHLORIDE, PRESERVATIVE FREE 10 ML: 5 INJECTION INTRAVENOUS at 08:46

## 2023-06-20 RX ADMIN — SODIUM CHLORIDE, PRESERVATIVE FREE 10 ML: 5 INJECTION INTRAVENOUS at 19:43

## 2023-06-20 RX ADMIN — METOPROLOL SUCCINATE 50 MG: 50 TABLET, EXTENDED RELEASE ORAL at 08:45

## 2023-06-20 RX ADMIN — Medication 2 PUFF: at 07:45

## 2023-06-20 RX ADMIN — TRAZODONE HYDROCHLORIDE 50 MG: 50 TABLET ORAL at 19:44

## 2023-06-20 ASSESSMENT — PAIN SCALES - GENERAL
PAINLEVEL_OUTOF10: 0

## 2023-06-20 NOTE — H&P
V2.0  History and Physical      Name:  Elizabeth Kerr /Age/Sex: 1940  (78 y.o. male)   MRN & CSN:  [id number] & [id number] Encounter Date/Time: 2023 11:18 PM EDT   Location:  - PCP: Thais Brown MD       Hospital Day: 1    Assessment and Plan:   Elizabeth Kerr is a 78 y.o. male with a pmh of Prostate Cancer with Metastasis, CAD, COPD, GERD, HLD, THN who presents with Sepsis [facility]    Hospital Problems             Last Modified POA    * (Principal) Sepsis ([facility] [address] 2023 Yes       Plan:  Sepsis   Admit inpatient  Temp 102.1 in ER with ,  WBC low 2.3   ANC 1690  Last chemotherapy on Monday   BP stable  Unclear source at this time,  UA pending, he denies diarrhea or cough. Mild congestion. Add COVID/influenza,   Blood cx  IV antibiotics Vancomcyin and Cefepime    2. Prostate Cancer with Metastasis   Oncology consult  On chemotherapy     3. Hypertension  BP stable continue home meds    4. Hyperlipidemia  Continue statin    5. COPD  Continue inhalers and nebs, no exacerbation    Disposition:   Current Living situation: home  Expected Disposition: home  Estimated D/C: 4    Diet No diet orders on file   DVT Prophylaxis [x] Lovenox, []  Heparin, [] SCDs, [] Ambulation,  [] Eliquis, [] Xarelto, [] Coumadin   Code Status Prior   Surrogate Decision Maker/ POA      Personally reviewed Lab Studies and Imaging     Discussed management of the case with Dr. Lucia Alberto in ER who recommended admission     Drugs that require monitoring for toxicity include Vancomycin and the method of monitoring was peak, trough        History from:     patient    History of Present Illness:     Chief Complaint: fever, fatigue  Elizabeth Kerr is a 78 y.o. male with pmh of prostate cancer, HLD, HTN, CAD who presents with fever and fatigue. Skip Sly reports yesterday he felt very tired and had a temperature of 102. He does have some nasal congestion but denies any cough.   He denies any abdominal pain nausea or

## 2023-06-20 NOTE — CARE COORDINATION
Case Management Assessment  Initial Evaluation    Date/Time of Evaluation: 6/20/2023 12:03 PM  Assessment Completed by: VARGHESE Rich    If patient is discharged prior to next notation, then this note serves as note for discharge by case management. Patient Name: Iván Montana                   YOB: 1940  Diagnosis: Septicemia ([facility] [address] [A41.9]  Neutropenic fever ([facility] [address] [D70.9, R50.81]  Sepsis ([facility] [address] [A41.9]                   Date / Time: 6/19/2023  7:09 PM    Patient Admission Status: Inpatient   Readmission Risk (Low < 19, Mod (19-27), High > 27): Readmission Risk Score: 18.9    Current PCP: Juan Pope MD  PCP verified by CM? Yes    Chart Reviewed: Yes      History Provided by: Patient  Patient Orientation: Alert and Oriented    Patient Cognition: Alert    Hospitalization in the last 30 days (Readmission):  Yes    If yes, Readmission Assessment in CM Navigator will be completed. Advance Directives:      Code Status: Full Code   Patient's Primary Decision Maker is: Legal Next of Kin    Primary Decision MakerDedgifty Reaves - Spouse - [phone]    Discharge Planning:    Patient lives with: Spouse/Significant Other Type of Home: House  Primary Care Giver: Self (The patient states his wife helps when needed)  Patient Support Systems include: Spouse/Significant Other, Family Members   Current Financial resources: Medicare  Current community resources: None  Current services prior to admission: None            Current DME:              Type of Home Care services:  None    ADLS  Prior functional level: Independent in ADLs/IADLs, Cooking, Housework, Shopping, Assistance with the following:  Current functional level: Independent in ADLs/IADLs, Assistance with the following:, Cooking, Housework, Shopping    PT AM-PAC:   /24  OT AM-PAC:   /24    Family can provide assistance at DC:  Yes  Would you like Case Management to discuss the discharge plan with any other family members/significant

## 2023-06-20 NOTE — ED PROVIDER NOTES
[facility]        Pt Name: Cherelle Blue  MRN: [id number]  [place] 1940  Date of evaluation: 6/19/2023  Provider: Adal Billings PA-C  PCP: Placido Sumner MD  Note Started: 10:54 PM EDT 6/19/23       I have seen and evaluated this patient with my supervising physician Lina Tatum MD.      [facility]       Chief Complaint   Patient presents with    Fever     Pt came from home by [facility], pt had chemo last Monday, fever started yesterday, 102.1 fever in triage. Hx of prostate cancer. HISTORY OF PRESENT ILLNESS: 1 or more Elements     History From: Patient, wife at bedside  Limitations to history : None    Cherelle Blue is a 78 y.o. male who presents to the emergency department today for evaluation for fever, and fatigue. The patient states he does have a history of prostate cancer, with metastasis to the lung. Patient states that he underwent chemotherapy 1 week ago. The patient states that he has overall been doing well until yesterday, when he has had increasing fatigue. Patient states that he has had fevers as high as 102 at home, and is febrile here at 102. Patient states that he has had a dry cough, and some nasal congestion but initially attributed this to seasonal allergy symptoms. The patient denies any chest pain. He has no shortness of breath. He has no abdominal pain, nausea, vomiting or diarrhea. The patient states he does have to self cath, but otherwise has not had any hematuria. Patient otherwise has no other complaints. No known sick contacts. Nursing Notes were all reviewed and agreed with or any disagreements were addressed in the HPI. REVIEW OF SYSTEMS :      Review of Systems   Constitutional:  Positive for fatigue and fever. Negative for activity change, appetite change and chills. HENT:  Negative for congestion and rhinorrhea.     Respiratory:  Negative for cough

## 2023-06-20 NOTE — CONSULTS
Clinical Pharmacy Note: Pharmacy to Dose Vancomycin    Vancomycin Day: 2  Indication: sepsis w/ neutropenia  Current Dose: 1g load  Dosing Method: Bayesian Modeling    Random: 4.9    Recent Labs     06/19/23  1925 06/20/23  0604   BUN 15 13       Recent Labs     06/19/23  1925 06/20/23  0604   CREATININE 1.0 0.9       Recent Labs     06/19/23  1925 06/20/23  0604   WBC 2.3* 2.6*         Intake/Output Summary (Last 24 hours) at 6/20/2023 0853  Last data filed at 6/20/2023 0842  Gross per 24 hour   Intake 240 ml   Output --   Net 240 ml         Ht Readings from Last 1 Encounters:   06/20/23 5' 10\" (1.778 m)        Wt Readings from Last 1 Encounters:   06/20/23 123 lb 4 oz (55.9 kg)         Body mass index is 17.68 kg/m². Estimated Creatinine Clearance: 48 mL/min (based on SCr of 0.9 mg/dL). Assessment/Plan:  Vancomycin level is therapeutic. Increase vancomycin regimen to 1g every 24 hours. Bayesian Modeling predicts an AUC of 475 mg/L*hr and trough of 13 mg/L. A vancomycin random level has been ordered on 6/21 at 0600 for follow-up. Changes in regimen will be determined based on culture results, renal function, and clinical response. Pharmacy will continue to monitor and adjust regimen as necessary.     Thank you for the consult,    Carolina Almaguer PharmD, [address] [facility] Specialist  [id number]
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 COVID-negative. May have UTI. Leukopenia and anemia due to chemotherapy. ANC adequate at 1700. 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 Had 1 dose of Jevtana on 6/12/2023. Previously treated with Taxotere. On ADT. History of C. difficile colitis. COPD  Hypertension    Reviewed his labs. Overall he has improved since admission. He has low-grade fever at present. He may have UTI. Continue supportive care. Thank you for asking me to see the patient.        Jolly Valle MD  Please Contact Through [employer]

## 2023-06-20 NOTE — TELEPHONE ENCOUNTER
Writer contacted Dr Diogo Wadsworth to inform of 30 day readmission risk. Writer's attempt to contact Dr Diogo Wadsworth was unsuccessful.      Call Back: If you need to call back to inform of disposition you can contact me at [phone]

## 2023-06-20 NOTE — CARE COORDINATION
06/20/23 1122   Readmission Assessment   Number of Days since last admission? 8-30 days   Previous Disposition Home with Family   Who is being Interviewed Patient   What was the patient's/caregiver's perception as to why they think they needed to return back to the hospital? Other (Comment)  (The patient stated he has a fever which lead him to return to the hospital)   Did you visit your Primary Care Physician after you left the hospital, before you returned this time? Yes   Did you see a specialist, such as Cardiac, Pulmonary, Orthopedic Physician, etc. after you left the hospital? Yes   Who advised the patient to return to the hospital? Physician   Does the patient report anything that got in the way of taking their medications? No   In our efforts to provide the best possible care to you and others like you, can you think of anything that we could have done to help you after you left the hospital the first time, so that you might not have needed to return so soon?  Other (Comment)  (No concerns reported.)

## 2023-06-20 NOTE — RT PROTOCOL NOTE
RT Inhaler-Nebulizer Bronchodilator Protocol Note    There is a bronchodilator order in the chart from a provider indicating to follow the RT Bronchodilator Protocol and there is an Initiate RT Inhaler-Nebulizer Bronchodilator Protocol order as well (see protocol at bottom of note). CXR Findings:  XR CHEST PORTABLE    Result Date: 6/19/2023  2.4 cm right upper lobe mass which is unchanged. Recommend correlation with the prior CT chest from 05/30/2023. No obvious infiltrate or effusion. The findings from the last RT Protocol Assessment were as follows:   History Pulmonary Disease: Chronic pulmonary disease  Respiratory Pattern: Regular pattern and RR 12-20 bpm  Breath Sounds: Slightly diminished and/or crackles  Cough: Strong, spontaneous, non-productive  Indication for Bronchodilator Therapy: Decreased or absent breath sounds, On home bronchodilators  Bronchodilator Assessment Score: 4    Aerosolized bronchodilator medication orders have been revised according to the RT Inhaler-Nebulizer Bronchodilator Protocol below. Respiratory Therapist to perform RT Therapy Protocol Assessment initially then follow the protocol. Repeat RT Therapy Protocol Assessment PRN for score 0-3 or on second treatment, BID, and PRN for scores above 3. No Indications - adjust the frequency to every 6 hours PRN wheezing or bronchospasm, if no treatments needed after 48 hours then discontinue using Per Protocol order mode. If indication present, adjust the RT bronchodilator orders based on the Bronchodilator Assessment Score as indicated below. Use Inhaler orders unless patient has one or more of the following: on home nebulizer, not able to hold breath for 10 seconds, is not alert and oriented, cannot activate and use MDI correctly, or respiratory rate 25 breaths per minute or more, then use the equivalent nebulizer order(s) with same Frequency and PRN reasons based on the score.   If a patient is on this medication at

## 2023-06-21 LAB
ALBUMIN SERPL-MCNC: 2.8 G/DL (ref 3.4–5)
ALBUMIN/GLOB SERPL: 1 {RATIO} (ref 1.1–2.2)
ALP SERPL-CCNC: 72 U/L (ref 40–129)
ALT SERPL-CCNC: 29 U/L (ref 10–40)
ANION GAP SERPL CALCULATED.3IONS-SCNC: 13 MMOL/L (ref 3–16)
AST SERPL-CCNC: 18 U/L (ref 15–37)
BACTERIA UR CULT: NORMAL
BASOPHILS # BLD: 0 K/UL (ref 0–0.2)
BASOPHILS NFR BLD: 0.5 %
BILIRUB SERPL-MCNC: 0.4 MG/DL (ref 0–1)
BUN SERPL-MCNC: 9 MG/DL (ref 7–20)
CALCIUM SERPL-MCNC: 7.9 MG/DL (ref 8.3–10.6)
CHLORIDE SERPL-SCNC: 102 MMOL/L (ref 99–110)
CO2 SERPL-SCNC: 22 MMOL/L (ref 21–32)
CREAT SERPL-MCNC: 0.9 MG/DL (ref 0.8–1.3)
DEPRECATED RDW RBC AUTO: 16.2 % (ref 12.4–15.4)
EOSINOPHIL # BLD: 0 K/UL (ref 0–0.6)
EOSINOPHIL NFR BLD: 0.8 %
GFR SERPLBLD CREATININE-BSD FMLA CKD-EPI: >60 ML/MIN/{1.73_M2}
GLUCOSE SERPL-MCNC: 92 MG/DL (ref 70–99)
HAPTOGLOB SERPL-MCNC: 362 MG/DL (ref 30–200)
HCT VFR BLD AUTO: 23.1 % (ref 40.5–52.5)
HCT VFR BLD AUTO: 23.1 % (ref 40.5–52.5)
HGB BLD-MCNC: 7.6 G/DL (ref 13.5–17.5)
IMMATURE RETIC FRACT: 0.53 (ref 0.21–0.37)
IRON SATN MFR SERPL: 19 % (ref 20–50)
IRON SERPL-MCNC: 29 UG/DL (ref 59–158)
LYMPHOCYTES # BLD: 0.4 K/UL (ref 1–5.1)
LYMPHOCYTES NFR BLD: 19.9 %
MCH RBC QN AUTO: 30.4 PG (ref 26–34)
MCHC RBC AUTO-ENTMCNC: 33 G/DL (ref 31–36)
MCV RBC AUTO: 92.2 FL (ref 80–100)
MONOCYTES # BLD: 0.3 K/UL (ref 0–1.3)
MONOCYTES NFR BLD: 15.6 %
NEUTROPHILS # BLD: 1.4 K/UL (ref 1.7–7.7)
NEUTROPHILS NFR BLD: 63.2 %
PLATELET # BLD AUTO: 211 K/UL (ref 135–450)
PMV BLD AUTO: 7.8 FL (ref 5–10.5)
POTASSIUM SERPL-SCNC: 3.5 MMOL/L (ref 3.5–5.1)
PROT SERPL-MCNC: 5.6 G/DL (ref 6.4–8.2)
RBC # BLD AUTO: 2.5 M/UL (ref 4.2–5.9)
REPORT: NORMAL
RESP PATH DNA+RNA PNL NPH NAA+NON-PROBE: NORMAL
RETICS # AUTO: 0.02 M/UL
RETICS/RBC NFR AUTO: 0.68 % (ref 0.5–2.18)
SODIUM SERPL-SCNC: 137 MMOL/L (ref 136–145)
TIBC SERPL-MCNC: 149 UG/DL (ref 260–445)
VANCOMYCIN SERPL-MCNC: 6.3 UG/ML
WBC # BLD AUTO: 2.2 K/UL (ref 4–11)

## 2023-06-21 PROCEDURE — 83010 ASSAY OF HAPTOGLOBIN QUANT: CPT

## 2023-06-21 PROCEDURE — 6360000002 HC RX W HCPCS: Performed by: NURSE PRACTITIONER

## 2023-06-21 PROCEDURE — 36415 COLL VENOUS BLD VENIPUNCTURE: CPT

## 2023-06-21 PROCEDURE — 97166 OT EVAL MOD COMPLEX 45 MIN: CPT

## 2023-06-21 PROCEDURE — 2580000003 HC RX 258: Performed by: NURSE PRACTITIONER

## 2023-06-21 PROCEDURE — 97116 GAIT TRAINING THERAPY: CPT

## 2023-06-21 PROCEDURE — 97161 PT EVAL LOW COMPLEX 20 MIN: CPT

## 2023-06-21 PROCEDURE — 94640 AIRWAY INHALATION TREATMENT: CPT

## 2023-06-21 PROCEDURE — 85045 AUTOMATED RETICULOCYTE COUNT: CPT

## 2023-06-21 PROCEDURE — 6370000000 HC RX 637 (ALT 250 FOR IP): Performed by: NURSE PRACTITIONER

## 2023-06-21 PROCEDURE — 80202 ASSAY OF VANCOMYCIN: CPT

## 2023-06-21 PROCEDURE — 1200000000 HC SEMI PRIVATE

## 2023-06-21 PROCEDURE — 83540 ASSAY OF IRON: CPT

## 2023-06-21 PROCEDURE — 85025 COMPLETE CBC W/AUTO DIFF WBC: CPT

## 2023-06-21 PROCEDURE — 94761 N-INVAS EAR/PLS OXIMETRY MLT: CPT

## 2023-06-21 PROCEDURE — 97535 SELF CARE MNGMENT TRAINING: CPT

## 2023-06-21 PROCEDURE — 6370000000 HC RX 637 (ALT 250 FOR IP): Performed by: HOSPITALIST

## 2023-06-21 PROCEDURE — 6370000000 HC RX 637 (ALT 250 FOR IP): Performed by: INTERNAL MEDICINE

## 2023-06-21 PROCEDURE — 83550 IRON BINDING TEST: CPT

## 2023-06-21 PROCEDURE — 80053 COMPREHEN METABOLIC PANEL: CPT

## 2023-06-21 RX ORDER — LACTOBACILLUS RHAMNOSUS GG 10B CELL
1 CAPSULE ORAL 2 TIMES DAILY WITH MEALS
Status: DISCONTINUED | OUTPATIENT
Start: 2023-06-21 | End: 2023-06-22 | Stop reason: HOSPADM

## 2023-06-21 RX ADMIN — SODIUM CHLORIDE, PRESERVATIVE FREE 10 ML: 5 INJECTION INTRAVENOUS at 10:13

## 2023-06-21 RX ADMIN — SODIUM CHLORIDE, PRESERVATIVE FREE 10 ML: 5 INJECTION INTRAVENOUS at 20:23

## 2023-06-21 RX ADMIN — IPRATROPIUM BROMIDE AND ALBUTEROL SULFATE 1 DOSE: 2.5; .5 SOLUTION RESPIRATORY (INHALATION) at 16:36

## 2023-06-21 RX ADMIN — PANTOPRAZOLE SODIUM 40 MG: 40 TABLET, DELAYED RELEASE ORAL at 10:11

## 2023-06-21 RX ADMIN — IPRATROPIUM BROMIDE AND ALBUTEROL SULFATE 1 DOSE: 2.5; .5 SOLUTION RESPIRATORY (INHALATION) at 09:02

## 2023-06-21 RX ADMIN — ESCITALOPRAM OXALATE 10 MG: 10 TABLET ORAL at 10:11

## 2023-06-21 RX ADMIN — Medication 1 CAPSULE: at 18:48

## 2023-06-21 RX ADMIN — PREDNISONE 5 MG: 5 TABLET ORAL at 10:10

## 2023-06-21 RX ADMIN — IPRATROPIUM BROMIDE AND ALBUTEROL SULFATE 1 DOSE: 2.5; .5 SOLUTION RESPIRATORY (INHALATION) at 21:40

## 2023-06-21 RX ADMIN — IPRATROPIUM BROMIDE AND ALBUTEROL SULFATE 1 DOSE: 2.5; .5 SOLUTION RESPIRATORY (INHALATION) at 11:14

## 2023-06-21 RX ADMIN — ENOXAPARIN SODIUM 40 MG: 100 INJECTION SUBCUTANEOUS at 10:12

## 2023-06-21 RX ADMIN — TRAZODONE HYDROCHLORIDE 50 MG: 50 TABLET ORAL at 20:22

## 2023-06-21 RX ADMIN — Medication 2 PUFF: at 09:02

## 2023-06-21 RX ADMIN — Medication 2 PUFF: at 21:41

## 2023-06-21 RX ADMIN — METOPROLOL SUCCINATE 50 MG: 50 TABLET, EXTENDED RELEASE ORAL at 10:10

## 2023-06-21 ASSESSMENT — PAIN SCALES - GENERAL
PAINLEVEL_OUTOF10: 0
PAINLEVEL_OUTOF10: 0

## 2023-06-21 NOTE — PLAN OF CARE
Problem: Safety - Adult  Goal: Free from fall injury  6/20/2023 2205 by Jazmin Kim RN  Outcome: Progressing  6/20/2023 1601 by Cedric Rios LPN  Outcome: Progressing

## 2023-06-22 VITALS
WEIGHT: 123.25 LBS | HEART RATE: 91 BPM | TEMPERATURE: 98.2 F | HEIGHT: 70 IN | OXYGEN SATURATION: 98 % | DIASTOLIC BLOOD PRESSURE: 73 MMHG | SYSTOLIC BLOOD PRESSURE: 145 MMHG | RESPIRATION RATE: 18 BRPM | BODY MASS INDEX: 17.65 KG/M2

## 2023-06-22 PROCEDURE — 2580000003 HC RX 258: Performed by: NURSE PRACTITIONER

## 2023-06-22 PROCEDURE — 6370000000 HC RX 637 (ALT 250 FOR IP): Performed by: NURSE PRACTITIONER

## 2023-06-22 PROCEDURE — 94640 AIRWAY INHALATION TREATMENT: CPT

## 2023-06-22 PROCEDURE — 6360000002 HC RX W HCPCS: Performed by: NURSE PRACTITIONER

## 2023-06-22 PROCEDURE — 6370000000 HC RX 637 (ALT 250 FOR IP): Performed by: HOSPITALIST

## 2023-06-22 PROCEDURE — 6370000000 HC RX 637 (ALT 250 FOR IP): Performed by: INTERNAL MEDICINE

## 2023-06-22 PROCEDURE — 94761 N-INVAS EAR/PLS OXIMETRY MLT: CPT

## 2023-06-22 PROCEDURE — 6360000002 HC RX W HCPCS: Performed by: INTERNAL MEDICINE

## 2023-06-22 RX ORDER — LACTOBACILLUS RHAMNOSUS GG 10B CELL
1 CAPSULE ORAL 2 TIMES DAILY WITH MEALS
Qty: 20 CAPSULE | Refills: 0 | Status: SHIPPED | OUTPATIENT
Start: 2023-06-22 | End: 2023-07-02

## 2023-06-22 RX ADMIN — PANTOPRAZOLE SODIUM 40 MG: 40 TABLET, DELAYED RELEASE ORAL at 08:01

## 2023-06-22 RX ADMIN — METOPROLOL SUCCINATE 50 MG: 50 TABLET, EXTENDED RELEASE ORAL at 08:00

## 2023-06-22 RX ADMIN — IPRATROPIUM BROMIDE AND ALBUTEROL SULFATE 1 DOSE: 2.5; .5 SOLUTION RESPIRATORY (INHALATION) at 08:25

## 2023-06-22 RX ADMIN — ENOXAPARIN SODIUM 40 MG: 100 INJECTION SUBCUTANEOUS at 08:00

## 2023-06-22 RX ADMIN — Medication 2 PUFF: at 08:25

## 2023-06-22 RX ADMIN — Medication 1 CAPSULE: at 08:00

## 2023-06-22 RX ADMIN — EPOETIN ALFA-EPBX 20000 UNITS: 10000 INJECTION, SOLUTION INTRAVENOUS; SUBCUTANEOUS at 09:48

## 2023-06-22 RX ADMIN — SODIUM CHLORIDE, PRESERVATIVE FREE 10 ML: 5 INJECTION INTRAVENOUS at 08:01

## 2023-06-22 RX ADMIN — ESCITALOPRAM OXALATE 10 MG: 10 TABLET ORAL at 08:01

## 2023-06-22 RX ADMIN — PREDNISONE 5 MG: 5 TABLET ORAL at 08:01

## 2023-06-22 ASSESSMENT — PAIN SCALES - GENERAL
PAINLEVEL_OUTOF10: 0
PAINLEVEL_OUTOF10: 0

## 2023-06-22 NOTE — PLAN OF CARE
Problem: Safety - Adult  Goal: Free from fall injury  6/21/2023 2232 by Tavon Rodrigues RN  Outcome: Progressing  6/21/2023 1531 by Boris Smith RN  Outcome: Progressing     Problem: ABCDS Injury Assessment  Goal: Absence of physical injury  6/21/2023 2232 by Tavon Rodrigues RN  Outcome: Progressing  6/21/2023 1531 by Boris Smith RN  Outcome: Progressing     Problem: Chronic Conditions and Co-morbidities  Goal: Patient's chronic conditions and co-morbidity symptoms are monitored and maintained or improved  6/21/2023 2232 by Tavon Rodrigues RN  Outcome: Progressing  6/21/2023 1531 by Boris Smith RN  Outcome: Progressing

## 2023-06-22 NOTE — DISCHARGE SUMMARY
Discharge Summary    Name:  Renzo Cobb /Age/Sex: 1940  (78 y.o. male)   MRN & CSN:  [id number] & [id number] Admission Date/Time: 2023  7:09 PM   Attending:  Shana Davis MD Discharging Physician: Shana Davis MD     Hospital Course:   Renzo Cobb is a 78 y.o.  male  who presents with prostate cancer on chemotherapy admitted with fevers and chills. Patient has pancytopenia due to chemotherapy leukopenia however no neutropenia, was admitted because of lumbar spine antibiotics, infectious work-up remains negative. Patient is afebrile for 48 hours. Discharged home in stable condition    Fever/sepsis of unknown source, afebrile, infectious work-up is negative, sepsis ruled out  Pancytopenia/anemia due to chemotherapy, oncology evaluation appreciated, stable for discharge home  Prostate cancer on chemotherapy, follow-up with oncology outpatient  Hypertension, on Lopressor and Norvasc with holding parameters  Chronic steroids on prednisone  COPD, acute exacerbation,  Recent C. difficile colitis admission, completed course, no diarrhea    The patient expressed appropriate understanding of and agreement with the discharge recommendations, medications, and plan. Consults this admission:  Bonnie 354    Discharge Instruction:     Follow up appointments: PCP on-call    Tanis Sandifer, MD  [address] [place] [address]  [phone]    Follow up in 1 week(s)        Primary care physician:  within 2 weeks    Diet:  regular diet     Activity: activity as tolerated    Disposition: Discharged to:   [x]Home, [][facility], []SNF, []Acute Rehab, []Hospice     Condition on discharge: Stable    Discharge Medications:        Medication List        START taking these medications      lactobacillus capsule  Take 1 capsule by mouth 2 times daily (with meals) for 10 days            CHANGE how you take these medications      amLODIPine 10 MG tablet  Commonly known as: NORVASC  TAKE 1 TABLET BY

## 2023-06-22 NOTE — PLAN OF CARE
Problem: Safety - Adult  Goal: Free from fall injury  6/22/2023 0931 by Yariel Kwok RN  Outcome: Progressing     Problem: Chronic Conditions and Co-morbidities  Goal: Patient's chronic conditions and co-morbidity symptoms are monitored and maintained or improved  6/22/2023 0931 by Yariel Kwok RN  Outcome: Progressing     Problem: ABCDS Injury Assessment  Goal: Absence of physical injury  6/22/2023 0931 by Yariel Kwok RN  Outcome: Progressing

## 2023-06-22 NOTE — PROGRESS NOTES
06/20/23 1203   RT Protocol   History Pulmonary Disease 2   Respiratory pattern 0   Breath sounds 2   Cough 0   Indications for Bronchodilator Therapy Decreased or absent breath sounds; On home bronchodilators   Bronchodilator Assessment Score 4
Clinical Pharmacy Note: Pharmacy to Dose Vancomycin    Elizabeth Sunshine is a 78 y.o. male started on Vancomycin for Sepsis; consult received from [facility] to manage therapy. Also receiving the following antibiotics: Cefepime. Goal AUC: 400-600 mg/L*hr  Goal Trough Level: 15-20 mcg/mL    Assessment/Plan:  A 1000 mg loading dose was given on 06/19 at 2106  Initiate vancomycin 500 mg IV every 12 hours. Bayesian modeling predicts an AUC of 475 mg/L*hr and a trough of 16.4 mcg/mL at steady state concentration. A vancomycin random level has been ordered for 06/20 at 0600  Changes in regimen will be determined based on culture results, renal function, and clinical response. Pharmacy will continue to monitor and adjust regimen as necessary. Allergies:  Lisinopril     Recent Labs     06/19/23 1925   CREATININE 1.0       Recent Labs     06/19/23 1925   WBC 2.3*       Ht Readings from Last 1 Encounters:   06/20/23 5' 10\" (1.778 m)        Wt Readings from Last 1 Encounters:   06/20/23 123 lb 4 oz (55.9 kg)         Estimated Creatinine Clearance: 43 mL/min (based on SCr of 1 mg/dL).       Thank you for the consult,    La Murphy, JudahD
Clinical Pharmacy Note: Pharmacy to Dose Vancomycin    Vancomycin Day: 2  Indication: Sepsis of unknown etiology  Current Dose: 1,000 mg Q24  Dosing Method: Bayesian Modeling    Random: 6.3    Recent Labs     06/20/23  0604 06/21/23  0528   BUN 13 9       Recent Labs     06/20/23  0604 06/21/23  0528   CREATININE 0.9 0.9       Recent Labs     06/20/23  0604 06/21/23  0528   WBC 2.6* 2.2*         Intake/Output Summary (Last 24 hours) at 6/21/2023 0800  Last data filed at 6/20/2023 8838  Gross per 24 hour   Intake 240 ml   Output --   Net 240 ml         Ht Readings from Last 1 Encounters:   06/20/23 5' 10\" (1.778 m)        Wt Readings from Last 1 Encounters:   06/20/23 123 lb 4 oz (55.9 kg)         Body mass index is 17.68 kg/m². Estimated Creatinine Clearance: 48 mL/min (based on SCr of 0.9 mg/dL). Assessment/Plan:  Vancomycin level is subtherapeutic. Increase vancomycin regimen to 1,250 mg every 24 hours. Bayesian Modeling predicts an AUC of 498 mg/L*hr and trough of 13.9 mg/L. A vancomycin random level has been ordered on 06/22/23 at 0600 for follow-up. Changes in regimen will be determined based on culture results, renal function, and clinical response. Pharmacy will continue to monitor and adjust regimen as necessary. Thank you for the consult,    Jayne Brandt. Brianna Griffith, PharmD.
Cuco Sanchez MD    Hospitalist Progress Note      Name:  Ridge Rapp /Age/Sex: 1940  (78 y.o. male)   MRN & CSN:  [id number] & [id number] Admission Date/Time: 2023  7:09 PM   Location:  [address] [place], [place] [id number] PCP: Inocente Owens MD       I saw and examined the patient on 2023 at 4:00 PM.    Hospital Day: 3  Barriers to discharge: Anemia, monitor for hemoglobin stability    Patient Active Problem List   Diagnosis    Psoriasis    Prostate cancer metastatic to bone (HCC)    GERD (gastroesophageal reflux disease)    ED (erectile dysfunction)    Hypertension    Hyperlipidemia    Occlusion and stenosis of carotid artery    Centrilobular emphysema (HCC)    Hypoxia    Pulmonary nodules    Moderate COPD (chronic obstructive pulmonary disease) (HCC)    Hematuria    Cystitis    Atherosclerosis of both carotid arteries    Abdominal pain    Biliary colic    Abdominal pain, epigastric    Acute cholecystitis    Cholecystitis    Hospital-acquired pneumonia    Failure of outpatient treatment    Pneumonia    Carotid artery occlusion and stenosis    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 difficile colitis, completed treatment, no more diarrhea presented with fever, none present since admission. Fever unknown source, afebrile since admission, discontinue empiric vancomycin and cefepime as the patient recently had C. difficile colitis, no diarrhea, respiratory panel negative, urine and blood cultures negative. Prostate cancer, on chemotherapy, pancytopenia suspect due to chemotherapy, oncology consulted  Anemia, hemoglobin 7.6, dilution partly contributory factor, denies any melena or hematochezia, suspect due to chemotherapy, monitor for stability.   Had
Data- discharge order received, pt verbalized agreement to discharge, disposition to previous residence, no needs for HHC/DME. Action- discharge instructions prepared and given to patient, pt verbalized understanding. Medication information packet given r/t NEW and/or CHANGED prescriptions emphasizing name/purpose/side effects, pt verbalized understanding. Discharge instruction summary: Diet- regular, Activity- as tolerated, Primary Care Physician as follows: Shawn Covarrubias -[phone] f/u appointment 1 week, immunizations reviewed, prescription medications filled patient's pharmacy. Inpatient surgical procedure precautions reviewed. 1. WEIGHT: Admit Weight - Scale: 155 lb (70.3 kg) (06/19/23 1904)        Today  Weight - Scale: 123 lb 4 oz (55.9 kg) (06/20/23 0030)       2. O2 SAT.: SpO2: 98 % (06/22/23 0827)    Response- Pt belongings gathered, IV removed. Disposition is home (no HHC/DME needs), transported with nurse, taken to lobby via w/c w/ wheelchair, no complications.
Hospitalist Progress Note      PCP: Digna Hicks MD    Date of Admission: 6/19/2023    Chief Complaint: Fever    Hospital Course:     Patient feels well admitting history reviewed and confirmed with the patient  No further fevers  No chest pain no shortness of breath no abdominal pain or diarrhea      Medications:  Reviewed      Exam:    /64   Pulse 90   Temp 98.8 °F (37.1 °C) (Oral)   Resp 16   Ht 5' 10\" (1.778 m)   Wt 123 lb 4 oz (55.9 kg)   SpO2 98%   BMI 17.68 kg/m²     General appearance: No apparent distress, appears stated age and cooperative. HEENT: Pupils equal, round, and reactive to light. Conjunctivae/corneas clear. Neck: Supple, with full range of motion. No jugular venous distention. Trachea midline. Respiratory:  Normal respiratory effort. Clear to auscultation, bilaterally without RALES/WHEEZES/Rhonchi. Cardiovascular: Regular rate and rhythm with normal S1/S2 without MURMURS, rubs or gallops. Abdomen: Soft, non-tender, non-distended with normal bowel sounds. Musculoskeletal: No clubbing, cyanosis or EDEMA bilaterally. Full range of motion without deformity. Skin: Skin color, texture, turgor normal.  No rashes or lesions. Neurologic:  Neurovascularly intact without any focal sensory/motor deficits. Cranial nerves: II-XII intact, grossly non-focal.        Labs:   Recent Labs     06/19/23 1925 06/20/23  0604   WBC 2.3* 2.6*   HGB 9.4* 9.1*   HCT 28.0* 27.7*    213     Recent Labs     06/19/23 1925 06/20/23  0604    137   K 4.0 4.2   CL 99 102   CO2 28 21   BUN 15 13   CREATININE 1.0 0.9   CALCIUM 8.6 8.1*     Recent Labs     06/19/23 1925 06/20/23  0604   AST 35 29   ALT 36 34   BILITOT 1.1* 0.9   ALKPHOS 78 78     Recent Labs     06/19/23 1925   INR 1.12     No results for input(s): Osorio Resides in the last 72 hours.     Urinalysis:      Lab Results   Component Value Date/Time    NITRU POSITIVE 06/19/2023 11:21 PM    WBCUA 592 06/19/2023 11:21
[facility] [address] Department   Phone: [phone]    Occupational Therapy    [x] Initial Evaluation            [] Daily Treatment Note         [x] Discharge Summary      Patient: Iván Montana   : 1940   MRN: [id number]   Date of Service:  2023    Admitting Diagnosis:  Sepsis [facility])  Current Admission Summary: Briefly, Iván Montana is a 78 y.o. male  who presents to the ED complaining of chills and rigors, with fever starting yesterday and arrives febrile today. He got chemotherapy on Monday for metastatic prostate cancer. He has a cough as well as some generalized abdominal discomfort. He recently had C. difficile but his diarrhea has improved. Past Medical History:  has a past medical history of Carotid artery occlusion and stenosis, Centrilobular emphysema ([address] COPD (chronic obstructive pulmonary disease) ([address] ED (erectile dysfunction), Emphysema lung ([address] GERD (gastroesophageal reflux disease), Hyperlipidemia, Hypertension, Hypertension, Prostate CA ([address] Prostate CA ([address] Prostate cancer metastatic to lung ([address] Psoriasis, Pulmonary nodules, Radiation, and Urinary incontinence. Past Surgical History:  has a past surgical history that includes Prostatectomy (); Tonsillectomy; Carotid endarterectomy (5/3/12); Cystoscopy (2017); Colonoscopy; Endoscopy, colon, diagnostic; Carotid endarterectomy (Right, 10/25/2017); Cholecystectomy, laparoscopic (2018); skin biopsy (); Esophagus dilation (N/A, 1/3/2022); Upper gastrointestinal endoscopy (N/A, 1/3/2022); Colonoscopy (N/A, 2023); and Colonoscopy (2023). Discharge Recommendations: Iván Montana scored a 24/24 on the AM-PAC ADL Inpatient form. At this time, no further OT is recommended upon discharge due to patient at independent level. Recommend patient returns to prior setting with prior services.       DME Required For Discharge: No DME required    Precautions/Restrictions:
Mornig assessment completed, vss, alert and oriented, scheduled meds given, The care plan and education has been reviewed and mutually agreed upon with the patient. Pt remains free from falls. Fall precautions in place--bed in lowest position, call light within reach, bed alarm in use. Pt aware to call for assistance before getting up.
Night shift assessment completed. Routine vitals have been obtained. Scheduled medications given. Patient is awake, alert and oriented/ talking to staff. Respirations are easy and unlabored with no c/o SOB. Skin has been assessed per writer. IV site is C/D/I. Patient does not appear to be in distress. Call light within reach. Standard safety measures are in place. All needs have been met at this time.
Nutrition Note    RECOMMENDATIONS  PO Diet: Continue current diet  ONS: Ordered Magic Cup BID. Pt denied regular Ensure or Ensure clear  Other: Please reweigh pt and document wt method     NUTRITION ASSESSMENT   Pt triggered for positive nutrition screen indicating wt loss and poor po intake. Hx of metastatic prostate cancer, on chemo. Upon visiting, reported decreased appetite/po intake r/t taste changes since starting chemo estimated 8 to 9 weeks prior with 10 lb unintentional wt loss in a few weeks. If wt today of 123 lb is accurate, indicates 30 lb (19.6%) wt loss in less than 3 weeks per wt hx in EMR. Pt does not believe wt of 123 lb is accurate, waiting for RN to reweigh for accurate wt change assessment. Willing to try Magic Cup to offer additional nutrition. RD will order and continue to monitor for adequate po intake. Nutrition Related Findings: LR at 100 mL/hr. Lytes obtained today WNL. LBM 6/19, BS+. No edema noted. Wounds: None  Nutrition Education:  Education not indicated   Nutrition Goals: PO intake 50% or greater, by next RD assessment     MALNUTRITION ASSESSMENT   Chronic Illness  Malnutrition Status: Insufficient data    NUTRITION DIAGNOSIS   Inadequate protein-energy intake related to catabolic illness, altered taste perception as evidenced by poor intake prior to admission, weight loss    CURRENT NUTRITION THERAPIES  ADULT DIET; Regular     PO Intake: Unable to assess   PO Supplement Intake:None Ordered    ANTHROPOMETRICS  Current Height: 5' 10\" (177.8 cm)  Current Weight - Scale: 123 lb 4 oz (55.9 kg)    Ideal Body Weight (IBW): 166 lbs  (75 kg)    Usual Bodyweight 165 lb (74.8 kg) (per pt report)       BMI: 17.6    COMPARATIVE STANDARDS  Energy (kcal): Total Energy Requirements (kcals/day): waiting to calculate estimated energy needs of the pt until reweight is obtained       The patient will be monitored per nutrition standards of care.  Consult dietitian if additional nutrition
ONCOLOGY HEMATOLOGY CARE PROGRESS NOTE      SUBJECTIVE:  Feeling better. Still weak. Afebrile. ROS:     Constitutional:  No weight loss, No fever, No chills, No night sweats. Energy level decreased.   Eyes:  No impairment or change in vision  ENT / Mouth:  No pain, abnormal ulceration, bleeding, nasal drip or change in voice or hearing  Cardiovascular:  No chest pain, palpitations, new edema, or calf discomfort  Respiratory:  No pain, hemoptysis, change to breathing  Breast:  No pain, discharge, change in appearance or texture  Gastrointestinal:  No pain, cramping, jaundice, change to eating and bowel habits  Urinary:  No pain, bleeding or change in continence  Genitalia: No pain, bleeding or discharge  Musculoskeletal:  No redness, pain, edema or weakness  Skin:  No pruritus, rash, change to nodules or lesions  Neurologic:  No discomfort, change in mental status, speech, sensory or motor activity  Psychiatric:  No change in concentration or change to affect or mood  Endocrine:  No hot flashes, increased thirst, or change to urine production  Hematologic: No petechiae, ecchymosis or bleeding  Lymphatic:  No lymphadenopathy or lymphedema  Allergy / Immunologic:  No eczema, hives, frequent or recurrent infections    OBJECTIVE        Physical    VITALS:  Patient Vitals for the past 24 hrs:   BP Temp Temp src Pulse Resp SpO2   06/22/23 0827 -- -- -- -- -- 98 %   06/22/23 0743 (!) 145/73 98.2 °F (36.8 °C) Oral 91 18 --   06/22/23 0538 108/64 98.2 °F (36.8 °C) Oral 96 17 98 %   06/21/23 2333 122/63 98.3 °F (36.8 °C) Oral 98 17 96 %   06/21/23 2141 -- -- -- 98 18 93 %   06/21/23 1932 122/64 98.8 °F (37.1 °C) Oral 98 17 92 %   06/21/23 1650 116/63 98.4 °F (36.9 °C) Oral 90 18 91 %   06/21/23 1218 121/64 98.2 °F (36.8 °C) Oral (!) 105 18 91 %   06/21/23 1114 -- -- -- (!) 103 18 93 %   06/21/23 0930 (!) 110/57 98.4 °F (36.9 °C) Oral (!) 111 16 92 %   06/21/23 0903 -- -- -- (!) 103 16
Patient A&Ox4, VSS, afebrile this shift. Patient denies pain or nausea, tolerating diet well. Patient ambulating with standby assist, anticipating discharge home tomorrow. Will continue to monitor for changes.
Patient has been admitted to . VS have been obtained. Admission/ assessment completed. Patient is being oriented to floor/ room. All questions / concerns have been met at this time.
Pharmacy Home Medication Reconciliation Note    A medication reconciliation has been completed for Ji Ornelas 1940    Pharmacy: [address]  Information provided by: patient    The patient's home medication list is as follows: No current facility-administered medications on file prior to encounter. Current Outpatient Medications on File Prior to Encounter   Medication Sig Dispense Refill    pantoprazole (PROTONIX) 40 MG tablet Take 1 tablet by mouth daily 1 po qd 90 tablet 3    potassium chloride (KLOR-CON M) 20 MEQ TBCR extended release tablet Take 2 tablets by mouth daily for 3 doses (Patient not taking: Reported on 6/19/2023) 60 tablet 3    traZODone (DESYREL) 50 MG tablet Take 1 tablet by mouth nightly      predniSONE (DELTASONE) 5 MG tablet Take 1 tablet by mouth daily      escitalopram (LEXAPRO) 10 MG tablet Take 1 tablet by mouth daily 30 tablet 3    SYMBICORT 160-4.5 MCG/ACT AERO INHALE 2 PUFFS INTO THE LUNGS BY MOUTH TWICE DAILY 30.6 g 5    ipratropium-albuterol (DUONEB) 0.5-2.5 (3) MG/3ML SOLN nebulizer solution Inhale 3 mLs into the lungs every 4 hours DX:COPD J44.9 360 mL 11    albuterol sulfate HFA (PROVENTIL;VENTOLIN;PROAIR) 108 (90 Base) MCG/ACT inhaler Inhale 2 puffs into the lungs every 6 hours as needed for Wheezing 25.5 g 5    metoprolol succinate (TOPROL XL) 50 MG extended release tablet Take 1 tablet by mouth daily 90 tablet 3    amLODIPine (NORVASC) 10 MG tablet TAKE 1 TABLET BY MOUTH EVERY DAY (Patient taking differently: Take 1 tablet by mouth nightly) 90 tablet 3    aspirin 81 MG EC tablet Take 1 tablet by mouth daily (Patient not taking: Reported on 6/19/2023)      Calcium-Magnesium-Vitamin D (CALCIUM 1200+D3 PO) Take by mouth      Probiotic Product (PROBIOTIC DAILY PO) Take by mouth      leuprolide acetate, 4 Month, (ELIGARD) 30 MG injection Inject 0.5 mLs into the skin once Once every 4 months.  (Patient not taking: Reported on 6/19/2023)
Physician Progress Note      Rocky Dietrich  CSN #:                  [id number]  :                       1940  ADMIT DATE:       2023 7:09 PM  [address] [place] DATE:        2023 11:23 AM  RESPONDING  PROVIDER #:        Papo Wilder MD          QUERY TEXT:    Patient admitted with fever and tachycardia. Documentation reflects Sepsis of   unclear source in PN . If possible, please document in the progress notes   and discharge summary if sepsis was: The medical record reflects the following:  Risk Factors: 77 yo w/ fever, tachycardia  Clinical Indicators: Per PN : Sepsis unclear source. Treatment: Lactic acid, procalcitonin, blood cultures, IVF bolus, IV Cefepime,   IV Vanc now DC'd. Options provided:  -- Sepsis confirmed after study  -- Sepsis treated and resolved  -- Sepsis ruled out after study  -- Other - I will add my own diagnosis  -- Disagree - Not applicable / Not valid  -- Disagree - Clinically unable to determine / Unknown  -- Refer to Clinical Documentation Reviewer    PROVIDER RESPONSE TEXT:    Sepsis ruled out after study.     Query created by: Ousmane Cervantes on 2023 11:33 AM      Electronically signed by:  Papo Wilder MD 2023 12:26 PM
Shift assessment completed. Routine vitals stable. Scheduled medications given. Patient is awake, alert and oriented. Respirations are easy and unlabored. Patient does not appear to be in distress. Call light within reach.
Device: no device  Assistance: Independent  Distance: 300ft  Gait Mechanics: Decreased gait speed   Comments:    Stair Mobility  Stair mobility not completed on this date. Comments: Patient reports no concern with completing stair mobility upon discharge   Balance  Static Sitting Balance: good: independent with functional balance in unsupported position  Dynamic Sitting Balance: good: independent with functional balance in unsupported position  Static Standing Balance: good: independent with functional balance in unsupported position  Dynamic Standing Balance: good: independent with functional balance in unsupported position  Comments:    Functional Outcomes  -PAC Inpatient Mobility Raw Score : 24              Cognition  WFL  Orientation:    A&O x 4    Education  Barriers To Learning: none  Patient Education: patient educated on goals, PT role and benefits, plan of care, general safety, functional mobility training, transfer training, discharge recommendations  Learning Assessment:  Pt verbalized and demonstrates understanding    Assessment  Activity Tolerance: No adverse response to treatment   Impairments Requiring Therapeutic Intervention: none - eval with same day discharge  Prognosis: good without need for therapy intervention  Clinical Assessment: Patient presenting at independent level for completion of required mobility tasks for return to home. Eval with d/c at this time. No therapy services indicated. Safety Interventions: patient left in bed, call light within reach, and nurse notified (no call light used as patient is a medium fall risk)     Plan  Frequency: Eval with same day discharge. No follow up required. Current Treatment Recommendations: Not applicable, evaluation completed with same day discharge. Goals  Patient eval with same day discharge. No goals set as patient is at baseline mobility status.       Therapy Session Time      Individual Group Co-treatment   Time In     2556   Time

## 2023-06-23 ENCOUNTER — CARE COORDINATION (OUTPATIENT)
Dept: CASE MANAGEMENT | Age: 83
End: 2023-06-23

## 2023-06-23 LAB
BACTERIA BLD CULT ORG #2: NORMAL
BACTERIA BLD CULT: NORMAL

## 2023-06-23 NOTE — CARE COORDINATION
Care Transitions Outreach Attempt    Call within 2 business days of discharge: Yes     Attempted to reach patient for transitions of care follow up. Unable to reach patient, left vm. Patient: Jt Palma Patient : 1940 MRN: [id number]    Last Discharge  Street       Date Complaint Diagnosis Description Type Department Provider    23 Fever Septicemia ([facility] . .. ED to Hosp-Admission (Discharged) (ADMITTED) FZ 5T Christine Hester MD; Robel Vaz . .. Was this an external facility discharge?  No Discharge Facility:     Noted following upcoming appointments from discharge chart review:   [facility] follow up appointment(s):   Future Appointments   Date Time Provider Javan Chacon   2023  9:45 AM [facility] FF VASC & ENDO, VASCULAR LAB SCHED FF VASC/ENDO [facility]   10/10/2023  1:00 PM Finn Theodore MD PULM & CC [facility]     Wilfredo Bermudez RN

## 2023-06-26 ENCOUNTER — CARE COORDINATION (OUTPATIENT)
Dept: CASE MANAGEMENT | Age: 83
End: 2023-06-26

## 2023-07-25 ENCOUNTER — PROCEDURE VISIT (OUTPATIENT)
Dept: VASCULAR SURGERY | Age: 83
End: 2023-07-25
Payer: MEDICARE

## 2023-07-25 DIAGNOSIS — I65.23 CAROTID ATHEROSCLEROSIS, BILATERAL: ICD-10-CM

## 2023-07-25 PROCEDURE — 93880 EXTRACRANIAL BILAT STUDY: CPT | Performed by: SURGERY

## 2023-08-01 ENCOUNTER — TELEPHONE (OUTPATIENT)
Dept: VASCULAR SURGERY | Age: 83
End: 2023-08-01

## 2023-08-01 DIAGNOSIS — I65.23 CAROTID ATHEROSCLEROSIS, BILATERAL: Primary | ICD-10-CM

## 2023-08-01 NOTE — TELEPHONE ENCOUNTER
Left message with results of carotid duplex which are stable with no significant progression. Plan to repeat in 1 year.     Electronically signed by ADELAIDA Conte CNP on 8/1/2023 at 1:08 PM

## 2023-08-08 NOTE — PROGRESS NOTES
HR=51 bpm, OMHE=827/36 mmhg, SpO2=99.0 %, Resp=17 B/min, EtCO2=35 mmHg, Apnea=1 Seconds, Pain=0, Malcolm=10, Mack=2, Comment=SB Medicare Annual Wellness Visit    Mennie Friday is here for Medicare AWV    Assessment & Plan   Need for vaccination  Medicare annual wellness visit, subsequent    Recommendations for Preventive Services Due: see orders and patient instructions/AVS.  Recommended screening schedule for the next 5-10 years is provided to the patient in written form: see Patient Instructions/AVS.     Return for Medicare Annual Wellness Visit in 1 year. Subjective   Fatigue, occ rls, occ losse stools, on 2 meds for proatate  cancer  Bp readings at home good    Patient's complete Health Risk Assessment and screening values have been reviewed and are found in Flowsheets. The following problems were reviewed today and where indicated follow up appointments were made and/or referrals ordered. Positive Risk Factor Screenings with Interventions:              Health Habits/Nutrition:  Physical Activity: Inactive    Days of Exercise per Week: 0 days    Minutes of Exercise per Session: 0 min     Have you lost any weight without trying in the past 3 months?: No  Body mass index: 23.1  Have you seen the dentist within the past year?: Yes  Health Habits/Nutrition Interventions:  Inadequate physical activity:  patient agrees to exercise for at least 150 minutes/week             Objective   Vitals:    10/19/22 0927   BP: (!) 158/78   Pulse: 94   SpO2: 98%   Weight: 161 lb (73 kg)   Height: 5' 10\" (1.778 m)      Body mass index is 23.1 kg/m².       General Appearance: alert and oriented to person, place and time, well-developed and well-nourished, in no acute distress  Head: normocephalic and atraumatic  Eyes: pupils equal, round, and reactive to light, extraocular eye movements intact, conjunctivae normal  ENT: tympanic membrane, external ear and ear canal normal bilaterally, oropharynx clear and moist with normal mucous membranes  Neck: neck supple and non tender without mass, no thyromegaly or thyroid nodules, no cervical lymphadenopathy Pulmonary/Chest: clear to auscultation bilaterally- no wheezes, rales or rhonchi, normal air movement, no respiratory distress  Cardiovascular: normal rate, normal S1 and S2, no gallops, intact distal pulses, and no carotid bruits  Abdomen: soft, non-tender, non-distended, normal bowel sounds, no masses or organomegaly  Extremities: no cyanosis and no clubbing       Allergies   Allergen Reactions    Lisinopril      cough     Prior to Visit Medications    Medication Sig Taking?  Authorizing Provider   ERLEADA 60 MG TABS  Yes Historical Provider, MD   ipratropium-albuterol (DUONEB) 0.5-2.5 (3) MG/3ML SOLN nebulizer solution Inhale 3 mLs into the lungs every 4 hours DX:COPD J44.9 Yes Jasmin Bloom MD   albuterol sulfate HFA (PROVENTIL;VENTOLIN;PROAIR) 108 (90 Base) MCG/ACT inhaler Inhale 2 puffs into the lungs every 6 hours as needed for Wheezing Yes Jasmin Bloom MD   metoprolol succinate (TOPROL XL) 50 MG extended release tablet Take 1 tablet by mouth daily Yes Constanza Lim MD   amLODIPine (NORVASC) 10 MG tablet TAKE 1 TABLET BY MOUTH EVERY DAY Yes Constanza Lim MD   pantoprazole (PROTONIX) 40 MG tablet TAKE 1 TABLET BY MOUTH EVERY MORNING BEFORE BREAKFAST Yes Constanza Lim MD   budesonide-formoterol (SYMBICORT) 160-4.5 MCG/ACT AERO INHALE 2 PUFFS INTO THE LUNGS BY MOUTH TWICE DAILY Yes Jasmin Bloom MD   aspirin 81 MG EC tablet Take 81 mg by mouth daily Yes Historical Provider, MD   Calcium-Magnesium-Vitamin D (CALCIUM 1200+D3 PO) Take by mouth Yes Historical Provider, MD   Probiotic Product (PROBIOTIC DAILY PO) Take by mouth Yes Historical Provider, MD   ferrous sulfate (SLOW FE) 142 (45 Fe) MG extended release tablet Take by mouth Yes Historical Provider, MD   Omega-3 Fatty Acids (FISH OIL) 1000 MG CAPS by NOT APPLICABLE route Yes Historical Provider, MD   loperamide (IMODIUM A-D) 2 MG tablet Take by mouth Yes Historical Provider, MD   leuprolide acetate, 4 Month, (ELIGARD) 30 MG injection Inject 30 mg into the skin once Yes Historical Provider, MD   vitamin E 400 UNIT capsule Take 400 Units by mouth daily. Yes Historical Provider, MD     Lab Results   Component Value Date    PSA 7.27 (H) 10/14/2022    PSA 5.10 (H) 10/05/2021    PSA 4.60 (H) 11/09/2018      Lab Results   Component Value Date    WBC 6.6 10/14/2022    HGB 12.4 (L) 10/14/2022    HCT 38.4 (L) 10/14/2022    MCV 95.6 10/14/2022     10/14/2022      Lab Results   Component Value Date     10/14/2022    K 4.8 10/14/2022     10/14/2022    CO2 25 10/14/2022    BUN 12 10/14/2022    CREATININE 0.9 10/14/2022    GLUCOSE 105 02/01/2022    CALCIUM 8.6 10/14/2022    PROT 6.4 10/14/2022    LABALBU 4.4 10/14/2022    BILITOT 0.5 10/14/2022    ALKPHOS 74 10/14/2022    AST 19 10/14/2022    ALT 15 10/14/2022    LABGLOM >60 10/14/2022    GFRAA >60 10/14/2022    AGRATIO 2.2 10/14/2022    GLOB 2.0 08/12/2021        Lab Results   Component Value Date    TSH 1.23 06/11/2017    TSHREFLEX 1.95 10/14/2022        CareTeam (Including outside providers/suppliers regularly involved in providing care):   Patient Care Team:  Florin Naranjo MD as PCP - General (Family Medicine)  Magali Simpson MD as Consulting Physician (Vascular Surgery)  Ranjeet Denise MD as Surgeon (General Surgery)  Jus Norris MD (Pulmonology)  [address] [facility]  dentist   Reviewed and updated this visit:  Tobacco  Allergies  Meds  Med Hx  Surg Hx  Soc Hx  Fam Hx        Encounter Diagnoses   Name Primary?     Need for vaccination Yes    Medicare annual wellness visit, subsequent     Fatigue, unspecified type     Moderate COPD (chronic obstructive pulmonary disease) ([facility]     Prostate CA ([facility] - had surgery, radiation and now hormone treatment     Gastroesophageal reflux disease without esophagitis     Mixed hyperlipidemia - stable     Primary hypertension - borderline     Psoriasis - fiar         Flu shot today  See urol today - on 2 meds for prostate cancer  Shingrix in future  Add  vit b12, iron level

## 2023-08-28 ENCOUNTER — OFFICE VISIT (OUTPATIENT)
Dept: PULMONOLOGY | Age: 83
End: 2023-08-28
Payer: MEDICARE

## 2023-08-28 ENCOUNTER — HOSPITAL ENCOUNTER (OUTPATIENT)
Age: 83
Discharge: HOME | End: 2023-08-28
Payer: MEDICARE

## 2023-08-28 ENCOUNTER — HOSPITAL ENCOUNTER (OUTPATIENT)
Dept: GENERAL RADIOLOGY | Age: 83
Discharge: HOME | End: 2023-08-28
Payer: MEDICARE

## 2023-08-28 VITALS
OXYGEN SATURATION: 94 % | HEART RATE: 111 BPM | DIASTOLIC BLOOD PRESSURE: 58 MMHG | HEIGHT: 70 IN | BODY MASS INDEX: 18.18 KG/M2 | WEIGHT: 127 LBS | SYSTOLIC BLOOD PRESSURE: 122 MMHG

## 2023-08-28 DIAGNOSIS — C61 PROSTATE CANCER METASTATIC TO LUNG (HCC): ICD-10-CM

## 2023-08-28 DIAGNOSIS — C78.00 PROSTATE CANCER METASTATIC TO LUNG (HCC): ICD-10-CM

## 2023-08-28 DIAGNOSIS — J43.2 CENTRILOBULAR EMPHYSEMA (HCC): ICD-10-CM

## 2023-08-28 DIAGNOSIS — J44.9 MODERATE COPD (CHRONIC OBSTRUCTIVE PULMONARY DISEASE) (HCC): Primary | ICD-10-CM

## 2023-08-28 DIAGNOSIS — K21.9 GASTROESOPHAGEAL REFLUX DISEASE WITHOUT ESOPHAGITIS: ICD-10-CM

## 2023-08-28 DIAGNOSIS — J44.9 MODERATE COPD (CHRONIC OBSTRUCTIVE PULMONARY DISEASE) (HCC): ICD-10-CM

## 2023-08-28 PROCEDURE — G8427 DOCREV CUR MEDS BY ELIG CLIN: HCPCS | Performed by: INTERNAL MEDICINE

## 2023-08-28 PROCEDURE — G8419 CALC BMI OUT NRM PARAM NOF/U: HCPCS | Performed by: INTERNAL MEDICINE

## 2023-08-28 PROCEDURE — 3023F SPIROM DOC REV: CPT | Performed by: INTERNAL MEDICINE

## 2023-08-28 PROCEDURE — 3078F DIAST BP <80 MM HG: CPT | Performed by: INTERNAL MEDICINE

## 2023-08-28 PROCEDURE — 99214 OFFICE O/P EST MOD 30 MIN: CPT | Performed by: INTERNAL MEDICINE

## 2023-08-28 PROCEDURE — 1123F ACP DISCUSS/DSCN MKR DOCD: CPT | Performed by: INTERNAL MEDICINE

## 2023-08-28 PROCEDURE — 71046 X-RAY EXAM CHEST 2 VIEWS: CPT

## 2023-08-28 PROCEDURE — 3074F SYST BP LT 130 MM HG: CPT | Performed by: INTERNAL MEDICINE

## 2023-08-28 PROCEDURE — 1036F TOBACCO NON-USER: CPT | Performed by: INTERNAL MEDICINE

## 2023-08-28 RX ORDER — BUDESONIDE, GLYCOPYRROLATE, AND FORMOTEROL FUMARATE 160; 9; 4.8 UG/1; UG/1; UG/1
2 AEROSOL, METERED RESPIRATORY (INHALATION) 2 TIMES DAILY
Qty: 1 EACH | Refills: 5 | Status: SHIPPED | OUTPATIENT
Start: 2023-08-28

## 2023-08-28 NOTE — PROGRESS NOTES
effusion      1 year    Chief Complaint   Patient presents with    COPD    Shortness of Breath       HPI  The patient presents with a chief complaint of moderate shortness of breath related to moderate COPD of many years duration. He has mild associated cough. Exertion is a modifying factor    Patient has a long history of prostate cancer. However, over the last year this is gotten worse. He now has multiple pulmonary mets. He is on chemo. His PSA is starting to come down. However, he feels like he is more short of breath than usual.      Review of Systems  No Chest pain, Nausea or vomiting reported    History  I have reviewed past medical, surgical, social and family history. This is documented elsewhere in the medical record. Physical Exam:  Well developed, well nourished  Alert and oriented  Sclera is clear  No cervical adenopathy  No JVD. Chest examination is clear. Cardiac examination reveals regular rate and rhythm without murmur, gallop or rub. The abdomen is soft, nontender and nondistended. There is no clubbing, cyanosis or edema of the extremities. There is no obvious skin rash. No focal neuro deficicts  Normal mood and affect    Allergies   Allergen Reactions    Lisinopril      cough     Prior to Visit Medications    Medication Sig Taking?  Authorizing Provider   cabazitaxel (JEVTANA) SOLN Infuse 3.8 mLs intravenously Yes Historical Provider, MD   pantoprazole (PROTONIX) 40 MG tablet Take 1 tablet by mouth daily 1 po qd Yes Juliana Tejada MD   traZODone (DESYREL) 50 MG tablet Take 1 tablet by mouth nightly Yes Historical Provider, MD   predniSONE (DELTASONE) 5 MG tablet Take 1 tablet by mouth daily Yes Historical Provider, MD   escitalopram (LEXAPRO) 10 MG tablet Take 1 tablet by mouth daily Yes Juliana Tejada MD   SYMBICORT 160-4.5 MCG/ACT AERO INHALE 2 PUFFS INTO THE LUNGS BY MOUTH TWICE DAILY Yes Ellen Robbins MD   ipratropium-albuterol (DUONEB) 0.5-2.5 (3) MG/3ML SOLN

## 2023-09-07 ENCOUNTER — HOSPITAL ENCOUNTER (OUTPATIENT)
Dept: PULMONOLOGY | Age: 83
Discharge: HOME | End: 2023-09-07
Attending: INTERNAL MEDICINE
Payer: MEDICARE

## 2023-09-07 DIAGNOSIS — J44.9 MODERATE COPD (CHRONIC OBSTRUCTIVE PULMONARY DISEASE) (HCC): ICD-10-CM

## 2023-09-07 LAB
DLCO %PRED: 74 %
DLCO PRED: NORMAL
DLCO/VA %PRED: NORMAL
DLCO/VA PRED: NORMAL
DLCO/VA: NORMAL
DLCO: NORMAL
EXPIRATORY TIME-POST: NORMAL
EXPIRATORY TIME: NORMAL
FEF 25-75% %CHNG: NORMAL
FEF 25-75% %PRED-POST: NORMAL
FEF 25-75% %PRED-PRE: NORMAL
FEF 25-75% PRED: NORMAL
FEF 25-75%-POST: NORMAL
FEF 25-75%-PRE: NORMAL
FEV1 %PRED-POST: 68 %
FEV1 %PRED-PRE: 60 %
FEV1 PRED: NORMAL
FEV1-POST: NORMAL
FEV1-PRE: NORMAL
FEV1/FVC %PRED-POST: NORMAL
FEV1/FVC %PRED-PRE: NORMAL
FEV1/FVC PRED: NORMAL
FEV1/FVC-POST: 65 %
FEV1/FVC-PRE: 60 %
FVC %PRED-POST: NORMAL
FVC %PRED-PRE: NORMAL
FVC PRED: NORMAL
FVC-POST: NORMAL
FVC-PRE: NORMAL
GAW %PRED: NORMAL
GAW PRED: NORMAL
GAW: NORMAL
IC %PRED: NORMAL
IC PRED: NORMAL
IC: NORMAL
MEP: NORMAL
MIP: NORMAL
MVV %PRED-PRE: NORMAL
MVV PRED: NORMAL
MVV-PRE: NORMAL
PEF %PRED-POST: NORMAL
PEF %PRED-PRE: NORMAL
PEF PRED: NORMAL
PEF%CHNG: NORMAL
PEF-POST: NORMAL
PEF-PRE: NORMAL
RAW %PRED: NORMAL
RAW PRED: NORMAL
RAW: NORMAL
RV %PRED: NORMAL
RV PRED: NORMAL
RV: NORMAL
SVC %PRED: NORMAL
SVC PRED: NORMAL
SVC: NORMAL
TLC %PRED: 118 %
TLC PRED: NORMAL
TLC: NORMAL
VA %PRED: NORMAL
VA PRED: NORMAL
VA: NORMAL
VTG %PRED: NORMAL
VTG PRED: NORMAL
VTG: NORMAL

## 2023-09-07 PROCEDURE — 94729 DIFFUSING CAPACITY: CPT

## 2023-09-07 PROCEDURE — 94060 EVALUATION OF WHEEZING: CPT

## 2023-09-07 PROCEDURE — 6370000000 HC RX 637 (ALT 250 FOR IP): Performed by: INTERNAL MEDICINE

## 2023-09-07 PROCEDURE — 94760 N-INVAS EAR/PLS OXIMETRY 1: CPT

## 2023-09-07 PROCEDURE — 94726 PLETHYSMOGRAPHY LUNG VOLUMES: CPT

## 2023-09-07 RX ORDER — ALBUTEROL SULFATE 90 UG/1
4 AEROSOL, METERED RESPIRATORY (INHALATION) ONCE
Status: COMPLETED | OUTPATIENT
Start: 2023-09-07 | End: 2023-09-07

## 2023-09-07 RX ADMIN — Medication 4 PUFF: at 08:58

## 2023-09-07 ASSESSMENT — PULMONARY FUNCTION TESTS
FEV1_PERCENT_PREDICTED_POST: 68
FEV1/FVC_PRE: 60
FEV1_PERCENT_PREDICTED_PRE: 60
FEV1/FVC_POST: 65

## 2023-09-08 NOTE — PROCEDURES
Pulmonary Function Testing      Patient name:  Ramila Weinberg     [id number] [facility] Unit #:   [id number]   Date of test: 9/7/2023  Date of interpretation:   9/8/2023    Mr. Ramila Weinberg is a 78y.o. year-old. The spirometry data were acceptable and reproducible. Spirometry:  Flow volume loops were obstructed. The FEV-1/FVC ratio was decreased. The  post-bronchodilator FEV-1 was 1.83 liters (68% of predicted), which was moderately decreased. The FVC was 2.82 liters (74% of predicted), which was decreased. Response to inhaled bronchodilators (albuterol) was significant. Lung volumes:  Lung volumes were tested by plethysmography. The total lung capacity was 7.41 liters (118% of predicted), which was normal. The residual volume was 4.38 liters (156% of predicted), which was increased. The ratio of residual volume to total lung capacity (RV/TLC) was 129%, which was increased. Specific airway resistance was increased. Diffusion capacity was found to be decreased. Interpretation:  Moderate obstructive airway disease with a significant bronchodilator response, evidence of air trapping and reduced diffusion capacity.       Den Huerta MD, [address] [place] [address] Pulmonary, Critical Care and Sleep Medicine  [address] [place] [address] [address]

## 2023-09-21 RX ORDER — AMLODIPINE BESYLATE 10 MG/1
TABLET ORAL
Qty: 90 TABLET | Refills: 3 | Status: SHIPPED | OUTPATIENT
Start: 2023-09-21

## 2023-09-26 ENCOUNTER — OFFICE VISIT (OUTPATIENT)
Dept: PULMONOLOGY | Age: 83
End: 2023-09-26
Payer: MEDICARE

## 2023-09-26 VITALS
HEIGHT: 70 IN | BODY MASS INDEX: 22.33 KG/M2 | OXYGEN SATURATION: 95 % | SYSTOLIC BLOOD PRESSURE: 136 MMHG | DIASTOLIC BLOOD PRESSURE: 70 MMHG | HEART RATE: 84 BPM | WEIGHT: 156 LBS

## 2023-09-26 DIAGNOSIS — K21.9 GASTROESOPHAGEAL REFLUX DISEASE WITHOUT ESOPHAGITIS: ICD-10-CM

## 2023-09-26 DIAGNOSIS — C61 PROSTATE CANCER METASTATIC TO LUNG (HCC): ICD-10-CM

## 2023-09-26 DIAGNOSIS — J44.9 MODERATE COPD (CHRONIC OBSTRUCTIVE PULMONARY DISEASE) (HCC): Primary | ICD-10-CM

## 2023-09-26 DIAGNOSIS — J43.2 CENTRILOBULAR EMPHYSEMA (HCC): ICD-10-CM

## 2023-09-26 DIAGNOSIS — C78.00 PROSTATE CANCER METASTATIC TO LUNG (HCC): ICD-10-CM

## 2023-09-26 PROCEDURE — 3078F DIAST BP <80 MM HG: CPT | Performed by: INTERNAL MEDICINE

## 2023-09-26 PROCEDURE — G8427 DOCREV CUR MEDS BY ELIG CLIN: HCPCS | Performed by: INTERNAL MEDICINE

## 2023-09-26 PROCEDURE — 1123F ACP DISCUSS/DSCN MKR DOCD: CPT | Performed by: INTERNAL MEDICINE

## 2023-09-26 PROCEDURE — 1036F TOBACCO NON-USER: CPT | Performed by: INTERNAL MEDICINE

## 2023-09-26 PROCEDURE — 99214 OFFICE O/P EST MOD 30 MIN: CPT | Performed by: INTERNAL MEDICINE

## 2023-09-26 PROCEDURE — 3023F SPIROM DOC REV: CPT | Performed by: INTERNAL MEDICINE

## 2023-09-26 PROCEDURE — G8420 CALC BMI NORM PARAMETERS: HCPCS | Performed by: INTERNAL MEDICINE

## 2023-09-26 PROCEDURE — 3075F SYST BP GE 130 - 139MM HG: CPT | Performed by: INTERNAL MEDICINE

## 2023-09-26 NOTE — PROGRESS NOTES
[facility]  Follow Up Note  Marco A Albert MD       Judie Lucia   YOB: 1940    Date of Visit:  9/26/2023    Assessment/Plan:  1. Moderate COPD (chronic obstructive pulmonary disease) ([address])  PFT 9/23:  Spirometry:   Flow volume loops were obstructed. The FEV-1/FVC ratio was   decreased. The  post-bronchodilator FEV-1 was 1.83 liters (68% of   predicted), which was moderately decreased. The FVC was 2.82   liters (74% of predicted), which was decreased. Response to   inhaled bronchodilators (albuterol) was significant. Lung volumes:   Lung volumes were tested by plethysmography. The total lung   capacity was 7.41 liters (118% of predicted), which was normal.   The residual volume was 4.38 liters (156% of predicted), which   was increased. The ratio of residual volume to total lung   capacity (RV/TLC) was 129%, which was increased. Specific airway   resistance was increased. Diffusion capacity was found to be decreased. Interpretation:   Moderate obstructive airway disease with a significant   bronchodilator response, evidence of air trapping and reduced   diffusion capacity. I have independently reviewed pulmonary function testing. My impression is moderate COPD with hyperinflation and good response to inhaled bronchodilators. In comparison to the study of 2018, FEV1 is improved some      Symbicort ==> Breztri ==> This has helped  Duoneb  Albuterol     2. Centrilobular emphysema ([address])  CT finding    3. Gastroesophageal reflux disease without esophagitis  On Prilosec    He is vaccinated against COVID and boosted  He will take the flu shot in November 4. Metastatic Prostate CA  I have independently reviewed radiographic images for this patient as part of this visit. CT shows multiple lung mets some of which are cavitary He also has right paratracheal LN.     I have independently reviewed radiographic images for this patient as part of

## 2023-10-23 RX ORDER — ESCITALOPRAM OXALATE 10 MG/1
10 TABLET ORAL DAILY
Qty: 30 TABLET | Refills: 3 | Status: SHIPPED | OUTPATIENT
Start: 2023-10-23

## 2023-10-31 ENCOUNTER — NURSE ONLY (OUTPATIENT)
Dept: PULMONOLOGY | Age: 83
End: 2023-10-31
Payer: MEDICARE

## 2023-10-31 VITALS — TEMPERATURE: 96.5 F

## 2023-10-31 DIAGNOSIS — Z23 NEED FOR INFLUENZA VACCINATION: Primary | ICD-10-CM

## 2023-10-31 PROCEDURE — G0008 ADMIN INFLUENZA VIRUS VAC: HCPCS | Performed by: INTERNAL MEDICINE

## 2023-10-31 PROCEDURE — 90694 VACC AIIV4 NO PRSRV 0.5ML IM: CPT | Performed by: INTERNAL MEDICINE

## 2023-11-01 DIAGNOSIS — J44.9 MODERATE COPD (CHRONIC OBSTRUCTIVE PULMONARY DISEASE) (HCC): Primary | ICD-10-CM

## 2023-11-01 RX ORDER — BUDESONIDE, GLYCOPYRROLATE, AND FORMOTEROL FUMARATE 160; 9; 4.8 UG/1; UG/1; UG/1
160 AEROSOL, METERED RESPIRATORY (INHALATION) DAILY
Qty: 4 EACH | Refills: 0 | COMMUNITY
Start: 2023-11-01

## 2023-11-04 DIAGNOSIS — I10 PRIMARY HYPERTENSION: ICD-10-CM

## 2023-11-06 RX ORDER — METOPROLOL SUCCINATE 50 MG/1
TABLET, EXTENDED RELEASE ORAL
Qty: 90 TABLET | Refills: 1 | Status: SHIPPED | OUTPATIENT
Start: 2023-11-06

## 2023-11-06 NOTE — PROGRESS NOTES
Ambulatory Surgery/Procedure Discharge Note    Vitals:    01/03/22 1435   BP: 138/82   Pulse: 75   Resp: 16   SpO2:        In: 500 [I.V.:500]  Out: -     Restroom use offered before discharge. Yes    Pain assessment:  none  Pain Level: 0    Pt vss. Denies pain. Tolerating PO, no n/v. States readiness to go home. Discharge instructions reviewed, IV d/c'd. Patient discharged to home/self care.  Patient discharged via wheel chair by transporter to waiting family/S.O.       1/3/2022 2:46 PM
Pre and post operative expectations and routines explained to patient, verbalized understanding.
declines

## 2023-11-06 NOTE — TELEPHONE ENCOUNTER
Lov 6/6/23  Lrf 90 3 9/26/22  Lab Results   Component Value Date     06/21/2023    K 3.5 06/21/2023     06/21/2023    CO2 22 06/21/2023    BUN 9 06/21/2023    CREATININE 0.9 06/21/2023    GLUCOSE 92 06/21/2023    CALCIUM 7.9 (L) 06/21/2023    PROT 5.6 (L) 06/21/2023    LABALBU 2.8 (L) 06/21/2023    BILITOT 0.4 06/21/2023    ALKPHOS 72 06/21/2023    AST 18 06/21/2023    ALT 29 06/21/2023    LABGLOM >60 06/21/2023    GFRAA >60 10/14/2022    AGRATIO 1.0 (L) 06/21/2023    GLOB 2.0 08/12/2021

## 2023-11-28 ENCOUNTER — OFFICE VISIT (OUTPATIENT)
Dept: FAMILY MEDICINE CLINIC | Age: 83
End: 2023-11-28
Payer: MEDICARE

## 2023-11-28 VITALS
WEIGHT: 155 LBS | HEART RATE: 87 BPM | OXYGEN SATURATION: 99 % | BODY MASS INDEX: 22.24 KG/M2 | SYSTOLIC BLOOD PRESSURE: 109 MMHG | DIASTOLIC BLOOD PRESSURE: 74 MMHG

## 2023-11-28 DIAGNOSIS — C77.8 MALIGNANT NEOPLASM OF PROSTATE METASTATIC TO LYMPH NODES OF MULTIPLE SITES (HCC): ICD-10-CM

## 2023-11-28 DIAGNOSIS — J44.9 MODERATE COPD (CHRONIC OBSTRUCTIVE PULMONARY DISEASE) (HCC): ICD-10-CM

## 2023-11-28 DIAGNOSIS — K21.9 GASTROESOPHAGEAL REFLUX DISEASE WITHOUT ESOPHAGITIS: ICD-10-CM

## 2023-11-28 DIAGNOSIS — Z00.00 MEDICARE ANNUAL WELLNESS VISIT, SUBSEQUENT: ICD-10-CM

## 2023-11-28 DIAGNOSIS — C61 MALIGNANT NEOPLASM OF PROSTATE METASTATIC TO LYMPH NODES OF MULTIPLE SITES (HCC): ICD-10-CM

## 2023-11-28 DIAGNOSIS — E78.2 MIXED HYPERLIPIDEMIA: ICD-10-CM

## 2023-11-28 DIAGNOSIS — L40.9 PSORIASIS: ICD-10-CM

## 2023-11-28 DIAGNOSIS — C61 PROSTATE CANCER METASTATIC TO BONE (HCC): ICD-10-CM

## 2023-11-28 DIAGNOSIS — C78.00 PROSTATE CANCER METASTATIC TO LUNG (HCC): Primary | ICD-10-CM

## 2023-11-28 DIAGNOSIS — C61 PROSTATE CANCER METASTATIC TO LUNG (HCC): Primary | ICD-10-CM

## 2023-11-28 DIAGNOSIS — I10 PRIMARY HYPERTENSION: ICD-10-CM

## 2023-11-28 DIAGNOSIS — C79.51 PROSTATE CANCER METASTATIC TO BONE (HCC): ICD-10-CM

## 2023-11-28 PROCEDURE — G0439 PPPS, SUBSEQ VISIT: HCPCS | Performed by: FAMILY MEDICINE

## 2023-11-28 PROCEDURE — G8484 FLU IMMUNIZE NO ADMIN: HCPCS | Performed by: FAMILY MEDICINE

## 2023-11-28 PROCEDURE — 1123F ACP DISCUSS/DSCN MKR DOCD: CPT | Performed by: FAMILY MEDICINE

## 2023-11-28 PROCEDURE — 3078F DIAST BP <80 MM HG: CPT | Performed by: FAMILY MEDICINE

## 2023-11-28 PROCEDURE — 3074F SYST BP LT 130 MM HG: CPT | Performed by: FAMILY MEDICINE

## 2023-11-28 ASSESSMENT — PATIENT HEALTH QUESTIONNAIRE - PHQ9
1. LITTLE INTEREST OR PLEASURE IN DOING THINGS: 0
SUM OF ALL RESPONSES TO PHQ QUESTIONS 1-9: 0
SUM OF ALL RESPONSES TO PHQ QUESTIONS 1-9: 0
2. FEELING DOWN, DEPRESSED OR HOPELESS: 0
SUM OF ALL RESPONSES TO PHQ QUESTIONS 1-9: 0
SUM OF ALL RESPONSES TO PHQ QUESTIONS 1-9: 0
SUM OF ALL RESPONSES TO PHQ9 QUESTIONS 1 & 2: 0

## 2023-11-28 ASSESSMENT — LIFESTYLE VARIABLES: HOW OFTEN DO YOU HAVE A DRINK CONTAINING ALCOHOL: NEVER

## 2023-11-28 NOTE — PROGRESS NOTES
urge rsv at pharm      Watch bp , if drops lower could dec norvasc  Lab review from [facility]

## 2023-12-02 ENCOUNTER — HOSPITAL ENCOUNTER (INPATIENT)
Age: 83
LOS: 5 days | Discharge: HOME | End: 2023-12-08
Attending: INTERNAL MEDICINE | Admitting: INTERNAL MEDICINE
Payer: MEDICARE

## 2023-12-02 ENCOUNTER — APPOINTMENT (OUTPATIENT)
Dept: GENERAL RADIOLOGY | Age: 83
End: 2023-12-02
Payer: MEDICARE

## 2023-12-02 ENCOUNTER — APPOINTMENT (OUTPATIENT)
Dept: CT IMAGING | Age: 83
End: 2023-12-02
Payer: MEDICARE

## 2023-12-02 DIAGNOSIS — K80.50 BILIARY COLIC: ICD-10-CM

## 2023-12-02 DIAGNOSIS — R50.9 FEVER, UNSPECIFIED FEVER CAUSE: ICD-10-CM

## 2023-12-02 DIAGNOSIS — R00.0 TACHYCARDIA: ICD-10-CM

## 2023-12-02 DIAGNOSIS — N10 ACUTE PYELONEPHRITIS: Primary | ICD-10-CM

## 2023-12-02 DIAGNOSIS — D64.9 ANEMIA, UNSPECIFIED TYPE: ICD-10-CM

## 2023-12-02 DIAGNOSIS — K92.2 GASTROINTESTINAL HEMORRHAGE, UNSPECIFIED GASTROINTESTINAL HEMORRHAGE TYPE: ICD-10-CM

## 2023-12-02 DIAGNOSIS — R53.83 OTHER FATIGUE: ICD-10-CM

## 2023-12-02 LAB
ABO + RH BLD: NORMAL
ALBUMIN SERPL-MCNC: 3.2 G/DL (ref 3.4–5)
ALBUMIN/GLOB SERPL: 1.3 {RATIO} (ref 1.1–2.2)
ALP SERPL-CCNC: 82 U/L (ref 40–129)
ALT SERPL-CCNC: 27 U/L (ref 10–40)
ANION GAP SERPL CALCULATED.3IONS-SCNC: 10 MMOL/L (ref 3–16)
ANISOCYTOSIS BLD QL SMEAR: ABNORMAL
AST SERPL-CCNC: 37 U/L (ref 15–37)
BACTERIA URNS QL MICRO: ABNORMAL /HPF
BASOPHILS # BLD: 0 K/UL (ref 0–0.2)
BASOPHILS NFR BLD: 0 %
BILIRUB SERPL-MCNC: 1 MG/DL (ref 0–1)
BILIRUB UR QL STRIP.AUTO: NEGATIVE
BLD GP AB SCN SERPL QL: NORMAL
BUN SERPL-MCNC: 18 MG/DL (ref 7–20)
BURR CELLS BLD QL SMEAR: ABNORMAL
CALCIUM SERPL-MCNC: 7.9 MG/DL (ref 8.3–10.6)
CHLORIDE SERPL-SCNC: 97 MMOL/L (ref 99–110)
CLARITY UR: ABNORMAL
CO2 SERPL-SCNC: 27 MMOL/L (ref 21–32)
COLOR UR: ABNORMAL
CREAT SERPL-MCNC: 1.2 MG/DL (ref 0.8–1.3)
DEPRECATED RDW RBC AUTO: 17.4 % (ref 12.4–15.4)
EOSINOPHIL # BLD: 0 K/UL (ref 0–0.6)
EOSINOPHIL NFR BLD: 0 %
EPI CELLS #/AREA URNS HPF: ABNORMAL /HPF (ref 0–5)
FLUAV RNA RESP QL NAA+PROBE: NOT DETECTED
FLUBV RNA RESP QL NAA+PROBE: NOT DETECTED
GFR SERPLBLD CREATININE-BSD FMLA CKD-EPI: 59 ML/MIN/{1.73_M2}
GLUCOSE SERPL-MCNC: 116 MG/DL (ref 70–99)
GLUCOSE UR STRIP.AUTO-MCNC: NEGATIVE MG/DL
HCT VFR BLD AUTO: 24.9 % (ref 40.5–52.5)
HEMOCCULT STL QL: ABNORMAL
HGB BLD-MCNC: 8.2 G/DL (ref 13.5–17.5)
HGB UR QL STRIP.AUTO: ABNORMAL
KETONES UR STRIP.AUTO-MCNC: ABNORMAL MG/DL
LACTATE BLDV-SCNC: 1 MMOL/L (ref 0.4–1.9)
LEUKOCYTE ESTERASE UR QL STRIP.AUTO: ABNORMAL
LYMPHOCYTES # BLD: 0.5 K/UL (ref 1–5.1)
LYMPHOCYTES NFR BLD: 10 %
MCH RBC QN AUTO: 29.1 PG (ref 26–34)
MCHC RBC AUTO-ENTMCNC: 33 G/DL (ref 31–36)
MCV RBC AUTO: 88.2 FL (ref 80–100)
MONOCYTES # BLD: 0.3 K/UL (ref 0–1.3)
MONOCYTES NFR BLD: 6 %
NEUTROPHILS # BLD: 3.8 K/UL (ref 1.7–7.7)
NEUTROPHILS NFR BLD: 84 %
NITRITE UR QL STRIP.AUTO: POSITIVE
OVALOCYTES BLD QL SMEAR: ABNORMAL
PH UR STRIP.AUTO: 5.5 [PH] (ref 5–8)
PLATELET # BLD AUTO: 186 K/UL (ref 135–450)
PLATELET BLD QL SMEAR: ADEQUATE
PMV BLD AUTO: 7.8 FL (ref 5–10.5)
POTASSIUM SERPL-SCNC: 3.9 MMOL/L (ref 3.5–5.1)
PROCALCITONIN SERPL IA-MCNC: 0.48 NG/ML (ref 0–0.15)
PROT SERPL-MCNC: 5.6 G/DL (ref 6.4–8.2)
PROT UR STRIP.AUTO-MCNC: 300 MG/DL
RBC # BLD AUTO: 2.82 M/UL (ref 4.2–5.9)
RBC #/AREA URNS HPF: ABNORMAL /HPF (ref 0–4)
SARS-COV-2 RNA RESP QL NAA+PROBE: NOT DETECTED
SLIDE REVIEW: ABNORMAL
SODIUM SERPL-SCNC: 134 MMOL/L (ref 136–145)
SP GR UR STRIP.AUTO: 1.01 (ref 1–1.03)
TROPONIN, HIGH SENSITIVITY: 31 NG/L (ref 0–22)
UA COMPLETE W REFLEX CULTURE PNL UR: YES
UA DIPSTICK W REFLEX MICRO PNL UR: YES
URN SPEC COLLECT METH UR: ABNORMAL
UROBILINOGEN UR STRIP-ACNC: 1 E.U./DL
WBC # BLD AUTO: 4.5 K/UL (ref 4–11)
WBC #/AREA URNS HPF: >100 /HPF (ref 0–5)

## 2023-12-02 PROCEDURE — 85025 COMPLETE CBC W/AUTO DIFF WBC: CPT

## 2023-12-02 PROCEDURE — 6360000004 HC RX CONTRAST MEDICATION: Performed by: INTERNAL MEDICINE

## 2023-12-02 PROCEDURE — 36415 COLL VENOUS BLD VENIPUNCTURE: CPT

## 2023-12-02 PROCEDURE — 82728 ASSAY OF FERRITIN: CPT

## 2023-12-02 PROCEDURE — 96361 HYDRATE IV INFUSION ADD-ON: CPT

## 2023-12-02 PROCEDURE — C9113 INJ PANTOPRAZOLE SODIUM, VIA: HCPCS | Performed by: INTERNAL MEDICINE

## 2023-12-02 PROCEDURE — 87077 CULTURE AEROBIC IDENTIFY: CPT

## 2023-12-02 PROCEDURE — 84484 ASSAY OF TROPONIN QUANT: CPT

## 2023-12-02 PROCEDURE — 87186 SC STD MICRODIL/AGAR DIL: CPT

## 2023-12-02 PROCEDURE — 71045 X-RAY EXAM CHEST 1 VIEW: CPT

## 2023-12-02 PROCEDURE — 84238 ASSAY NONENDOCRINE RECEPTOR: CPT

## 2023-12-02 PROCEDURE — 86850 RBC ANTIBODY SCREEN: CPT

## 2023-12-02 PROCEDURE — 87150 DNA/RNA AMPLIFIED PROBE: CPT

## 2023-12-02 PROCEDURE — 99285 EMERGENCY DEPT VISIT HI MDM: CPT

## 2023-12-02 PROCEDURE — 87040 BLOOD CULTURE FOR BACTERIA: CPT

## 2023-12-02 PROCEDURE — 6370000000 HC RX 637 (ALT 250 FOR IP): Performed by: PHYSICIAN ASSISTANT

## 2023-12-02 PROCEDURE — 83550 IRON BINDING TEST: CPT

## 2023-12-02 PROCEDURE — 86901 BLOOD TYPING SEROLOGIC RH(D): CPT

## 2023-12-02 PROCEDURE — 80053 COMPREHEN METABOLIC PANEL: CPT

## 2023-12-02 PROCEDURE — 81001 URINALYSIS AUTO W/SCOPE: CPT

## 2023-12-02 PROCEDURE — 2580000003 HC RX 258: Performed by: INTERNAL MEDICINE

## 2023-12-02 PROCEDURE — 96375 TX/PRO/DX INJ NEW DRUG ADDON: CPT

## 2023-12-02 PROCEDURE — 96374 THER/PROPH/DIAG INJ IV PUSH: CPT

## 2023-12-02 PROCEDURE — 87086 URINE CULTURE/COLONY COUNT: CPT

## 2023-12-02 PROCEDURE — 84145 PROCALCITONIN (PCT): CPT

## 2023-12-02 PROCEDURE — 83605 ASSAY OF LACTIC ACID: CPT

## 2023-12-02 PROCEDURE — 83540 ASSAY OF IRON: CPT

## 2023-12-02 PROCEDURE — 82270 OCCULT BLOOD FECES: CPT

## 2023-12-02 PROCEDURE — 84443 ASSAY THYROID STIM HORMONE: CPT

## 2023-12-02 PROCEDURE — 86900 BLOOD TYPING SEROLOGIC ABO: CPT

## 2023-12-02 PROCEDURE — 6360000002 HC RX W HCPCS: Performed by: INTERNAL MEDICINE

## 2023-12-02 PROCEDURE — 87636 SARSCOV2 & INF A&B AMP PRB: CPT

## 2023-12-02 PROCEDURE — 2580000003 HC RX 258: Performed by: PHYSICIAN ASSISTANT

## 2023-12-02 PROCEDURE — 93005 ELECTROCARDIOGRAM TRACING: CPT | Performed by: PHYSICIAN ASSISTANT

## 2023-12-02 PROCEDURE — 74177 CT ABD & PELVIS W/CONTRAST: CPT

## 2023-12-02 RX ORDER — 0.9 % SODIUM CHLORIDE 0.9 %
30 INTRAVENOUS SOLUTION INTRAVENOUS ONCE
Status: COMPLETED | OUTPATIENT
Start: 2023-12-02 | End: 2023-12-02

## 2023-12-02 RX ORDER — ACETAMINOPHEN 325 MG/1
650 TABLET ORAL ONCE
Status: COMPLETED | OUTPATIENT
Start: 2023-12-02 | End: 2023-12-02

## 2023-12-02 RX ORDER — PANTOPRAZOLE SODIUM 40 MG/10ML
40 INJECTION, POWDER, LYOPHILIZED, FOR SOLUTION INTRAVENOUS ONCE
Status: COMPLETED | OUTPATIENT
Start: 2023-12-02 | End: 2023-12-02

## 2023-12-02 RX ADMIN — IOPAMIDOL 75 ML: 755 INJECTION, SOLUTION INTRAVENOUS at 21:40

## 2023-12-02 RX ADMIN — PANTOPRAZOLE SODIUM 40 MG: 40 INJECTION, POWDER, FOR SOLUTION INTRAVENOUS at 22:54

## 2023-12-02 RX ADMIN — SODIUM CHLORIDE 2109 ML: 9 INJECTION, SOLUTION INTRAVENOUS at 18:56

## 2023-12-02 RX ADMIN — ACETAMINOPHEN 650 MG: 325 TABLET ORAL at 20:10

## 2023-12-02 RX ADMIN — CEFTRIAXONE SODIUM 1000 MG: 1 INJECTION, POWDER, FOR SOLUTION INTRAMUSCULAR; INTRAVENOUS at 22:55

## 2023-12-02 ASSESSMENT — ENCOUNTER SYMPTOMS
NAUSEA: 1
COLOR CHANGE: 0
VOMITING: 1
EYE REDNESS: 0
COUGH: 1
SORE THROAT: 0
DIARRHEA: 1
ABDOMINAL PAIN: 1
RHINORRHEA: 0
SHORTNESS OF BREATH: 0

## 2023-12-02 NOTE — ED PROVIDER NOTES
Sunita Azar [facility] emergency department encounter, please see documentation by advanced practice provider, Milvia Garcia.         Herrera Margie, DO  12/02/23 9834

## 2023-12-03 ENCOUNTER — ANESTHESIA (OUTPATIENT)
Dept: OPERATING ROOM | Age: 83
End: 2023-12-03
Payer: MEDICARE

## 2023-12-03 ENCOUNTER — ANESTHESIA EVENT (OUTPATIENT)
Dept: OPERATING ROOM | Age: 83
End: 2023-12-03
Payer: MEDICARE

## 2023-12-03 ENCOUNTER — APPOINTMENT (OUTPATIENT)
Dept: GENERAL RADIOLOGY | Age: 83
End: 2023-12-03
Payer: MEDICARE

## 2023-12-03 PROBLEM — N39.0 UTI (URINARY TRACT INFECTION): Status: ACTIVE | Noted: 2023-12-03

## 2023-12-03 PROBLEM — N13.30 HYDRONEPHROSIS: Chronic | Status: ACTIVE | Noted: 2023-12-03

## 2023-12-03 PROBLEM — N99.112 POSTPROCEDURAL MEMBRANOUS URETHRAL STRICTURE: Status: ACTIVE | Noted: 2023-12-03

## 2023-12-03 LAB
EKG ATRIAL RATE: 119 BPM
EKG DIAGNOSIS: NORMAL
EKG P AXIS: 66 DEGREES
EKG P-R INTERVAL: 138 MS
EKG Q-T INTERVAL: 314 MS
EKG QRS DURATION: 82 MS
EKG QTC CALCULATION (BAZETT): 441 MS
EKG R AXIS: -70 DEGREES
EKG T AXIS: 64 DEGREES
EKG VENTRICULAR RATE: 119 BPM
FERRITIN SERPL IA-MCNC: 1483 NG/ML (ref 30–400)
IRON SATN MFR SERPL: 16 % (ref 20–50)
IRON SERPL-MCNC: 27 UG/DL (ref 59–158)
TIBC SERPL-MCNC: 172 UG/DL (ref 260–445)
TSH SERPL DL<=0.005 MIU/L-ACNC: 1.29 UIU/ML (ref 0.27–4.2)

## 2023-12-03 PROCEDURE — 94640 AIRWAY INHALATION TREATMENT: CPT

## 2023-12-03 PROCEDURE — 3600000014 HC SURGERY LEVEL 4 ADDTL 15MIN: Performed by: UROLOGY

## 2023-12-03 PROCEDURE — 6360000002 HC RX W HCPCS: Performed by: INTERNAL MEDICINE

## 2023-12-03 PROCEDURE — 3700000001 HC ADD 15 MINUTES (ANESTHESIA): Performed by: UROLOGY

## 2023-12-03 PROCEDURE — 7100000001 HC PACU RECOVERY - ADDTL 15 MIN: Performed by: UROLOGY

## 2023-12-03 PROCEDURE — 3700000000 HC ANESTHESIA ATTENDED CARE: Performed by: UROLOGY

## 2023-12-03 PROCEDURE — 2580000003 HC RX 258: Performed by: UROLOGY

## 2023-12-03 PROCEDURE — 1200000000 HC SEMI PRIVATE

## 2023-12-03 PROCEDURE — BT151ZZ FLUOROSCOPY OF URETHRA USING LOW OSMOLAR CONTRAST: ICD-10-PCS | Performed by: UROLOGY

## 2023-12-03 PROCEDURE — 74420 UROGRAPHY RTRGR +-KUB: CPT

## 2023-12-03 PROCEDURE — 2500000003 HC RX 250 WO HCPCS: Performed by: ANESTHESIOLOGY

## 2023-12-03 PROCEDURE — 6360000004 HC RX CONTRAST MEDICATION: Performed by: UROLOGY

## 2023-12-03 PROCEDURE — 7100000000 HC PACU RECOVERY - FIRST 15 MIN: Performed by: UROLOGY

## 2023-12-03 PROCEDURE — 6360000002 HC RX W HCPCS: Performed by: UROLOGY

## 2023-12-03 PROCEDURE — 0T7D8ZZ DILATION OF URETHRA, VIA NATURAL OR ARTIFICIAL OPENING ENDOSCOPIC: ICD-10-PCS | Performed by: UROLOGY

## 2023-12-03 PROCEDURE — 6370000000 HC RX 637 (ALT 250 FOR IP): Performed by: INTERNAL MEDICINE

## 2023-12-03 PROCEDURE — 2580000003 HC RX 258: Performed by: ANESTHESIOLOGY

## 2023-12-03 PROCEDURE — 6370000000 HC RX 637 (ALT 250 FOR IP): Performed by: UROLOGY

## 2023-12-03 PROCEDURE — 3600000004 HC SURGERY LEVEL 4 BASE: Performed by: UROLOGY

## 2023-12-03 PROCEDURE — 93010 ELECTROCARDIOGRAM REPORT: CPT | Performed by: INTERNAL MEDICINE

## 2023-12-03 PROCEDURE — C1758 CATHETER, URETERAL: HCPCS | Performed by: UROLOGY

## 2023-12-03 PROCEDURE — 94761 N-INVAS EAR/PLS OXIMETRY MLT: CPT

## 2023-12-03 PROCEDURE — C1769 GUIDE WIRE: HCPCS | Performed by: UROLOGY

## 2023-12-03 PROCEDURE — 6370000000 HC RX 637 (ALT 250 FOR IP): Performed by: NURSE PRACTITIONER

## 2023-12-03 PROCEDURE — 2709999900 HC NON-CHARGEABLE SUPPLY: Performed by: UROLOGY

## 2023-12-03 PROCEDURE — 2580000003 HC RX 258: Performed by: INTERNAL MEDICINE

## 2023-12-03 PROCEDURE — 6360000002 HC RX W HCPCS: Performed by: ANESTHESIOLOGY

## 2023-12-03 RX ORDER — TRAZODONE HYDROCHLORIDE 50 MG/1
50 TABLET ORAL NIGHTLY
Status: DISCONTINUED | OUTPATIENT
Start: 2023-12-03 | End: 2023-12-03

## 2023-12-03 RX ORDER — POLYETHYLENE GLYCOL 3350 17 G/17G
17 POWDER, FOR SOLUTION ORAL DAILY PRN
Status: DISCONTINUED | OUTPATIENT
Start: 2023-12-03 | End: 2023-12-05

## 2023-12-03 RX ORDER — FENTANYL CITRATE 50 UG/ML
INJECTION, SOLUTION INTRAMUSCULAR; INTRAVENOUS PRN
Status: DISCONTINUED | OUTPATIENT
Start: 2023-12-03 | End: 2023-12-03 | Stop reason: SDUPTHER

## 2023-12-03 RX ORDER — PREDNISONE 5 MG/1
5 TABLET ORAL DAILY
Status: DISCONTINUED | OUTPATIENT
Start: 2023-12-03 | End: 2023-12-08 | Stop reason: HOSPADM

## 2023-12-03 RX ORDER — TRAZODONE HYDROCHLORIDE 50 MG/1
50 TABLET ORAL NIGHTLY
Status: DISCONTINUED | OUTPATIENT
Start: 2023-12-03 | End: 2023-12-08 | Stop reason: HOSPADM

## 2023-12-03 RX ORDER — HYDRALAZINE HYDROCHLORIDE 20 MG/ML
10 INJECTION INTRAMUSCULAR; INTRAVENOUS
Status: DISCONTINUED | OUTPATIENT
Start: 2023-12-03 | End: 2023-12-03 | Stop reason: HOSPADM

## 2023-12-03 RX ORDER — PANTOPRAZOLE SODIUM 40 MG/10ML
40 INJECTION, POWDER, LYOPHILIZED, FOR SOLUTION INTRAVENOUS DAILY
Status: DISCONTINUED | OUTPATIENT
Start: 2023-12-03 | End: 2023-12-03

## 2023-12-03 RX ORDER — SODIUM CHLORIDE 9 MG/ML
INJECTION, SOLUTION INTRAVENOUS CONTINUOUS
Status: DISCONTINUED | OUTPATIENT
Start: 2023-12-03 | End: 2023-12-04

## 2023-12-03 RX ORDER — DEXAMETHASONE SODIUM PHOSPHATE 4 MG/ML
INJECTION, SOLUTION INTRA-ARTICULAR; INTRALESIONAL; INTRAMUSCULAR; INTRAVENOUS; SOFT TISSUE PRN
Status: DISCONTINUED | OUTPATIENT
Start: 2023-12-03 | End: 2023-12-03 | Stop reason: SDUPTHER

## 2023-12-03 RX ORDER — ENOXAPARIN SODIUM 100 MG/ML
40 INJECTION SUBCUTANEOUS DAILY
Status: DISCONTINUED | OUTPATIENT
Start: 2023-12-03 | End: 2023-12-03

## 2023-12-03 RX ORDER — PANTOPRAZOLE SODIUM 40 MG/10ML
40 INJECTION, POWDER, LYOPHILIZED, FOR SOLUTION INTRAVENOUS DAILY
Status: DISCONTINUED | OUTPATIENT
Start: 2023-12-04 | End: 2023-12-08 | Stop reason: HOSPADM

## 2023-12-03 RX ORDER — LABETALOL HYDROCHLORIDE 5 MG/ML
10 INJECTION, SOLUTION INTRAVENOUS
Status: DISCONTINUED | OUTPATIENT
Start: 2023-12-03 | End: 2023-12-03 | Stop reason: HOSPADM

## 2023-12-03 RX ORDER — ONDANSETRON 2 MG/ML
4 INJECTION INTRAMUSCULAR; INTRAVENOUS
Status: DISCONTINUED | OUTPATIENT
Start: 2023-12-03 | End: 2023-12-03 | Stop reason: HOSPADM

## 2023-12-03 RX ORDER — GENTAMICIN SULFATE 80 MG/100ML
80 INJECTION, SOLUTION INTRAVENOUS ONCE
Status: COMPLETED | OUTPATIENT
Start: 2023-12-03 | End: 2023-12-03

## 2023-12-03 RX ORDER — SODIUM CHLORIDE 0.9 % (FLUSH) 0.9 %
5-40 SYRINGE (ML) INJECTION EVERY 12 HOURS SCHEDULED
Status: DISCONTINUED | OUTPATIENT
Start: 2023-12-03 | End: 2023-12-08 | Stop reason: HOSPADM

## 2023-12-03 RX ORDER — POTASSIUM CHLORIDE 20 MEQ/1
40 TABLET, EXTENDED RELEASE ORAL PRN
Status: DISCONTINUED | OUTPATIENT
Start: 2023-12-03 | End: 2023-12-08 | Stop reason: HOSPADM

## 2023-12-03 RX ORDER — ALBUTEROL SULFATE 90 UG/1
2 AEROSOL, METERED RESPIRATORY (INHALATION) EVERY 6 HOURS PRN
Status: DISCONTINUED | OUTPATIENT
Start: 2023-12-03 | End: 2023-12-08 | Stop reason: HOSPADM

## 2023-12-03 RX ORDER — LACTOBACILLUS RHAMNOSUS GG 10B CELL
1 CAPSULE ORAL 2 TIMES DAILY WITH MEALS
Status: DISCONTINUED | OUTPATIENT
Start: 2023-12-03 | End: 2023-12-08 | Stop reason: HOSPADM

## 2023-12-03 RX ORDER — VITAMIN E 268 MG
400 CAPSULE ORAL DAILY
Status: DISCONTINUED | OUTPATIENT
Start: 2023-12-03 | End: 2023-12-08 | Stop reason: HOSPADM

## 2023-12-03 RX ORDER — ONDANSETRON 2 MG/ML
4 INJECTION INTRAMUSCULAR; INTRAVENOUS EVERY 6 HOURS PRN
Status: DISCONTINUED | OUTPATIENT
Start: 2023-12-03 | End: 2023-12-08 | Stop reason: HOSPADM

## 2023-12-03 RX ORDER — LIDOCAINE HYDROCHLORIDE 20 MG/ML
INJECTION, SOLUTION EPIDURAL; INFILTRATION; INTRACAUDAL; PERINEURAL PRN
Status: DISCONTINUED | OUTPATIENT
Start: 2023-12-03 | End: 2023-12-03 | Stop reason: SDUPTHER

## 2023-12-03 RX ORDER — HYDROMORPHONE HYDROCHLORIDE 2 MG/ML
0.25 INJECTION, SOLUTION INTRAMUSCULAR; INTRAVENOUS; SUBCUTANEOUS EVERY 5 MIN PRN
Status: DISCONTINUED | OUTPATIENT
Start: 2023-12-03 | End: 2023-12-03 | Stop reason: HOSPADM

## 2023-12-03 RX ORDER — SODIUM CHLORIDE 9 MG/ML
INJECTION, SOLUTION INTRAVENOUS PRN
Status: DISCONTINUED | OUTPATIENT
Start: 2023-12-03 | End: 2023-12-08 | Stop reason: HOSPADM

## 2023-12-03 RX ORDER — OXYCODONE HYDROCHLORIDE 5 MG/1
5 TABLET ORAL
Status: DISCONTINUED | OUTPATIENT
Start: 2023-12-03 | End: 2023-12-03 | Stop reason: HOSPADM

## 2023-12-03 RX ORDER — MAGNESIUM SULFATE IN WATER 40 MG/ML
2000 INJECTION, SOLUTION INTRAVENOUS PRN
Status: DISCONTINUED | OUTPATIENT
Start: 2023-12-03 | End: 2023-12-08 | Stop reason: HOSPADM

## 2023-12-03 RX ORDER — ACETAMINOPHEN 650 MG/1
650 SUPPOSITORY RECTAL EVERY 6 HOURS PRN
Status: DISCONTINUED | OUTPATIENT
Start: 2023-12-03 | End: 2023-12-08 | Stop reason: HOSPADM

## 2023-12-03 RX ORDER — AMLODIPINE BESYLATE 5 MG/1
10 TABLET ORAL DAILY
Status: DISCONTINUED | OUTPATIENT
Start: 2023-12-03 | End: 2023-12-07

## 2023-12-03 RX ORDER — SODIUM CHLORIDE, SODIUM LACTATE, POTASSIUM CHLORIDE, CALCIUM CHLORIDE 600; 310; 30; 20 MG/100ML; MG/100ML; MG/100ML; MG/100ML
INJECTION, SOLUTION INTRAVENOUS CONTINUOUS PRN
Status: DISCONTINUED | OUTPATIENT
Start: 2023-12-03 | End: 2023-12-03 | Stop reason: SDUPTHER

## 2023-12-03 RX ORDER — POTASSIUM CHLORIDE 7.45 MG/ML
10 INJECTION INTRAVENOUS PRN
Status: DISCONTINUED | OUTPATIENT
Start: 2023-12-03 | End: 2023-12-08 | Stop reason: HOSPADM

## 2023-12-03 RX ORDER — ESCITALOPRAM OXALATE 10 MG/1
10 TABLET ORAL DAILY
Status: DISCONTINUED | OUTPATIENT
Start: 2023-12-03 | End: 2023-12-08 | Stop reason: HOSPADM

## 2023-12-03 RX ORDER — PANTOPRAZOLE SODIUM 40 MG/1
40 TABLET, DELAYED RELEASE ORAL
Status: DISCONTINUED | OUTPATIENT
Start: 2023-12-03 | End: 2023-12-03

## 2023-12-03 RX ORDER — ACETAMINOPHEN 325 MG/1
650 TABLET ORAL EVERY 6 HOURS PRN
Status: DISCONTINUED | OUTPATIENT
Start: 2023-12-03 | End: 2023-12-08 | Stop reason: HOSPADM

## 2023-12-03 RX ORDER — PROPOFOL 10 MG/ML
INJECTION, EMULSION INTRAVENOUS PRN
Status: DISCONTINUED | OUTPATIENT
Start: 2023-12-03 | End: 2023-12-03 | Stop reason: SDUPTHER

## 2023-12-03 RX ORDER — MAGNESIUM HYDROXIDE 1200 MG/15ML
LIQUID ORAL
Status: COMPLETED | OUTPATIENT
Start: 2023-12-03 | End: 2023-12-03

## 2023-12-03 RX ORDER — ONDANSETRON 2 MG/ML
INJECTION INTRAMUSCULAR; INTRAVENOUS PRN
Status: DISCONTINUED | OUTPATIENT
Start: 2023-12-03 | End: 2023-12-03 | Stop reason: SDUPTHER

## 2023-12-03 RX ORDER — ONDANSETRON 4 MG/1
4 TABLET, ORALLY DISINTEGRATING ORAL EVERY 8 HOURS PRN
Status: DISCONTINUED | OUTPATIENT
Start: 2023-12-03 | End: 2023-12-08 | Stop reason: HOSPADM

## 2023-12-03 RX ORDER — SODIUM CHLORIDE 0.9 % (FLUSH) 0.9 %
5-40 SYRINGE (ML) INJECTION PRN
Status: DISCONTINUED | OUTPATIENT
Start: 2023-12-03 | End: 2023-12-08 | Stop reason: HOSPADM

## 2023-12-03 RX ADMIN — Medication 2 PUFF: at 12:34

## 2023-12-03 RX ADMIN — PREDNISONE 5 MG: 5 TABLET ORAL at 02:41

## 2023-12-03 RX ADMIN — Medication 10 ML: at 22:17

## 2023-12-03 RX ADMIN — Medication 10 ML: at 09:40

## 2023-12-03 RX ADMIN — PHENYLEPHRINE HYDROCHLORIDE 100 MCG: 10 INJECTION INTRAVENOUS at 16:08

## 2023-12-03 RX ADMIN — DEXAMETHASONE SODIUM PHOSPHATE 4 MG: 4 INJECTION, SOLUTION INTRAMUSCULAR; INTRAVENOUS at 16:12

## 2023-12-03 RX ADMIN — AMLODIPINE BESYLATE 10 MG: 5 TABLET ORAL at 02:41

## 2023-12-03 RX ADMIN — SODIUM CHLORIDE, POTASSIUM CHLORIDE, SODIUM LACTATE AND CALCIUM CHLORIDE: 600; 310; 30; 20 INJECTION, SOLUTION INTRAVENOUS at 16:03

## 2023-12-03 RX ADMIN — ENOXAPARIN SODIUM 40 MG: 40 INJECTION SUBCUTANEOUS at 09:40

## 2023-12-03 RX ADMIN — Medication 1 CAPSULE: at 11:07

## 2023-12-03 RX ADMIN — FENTANYL CITRATE 25 MCG: 50 INJECTION, SOLUTION INTRAMUSCULAR; INTRAVENOUS at 16:19

## 2023-12-03 RX ADMIN — PANTOPRAZOLE SODIUM 40 MG: 40 TABLET, DELAYED RELEASE ORAL at 06:51

## 2023-12-03 RX ADMIN — LIDOCAINE HYDROCHLORIDE 80 MG: 20 INJECTION, SOLUTION EPIDURAL; INFILTRATION; INTRACAUDAL; PERINEURAL at 16:08

## 2023-12-03 RX ADMIN — CEFTRIAXONE SODIUM 1000 MG: 1 INJECTION, POWDER, FOR SOLUTION INTRAMUSCULAR; INTRAVENOUS at 22:25

## 2023-12-03 RX ADMIN — ESCITALOPRAM OXALATE 10 MG: 10 TABLET ORAL at 02:47

## 2023-12-03 RX ADMIN — TIOTROPIUM BROMIDE INHALATION SPRAY 2 PUFF: 3.12 SPRAY, METERED RESPIRATORY (INHALATION) at 12:34

## 2023-12-03 RX ADMIN — Medication 400 UNITS: at 02:41

## 2023-12-03 RX ADMIN — PHENYLEPHRINE HYDROCHLORIDE 100 MCG: 10 INJECTION INTRAVENOUS at 16:13

## 2023-12-03 RX ADMIN — ONDANSETRON 4 MG: 2 INJECTION INTRAMUSCULAR; INTRAVENOUS at 16:12

## 2023-12-03 RX ADMIN — FENTANYL CITRATE 25 MCG: 50 INJECTION, SOLUTION INTRAMUSCULAR; INTRAVENOUS at 16:17

## 2023-12-03 RX ADMIN — Medication 2 PUFF: at 21:22

## 2023-12-03 RX ADMIN — Medication 1 CAPSULE: at 17:36

## 2023-12-03 RX ADMIN — FENTANYL CITRATE 25 MCG: 50 INJECTION, SOLUTION INTRAMUSCULAR; INTRAVENOUS at 16:07

## 2023-12-03 RX ADMIN — GENTAMICIN SULFATE 80 MG: 80 INJECTION, SOLUTION INTRAVENOUS at 16:28

## 2023-12-03 RX ADMIN — SODIUM CHLORIDE: 9 INJECTION, SOLUTION INTRAVENOUS at 17:37

## 2023-12-03 RX ADMIN — PROPOFOL 130 MG: 10 INJECTION, EMULSION INTRAVENOUS at 16:08

## 2023-12-03 RX ADMIN — FENTANYL CITRATE 50 MCG: 50 INJECTION, SOLUTION INTRAMUSCULAR; INTRAVENOUS at 16:25

## 2023-12-03 RX ADMIN — TRAZODONE HYDROCHLORIDE 50 MG: 50 TABLET ORAL at 22:13

## 2023-12-03 RX ADMIN — TRAZODONE HYDROCHLORIDE 50 MG: 50 TABLET ORAL at 02:41

## 2023-12-03 RX ADMIN — FENTANYL CITRATE 25 MCG: 50 INJECTION, SOLUTION INTRAMUSCULAR; INTRAVENOUS at 16:12

## 2023-12-03 RX ADMIN — ACETAMINOPHEN 650 MG: 325 TABLET ORAL at 02:40

## 2023-12-03 ASSESSMENT — PAIN DESCRIPTION - DESCRIPTORS: DESCRIPTORS: BURNING

## 2023-12-03 ASSESSMENT — PAIN DESCRIPTION - LOCATION: LOCATION: PENIS

## 2023-12-03 ASSESSMENT — PAIN SCALES - GENERAL
PAINLEVEL_OUTOF10: 1
PAINLEVEL_OUTOF10: 0

## 2023-12-03 ASSESSMENT — LIFESTYLE VARIABLES: SMOKING_STATUS: 0

## 2023-12-03 ASSESSMENT — COPD QUESTIONNAIRES: CAT_SEVERITY: MODERATE

## 2023-12-03 ASSESSMENT — PAIN DESCRIPTION - ORIENTATION: ORIENTATION: LOWER

## 2023-12-03 NOTE — FLOWSHEET NOTE
12/03/23 0419   Nursing Delirium Screening Scale (Nu-DESC)   Disorientation 0   Innappropriate Behavior 0   Innappropriate Communication 0   Illusions/Hallucinations 0   Psychomotor Retardation 0   Nu-DESC Score (calculated) 0 01-Jun-2019 21:46

## 2023-12-03 NOTE — PROGRESS NOTES
12/03/23 1257   RT Protocol   History Pulmonary Disease 2   Respiratory pattern 0   Breath sounds 0   Cough 0   Bronchodilator Assessment Score 2

## 2023-12-03 NOTE — PROGRESS NOTES
[facility]    12/3/2023 9:47 AM        Name: Sage De Jesus . Admitted: 12/2/2023  Primary Care Provider: Viviane Emerson MD (Tel: [phone])      Chief complaint:  79 yo male with hx metastatic stage IV prostate cancer. Presented to ER with fatigue and fever with reported temp to 103 at home. He was admitted with sepsis and UTI. Subjective:  Resting in bed, wife and son visiting. Patient reports he is feeling better. Denies chest pain, shortness of breath, abdominal pain, nausea. Urine blood tinged.      Reviewed interval ancillary notes    Current Medications  amLODIPine (NORVASC) tablet 10 mg, Daily  albuterol sulfate HFA (PROVENTIL;VENTOLIN;PROAIR) 108 (90 Base) MCG/ACT inhaler 2 puff, Q6H PRN  predniSONE (DELTASONE) tablet 5 mg, Daily  pantoprazole (PROTONIX) tablet 40 mg, QAM AC  escitalopram (LEXAPRO) tablet 10 mg, Daily  vitamin E capsule 400 Units, Daily  sodium chloride flush 0.9 % injection 5-40 mL, 2 times per day  sodium chloride flush 0.9 % injection 5-40 mL, PRN  0.9 % sodium chloride infusion, PRN  potassium chloride (KLOR-CON M) extended release tablet 40 mEq, PRN   Or  potassium bicarb-citric acid (EFFER-K) effervescent tablet 40 mEq, PRN   Or  potassium chloride 10 mEq/100 mL IVPB (Peripheral Line), PRN  magnesium sulfate 2000 mg in 50 mL IVPB premix, PRN  enoxaparin (LOVENOX) injection 40 mg, Daily  ondansetron (ZOFRAN-ODT) disintegrating tablet 4 mg, Q8H PRN   Or  ondansetron (ZOFRAN) injection 4 mg, Q6H PRN  polyethylene glycol (GLYCOLAX) packet 17 g, Daily PRN  acetaminophen (TYLENOL) tablet 650 mg, Q6H PRN   Or  acetaminophen (TYLENOL) suppository 650 mg, Q6H PRN  cefTRIAXone (ROCEPHIN) 1,000 mg in sodium chloride 0.9 % 50 mL IVPB (mini-bag), Q24H  mometasone-formoterol (DULERA) 200-5 MCG/ACT inhaler 2 puff, BID RT  tiotropium (SPIRIVA RESPIMAT) 2.5 MCG/ACT intravenous contrast material already administered. 3.  Heterogeneous prostate gland enlargement with possible invasion of the bladder and/or rectum. MR suggested for further characterization as  clinically warranted. 4.  Osteoblastic metastases redemonstrated. 5.  Additional findings, as above. Problem List  Principal Problem:    UTI (urinary tract infection)  Resolved Problems:    * No resolved hospital problems.  *       Assessment & Plan:     UTI / bilateral hydroureteronephrosis / hematuria  - Hx metastatic prostate cancer / self catheterization  - UA large leukocytes (WBC > 100), positive nitrites, 2+ bacteria  - CT scan with severe acute cystitis with moderate bilateral hydroureteronephrosis  - Started on IV ceftriaxone on admission  - Urine culture results pending  - Urology consult pending  - Add lactobacillus    Sepsis present on admission  - Based on temp (103 at home), tachycardia (), Tachypnea (RR 22), source UTI  - Received IV fluids and IV antibiotic in ER  - Blood culture results pending  - Lactate 1.0    Hematochezia  - Recent hx c.diff colitis  - Stool occult blood positive in ER  - Hgb 8.2 on presentation, baseline variable but appears closer to 9-11, most  recently 9.7 on [facility] labs 11/27  - EGD (1/2022) with multiple gastric body polyps  - Continue Protonix  - GI consult pending  - Continue to follow H&H  - Add on iron studies    Metastatic prostate cancer  - Follows with Dr America Gaines  - Last chemo 11/27  - Appreciate hem/onc recs    Normocytic anemia  - Hgb 8.2 on presentation, compared to 9.7 on 11/27 at oncology office  - Stool for occult blood + in ER  - Add on iron studies  - Hem/onc evaluated, suspect likely anemia of chronic disease  - CBC in am    COPD  - Hx moderate COPD, follows with Dr Gatica Round stable, not in exacerbation  - No acute findings on CXR  - No change in cough or sputum production  - Takes Breztri at home, receiving [facility] here  - Continue daily

## 2023-12-03 NOTE — PROGRESS NOTES
Pt resting quietly in bed, awake, denies pain. VSS, O2 sats 100% on 2 L NC. Rivera remains in place, scant output. Pt seen by anesthesia, phase 1 criteria met. Will transfer pt to 5T.

## 2023-12-03 NOTE — ED NOTES
OCCULT STOOL DIAGNOSTIC - Abnormal; Notable for the following components:    Occult Blood Diagnostic   (*)     Value: Result: POSITIVE  Normal range: Negative      All other components within normal limits    Narrative:     ORDER#: [id number]                          ORDERED BY: Vishal Yanes  SOURCE: Stool                              COLLECTED:  12/02/23 19:49  ANTIBIOTICS AT [facility].:                      RECEIVED :  12/02/23 19:59     Critical values: no     Abnormal Assessment Findings:     Background  History:   Past Medical History:   Diagnosis Date    Carotid artery occlusion and stenosis 05/03/2012    Centrilobular emphysema ([address]) 08/08/2014    COPD (chronic obstructive pulmonary disease) (HCC)     ED (erectile dysfunction)     Emphysema lung ([address]) 08/08/2014    GERD (gastroesophageal reflux disease)     Hyperlipidemia     pt denies    Hypertension     Prostate CA ([address]) 2003    surgery then radiation    Prostate cancer metastatic to lung ([address]) 04/04/2023    Psoriasis     Pulmonary nodules     Radiation 2004    Urinary incontinence        Assessment    Vitals/MEWS:    Level of Consciousness: Alert (0)     FiO2 (%):   O2 Flow Rate: O2 Device: None (Room air)    Cardiac Rhythm:    Pain Assessment:  [x] Verbal [] Arya [place] Scale  Pain Scale:    Last documented pain score (0-10 scale)    Last documented pain medication administered:   Mental Status: oriented, alert, and coherent  Orientation Level:    NIH Score:    C-SSRS: Risk of Suicide: No Risk  Bedside swallow:    Wenceslao Coma Scale (GCS): [place] Coma Scale  Eye Opening: Spontaneous  Best Verbal Response: Oriented  Best Motor Response: Obeys commands  Wenceslao Coma Scale Score: 15  Active LDA's:   Peripheral IV 12/02/23 Left Antecubital (Active)   Site Assessment Clean, dry & intact 12/02/23 1851   Line Status Blood return noted;Normal saline locked 12/02/23 1851     PO Status: Regular  Pertinent or High Risk Medications/Drips: no   If Yes, please provide details:   Pending Blood Product Administration: no       You may also review the ED PT Care Timeline found under the Summary Nursing Index tab. Recommendation    Pending orders   Plan for Discharge (if known):    Additional Comments:    If any further questions, please call Sending RN at ER    Electronically signed by: Electronically signed by Zuhair Collado RN on 12/2/2023 at 9:14 PM      Zuhair Collado RN  12/02/23 4118

## 2023-12-03 NOTE — OP NOTE
stability, I abandoned attempts at ureteral catheterization and possible stent placement. I considered balloon dilating the urethral stricture, but given the severity of the stricture and my concern that he will need another form of bladder drainage (suprapubic catheter) chronically, I decided just to dilate to the 21 [place] and then place a 20 [place] tip Rivera catheter which was passed into the bladder easily over the wire. This catheter irrigated nicely, and the urine was slightly bloody but no clots were observed. The patient was then awakened from anesthesia and transferred to the recovery room in stable condition. There were no immediate complications. PLAN:  We will continue the Rivera catheter for 5 to 7 days. We should have a conversation when he awakes with him and his family about the need for alternative bladder management, specifically a suprapubic catheter. We will have to watch him closely in terms of his infected urine and hydronephrosis. If he becomes septic, he will likely need bilateral nephrostomy tubes. I think the only way to identify his ureteral orifices in the bladder would be to have antegrade stents placed.     Electronically signed by Shaheen Dutton MD on 12/3/2023 at 4:50 PM

## 2023-12-03 NOTE — INTERVAL H&P NOTE
Update History & Physical    The patient's History and Physical of December 3, 2023 was reviewed with the patient and I examined the patient. There was no change. The surgical site was confirmed by the patient and me. Plan: The risks, benefits, expected outcome, and alternative to the recommended procedure have been discussed with the patient. Patient understands and wants to proceed with the procedure.      Electronically signed by Jacky Gonzalez MD on 12/3/2023 at 4:07 PM

## 2023-12-03 NOTE — H&P
Urology History and Physical  Inpatient Setting - [facility]    Provider: Mariza German MD  Patient ID:  Admission Date: 2023 Name: Rick Escobar Date: n/a MRN: [id number]   Patient Location: [id number] : 1940  Attending: Luis Martínez MD Date of Service: 12/3/2023  PCP: Nigel Wall MD     Diagnoses:  1. Acute pyelonephritis    2. Tachycardia    3. Anemia, unspecified type    4. Other fatigue    5. Fever, unspecified fever cause    6. Gastrointestinal hemorrhage, unspecified gastrointestinal hemorrhage type    7. Biliary colic          Assessment/Plan:  Continue to the OR as planned for cystoscopy, colón catheter placement, possible urethral dilation, possible bilateral ureteral stent placement. RBA and expected outcomes discussed with him and his wife today at bedside. They agree to proceed. All the patients questions were answered in detail. He understands the plan as listed above.                                                                                                                                               _____________________________________________________________    CC: Pre-op    HPI: Radha Peterson is a 78 y.o. male. The history and physical exam was reviewed. The patient was seen and examined in pre-op. He had a chance to ask questions which were answered. There has been no interval changes. Plan to continue to the OR    Past Medical History:   He has a past medical history of Carotid artery occlusion and stenosis (2012), Centrilobular emphysema ([address]) (2014), COPD (chronic obstructive pulmonary disease) ([address]), ED (erectile dysfunction), Emphysema lung ([address]) (2014), GERD (gastroesophageal reflux disease), Hyperlipidemia, Hypertension, Prostate CA ([address]) (), Prostate cancer metastatic to lung ([address]) (2023), Psoriasis, Pulmonary nodules, Radiation (), and Urinary incontinence.     Past Surgical

## 2023-12-03 NOTE — PROGRESS NOTES
4 Eyes Skin Assessment     NAME:  Belkis Emanuel  YOB: 1940  MEDICAL RECORD NUMBER:  [id number]    The patient is being assessed for  Admission    I agree that at least one RN has performed a thorough Head to Toe Skin Assessment on the patient. ALL assessment sites listed below have been assessed. Areas assessed by both nurses:    Head, Face, Ears, Shoulders, Back, Chest, Arms, Elbows, Hands, Sacrum. Buttock, Coccyx, Ischium, Legs. Feet and Heels, and Under Medical Devices         Does the Patient have a Wound?  No noted wound(s)       Daryl Prevention initiated by RN: No  Wound Care Orders initiated by RN: No    Pressure Injury (Stage 3,4, Unstageable, DTI, NWPT, and Complex wounds) if present, place Wound referral order by RN under : No    New Ostomies, if present place, Ostomy referral order under : No     Nurse 1 eSignature: Electronically signed by Anitha Hernandez RN on 12/3/23 at 4:34 AM EST      Nurse 2 eSignature: Electronically signed by Rebeca Weir LPN on 13/9/74 at 8:70 AM EST

## 2023-12-03 NOTE — CONSULTS
GI Consult Note      Admission Date: 12/2/2023  Hospital Day: Hospital Day: 2  Attending: Franck Suarez MD  Date of service: 12/3/23    Subjective:     Chief complaint/ Reason for consult:   Anemia, occult positive stools    HPI: Clinton Merchant is a 78 y.o.  male patient, who was seen at the request of Dr. Franck Suarez MD.    History was obtained from chart review and the patient. H/o prostate cancer 2003, s/p prostatectomy. He was found to have metastatic prostate cancer early 2023 and underwent chemo. Last chemo was Monday the past week. He has been feeling fatigued, abdominal pain/bloating and constipation the past few days. He usually gets constipated after chemo. Wife gave him dulcolax and later mag citrate. He then have dark brown stool. In ER, CT showed severe acute cystitis with moderate bilateral hydroureteronephrosis, presence of heterogeneous prostate gland with osteoblastic metastasis present. He is suspected to have UTI and was started on IV antibiotics. Urology was consulted but failed to pass a colón at bedside. He will likely need cystoscopy. Hb here was 8.2 and occult stool test positive. GI was consulted for further evaluation. He has h/o recurrent UTI and treated with antibiotics. Because of the frequent antibiotics, he had C. Difficile earlier this year. Pt notes intermittent bleeding on tissue wiping after BMs, every few months. Past Endoscopic History:  EGD 1/2022 (Dr Bola Umanzor)  Multiple gastric body polyps  Dysphagia - probably due to esophageal spasm  Recommendations: -Continue acid suppression. Follow up biopsy results. Follow up with me as needed.     Colonoscopy 6/2023  Two small colon polyps in transverse colon (TA)  Random colon bx normal   Mild radiation proctitis and hemorrhoids                  Past Medical History:     Past Medical History:   Diagnosis Date    Carotid artery occlusion and stenosis 05/03/2012    Centrilobular emphysema ([address] [facility]) 08/08/2014    COPD (chronic obstructive pulmonary disease) [facility])     ED (erectile dysfunction)     Emphysema lung ([address] [facility]) 08/08/2014    GERD (gastroesophageal reflux disease)     Hyperlipidemia     pt denies    Hypertension     Prostate CA [facility]) 2003    surgery then radiation    Prostate cancer metastatic to lung ([address] [facility]) 04/04/2023    Psoriasis     Pulmonary nodules     Radiation 2004    Urinary incontinence        Past Surgical History:    Past Surgical History:   Procedure Laterality Date    CAROTID ENDARTERECTOMY  5/3/12    left    CAROTID ENDARTERECTOMY Right 10/25/2017    Dr. Angelica Alcaraz at [address]  02/12/2018    Intraoperative Cholangiogram    COLONOSCOPY      COLONOSCOPY N/A 6/1/2023    COLONOSCOPY POLYPECTOMY SNARE/COLD BIOPSY performed by Himanshu Ho MD at [facility]  6/1/2023    COLONOSCOPY WITH BIOPSY performed by Himanshu Ho MD at [address] [place] [address]  07/2017    ENDOSCOPY, COLON, DIAGNOSTIC      ESOPHAGEAL DILATATION N/A 1/3/2022    ESOPHAGEAL DILATION Bertrum No performed by Enma Sierra MD at [facility]  2003    SKIN BIOPSY  2021    melanoma left arm    TONSILLECTOMY      UPPER GASTROINTESTINAL ENDOSCOPY N/A 1/3/2022    EGD POLYP SNARE performed by Enma Sierra MD at [facility] History:     Tobacco use:   reports that he quit smoking about 36 years ago. His smoking use included cigarettes. He has a 15.00 pack-year smoking history. He has never used smokeless tobacco.  Alcohol use:   reports current alcohol use. Currently lives in: [address]   reports no history of drug use.          Family History:       Problem Relation Age of Onset    Diabetes Mother     High Blood Pressure Mother     Heart Disease Father         myxoma    Heart Failure Father        Medications:    amLODIPine  10 mg Oral Daily    predniSONE  5 mg Oral Daily    pantoprazole  40 mg Oral QAM AC    escitalopram

## 2023-12-03 NOTE — PROGRESS NOTES
Pt arrived from OR to PACU, awakens to voice, denies pain. VSS, O2 sats 100% on 6 L simple mask. Rivera in place draining clear cherry colored urine. Will monitor.

## 2023-12-03 NOTE — PROGRESS NOTES
note    Stopped into see patient today  Attempted to place a colón, unable to likely d/t stricture or false passage  Disc case with attending will setup for cyst, poss u dil pos bl stents  Full consult note to follow    Marleen Cooper CNP  12/03/2023

## 2023-12-04 LAB
ALBUMIN SERPL-MCNC: 2.9 G/DL (ref 3.4–5)
ALBUMIN/GLOB SERPL: 1 {RATIO} (ref 1.1–2.2)
ALP SERPL-CCNC: 68 U/L (ref 40–129)
ALT SERPL-CCNC: 28 U/L (ref 10–40)
ANION GAP SERPL CALCULATED.3IONS-SCNC: 6 MMOL/L (ref 3–16)
AST SERPL-CCNC: 30 U/L (ref 15–37)
BASOPHILS # BLD: 0 K/UL (ref 0–0.2)
BASOPHILS NFR BLD: 0.2 %
BILIRUB SERPL-MCNC: 0.3 MG/DL (ref 0–1)
BUN SERPL-MCNC: 11 MG/DL (ref 7–20)
CALCIUM SERPL-MCNC: 7.8 MG/DL (ref 8.3–10.6)
CHLORIDE SERPL-SCNC: 103 MMOL/L (ref 99–110)
CO2 SERPL-SCNC: 26 MMOL/L (ref 21–32)
CREAT SERPL-MCNC: 0.9 MG/DL (ref 0.8–1.3)
DEPRECATED RDW RBC AUTO: 17 % (ref 12.4–15.4)
EOSINOPHIL # BLD: 0 K/UL (ref 0–0.6)
EOSINOPHIL NFR BLD: 0.1 %
GFR SERPLBLD CREATININE-BSD FMLA CKD-EPI: >60 ML/MIN/{1.73_M2}
GLUCOSE SERPL-MCNC: 136 MG/DL (ref 70–99)
HCT VFR BLD AUTO: 22 % (ref 40.5–52.5)
HGB BLD-MCNC: 7.4 G/DL (ref 13.5–17.5)
LYMPHOCYTES # BLD: 0.3 K/UL (ref 1–5.1)
LYMPHOCYTES NFR BLD: 15 %
MCH RBC QN AUTO: 29.6 PG (ref 26–34)
MCHC RBC AUTO-ENTMCNC: 33.6 G/DL (ref 31–36)
MCV RBC AUTO: 88.3 FL (ref 80–100)
MONOCYTES # BLD: 0.1 K/UL (ref 0–1.3)
MONOCYTES NFR BLD: 4.8 %
NEUTROPHILS # BLD: 1.4 K/UL (ref 1.7–7.7)
NEUTROPHILS NFR BLD: 79.9 %
PATH INTERP BLD-IMP: NORMAL
PATH INTERP BLD-IMP: YES
PHOSPHATE SERPL-MCNC: 2.2 MG/DL (ref 2.5–4.9)
PLATELET # BLD AUTO: 172 K/UL (ref 135–450)
PMV BLD AUTO: 8.1 FL (ref 5–10.5)
POTASSIUM SERPL-SCNC: 4.1 MMOL/L (ref 3.5–5.1)
PROT SERPL-MCNC: 5.7 G/DL (ref 6.4–8.2)
RBC # BLD AUTO: 2.49 M/UL (ref 4.2–5.9)
REPORT: NORMAL
SODIUM SERPL-SCNC: 135 MMOL/L (ref 136–145)
WBC # BLD AUTO: 1.7 K/UL (ref 4–11)

## 2023-12-04 PROCEDURE — 6360000002 HC RX W HCPCS: Performed by: NURSE PRACTITIONER

## 2023-12-04 PROCEDURE — 6370000000 HC RX 637 (ALT 250 FOR IP): Performed by: UROLOGY

## 2023-12-04 PROCEDURE — 2500000003 HC RX 250 WO HCPCS: Performed by: INTERNAL MEDICINE

## 2023-12-04 PROCEDURE — 36415 COLL VENOUS BLD VENIPUNCTURE: CPT

## 2023-12-04 PROCEDURE — 6360000002 HC RX W HCPCS: Performed by: UROLOGY

## 2023-12-04 PROCEDURE — 2580000003 HC RX 258: Performed by: UROLOGY

## 2023-12-04 PROCEDURE — 2580000003 HC RX 258: Performed by: NURSE PRACTITIONER

## 2023-12-04 PROCEDURE — 2580000003 HC RX 258: Performed by: INTERNAL MEDICINE

## 2023-12-04 PROCEDURE — 84100 ASSAY OF PHOSPHORUS: CPT

## 2023-12-04 PROCEDURE — 94640 AIRWAY INHALATION TREATMENT: CPT

## 2023-12-04 PROCEDURE — 6360000002 HC RX W HCPCS: Performed by: INTERNAL MEDICINE

## 2023-12-04 PROCEDURE — 80053 COMPREHEN METABOLIC PANEL: CPT

## 2023-12-04 PROCEDURE — 87040 BLOOD CULTURE FOR BACTERIA: CPT

## 2023-12-04 PROCEDURE — 1200000000 HC SEMI PRIVATE

## 2023-12-04 PROCEDURE — 85025 COMPLETE CBC W/AUTO DIFF WBC: CPT

## 2023-12-04 PROCEDURE — C9113 INJ PANTOPRAZOLE SODIUM, VIA: HCPCS | Performed by: UROLOGY

## 2023-12-04 RX ADMIN — TRAZODONE HYDROCHLORIDE 50 MG: 50 TABLET ORAL at 21:46

## 2023-12-04 RX ADMIN — PANTOPRAZOLE SODIUM 40 MG: 40 INJECTION, POWDER, FOR SOLUTION INTRAVENOUS at 12:10

## 2023-12-04 RX ADMIN — Medication 1 CAPSULE: at 08:39

## 2023-12-04 RX ADMIN — TIOTROPIUM BROMIDE INHALATION SPRAY 2 PUFF: 3.12 SPRAY, METERED RESPIRATORY (INHALATION) at 11:43

## 2023-12-04 RX ADMIN — ACETAMINOPHEN 650 MG: 325 TABLET ORAL at 11:59

## 2023-12-04 RX ADMIN — ESCITALOPRAM OXALATE 10 MG: 10 TABLET ORAL at 08:39

## 2023-12-04 RX ADMIN — EPOETIN ALFA-EPBX 40000 UNITS: 40000 INJECTION, SOLUTION INTRAVENOUS; SUBCUTANEOUS at 12:00

## 2023-12-04 RX ADMIN — Medication 2 PUFF: at 21:40

## 2023-12-04 RX ADMIN — MEROPENEM 1000 MG: 1 INJECTION, POWDER, FOR SOLUTION INTRAVENOUS at 18:07

## 2023-12-04 RX ADMIN — CEFTRIAXONE SODIUM 2000 MG: 1 INJECTION, POWDER, FOR SOLUTION INTRAMUSCULAR; INTRAVENOUS at 12:57

## 2023-12-04 RX ADMIN — CALCIUM GLUCONATE 1000 MG: 98 INJECTION, SOLUTION INTRAVENOUS at 14:12

## 2023-12-04 RX ADMIN — Medication 2 PUFF: at 11:43

## 2023-12-04 RX ADMIN — AMLODIPINE BESYLATE 10 MG: 5 TABLET ORAL at 08:39

## 2023-12-04 RX ADMIN — PREDNISONE 5 MG: 5 TABLET ORAL at 08:39

## 2023-12-04 RX ADMIN — Medication 10 ML: at 08:39

## 2023-12-04 RX ADMIN — SODIUM PHOSPHATE, MONOBASIC, MONOHYDRATE AND SODIUM PHOSPHATE, DIBASIC, ANHYDROUS 11.25 MMOL: 142; 276 INJECTION, SOLUTION INTRAVENOUS at 12:27

## 2023-12-04 RX ADMIN — Medication 1 CAPSULE: at 17:06

## 2023-12-04 RX ADMIN — Medication 400 UNITS: at 08:39

## 2023-12-04 ASSESSMENT — PAIN SCALES - GENERAL
PAINLEVEL_OUTOF10: 0
PAINLEVEL_OUTOF10: 1

## 2023-12-04 ASSESSMENT — PAIN DESCRIPTION - LOCATION: LOCATION: FOOT

## 2023-12-04 ASSESSMENT — PAIN DESCRIPTION - ORIENTATION: ORIENTATION: RIGHT

## 2023-12-04 ASSESSMENT — PAIN DESCRIPTION - DESCRIPTORS: DESCRIPTORS: NUMBNESS

## 2023-12-04 NOTE — PLAN OF CARE
Problem: Safety - Adult  Goal: Free from fall injury  12/4/2023 1425 by Waqar Osei RN  Outcome: Progressing  12/4/2023 1119 by Waqar Osei RN  Outcome: Progressing  12/4/2023 0257 by Carola Boyer RN  Outcome: Progressing     Problem: Pain  Goal: Verbalizes/displays adequate comfort level or baseline comfort level  12/4/2023 1425 by Waqar Osei RN  Outcome: Progressing  12/4/2023 1119 by Waqar Osei RN  Outcome: Progressing  12/4/2023 0257 by Carola Boyer RN  Outcome: Progressing     Problem: ABCDS Injury Assessment  Goal: Absence of physical injury  12/4/2023 0257 by Carola Boyer RN  Outcome: Progressing     Problem: Chronic Conditions and Co-morbidities  Goal: Patient's chronic conditions and co-morbidity symptoms are monitored and maintained or improved  12/4/2023 0257 by Carola Boyer RN  Outcome: Progressing     Problem: Discharge Planning  Goal: Discharge to home or other facility with appropriate resources  12/4/2023 0257 by Carola Boyer RN  Outcome: Progressing

## 2023-12-04 NOTE — CARE COORDINATION
Case Management Assessment  Initial Evaluation    Date/Time of Evaluation: 12/4/2023 4:00 PM  Assessment Completed by: Lexi Arndt    If patient is discharged prior to next notation, then this note serves as note for discharge by case management. Patient Name: Christian Martin                   YOB: 1940  Diagnosis: UTI (urinary tract infection) [N39.0]  Tachycardia [R00.0]  Acute pyelonephritis [N10]  Fever, unspecified fever cause [R50.9]  Gastrointestinal hemorrhage, unspecified gastrointestinal hemorrhage type [K92.2]  Other fatigue [R53.83]  Anemia, unspecified type [D64.9]                   Date / Time: 12/2/2023  5:14 PM    Patient Admission Status: Inpatient   Readmission Risk (Low < 19, Mod (19-27), High > 27): Readmission Risk Score: 20    Current PCP: Zina Lucas MD  PCP verified by CM? Yes    Chart Reviewed: Yes      History Provided by: Patient  Patient Orientation: Alert and Oriented    Patient Cognition: Alert    Hospitalization in the last 30 days (Readmission):  No    If yes, Readmission Assessment in CM Navigator will be completed. Advance Directives:      Code Status: Full Code   Patient's Primary Decision Maker is: Legal Next of Kin    Primary Decision Maker: Rhina Osorio Spouse - [phone]    Discharge Planning:    Patient lives with: Spouse/Significant Other Type of Home: House  Primary Care Giver: Self  Patient Support Systems include: Spouse/Significant Other   Current Financial resources: Medicare  Current community resources: None  Current services prior to admission: None            Current DME:              Type of Home Care services:  None    ADLS  Prior functional level:  Independent in ADLs/IADLs, Bathing, Dressing, Toileting, Feeding, Mobility, Housework, Cooking, Shopping  Current functional level: Assistance with the following:, Bathing, Dressing, Toileting, Mobility    PT AM-PAC:   /24  OT AM-PAC:   /24    Family can provide assistance at DC: was provided to:     Patient Representative Name:       The Patient and/or Patient Representative Agree with the Discharge Plan?       [address] [place] [address] Management Department  Ph: [phone]

## 2023-12-04 NOTE — CONSULTS
Urology Consult Note  [facility]     Patient: Belkis Emanuel MRN: [id number]  Room/Bed: 8YI-0879/4138-23   YOB: 1940  Age/Sex: 78 y. o.male  Admission Date: 12/2/2023     Date of Service:  12/4/2023    Consulting Provider: ADELAIDA Del Angel CNP CNP  Admitting/Requesting Physician: Michelle Karimi MD  Primary Care Physician: Kelly Simmons MD    Reason for Consult: Urinary Retention, Hx of Urethral Stricture, Bilateral Hydronephrosis, UTI    ASSESSMENT/PLAN     77 yo male    Urinary Retention  Bilateral hydronephrosis  Severe cystitis  Hx of urethral stricture  Advanced prostate cancer    Attempted colón insertion at bedside, unable to place      Recommendations:  NPO for cystoscopy, possible urethral stricture, possible bilat ureteral stents. Will continue to follow. All patient questions were answered. He understands the plan as listed above. HISTORY     Chief Complaint:   Chief Complaint   Patient presents with    Fever    Fatigue     Pt has prostate cancer with mets, on chemo, endorsing temp of 103 at home today, diarrhea, fatigue. History of Present Illness: Belkis Emanuel is a 78 y.o. male with urinary retention, bilat hydro, severe cystitis, ANTONIA who complains of chills and fever, complains of diff cathing self. Onset of symptoms was days with days ago course since that time. Symptoms are aggravated by Self cath. Symptoms improved with abx. Associated symptoms include decreased ability to cath. Patient also reports chills. He has tried the following treatments: abx.      Past Medical History:  He has a past medical history of Carotid artery occlusion and stenosis (05/03/2012), Centrilobular emphysema ([address]) (08/08/2014), COPD (chronic obstructive pulmonary disease) ([address]), ED (erectile dysfunction), Emphysema lung ([address]) (08/08/2014), GERD (gastroesophageal reflux disease), Hyperlipidemia, Hypertension, Prostate CA ([address]) (2003), Prostate cancer metastatic to lung ([address]) (04/04/2023), Psoriasis, Pulmonary nodules, Radiation (2004), and Urinary incontinence. Hospital Problem List:  Principal Problem:    UTI (urinary tract infection)  Active Problems:    Hydronephrosis    Postprocedural membranous urethral stricture  Resolved Problems:    * No resolved hospital problems. *      Past Surgical History:  He has a past surgical history that includes Prostatectomy (2003); Tonsillectomy; Carotid endarterectomy (5/3/12); Cystoscopy (07/2017); Colonoscopy; Endoscopy, colon, diagnostic; Carotid endarterectomy (Right, 10/25/2017); Cholecystectomy, laparoscopic (02/12/2018); skin biopsy (2021); Esophagus dilation (N/A, 1/3/2022); Upper gastrointestinal endoscopy (N/A, 1/3/2022); Colonoscopy (N/A, 6/1/2023); and Colonoscopy (6/1/2023). Social History:  He reports that he quit smoking about 36 years ago. His smoking use included cigarettes. He has a 15.00 pack-year smoking history. He has never used smokeless tobacco. He reports current alcohol use. He reports that he does not use drugs. Family History:  family history includes Diabetes in his mother; Heart Disease in his father; Heart Failure in his father; High Blood Pressure in his mother. Allergies:   Allergies   Allergen Reactions    Lisinopril      cough       Medications:  Scheduled Meds:   sodium phosphate 11.25 mmol in sodium chloride 0.9 % 250 mL IVPB  0.16 mmol/kg IntraVENous Once    amLODIPine  10 mg Oral Daily    predniSONE  5 mg Oral Daily    escitalopram  10 mg Oral Daily    vitamin E  400 Units Oral Daily    sodium chloride flush  5-40 mL IntraVENous 2 times per day    cefTRIAXone (ROCEPHIN) IV  1,000 mg IntraVENous Q24H    mometasone-formoterol  2 puff Inhalation BID RT    tiotropium  2 puff Inhalation Daily RT    traZODone  50 mg Oral Nightly    lactobacillus  1 capsule Oral BID WC    pantoprazole  40 mg IntraVENous Daily     Continuous Infusions:   sodium chloride      sodium chloride 50 mL/hr at

## 2023-12-04 NOTE — PROGRESS NOTES
[facility]    12/4/2023 11:51 AM        Name: Felicia Burden . Admitted: 12/2/2023  Primary Care Provider: Ute Jones MD (Tel: [phone])      Chief complaint:  79 yo male with hx metastatic stage IV prostate cancer. Presented to ER with fatigue and fever with reported temp to 103 at home. He was admitted with sepsis and UTI. Subjective:  Resting in bed, wife visiting. States he is feeling okay, just weak. Denies pain, shortness of breath, nausea. Rivera draining blood tinged urine.       Reviewed interval ancillary notes    Current Medications  sodium phosphate 11.25 mmol in sodium chloride 0.9 % 250 mL IVPB, Once  epoetin marcela-epbx (RETACRIT) injection 40,000 Units, Once  cefTRIAXone (ROCEPHIN) 2,000 mg in sodium chloride 0.9 % 50 mL IVPB (mini-bag), Q24H  amLODIPine (NORVASC) tablet 10 mg, Daily  albuterol sulfate HFA (PROVENTIL;VENTOLIN;PROAIR) 108 (90 Base) MCG/ACT inhaler 2 puff, Q6H PRN  predniSONE (DELTASONE) tablet 5 mg, Daily  escitalopram (LEXAPRO) tablet 10 mg, Daily  vitamin E capsule 400 Units, Daily  sodium chloride flush 0.9 % injection 5-40 mL, 2 times per day  sodium chloride flush 0.9 % injection 5-40 mL, PRN  0.9 % sodium chloride infusion, PRN  potassium chloride (KLOR-CON M) extended release tablet 40 mEq, PRN   Or  potassium bicarb-citric acid (EFFER-K) effervescent tablet 40 mEq, PRN   Or  potassium chloride 10 mEq/100 mL IVPB (Peripheral Line), PRN  magnesium sulfate 2000 mg in 50 mL IVPB premix, PRN  ondansetron (ZOFRAN-ODT) disintegrating tablet 4 mg, Q8H PRN   Or  ondansetron (ZOFRAN) injection 4 mg, Q6H PRN  polyethylene glycol (GLYCOLAX) packet 17 g, Daily PRN  acetaminophen (TYLENOL) tablet 650 mg, Q6H PRN   Or  acetaminophen (TYLENOL) suppository 650 mg, Q6H PRN  mometasone-formoterol (DULERA) 200-5 MCG/ACT inhaler 2 puff, BID RT  tiotropium (SPIRIVA

## 2023-12-04 NOTE — PROGRESS NOTES
tract infection)    Hydronephrosis    Postprocedural membranous urethral stricture       ASSESSMENT AND PLAN:  Metastatic prostate carcinoma. Will postpone Jevtana. Last PSA dropped. UTI. Will have suprapubic catheter placed. Bilateral hydronephrosis with obstructive uropathy. Leukopenia and anemia mostly due to chemotherapy. We will give Retacrit today for chemotherapy-induced anemia. Reviewed his labs. CT scan shows severe acute cystitis with moderate bilateral hydronephrosis. Linear extension of the bladder lumen along the left prostate gland was noted. He is known to have osteoblastic metastases. He has leukopenia and worsening anemia. His ANC is still adequate. He is on antibiotics. Retacrit will be given. Explained to the patient and his family. His next chemotherapy will be postponed after Concha.   Discussed with the pharmacist.        ONCOLOGIC DISPOSITION:      Janusz Finney MD  Please contact through [address] [place]

## 2023-12-04 NOTE — PROGRESS NOTES
[facility] lab called with culture results 1 bottle aerobic collected 12/2/23 positive with staph epidermidis.

## 2023-12-04 NOTE — PLAN OF CARE
Problem: Safety - Adult  Goal: Free from fall injury  12/4/2023 1119 by Roderick Meza RN  Outcome: Progressing  12/4/2023 0257 by Nancy Rizzo RN  Outcome: Progressing     Problem: Pain  Goal: Verbalizes/displays adequate comfort level or baseline comfort level  12/4/2023 1119 by Roderick Meza RN  Outcome: Progressing  12/4/2023 0257 by Nancy Rizzo RN  Outcome: Progressing     Problem: ABCDS Injury Assessment  Goal: Absence of physical injury  12/4/2023 0257 by Nancy Rizzo RN  Outcome: Progressing     Problem: Chronic Conditions and Co-morbidities  Goal: Patient's chronic conditions and co-morbidity symptoms are monitored and maintained or improved  12/4/2023 0257 by Nancy Rizzo RN  Outcome: Progressing     Problem: Discharge Planning  Goal: Discharge to home or other facility with appropriate resources  12/4/2023 0257 by Nancy Rizzo RN  Outcome: Progressing

## 2023-12-04 NOTE — PROGRESS NOTES
hold  No plans for EGD/colonoscopy (last procedures per consult note) unless patient is overtly or grossly bleeding   Benefiber 1 tbsp daily then add miralax prn constipation as outpt. Discussed with patient   Otherwise will sign off, call if needed.      Jorge Dalton MD , MD  [facility] and [address] [place] [address],[address]

## 2023-12-04 NOTE — PLAN OF CARE
Problem: Safety - Adult  Goal: Free from fall injury  Outcome: Progressing     Problem: Pain  Goal: Verbalizes/displays adequate comfort level or baseline comfort level  Outcome: Progressing     Problem: ABCDS Injury Assessment  Goal: Absence of physical injury  Outcome: Progressing     Problem: Chronic Conditions and Co-morbidities  Goal: Patient's chronic conditions and co-morbidity symptoms are monitored and maintained or improved  Outcome: Progressing     Problem: Discharge Planning  Goal: Discharge to home or other facility with appropriate resources  Outcome: Progressing

## 2023-12-05 PROBLEM — R00.0 TACHYCARDIA: Status: ACTIVE | Noted: 2023-12-05

## 2023-12-05 PROBLEM — R53.83 OTHER FATIGUE: Status: ACTIVE | Noted: 2023-12-05

## 2023-12-05 PROBLEM — K92.2 GASTROINTESTINAL HEMORRHAGE: Status: ACTIVE | Noted: 2023-12-05

## 2023-12-05 PROBLEM — D64.9 ANEMIA: Status: ACTIVE | Noted: 2023-12-05

## 2023-12-05 PROBLEM — D84.9 IMMUNOCOMPROMISED (HCC): Status: ACTIVE | Noted: 2023-12-05

## 2023-12-05 PROBLEM — R50.9 FEVER: Status: ACTIVE | Noted: 2023-12-05

## 2023-12-05 LAB
ANION GAP SERPL CALCULATED.3IONS-SCNC: 6 MMOL/L (ref 3–16)
ANISOCYTOSIS BLD QL SMEAR: ABNORMAL
BACTERIA UR CULT: ABNORMAL
BACTERIA UR CULT: ABNORMAL
BASOPHILS # BLD: 0 K/UL (ref 0–0.2)
BASOPHILS NFR BLD: 0 %
BUN SERPL-MCNC: 8 MG/DL (ref 7–20)
CALCIUM SERPL-MCNC: 8.1 MG/DL (ref 8.3–10.6)
CHLORIDE SERPL-SCNC: 103 MMOL/L (ref 99–110)
CO2 SERPL-SCNC: 27 MMOL/L (ref 21–32)
CREAT SERPL-MCNC: 0.8 MG/DL (ref 0.8–1.3)
DEPRECATED RDW RBC AUTO: 16.9 % (ref 12.4–15.4)
EOSINOPHIL # BLD: 0 K/UL (ref 0–0.6)
EOSINOPHIL NFR BLD: 0 %
GFR SERPLBLD CREATININE-BSD FMLA CKD-EPI: >60 ML/MIN/{1.73_M2}
GLUCOSE SERPL-MCNC: 119 MG/DL (ref 70–99)
HCT VFR BLD AUTO: 22.4 % (ref 40.5–52.5)
HGB BLD-MCNC: 7.6 G/DL (ref 13.5–17.5)
LYMPHOCYTES # BLD: 0.4 K/UL (ref 1–5.1)
LYMPHOCYTES NFR BLD: 18 %
MCH RBC QN AUTO: 30 PG (ref 26–34)
MCHC RBC AUTO-ENTMCNC: 33.8 G/DL (ref 31–36)
MCV RBC AUTO: 88.8 FL (ref 80–100)
METAMYELOCYTES NFR BLD MANUAL: 1 %
MONOCYTES # BLD: 0.1 K/UL (ref 0–1.3)
MONOCYTES NFR BLD: 4 %
NEUTROPHILS # BLD: 1.7 K/UL (ref 1.7–7.7)
NEUTROPHILS NFR BLD: 73 %
NEUTS BAND NFR BLD MANUAL: 4 % (ref 0–7)
ORGANISM: ABNORMAL
ORGANISM: ABNORMAL
OVALOCYTES BLD QL SMEAR: ABNORMAL
PLATELET # BLD AUTO: 214 K/UL (ref 135–450)
PLATELET BLD QL SMEAR: ADEQUATE
PMV BLD AUTO: 7.8 FL (ref 5–10.5)
POTASSIUM SERPL-SCNC: 3.2 MMOL/L (ref 3.5–5.1)
RBC # BLD AUTO: 2.52 M/UL (ref 4.2–5.9)
SLIDE REVIEW: ABNORMAL
SODIUM SERPL-SCNC: 136 MMOL/L (ref 136–145)
WBC # BLD AUTO: 2.2 K/UL (ref 4–11)

## 2023-12-05 PROCEDURE — 6370000000 HC RX 637 (ALT 250 FOR IP): Performed by: INTERNAL MEDICINE

## 2023-12-05 PROCEDURE — 97161 PT EVAL LOW COMPLEX 20 MIN: CPT

## 2023-12-05 PROCEDURE — 36415 COLL VENOUS BLD VENIPUNCTURE: CPT

## 2023-12-05 PROCEDURE — 97165 OT EVAL LOW COMPLEX 30 MIN: CPT

## 2023-12-05 PROCEDURE — 6370000000 HC RX 637 (ALT 250 FOR IP): Performed by: UROLOGY

## 2023-12-05 PROCEDURE — 6370000000 HC RX 637 (ALT 250 FOR IP): Performed by: NURSE PRACTITIONER

## 2023-12-05 PROCEDURE — 1200000000 HC SEMI PRIVATE

## 2023-12-05 PROCEDURE — 99223 1ST HOSP IP/OBS HIGH 75: CPT | Performed by: INTERNAL MEDICINE

## 2023-12-05 PROCEDURE — 97535 SELF CARE MNGMENT TRAINING: CPT

## 2023-12-05 PROCEDURE — 2580000003 HC RX 258: Performed by: NURSE PRACTITIONER

## 2023-12-05 PROCEDURE — 85025 COMPLETE CBC W/AUTO DIFF WBC: CPT

## 2023-12-05 PROCEDURE — 97530 THERAPEUTIC ACTIVITIES: CPT

## 2023-12-05 PROCEDURE — C9113 INJ PANTOPRAZOLE SODIUM, VIA: HCPCS | Performed by: UROLOGY

## 2023-12-05 PROCEDURE — 6360000002 HC RX W HCPCS: Performed by: NURSE PRACTITIONER

## 2023-12-05 PROCEDURE — 6360000002 HC RX W HCPCS: Performed by: UROLOGY

## 2023-12-05 PROCEDURE — 97116 GAIT TRAINING THERAPY: CPT

## 2023-12-05 PROCEDURE — 94761 N-INVAS EAR/PLS OXIMETRY MLT: CPT

## 2023-12-05 PROCEDURE — 80048 BASIC METABOLIC PNL TOTAL CA: CPT

## 2023-12-05 PROCEDURE — 2580000003 HC RX 258: Performed by: UROLOGY

## 2023-12-05 PROCEDURE — 94640 AIRWAY INHALATION TREATMENT: CPT

## 2023-12-05 RX ORDER — POLYETHYLENE GLYCOL 3350 17 G/17G
17 POWDER, FOR SOLUTION ORAL DAILY
Status: DISCONTINUED | OUTPATIENT
Start: 2023-12-05 | End: 2023-12-07

## 2023-12-05 RX ORDER — VANCOMYCIN HYDROCHLORIDE 50 MG/ML
250 KIT ORAL EVERY 12 HOURS SCHEDULED
Status: DISCONTINUED | OUTPATIENT
Start: 2023-12-05 | End: 2023-12-08 | Stop reason: HOSPADM

## 2023-12-05 RX ORDER — POTASSIUM CHLORIDE 20 MEQ/1
40 TABLET, EXTENDED RELEASE ORAL 2 TIMES DAILY WITH MEALS
Status: COMPLETED | OUTPATIENT
Start: 2023-12-05 | End: 2023-12-05

## 2023-12-05 RX ADMIN — Medication 10 ML: at 08:44

## 2023-12-05 RX ADMIN — Medication 2 PUFF: at 21:33

## 2023-12-05 RX ADMIN — ESCITALOPRAM OXALATE 10 MG: 10 TABLET ORAL at 08:44

## 2023-12-05 RX ADMIN — MEROPENEM 1000 MG: 1 INJECTION, POWDER, FOR SOLUTION INTRAVENOUS at 14:26

## 2023-12-05 RX ADMIN — TIOTROPIUM BROMIDE INHALATION SPRAY 2 PUFF: 3.12 SPRAY, METERED RESPIRATORY (INHALATION) at 09:16

## 2023-12-05 RX ADMIN — PREDNISONE 5 MG: 5 TABLET ORAL at 08:44

## 2023-12-05 RX ADMIN — POTASSIUM CHLORIDE 40 MEQ: 1500 TABLET, EXTENDED RELEASE ORAL at 17:42

## 2023-12-05 RX ADMIN — Medication 250 MG: at 20:41

## 2023-12-05 RX ADMIN — Medication 10 ML: at 20:47

## 2023-12-05 RX ADMIN — Medication 1 CAPSULE: at 17:42

## 2023-12-05 RX ADMIN — MEROPENEM 1000 MG: 1 INJECTION, POWDER, FOR SOLUTION INTRAVENOUS at 20:47

## 2023-12-05 RX ADMIN — POLYETHYLENE GLYCOL 3350 17 G: 17 POWDER, FOR SOLUTION ORAL at 14:17

## 2023-12-05 RX ADMIN — Medication 1 CAPSULE: at 08:44

## 2023-12-05 RX ADMIN — Medication 400 UNITS: at 08:44

## 2023-12-05 RX ADMIN — AMLODIPINE BESYLATE 10 MG: 5 TABLET ORAL at 08:44

## 2023-12-05 RX ADMIN — TRAZODONE HYDROCHLORIDE 50 MG: 50 TABLET ORAL at 20:41

## 2023-12-05 RX ADMIN — PANTOPRAZOLE SODIUM 40 MG: 40 INJECTION, POWDER, FOR SOLUTION INTRAVENOUS at 08:43

## 2023-12-05 RX ADMIN — POTASSIUM CHLORIDE 40 MEQ: 1500 TABLET, EXTENDED RELEASE ORAL at 09:00

## 2023-12-05 RX ADMIN — Medication 2 PUFF: at 09:16

## 2023-12-05 ASSESSMENT — ENCOUNTER SYMPTOMS
BACK PAIN: 0
SINUS PAIN: 0
SORE THROAT: 0
CONSTIPATION: 0
SINUS PRESSURE: 0
DIARRHEA: 0
COUGH: 0
SHORTNESS OF BREATH: 0
RHINORRHEA: 0
EYE REDNESS: 0
WHEEZING: 0
EYE DISCHARGE: 0
NAUSEA: 0
ABDOMINAL PAIN: 0

## 2023-12-05 NOTE — CONSULTS
trimethoprim-sulfamethoxazole Sensitive <=2/38 mcg/mL BACTERIAL SUSCEPTIBILITY PANEL BY ROSIE     Enterobacter cloacae complex (2)    Antibiotic Interpretation Microscan  Method Status    amoxicillin-clavulanate Resistant >16/8 mcg/mL BACTERIAL SUSCEPTIBILITY PANEL BY ROSIE     ampicillin Resistant >16 mcg/mL BACTERIAL SUSCEPTIBILITY PANEL BY ROSIE     ampicillin-sulbactam Resistant >16/8 mcg/mL BACTERIAL SUSCEPTIBILITY PANEL BY ROSIE     ceFAZolin Resistant >16 mcg/mL BACTERIAL SUSCEPTIBILITY PANEL BY ROSIE     cefepime Sensitive <=2 mcg/mL BACTERIAL SUSCEPTIBILITY PANEL BY ROSIE     cefTRIAXone Resistant >32 mcg/mL BACTERIAL SUSCEPTIBILITY PANEL BY ROSIE     cefuroxime Resistant >16 mcg/mL BACTERIAL SUSCEPTIBILITY PANEL BY ROSIE     ciprofloxacin Sensitive <=1 mcg/mL BACTERIAL SUSCEPTIBILITY PANEL BY ROSIE     ertapenem Sensitive <=0.5 mcg/mL BACTERIAL SUSCEPTIBILITY PANEL BY ROSIE     gentamicin Sensitive <=4 mcg/mL BACTERIAL SUSCEPTIBILITY PANEL BY ROSIE     meropenem Sensitive <=1 mcg/mL BACTERIAL SUSCEPTIBILITY PANEL BY ROSIE     nitrofurantoin Intermediate 64 mcg/mL BACTERIAL SUSCEPTIBILITY PANEL BY ROSIE     piperacillin-tazobactam Intermediate 64 mcg/mL BACTERIAL SUSCEPTIBILITY PANEL BY ROSIE     trimethoprim-sulfamethoxazole Sensitive <=2/38 mcg/mL BACTERIAL SUSCEPTIBILITY PANEL BY ROSIE                Antibiotics and immunizations:       Current antibiotics: All antibiotics and their doses were reviewed by me    Recent Abx Admin                     meropenem (MERREM) 1,000 mg in sodium chloride 0.9 % 100 mL IVPB (mini-bag) (mg) 1,000 mg New Bag 12/04/23 1807                      Immunization History: All immunization history was reviewed by me today.     Immunization History   Administered Date(s) Administered    COVID-19, PFIZER GRAY top, DO NOT Dilute, (age 15 y+), IM, 30 mcg/0.3 mL 04/28/2022    COVID-19, PFIZER PURPLE top, DILUTE for use, (age 15 y+), 30mcg/0.3mL 01/28/2021, 02/18/2021, 10/06/2021, 09/26/2022    COVID-19, PFIZER, outpatient treatment Z78.9    Pneumonia J18.9    Carotid artery occlusion and stenosis I65.29    Malignant neoplasm of prostate metastatic to lymph nodes of multiple sites ([address]) C61, C77.8    Prostate cancer (HCC) C61    Diarrhea R19.7    Sepsis ([address]) A41.9    UTI (urinary tract infection) N39.0    Hydronephrosis N13.30    Postprocedural membranous urethral stricture N99.112    Immunocompromised (HCC) D84.9    Anemia D64.9    Fever R50.9    Gastrointestinal hemorrhage K92.2    Other fatigue R53.83    Tachycardia R00.0         Please note that this chart was generated using Dragon dictation software. Although every effort was made to ensure the accuracy of this automated transcription, some errors in transcription may have occurred inadvertently. If you may need any clarification, please do not hesitate to contact me through [facility] or at the phone number provided below with my electronic signature. Any pictures or media included in this note were obtained after taking informed verbal consent from the patient and with their approval to include those in the patient's medical record. Jules Owens MD, MPH, FACP, FIDSA  12/5/2023, 2:23 PM  [facility]   [facility] [address] ([address] [place] [address].)  [facility], [facility] [place] [address]  Office: [phone]  Fax: [phone]  In-person Clinic days:  Tuesday & Thursday a.m. Virtual clinic days: Monday, Wednesday & Friday a.m.

## 2023-12-05 NOTE — PROGRESS NOTES
Hospitalist Progress Note      Name:  Maldonado Melo /Age/Sex: 1940  (78 y.o. male)   MRN & CSN:  [id number] & [id number] Encounter Date/Time: 2023 9:34 AM EST   Location:  [address] [place] [address] PCP: Merilyn Schmidt, MD Bettey Dance, MD       Hospital Day: 4    Subjective:   Chief Complaint:   Chief Complaint   Patient presents with    Fever    Fatigue     Pt has prostate cancer with mets, on chemo, endorsing temp of 103 at home today, diarrhea, fatigue. Maldonado Melo is a 78 y.o. 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 Admitted for sepsis and UTI. Interval History: Today, he is sitting up in the chair. He denies new symptoms. No acute events overnight. Admits to abd fullness, reports he is constipated, asking for mirilax. Independently reviewed interval ancillary notes from urology, hematology/oncology, and infectious disease. Assessment and Recommendations   Problem List  Principal Problem:    UTI (urinary tract infection)  Active Problems:    Hydronephrosis    Postprocedural membranous urethral stricture  Resolved Problems:    * No resolved hospital problems. *     Assessment and Plan:    UTI / bilateral hydroureteronephrosis / hematuria  - Hx metastatic prostate cancer / self catheterization  - UA large leukocytes (WBC > 100), positive nitrites, 2+ bacteria  - CT scan with severe acute cystitis with moderate bilateral hydroureteronephrosis  - Started on IV ceftriaxone on admission  - Urine culture with > 100K Klebsiella oxytoca, > 50K Enterobacter cloacae- resistant to many abx   - Switched to IV meropenem   - S/p cysto and urethral dilation 12/3, found to have severe vesicourethral anastomotic stricture. Continue colón x 1 week.   Suprapubic catheter recommended per oncology   - Appreciate urology recs  -

## 2023-12-05 NOTE — PROGRESS NOTES
[address] [facility] Department   Phone: [phone]    Occupational Therapy    [x] Initial Evaluation            [] Daily Treatment Note         [] Discharge Summary      Patient: Kirk Aleman   : 1940   MRN: [id number]   Date of Service:  2023    Admitting Diagnosis:  UTI (urinary tract infection)  Current Admission Summary: Kirk Aleman is a 78 y.o. 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 Admitted for sepsis and UTI. Past Medical History:  has a past medical history of Carotid artery occlusion and stenosis, Centrilobular emphysema ([address]), COPD (chronic obstructive pulmonary disease) ([address]), ED (erectile dysfunction), Emphysema lung ([address]), GERD (gastroesophageal reflux disease), Hyperlipidemia, Hypertension, Prostate CA ([address]), Prostate cancer metastatic to lung ([address]), Psoriasis, Pulmonary nodules, Radiation, and Urinary incontinence. Past Surgical History:  has a past surgical history that includes Prostatectomy (); Tonsillectomy; Carotid endarterectomy (5/3/12); Cystoscopy (2017); Colonoscopy; Endoscopy, colon, diagnostic; Carotid endarterectomy (Right, 10/25/2017); Cholecystectomy, laparoscopic (2018); skin biopsy (); Esophagus dilation (N/A, 1/3/2022); Upper gastrointestinal endoscopy (N/A, 1/3/2022); Colonoscopy (N/A, 2023); Colonoscopy (2023); and Cystoscopy (N/A, 12/3/2023). Discharge Recommendations: Kirk Aleman scored a 18/24 on the AM-PAC ADL Inpatient form. Current research shows that an AM-PAC score of 18 or greater is typically associated with a discharge to the patient's home setting.  Based on the patient's AM-PAC score, and their current ADL deficits, it is recommended that the patient have 2-3 sessions per week of Occupational Therapy at d/c to increase the patient's WFL  Tone:   Normotonic  Coordination Testing:   WFL    ROM:   (B) UE AROM WFL  Strength:   (B) UE strength grossly WFL    Therapist Clinical Decision Making (Complexity): medium complexity  Clinical Presentation: evolving      Subjective  General: Pt supine in bed, wife at bedside, agreeable to OT eval and tx. Pain: 0/10  Pain Interventions: not applicable     Activities of Daily Living  Basic Activities of Daily Living  Grooming: stand by assistance  Grooming Comments: oral care in stance at sink with unilateral hand support on surface  Upper Extremity Bathing: setup assistance cleaned axillary area  Bathing Comments: sponge bathing w/ warm bathing wipes while seated on [facility]  Upper Extremity Dressing: supervision don/doff gown  Lower Extremity Dressing: dependent dep for threading catheter, max A for threading briefs over feet, CGA for standing balance for clothing management up over hips  Dressing Comments: seated on [facility] d/t increased fatigue and decreased endurance  Toileting: dependent. Toileting Comments: dep for colón care and incontinent of small amount of stool, brief change completed, pt reporting wearing same briefs for 2 days  Instrumental Activities of Daily Living  No IADL completed on this date.   Functional Mobility  Bed Mobility:  Supine to Sit: modified independent  Scooting: modified independent  Comments:HOB elevated   Transfers:  Sit to stand transfer:contact guard assistance  Stand to sit transfer: contact guard assistance  Toilet transfer: contact guard assistance  Toilet transfer comments: to [facility] during ADLs  Comments:w/o RW  Functional Mobility  Functional Mobility Activity: to/from bathroom, to/from therapy room  Device Use: no device  Required Assistance: contact guard assistance  Comment: + additional 100 ft w/ RW and SBA , tolerated fairly w/ seated rest breaks  Balance:  Static Sitting Balance: good: independent with functional balance in unsupported position  Dynamic Sitting

## 2023-12-05 NOTE — PLAN OF CARE
Problem: Safety - Adult  Goal: Free from fall injury  Outcome: Progressing     Problem: Pain  Goal: Verbalizes/displays adequate comfort level or baseline comfort level  Outcome: Progressing     Problem: ABCDS Injury Assessment  Goal: Absence of physical injury  Outcome: Progressing     Problem: Chronic Conditions and Co-morbidities  Goal: Patient's chronic conditions and co-morbidity symptoms are monitored and maintained or improved  Outcome: Progressing     Problem: Discharge Planning  Goal: Discharge to home or other facility with appropriate resources  Outcome: Progressing     Problem: Gastrointestinal - Adult  Goal: Maintains or returns to baseline bowel function  Outcome: Progressing     Problem: Genitourinary - Adult  Goal: Urinary catheter remains patent  Outcome: Progressing     Problem: Infection - Adult  Goal: Absence of infection at discharge  Outcome: Progressing     Problem: Metabolic/Fluid and Electrolytes - Adult  Goal: Electrolytes maintained within normal limits  Outcome: Progressing     Problem: Hematologic - Adult  Goal: Maintains hematologic stability  Outcome: Progressing

## 2023-12-06 LAB
ACANTHOCYTES BLD QL SMEAR: ABNORMAL
ANION GAP SERPL CALCULATED.3IONS-SCNC: 6 MMOL/L (ref 3–16)
ANISOCYTOSIS BLD QL SMEAR: ABNORMAL
BACTERIA BLD CULT ORG #2: ABNORMAL
BACTERIA BLD CULT ORG #2: ABNORMAL
BACTERIA BLD CULT: ABNORMAL
BASOPHILS # BLD: 0 K/UL (ref 0–0.2)
BASOPHILS NFR BLD: 0 %
BUN SERPL-MCNC: 8 MG/DL (ref 7–20)
CALCIUM SERPL-MCNC: 8.5 MG/DL (ref 8.3–10.6)
CHLORIDE SERPL-SCNC: 103 MMOL/L (ref 99–110)
CO2 SERPL-SCNC: 27 MMOL/L (ref 21–32)
CREAT SERPL-MCNC: 0.8 MG/DL (ref 0.8–1.3)
DEPRECATED RDW RBC AUTO: 17.2 % (ref 12.4–15.4)
EOSINOPHIL # BLD: 0 K/UL (ref 0–0.6)
EOSINOPHIL NFR BLD: 0 %
GFR SERPLBLD CREATININE-BSD FMLA CKD-EPI: >60 ML/MIN/{1.73_M2}
GLUCOSE SERPL-MCNC: 85 MG/DL (ref 70–99)
HCT VFR BLD AUTO: 23.5 % (ref 40.5–52.5)
HGB BLD-MCNC: 7.9 G/DL (ref 13.5–17.5)
LYMPHOCYTES # BLD: 1.1 K/UL (ref 1–5.1)
LYMPHOCYTES NFR BLD: 28 %
MCH RBC QN AUTO: 29.8 PG (ref 26–34)
MCHC RBC AUTO-ENTMCNC: 33.4 G/DL (ref 31–36)
MCV RBC AUTO: 89.2 FL (ref 80–100)
MONOCYTES # BLD: 0.1 K/UL (ref 0–1.3)
MONOCYTES NFR BLD: 3 %
NEUTROPHILS # BLD: 2 K/UL (ref 1.7–7.7)
NEUTROPHILS NFR BLD: 63 %
ORGANISM: ABNORMAL
OVALOCYTES BLD QL SMEAR: ABNORMAL
PATH INTERP BLD-IMP: NO
PLATELET # BLD AUTO: 237 K/UL (ref 135–450)
PLATELET BLD QL SMEAR: ADEQUATE
PMV BLD AUTO: 7.8 FL (ref 5–10.5)
POLYCHROMASIA BLD QL SMEAR: ABNORMAL
POTASSIUM SERPL-SCNC: 4.7 MMOL/L (ref 3.5–5.1)
RBC # BLD AUTO: 2.64 M/UL (ref 4.2–5.9)
SLIDE REVIEW: ABNORMAL
SODIUM SERPL-SCNC: 136 MMOL/L (ref 136–145)
VARIANT LYMPHS NFR BLD MANUAL: 6 % (ref 0–6)
WBC # BLD AUTO: 3.1 K/UL (ref 4–11)

## 2023-12-06 PROCEDURE — 97116 GAIT TRAINING THERAPY: CPT

## 2023-12-06 PROCEDURE — 97530 THERAPEUTIC ACTIVITIES: CPT

## 2023-12-06 PROCEDURE — 6370000000 HC RX 637 (ALT 250 FOR IP): Performed by: INTERNAL MEDICINE

## 2023-12-06 PROCEDURE — 97110 THERAPEUTIC EXERCISES: CPT

## 2023-12-06 PROCEDURE — 85025 COMPLETE CBC W/AUTO DIFF WBC: CPT

## 2023-12-06 PROCEDURE — 99233 SBSQ HOSP IP/OBS HIGH 50: CPT | Performed by: INTERNAL MEDICINE

## 2023-12-06 PROCEDURE — 6360000002 HC RX W HCPCS: Performed by: NURSE PRACTITIONER

## 2023-12-06 PROCEDURE — 1200000000 HC SEMI PRIVATE

## 2023-12-06 PROCEDURE — 6370000000 HC RX 637 (ALT 250 FOR IP): Performed by: NURSE PRACTITIONER

## 2023-12-06 PROCEDURE — 80048 BASIC METABOLIC PNL TOTAL CA: CPT

## 2023-12-06 PROCEDURE — 2580000003 HC RX 258: Performed by: NURSE PRACTITIONER

## 2023-12-06 PROCEDURE — 2580000003 HC RX 258: Performed by: UROLOGY

## 2023-12-06 PROCEDURE — 84238 ASSAY NONENDOCRINE RECEPTOR: CPT

## 2023-12-06 PROCEDURE — 36415 COLL VENOUS BLD VENIPUNCTURE: CPT

## 2023-12-06 PROCEDURE — C9113 INJ PANTOPRAZOLE SODIUM, VIA: HCPCS | Performed by: UROLOGY

## 2023-12-06 PROCEDURE — 94640 AIRWAY INHALATION TREATMENT: CPT

## 2023-12-06 PROCEDURE — 6370000000 HC RX 637 (ALT 250 FOR IP): Performed by: UROLOGY

## 2023-12-06 PROCEDURE — 6360000002 HC RX W HCPCS: Performed by: UROLOGY

## 2023-12-06 RX ORDER — SULFAMETHOXAZOLE AND TRIMETHOPRIM 800; 160 MG/1; MG/1
1 TABLET ORAL EVERY 12 HOURS SCHEDULED
Status: DISCONTINUED | OUTPATIENT
Start: 2023-12-06 | End: 2023-12-08 | Stop reason: HOSPADM

## 2023-12-06 RX ADMIN — Medication 250 MG: at 09:00

## 2023-12-06 RX ADMIN — ESCITALOPRAM OXALATE 10 MG: 10 TABLET ORAL at 09:01

## 2023-12-06 RX ADMIN — PREDNISONE 5 MG: 5 TABLET ORAL at 09:01

## 2023-12-06 RX ADMIN — TRAZODONE HYDROCHLORIDE 50 MG: 50 TABLET ORAL at 21:00

## 2023-12-06 RX ADMIN — MEROPENEM 1000 MG: 1 INJECTION, POWDER, FOR SOLUTION INTRAVENOUS at 14:40

## 2023-12-06 RX ADMIN — Medication 2 PUFF: at 20:28

## 2023-12-06 RX ADMIN — ACETAMINOPHEN 650 MG: 325 TABLET ORAL at 01:13

## 2023-12-06 RX ADMIN — AMLODIPINE BESYLATE 10 MG: 5 TABLET ORAL at 09:01

## 2023-12-06 RX ADMIN — Medication 400 UNITS: at 09:01

## 2023-12-06 RX ADMIN — SULFAMETHOXAZOLE AND TRIMETHOPRIM 1 TABLET: 800; 160 TABLET ORAL at 21:00

## 2023-12-06 RX ADMIN — MEROPENEM 1000 MG: 1 INJECTION, POWDER, FOR SOLUTION INTRAVENOUS at 05:43

## 2023-12-06 RX ADMIN — Medication 1 CAPSULE: at 09:01

## 2023-12-06 RX ADMIN — Medication 10 ML: at 09:01

## 2023-12-06 RX ADMIN — PANTOPRAZOLE SODIUM 40 MG: 40 INJECTION, POWDER, FOR SOLUTION INTRAVENOUS at 09:01

## 2023-12-06 RX ADMIN — ACETAMINOPHEN 650 MG: 325 TABLET ORAL at 12:07

## 2023-12-06 RX ADMIN — Medication 250 MG: at 21:00

## 2023-12-06 RX ADMIN — POLYETHYLENE GLYCOL 3350 17 G: 17 POWDER, FOR SOLUTION ORAL at 09:00

## 2023-12-06 RX ADMIN — MEROPENEM 1000 MG: 1 INJECTION, POWDER, FOR SOLUTION INTRAVENOUS at 21:03

## 2023-12-06 RX ADMIN — Medication 1 CAPSULE: at 18:55

## 2023-12-06 RX ADMIN — Medication 10 ML: at 21:00

## 2023-12-06 ASSESSMENT — ENCOUNTER SYMPTOMS
EYE DISCHARGE: 0
CONSTIPATION: 0
EYE REDNESS: 0
SINUS PRESSURE: 0
ABDOMINAL PAIN: 0
SORE THROAT: 0
COUGH: 0
NAUSEA: 0
WHEEZING: 0
SHORTNESS OF BREATH: 0
DIARRHEA: 0
RHINORRHEA: 0
BACK PAIN: 0
SINUS PAIN: 0

## 2023-12-06 ASSESSMENT — PAIN SCALES - GENERAL
PAINLEVEL_OUTOF10: 0
PAINLEVEL_OUTOF10: 2
PAINLEVEL_OUTOF10: 3

## 2023-12-06 ASSESSMENT — PAIN DESCRIPTION - LOCATION
LOCATION: BACK
LOCATION: LEG

## 2023-12-06 ASSESSMENT — PAIN DESCRIPTION - DESCRIPTORS
DESCRIPTORS: ACHING;DISCOMFORT
DESCRIPTORS: DISCOMFORT

## 2023-12-06 ASSESSMENT — PAIN DESCRIPTION - ORIENTATION
ORIENTATION: LOWER
ORIENTATION: RIGHT;LEFT

## 2023-12-06 ASSESSMENT — PAIN - FUNCTIONAL ASSESSMENT: PAIN_FUNCTIONAL_ASSESSMENT: ACTIVITIES ARE NOT PREVENTED

## 2023-12-06 NOTE — PROGRESS NOTES
(Patient uses walk-in shower)  Toilet Height: standard height  Bathroom Equipment: grab bars in shower, built in [address] [place] [address] [address]: reacher  Transfer Assistance: Independent without use of device  Ambulation Assistance:Independent without use of device  ADL Assistance: independent with all ADL's  IADL Assistance: independent with homemaking tasks  Active :        [x] Yes                 [] No  Hand Dominance: [] Left                 [x] Right  Current Employment: retired. Occupation: Self-employed    Hobbies: Hiking, golf  Recent Falls: Denies    Examination   Vision:   Vision Gross Assessment: Impaired and Vision Corrective Device: wears glasses at all times  Hearing:   [facility]  Observation:   General Observation:  Pt on RA on arrival.      Subjective  General: Pt semi-fowlers in bed on arrival, agreeable to participate in PT treatment session. Pain: 0/10  Pain Interventions: not applicable     Functional Mobility  Bed Mobility:  Supine to Sit: modified independent  Sit to Supine: modified independent  Scooting: modified independent- toward EOB  Comments: Supine to/from sit: HOB flat  Transfers:  Sit to stand transfer: stand by assistance  Stand to sit transfer: stand by assistance  Comments: No AD used for transfers  Ambulation:  Surface:level surface  Assistive Device: no device  Assistance: stand by assistance  Distance: 350'  Gait Mechanics: improved stability, decreased speed, decreased step lengths, no overt losses of balance  Comments:   Stair Mobility:  Stair mobility not completed on this date. Comments:  Wheelchair Mobility:  No w/c mobility completed on this date.   Comments:  Balance:  Static Sitting Balance: good: independent with functional balance in unsupported position  Dynamic Sitting Balance: fair (+): maintains balance at SBA/supervision without use of UE support  Static Standing Balance: fair (+): maintains balance at SBA/supervision without use of UE support  Dynamic

## 2023-12-06 NOTE — PROGRESS NOTES
Infectious Diseases   Progress Note      Admission Date: 12/2/2023  Hospital Day: Hospital Day: 5   Attending: Luis Armando Shah MD  Date of service: 12/6/2023     Chief complaint/ Reason for consult:     Sepsis with tachycardia, tachypnea, leukopenia  Positive blood culture for Staph epidermidis and gram-positive rods  Complicated urinary tract infection with Enterobacter and Klebsiella  Immunocompromised, on chemotherapy  Stage IV prostate cancer    Microbiology:        I have reviewed allavailable micro lab data and cultures    Blood culture (1/2) - collected on 12/2/2023: Staph epidermidis, Bacteroides  Blood culture - collectedon 12/4/2023: Negative  Urine culture  - collected on 12/2/2023: 50,000 CFU per mL of Enterobacter, greater than 100,000 CFU per mL of Klebsiella    Susceptibility    Klebsiella oxytoca (1)    Antibiotic Interpretation Microscan  Method Status    amoxicillin-clavulanate Sensitive <=8/4 mcg/mL BACTERIAL SUSCEPTIBILITY PANEL BY ROSIE     ampicillin Resistant >16 mcg/mL BACTERIAL SUSCEPTIBILITY PANEL BY ROSIE     ampicillin-sulbactam Sensitive <=8/4 mcg/mL BACTERIAL SUSCEPTIBILITY PANEL BY ROSIE     ceFAZolin Resistant 16 mcg/mL BACTERIAL SUSCEPTIBILITY PANEL BY ROSIE     cefepime Sensitive <=2 mcg/mL BACTERIAL SUSCEPTIBILITY PANEL BY ROSIE     cefTRIAXone Sensitive <=1 mcg/mL BACTERIAL SUSCEPTIBILITY PANEL BY ROSIE     cefuroxime Sensitive <=4 mcg/mL BACTERIAL SUSCEPTIBILITY PANEL BY ROSIE     ciprofloxacin Sensitive <=1 mcg/mL BACTERIAL SUSCEPTIBILITY PANEL BY ROSIE     ertapenem Sensitive <=0.5 mcg/mL BACTERIAL SUSCEPTIBILITY PANEL BY ROSIE     gentamicin Sensitive <=4 mcg/mL BACTERIAL SUSCEPTIBILITY PANEL BY ROSIE     meropenem Sensitive <=1 mcg/mL BACTERIAL SUSCEPTIBILITY PANEL BY ROSIE     nitrofurantoin Sensitive <=32 mcg/mL BACTERIAL SUSCEPTIBILITY PANEL BY ROSIE     piperacillin-tazobactam Sensitive <=16 mcg/mL BACTERIAL SUSCEPTIBILITY PANEL BY ROSIE     trimethoprim-sulfamethoxazole Sensitive <=2/38 negative. Past Medical History: All past medical history reviewed today. Past Medical History:   Diagnosis Date    Carotid artery occlusion and stenosis 05/03/2012    Centrilobular emphysema ([address]) 08/08/2014    COPD (chronic obstructive pulmonary disease) (HCC)     ED (erectile dysfunction)     Emphysema lung ([address]) 08/08/2014    GERD (gastroesophageal reflux disease)     Hyperlipidemia     pt denies    Hypertension     Prostate CA [facility]) 2003    surgery then radiation    Prostate cancer metastatic to lung ([address]) 04/04/2023    Psoriasis     Pulmonary nodules     Radiation 2004    Urinary incontinence        Past Surgical History: All past surgical history was reviewed today. Past Surgical History:   Procedure Laterality Date    CAROTID ENDARTERECTOMY  5/3/12    left    CAROTID ENDARTERECTOMY Right 10/25/2017    Dr. Osorio Germain at [address]  02/12/2018    Intraoperative Cholangiogram    COLONOSCOPY      COLONOSCOPY N/A 6/1/2023    COLONOSCOPY POLYPECTOMY SNARE/COLD BIOPSY performed by Karma Burgos MD at [facility]  6/1/2023    COLONOSCOPY WITH BIOPSY performed by Karma Burgos MD at [address] [place] [address]  07/2017    CYSTOSCOPY N/A 12/3/2023    CYSTOSCOPY, PLACEMENT OF FRASER CATHETER, URETHRAL DILATION OF URETHRAL STRICTURE, CYSTOGRAM performed by Lili Banerjee MD at [address] [place] [address], COLON, DIAGNOSTIC      ESOPHAGEAL DILATATION N/A 1/3/2022    ESOPHAGEAL DILATION Óscar Fer performed by Robert Penaloza MD at [facility]  2003    SKIN BIOPSY  2021    melanoma left arm    TONSILLECTOMY      UPPER GASTROINTESTINAL ENDOSCOPY N/A 1/3/2022    EGD POLYP SNARE performed by Robert Penaloza MD at [facility] ENDOSCOPY       Family History: All family history was reviewed today.         Problem Relation Age of Onset    Diabetes Mother     High Blood Pressure Mother     Heart Disease Father         myxoma    Heart Failure Father

## 2023-12-06 NOTE — PROGRESS NOTES
[address] [facility] Department   Phone: [phone]    Occupational Therapy    [] Initial Evaluation            [x] Daily Treatment Note         [] Discharge Summary      Patient: Sage De Jesus   : 1940   MRN: [id number]   Date of Service:  2023    Admitting Diagnosis:  UTI (urinary tract infection)  Current Admission Summary: Sage De Jesus is a 78 y.o. 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 Admitted for sepsis and UTI. Past Medical History:  has a past medical history of Carotid artery occlusion and stenosis, Centrilobular emphysema ([address]), COPD (chronic obstructive pulmonary disease) ([address]), ED (erectile dysfunction), Emphysema lung ([address]), GERD (gastroesophageal reflux disease), Hyperlipidemia, Hypertension, Prostate CA ([address]), Prostate cancer metastatic to lung ([address]), Psoriasis, Pulmonary nodules, Radiation, and Urinary incontinence. Past Surgical History:  has a past surgical history that includes Prostatectomy (); Tonsillectomy; Carotid endarterectomy (5/3/12); Cystoscopy (2017); Colonoscopy; Endoscopy, colon, diagnostic; Carotid endarterectomy (Right, 10/25/2017); Cholecystectomy, laparoscopic (2018); skin biopsy (); Esophagus dilation (N/A, 1/3/2022); Upper gastrointestinal endoscopy (N/A, 1/3/2022); Colonoscopy (N/A, 2023); Colonoscopy (2023); and Cystoscopy (N/A, 12/3/2023). Discharge Recommendations: Sage De Jesus scored a 18/24 on the AM-PAC ADL Inpatient form. Current research shows that an AM-PAC score of 18 or greater is typically associated with a discharge to the patient's home setting.  Based on the patient's AM-PAC score, and their current ADL deficits, it is recommended that the patient have 2-3 sessions per week of Occupational Therapy at d/c to increase the patient's

## 2023-12-06 NOTE — DISCHARGE INSTR - COC
Continuity of Care Form    Patient Name: Marychuy Rose   :  1940  MRN:  [id number]    Admit date:  2023  Discharge date:  23    Code Status Order: Full Code   Advance Directives:   Advance Care Flowsheet Documentation       Date/Time Healthcare Directive Type of Healthcare Directive Copy in [address] Agent's Name Healthcare Agent's Phone Number    23 1526 Yes, patient has an advance directive for healthcare treatment Living will;Durable power of  for health care No, copy requested from family -- -- --            Admitting Physician:  Carolee Lorenzana MD  PCP: David Palencia MD    Discharging Nurse: Ginger Enciso [facility] Unit/Room#: 8UX-7360/0892-39  Discharging Unit Phone Number: [phone]    Emergency Contact:   Extended Emergency Contact Information  Primary Emergency Contact: Bear Russ  Address: [address] [place] [address] [place] Phone: [phone]  Relation: Spouse  Secondary Emergency Contact: Luis E Bennett  Address: [facility] [place]a [place] Phone: [phone]  Relation: Child    Past Surgical History:  Past Surgical History:   Procedure Laterality Date    CAROTID ENDARTERECTOMY  5/3/12    left    CAROTID ENDARTERECTOMY Right 10/25/2017    Dr. Leena Paula at [address]  2018    Intraoperative Cholangiogram    COLONOSCOPY      COLONOSCOPY N/A 2023    COLONOSCOPY POLYPECTOMY SNARE/COLD BIOPSY performed by Dhara Springer MD at [facility]  2023    COLONOSCOPY WITH BIOPSY performed by Dhara Springer MD at [address] [place] [address]  2017    CYSTOSCOPY N/A 12/3/2023    CYSTOSCOPY, PLACEMENT OF FRASER CATHETER, URETHRAL DILATION OF URETHRAL STRICTURE, CYSTOGRAM performed by Dale Walker MD at [address] [place] [address], [place], DIAGNOSTIC      ESOPHAGEAL DILATATION N/A 1/3/2022    ESOPHAGEAL DILATION [facility] (if applicable)   Name:  Address:  Dialysis Schedule:  Phone:  Fax:    / signature: {Esignature:[id number]}    PHYSICIAN SECTION    Prognosis: Fair    Condition at Discharge: Stable    Rehab Potential (if transferring to Rehab):     Recommended Labs or Other Treatments After Discharge: skilled nursing, PT/OT, CMP and CBC weekly x 2, colón care and education. Physician Certification: I certify the above information and transfer of Tasia York  is necessary for the continuing treatment of the diagnosis listed and that he requires [address] [place] [address] for greater 30 days.      Update Admission H&P: No change in H&P    PHYSICIAN SIGNATURE:  Electronically signed by ADELAIDA Olivarez CNP on 12/7/23 at 3:01 PM EST

## 2023-12-06 NOTE — PLAN OF CARE
Problem: Safety - Adult  Goal: Free from fall injury  12/5/2023 2314 by Char Carbajal RN  Outcome: Progressing     Problem: Pain  Goal: Verbalizes/displays adequate comfort level or baseline comfort level  12/5/2023 2314 by Char Carbajal RN  Outcome: Progressing     Problem: ABCDS Injury Assessment  Goal: Absence of physical injury  12/5/2023 2314 by Char Carbajal RN  Outcome: Progressing     Problem: Discharge Planning  Goal: Discharge to home or other facility with appropriate resources  12/5/2023 2314 by Char Carbajal RN  Outcome: Progressing     Problem: Genitourinary - Adult  Goal: Urinary catheter remains patent  Recent Flowsheet Documentation  Taken 12/5/2023 2000 by Char Carbajal RN  Urinary catheter remains patent: Assess patency of urinary catheter

## 2023-12-06 NOTE — PROGRESS NOTES
Cardiovascular: regular rate and rhythm. S1 & S2, negative for murmurs. Peripheral pulses 2+, capillary refill < 3 seconds. No swelling   Respiratory: Respirations symmetric and unlabored. Lungs clear to auscultation bilaterally, no wheezing, crackles, or rhonchi  Gastrointestinal: Abdomen soft and round. Bowel sounds normoactive in all quadrants. Musculoskeletal: No focal weakness. Neurologic/Psych: Awake and orientated to person, place and time. Calm affect, appropriate mood    Medications:   Medications:    polyethylene glycol  17 g Oral Daily    vancomycin  250 mg Oral 2 times per day    meropenem  1,000 mg IntraVENous Q8H    amLODIPine  10 mg Oral Daily    predniSONE  5 mg Oral Daily    escitalopram  10 mg Oral Daily    vitamin E  400 Units Oral Daily    sodium chloride flush  5-40 mL IntraVENous 2 times per day    mometasone-formoterol  2 puff Inhalation BID RT    tiotropium  2 puff Inhalation Daily RT    traZODone  50 mg Oral Nightly    lactobacillus  1 capsule Oral BID WC    pantoprazole  40 mg IntraVENous Daily      Infusions:    sodium chloride       PRN Meds: albuterol sulfate HFA, 2 puff, Q6H PRN  sodium chloride flush, 5-40 mL, PRN  sodium chloride, , PRN  potassium chloride, 40 mEq, PRN   Or  potassium alternative oral replacement, 40 mEq, PRN   Or  potassium chloride, 10 mEq, PRN  magnesium sulfate, 2,000 mg, PRN  ondansetron, 4 mg, Q8H PRN   Or  ondansetron, 4 mg, Q6H PRN  acetaminophen, 650 mg, Q6H PRN   Or  acetaminophen, 650 mg, Q6H PRN        Labs and Imaging   Results this Admission:  CXR 12/2/2023:  IMPRESSION:   No acute lung disease     CT abd/pelvis 12/2/2023:  IMPRESSION:  1. Severe acute cystitis with moderate bilateral hydroureteronephrosis concerning for ascending urinary tract infection. 2.  Linear extension of the bladder lumen along the left prostate gland margin possibly due to inflamed bladder diverticulum versus contained bladder perforation.   Noncontrast CT pelvis 08:54 PM    BILIRUBINUR negative 05/07/2013 12:48 PM    BLOODU LARGE 12/02/2023 08:54 PM    GLUCOSEU Negative 12/02/2023 08:54 PM    KETUA TRACE 12/02/2023 08:54 PM     Urine Cultures:   Lab Results   Component Value Date/Time    LABURIN >100,000 CFU/ml 12/02/2023 08:54 PM    LABURIN 50,000 CFU/ml  Multiple Resistant Drug Organism   12/02/2023 08:54 PM     Blood Cultures:   Lab Results   Component Value Date/Time    [facility]  12/04/2023 05:37 PM     No Growth to date. Any change in status will be called. Lab Results   Component Value Date/Time    BLOODCULT2  12/04/2023 05:37 PM     No Growth to date. Any change in status will be called.      Organism:   Lab Results   Component Value Date/Time    ORG Klebsiella oxytoca 12/02/2023 08:54 PM    ORG Enterobacter cloacae complex 12/02/2023 08:54 PM     Personally reviewed labs, diagnostic, device, and imaging results reviewed as a part of this visit    Electronically signed by ADELAIDA Freeman CNP on 12/6/2023 at 8:30 AM

## 2023-12-07 LAB
ACANTHOCYTES BLD QL SMEAR: ABNORMAL
ANION GAP SERPL CALCULATED.3IONS-SCNC: 6 MMOL/L (ref 3–16)
ANISOCYTOSIS BLD QL SMEAR: ABNORMAL
BASOPHILS # BLD: 0 K/UL (ref 0–0.2)
BASOPHILS NFR BLD: 0 %
BUN SERPL-MCNC: 9 MG/DL (ref 7–20)
CALCIUM SERPL-MCNC: 8.2 MG/DL (ref 8.3–10.6)
CHLORIDE SERPL-SCNC: 102 MMOL/L (ref 99–110)
CO2 SERPL-SCNC: 28 MMOL/L (ref 21–32)
CREAT SERPL-MCNC: 0.9 MG/DL (ref 0.8–1.3)
DEPRECATED RDW RBC AUTO: 17.6 % (ref 12.4–15.4)
EOSINOPHIL # BLD: 0 K/UL (ref 0–0.6)
EOSINOPHIL NFR BLD: 0 %
GFR SERPLBLD CREATININE-BSD FMLA CKD-EPI: >60 ML/MIN/{1.73_M2}
GLUCOSE SERPL-MCNC: 99 MG/DL (ref 70–99)
HCT VFR BLD AUTO: 25.3 % (ref 40.5–52.5)
HGB BLD-MCNC: 8.4 G/DL (ref 13.5–17.5)
LYMPHOCYTES # BLD: 1 K/UL (ref 1–5.1)
LYMPHOCYTES NFR BLD: 30 %
MCH RBC QN AUTO: 29.5 PG (ref 26–34)
MCHC RBC AUTO-ENTMCNC: 33.3 G/DL (ref 31–36)
MCV RBC AUTO: 88.6 FL (ref 80–100)
MONOCYTES # BLD: 0.3 K/UL (ref 0–1.3)
MONOCYTES NFR BLD: 8 %
MONONUC CELLS NFR BLD MANUAL: 3 %
NEUTROPHILS # BLD: 1.9 K/UL (ref 1.7–7.7)
NEUTROPHILS NFR BLD: 59 %
NRBC BLD-RTO: 1 /100 WBC
PATH INTERP BLD-IMP: NO
PLATELET # BLD AUTO: 300 K/UL (ref 135–450)
PLATELET BLD QL SMEAR: ADEQUATE
PMV BLD AUTO: 7.4 FL (ref 5–10.5)
POLYCHROMASIA BLD QL SMEAR: ABNORMAL
POTASSIUM SERPL-SCNC: 4 MMOL/L (ref 3.5–5.1)
RBC # BLD AUTO: 2.85 M/UL (ref 4.2–5.9)
SLIDE REVIEW: ABNORMAL
SODIUM SERPL-SCNC: 136 MMOL/L (ref 136–145)
WBC # BLD AUTO: 3.3 K/UL (ref 4–11)

## 2023-12-07 PROCEDURE — 94761 N-INVAS EAR/PLS OXIMETRY MLT: CPT

## 2023-12-07 PROCEDURE — 2580000003 HC RX 258: Performed by: UROLOGY

## 2023-12-07 PROCEDURE — 2580000003 HC RX 258: Performed by: NURSE PRACTITIONER

## 2023-12-07 PROCEDURE — 6370000000 HC RX 637 (ALT 250 FOR IP): Performed by: UROLOGY

## 2023-12-07 PROCEDURE — 94640 AIRWAY INHALATION TREATMENT: CPT

## 2023-12-07 PROCEDURE — 6360000002 HC RX W HCPCS: Performed by: NURSE PRACTITIONER

## 2023-12-07 PROCEDURE — 36415 COLL VENOUS BLD VENIPUNCTURE: CPT

## 2023-12-07 PROCEDURE — 97110 THERAPEUTIC EXERCISES: CPT

## 2023-12-07 PROCEDURE — C9113 INJ PANTOPRAZOLE SODIUM, VIA: HCPCS | Performed by: UROLOGY

## 2023-12-07 PROCEDURE — 6370000000 HC RX 637 (ALT 250 FOR IP): Performed by: NURSE PRACTITIONER

## 2023-12-07 PROCEDURE — 1200000000 HC SEMI PRIVATE

## 2023-12-07 PROCEDURE — 6370000000 HC RX 637 (ALT 250 FOR IP): Performed by: INTERNAL MEDICINE

## 2023-12-07 PROCEDURE — 99232 SBSQ HOSP IP/OBS MODERATE 35: CPT | Performed by: INTERNAL MEDICINE

## 2023-12-07 PROCEDURE — 97530 THERAPEUTIC ACTIVITIES: CPT

## 2023-12-07 PROCEDURE — 6360000002 HC RX W HCPCS: Performed by: UROLOGY

## 2023-12-07 PROCEDURE — 80048 BASIC METABOLIC PNL TOTAL CA: CPT

## 2023-12-07 PROCEDURE — 85025 COMPLETE CBC W/AUTO DIFF WBC: CPT

## 2023-12-07 RX ORDER — POLYETHYLENE GLYCOL 3350 17 G/17G
17 POWDER, FOR SOLUTION ORAL 2 TIMES DAILY
Status: DISCONTINUED | OUTPATIENT
Start: 2023-12-07 | End: 2023-12-08 | Stop reason: HOSPADM

## 2023-12-07 RX ORDER — POLYETHYLENE GLYCOL 3350 17 G/17G
17 POWDER, FOR SOLUTION ORAL 2 TIMES DAILY PRN
Qty: 527 G | Refills: 1 | Status: SHIPPED
Start: 2023-12-07 | End: 2024-01-06

## 2023-12-07 RX ORDER — AMLODIPINE BESYLATE 5 MG/1
5 TABLET ORAL DAILY
Status: DISCONTINUED | OUTPATIENT
Start: 2023-12-08 | End: 2023-12-08 | Stop reason: HOSPADM

## 2023-12-07 RX ORDER — LACTOBACILLUS RHAMNOSUS GG 10B CELL
1 CAPSULE ORAL 2 TIMES DAILY WITH MEALS
Qty: 20 CAPSULE | Refills: 0 | Status: SHIPPED | OUTPATIENT
Start: 2023-12-07 | End: 2023-12-17

## 2023-12-07 RX ORDER — METOPROLOL SUCCINATE 25 MG/1
25 TABLET, EXTENDED RELEASE ORAL DAILY
Status: DISCONTINUED | OUTPATIENT
Start: 2023-12-07 | End: 2023-12-08

## 2023-12-07 RX ORDER — SULFAMETHOXAZOLE AND TRIMETHOPRIM 800; 160 MG/1; MG/1
1 TABLET ORAL EVERY 12 HOURS SCHEDULED
Qty: 20 TABLET | Refills: 0 | Status: SHIPPED | OUTPATIENT
Start: 2023-12-07 | End: 2023-12-17

## 2023-12-07 RX ORDER — LACTULOSE 10 G/15ML
20 SOLUTION ORAL ONCE
Status: COMPLETED | OUTPATIENT
Start: 2023-12-07 | End: 2023-12-07

## 2023-12-07 RX ORDER — VANCOMYCIN HYDROCHLORIDE 50 MG/ML
250 KIT ORAL EVERY 12 HOURS SCHEDULED
Qty: 100 ML | Refills: 0 | Status: SHIPPED | OUTPATIENT
Start: 2023-12-07 | End: 2023-12-17

## 2023-12-07 RX ADMIN — Medication 1 CAPSULE: at 07:47

## 2023-12-07 RX ADMIN — POLYETHYLENE GLYCOL 3350 17 G: 17 POWDER, FOR SOLUTION ORAL at 07:46

## 2023-12-07 RX ADMIN — LACTULOSE 20 G: 20 SOLUTION ORAL at 14:14

## 2023-12-07 RX ADMIN — ACETAMINOPHEN 650 MG: 325 TABLET ORAL at 09:28

## 2023-12-07 RX ADMIN — MEROPENEM 1000 MG: 1 INJECTION, POWDER, FOR SOLUTION INTRAVENOUS at 20:46

## 2023-12-07 RX ADMIN — SULFAMETHOXAZOLE AND TRIMETHOPRIM 1 TABLET: 800; 160 TABLET ORAL at 07:47

## 2023-12-07 RX ADMIN — Medication 250 MG: at 07:46

## 2023-12-07 RX ADMIN — Medication 2 PUFF: at 20:55

## 2023-12-07 RX ADMIN — PREDNISONE 5 MG: 5 TABLET ORAL at 07:47

## 2023-12-07 RX ADMIN — Medication 10 ML: at 07:46

## 2023-12-07 RX ADMIN — MEROPENEM 1000 MG: 1 INJECTION, POWDER, FOR SOLUTION INTRAVENOUS at 14:17

## 2023-12-07 RX ADMIN — MEROPENEM 1000 MG: 1 INJECTION, POWDER, FOR SOLUTION INTRAVENOUS at 05:31

## 2023-12-07 RX ADMIN — AMLODIPINE BESYLATE 10 MG: 5 TABLET ORAL at 07:47

## 2023-12-07 RX ADMIN — ESCITALOPRAM OXALATE 10 MG: 10 TABLET ORAL at 07:47

## 2023-12-07 RX ADMIN — PANTOPRAZOLE SODIUM 40 MG: 40 INJECTION, POWDER, FOR SOLUTION INTRAVENOUS at 07:47

## 2023-12-07 RX ADMIN — Medication 1 CAPSULE: at 16:33

## 2023-12-07 RX ADMIN — Medication 10 ML: at 20:34

## 2023-12-07 RX ADMIN — METOPROLOL SUCCINATE 25 MG: 25 TABLET, EXTENDED RELEASE ORAL at 16:33

## 2023-12-07 RX ADMIN — TRAZODONE HYDROCHLORIDE 50 MG: 50 TABLET ORAL at 20:34

## 2023-12-07 RX ADMIN — Medication 2 PUFF: at 09:51

## 2023-12-07 RX ADMIN — SULFAMETHOXAZOLE AND TRIMETHOPRIM 1 TABLET: 800; 160 TABLET ORAL at 20:28

## 2023-12-07 RX ADMIN — Medication 250 MG: at 20:29

## 2023-12-07 RX ADMIN — Medication 400 UNITS: at 07:47

## 2023-12-07 ASSESSMENT — ENCOUNTER SYMPTOMS
DIARRHEA: 0
EYE DISCHARGE: 0
SORE THROAT: 0
BACK PAIN: 0
SHORTNESS OF BREATH: 0
EYE REDNESS: 0
COUGH: 0
WHEEZING: 0
CONSTIPATION: 0
SINUS PRESSURE: 0
ABDOMINAL PAIN: 0
SINUS PAIN: 0
RHINORRHEA: 0
NAUSEA: 0

## 2023-12-07 ASSESSMENT — PAIN DESCRIPTION - ORIENTATION: ORIENTATION: LEFT

## 2023-12-07 ASSESSMENT — PAIN DESCRIPTION - LOCATION: LOCATION: HIP

## 2023-12-07 ASSESSMENT — PAIN SCALES - GENERAL
PAINLEVEL_OUTOF10: 0
PAINLEVEL_OUTOF10: 0
PAINLEVEL_OUTOF10: 2

## 2023-12-07 NOTE — PROGRESS NOTES
Physician Progress Note      Wanda Casanova  CSN #:                  [id number]  :                       1940  ADMIT DATE:       2023 5:14 PM  DISCH DATE:  RESPONDING  PROVIDER #:        Olga Loomis CNP          QUERY TEXT:    Pt admitted with UTI. Pt noted to have continue self catheterization in PN   notes 12/3 If possible, please document in the progress notes and discharge   summary if you are evaluating and/or treating any of the following: The medical record reflects the following:  Risk Factors: sepsis, uti, Hematochezia. Clinical Indicators: H&P notes 12/3- Of note patient endorses that he has   occasional hematuria usually intermittently occurring due to   self-catheterization. States that he was recently treated for C. difficile a   few months ago. Urine analysis shows presence of possible UTI. Stool was   positive for occult blood. CT abdomen and pelvis positive for severe acute   cystitis with moderate bilateral hydroureteronephrosis, presence of   heterogeneous prostate gland with osteoblastic metastasis present. Treatment: Started on IV ceftriaxone on admission, Urine culture results   pending, Urology consult, Add lactobacillus. will place suprapubic catheter  Options provided:  -- UTI due to self catheterization  -- UTI not due to self catheterization  -- Other - I will add my own diagnosis  -- Disagree - Not applicable / Not valid  -- Disagree - Clinically unable to determine / Unknown  -- Refer to Clinical Documentation Reviewer    PROVIDER RESPONSE TEXT:    UTI is not due to self catheterization.     Query created by: Simi Greene on 2023 10:06 PM      Electronically signed by:  Olga Loomis CNP 2023 10:12 PM

## 2023-12-07 NOTE — PLAN OF CARE
Problem: Safety - Adult  Goal: Free from fall injury  Outcome: Progressing     Problem: Pain  Goal: Verbalizes/displays adequate comfort level or baseline comfort level  Outcome: Progressing  Flowsheets (Taken 12/6/2023 1915)  Verbalizes/displays adequate comfort level or baseline comfort level: Encourage patient to monitor pain and request assistance     Problem: ABCDS Injury Assessment  Goal: Absence of physical injury  Outcome: Progressing     Problem: Discharge Planning  Goal: Discharge to home or other facility with appropriate resources  Outcome: Progressing

## 2023-12-07 NOTE — PROGRESS NOTES
me today. Immunization History   Administered Date(s) Administered    COVID-19, PFIZER GRAY top, DO NOT Dilute, (age 15 y+), IM, 30 mcg/0.3 mL 04/28/2022    COVID-19, PFIZER PURPLE top, DILUTE for use, (age 15 y+), 30mcg/0.3mL 01/28/2021, 02/18/2021, 10/06/2021, 09/26/2022    COVID-19, PFIZER, (2023-24 formula), (age 12y+), IM, 30mcg/0.3mL 10/06/2023    Influenza Virus Vaccine 10/27/2011, 11/28/2013, 10/15/2014    Influenza, FLUAD, (age 72 y+), Adjuvanted, 0.5mL 09/30/2020, 11/04/2021, 10/19/2022, 10/31/2023    Influenza, High Dose (Fluzone 65 yrs and older) 10/01/2015, 10/05/2016, 11/17/2017, 10/29/2018    Influenza, Triv, inactivated, subunit, adjuvanted, IM (Fluad 65 yrs and older) 10/18/2019    Pneumococcal Conjugate 7-valent (Lacinda Caryn) 11/12/2014    Pneumococcal, PCV-13, PREVNAR 13, (age 6w+), IM, 0.5mL 11/19/2015    Pneumococcal, PPSV23, PNEUMOVAX 23, (age 2y+), SC/IM, 0.5mL 11/05/2008    Td, unspecified formulation 08/22/2005, 06/15/2015    Zoster Live (Zostavax) 11/01/2012    Zoster Recombinant (Shingrix) 08/07/2019, 10/08/2019       Known drug allergies: All allergies were reviewed and updated    Allergies   Allergen Reactions    Lisinopril      cough       Social history:     Social History:  All social andepidemiologic history was reviewed and updated by me today as needed. Tobacco use:   reports that he quit smoking about 36 years ago. His smoking use included cigarettes. He has a 15.00 pack-year smoking history. He has never used smokeless tobacco.  Alcohol use:   reports current alcohol use. Currently lives in: [address]   reports no history of drug use.      COVID VACCINATION AND LAB RESULT RECORDS:     Internal Administration   First Dose COVID-19, PFIZER PURPLE top, DILUTE for use, (age 15 y+), 30mcg/0.3mL  01/28/2021   Second Dose COVID-19, PFIZER PURPLE top, DILUTE for use, (age 15 y+), 30mcg/0.3mL   02/18/2021       Last COVID Lab SARS-CoV-2 (no units)   Date Value made to ensure the accuracy of this automated transcription, some errors in transcription may have occurred inadvertently. If you may need any clarification, please do not hesitate to contact me through EPIC or at the phone number provided below with my electronic signature. Any pictures or media included in this note were obtained after taking informed verbal consent from the patient and with their approval to include those in the patient's medical record. Jessica Motta MD, MPH, KOLE [place]  12/7/2023, 4:26 PM  [facility]   [facility] [address] ([address] [place] [address])  [facility] [place], [facility]  Office: [phone]  Fax: [phone]  In-person Clinic days:  Tuesday & Thursday a.m. Virtual clinic days: Monday, Wednesday & Friday a.m.

## 2023-12-07 NOTE — PLAN OF CARE
Problem: Safety - Adult  Goal: Free from fall injury  12/7/2023 0811 by Cathren Brunner, RN  Outcome: Progressing  12/6/2023 2259 by Nancy Weir RN  Outcome: Progressing     Problem: Pain  Goal: Verbalizes/displays adequate comfort level or baseline comfort level  12/7/2023 0811 by Cathren Brunner, RN  Outcome: Progressing  Flowsheets (Taken 12/7/2023 0430 by Nancy Weir RN)  Verbalizes/displays adequate comfort level or baseline comfort level: Encourage patient to monitor pain and request assistance  12/6/2023 2259 by Nancy Weir RN  Outcome: Progressing  Flowsheets (Taken 12/6/2023 1915)  Verbalizes/displays adequate comfort level or baseline comfort level: Encourage patient to monitor pain and request assistance     Problem: ABCDS Injury Assessment  Goal: Absence of physical injury  12/7/2023 0811 by Cathren Brunner, RN  Outcome: Progressing  12/6/2023 2259 by Nancy Weir RN  Outcome: Progressing     Problem: Chronic Conditions and Co-morbidities  Goal: Patient's chronic conditions and co-morbidity symptoms are monitored and maintained or improved  Outcome: Progressing     Problem: Discharge Planning  Goal: Discharge to home or other facility with appropriate resources  12/7/2023 0811 by Cathren Brunner, RN  Outcome: Progressing  12/6/2023 2259 by Nancy Weir RN  Outcome: Progressing     Problem: Gastrointestinal - Adult  Goal: Maintains or returns to baseline bowel function  Outcome: Progressing  Flowsheets (Taken 12/6/2023 2152 by Nancy Weir RN)  Maintains or returns to baseline bowel function: Assess bowel function     Problem: Genitourinary - Adult  Goal: Urinary catheter remains patent  Outcome: Progressing     Problem: Infection - Adult  Goal: Absence of infection at discharge  Outcome: Progressing     Problem: Metabolic/Fluid and Electrolytes - Adult  Goal: Electrolytes maintained within normal limits  Outcome: Progressing     Problem: Hematologic - Adult  Goal:

## 2023-12-07 NOTE — PROGRESS NOTES
CLINICAL PHARMACY NOTE: MEDS TO BEDS    Total # of Prescriptions Filled: 3   The following medications were delivered to the patient:  Firvanq 50mg/ml soln.   Bactrim DS  Acidophilus capsules    Additional Documentation:     Medications were picked up in the Outpatient Pharmacy by patient's wife Nabil Shen

## 2023-12-07 NOTE — CARE COORDINATION
Yes  Would you like Case Management to discuss the discharge plan with any other family members/significant others, and if so, who? Yes  Plans to Return to Present Housing: Yes  Other Identified Issues/Barriers to RETURNING to current housing:  NOne  Potential Assistance needed at discharge: [address] [place] (Referral made to [facility] who accepted.)            Potential DME:    Patient expects to discharge to: [address] [place] [address] for transportation at discharge: Family    Financial    Payor: [address] / Plan: MEDICARE PART A AND B / Product Type: *No Product type* /     Does insurance require precert for SNF: No    Potential assistance Purchasing Medications: No  Meds-to-Beds request: Yes      Karen Dinh [address] [facility], [facility] [address] [phone]  [facility] [address] [facility] [address]  Phone: [phone] Fax: [phone]      Notes:    Factors facilitating achievement of predicted outcomes: Family support, Cooperative, and Pleasant    Barriers to discharge: NOne    Additional Case Management Notes:  Patient recommended HHC and agreeable. Provided list.  No preference in agencies. Referral made to [facility] who accepted. Declined walker. No other needs identified. The Plan for Transition of Care is related to the following treatment goals of UTI (urinary tract infection) [N39.0]  Tachycardia [R00.0]  Acute pyelonephritis [N10]  Fever, unspecified fever cause [R50.9]  Gastrointestinal hemorrhage, unspecified gastrointestinal hemorrhage type [K92.2]  Other fatigue [R53.83]  Anemia, unspecified type [I72.8]    IF APPLICABLE: The Patient and/or patient representative Alber Fisher and his family were provided with a choice of provider and agrees with the discharge plan.  Freedom of choice list with basic dialogue that supports the patient's individualized plan of care/goals and shares the quality data associated with the providers was provided to: Patient   Patient Representative Name:

## 2023-12-07 NOTE — PROGRESS NOTES
[address] [facility] Department   Phone: [phone]    Occupational Therapy    [] Initial Evaluation            [x] Daily Treatment Note         [] Discharge Summary      Patient: Jhonatan Nelson   : 1940   MRN: [id number]   Date of Service:  2023    Admitting Diagnosis:  UTI (urinary tract infection)    Current Admission Summary: Jhonatan Nelson is a 78 y.o. 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 Admitted for sepsis and UTI. Past Medical History:  has a past medical history of Carotid artery occlusion and stenosis, Centrilobular emphysema ([address]), COPD (chronic obstructive pulmonary disease) ([address]), ED (erectile dysfunction), Emphysema lung ([address]), GERD (gastroesophageal reflux disease), Hyperlipidemia, Hypertension, Prostate CA ([address]), Prostate cancer metastatic to lung ([address]), Psoriasis, Pulmonary nodules, Radiation, and Urinary incontinence. Past Surgical History:  has a past surgical history that includes Prostatectomy (); Tonsillectomy; Carotid endarterectomy (5/3/12); Cystoscopy (2017); Colonoscopy; Endoscopy, colon, diagnostic; Carotid endarterectomy (Right, 10/25/2017); Cholecystectomy, laparoscopic (2018); skin biopsy (); Esophagus dilation (N/A, 1/3/2022); Upper gastrointestinal endoscopy (N/A, 1/3/2022); Colonoscopy (N/A, 2023); Colonoscopy (2023); and Cystoscopy (N/A, 12/3/2023). Discharge Recommendations: Jhonatan Nelson scored a 20/24 on the AM-PAC ADL Inpatient form. Current research shows that an AM-PAC score of 18 or greater is typically associated with a discharge to the patient's home setting.  Based on the patient's AM-PAC score, and their current ADL deficits, it is recommended that the patient have 2-3 sessions per week of Occupational Therapy at d/c to increase the patient's independence. At this time, this patient demonstrates the endurance and safety to discharge home with home (home vs OP services) and a follow up treatment frequency of 2-3x/wk. Please see assessment section for further patient specific details. If patient discharges prior to next session this note will serve as a discharge summary. Please see below for the latest assessment towards goals. HOME HEALTH CARE: LEVEL 1 STANDARD    - Initial home health evaluation to occur within 24-48 hours, in patient home   - Therapy to evaluate with goal of regaining prior level of functioning   - Therapy to evaluate if patient has [address] needs for personal care      DME Required For Discharge: no DME required at discharge    Precautions/Restrictions: medium fall risk, up as tolerated    Pre-Admission Information   Lives With: spouse                  Type of Home: house  Home Layout: two level, bedroom/bathroom upstairs, 13 stairs to 2nd level with bilateral HR  Home Access:  2 step to enter without rails   Bathroom Layout: tub only, walk in shower (Patient uses walk-in shower)  Toilet Height: standard height  Bathroom Equipment: grab bars in shower, built in [address]: reacher  Transfer Assistance: Independent without use of device  Ambulation Assistance:Independent without use of device  ADL Assistance: independent with all ADL's  IADL Assistance: independent with homemaking tasks  Active :        [x] Yes                 [] No  Hand Dominance: [] Left                 [x] Right  Current Employment: retired. Occupation: Self-employed    Hobbies: Hiking, golf  Recent Falls: Denies        Subjective  General: Pt sitting in recliner, stated hoping to go home later today but c/o constipation   Pain: 1/10.   Location: chronic low back pain  Pain Interventions: patient denies pain interventions     Activities of Daily Living  Basic Activities of Daily Living  Feeding:

## 2023-12-08 ENCOUNTER — TELEPHONE (OUTPATIENT)
Dept: FAMILY MEDICINE CLINIC | Age: 83
End: 2023-12-08

## 2023-12-08 VITALS
WEIGHT: 157.2 LBS | SYSTOLIC BLOOD PRESSURE: 120 MMHG | HEART RATE: 93 BPM | OXYGEN SATURATION: 97 % | DIASTOLIC BLOOD PRESSURE: 70 MMHG | HEIGHT: 70 IN | TEMPERATURE: 98 F | RESPIRATION RATE: 18 BRPM | BODY MASS INDEX: 22.5 KG/M2

## 2023-12-08 LAB
BACTERIA BLD CULT ORG #2: NORMAL
BACTERIA BLD CULT: NORMAL

## 2023-12-08 PROCEDURE — 6360000002 HC RX W HCPCS: Performed by: UROLOGY

## 2023-12-08 PROCEDURE — C9113 INJ PANTOPRAZOLE SODIUM, VIA: HCPCS | Performed by: UROLOGY

## 2023-12-08 PROCEDURE — 6370000000 HC RX 637 (ALT 250 FOR IP): Performed by: UROLOGY

## 2023-12-08 PROCEDURE — 94761 N-INVAS EAR/PLS OXIMETRY MLT: CPT

## 2023-12-08 PROCEDURE — 6370000000 HC RX 637 (ALT 250 FOR IP): Performed by: INTERNAL MEDICINE

## 2023-12-08 PROCEDURE — 6360000002 HC RX W HCPCS: Performed by: NURSE PRACTITIONER

## 2023-12-08 PROCEDURE — 2580000003 HC RX 258: Performed by: UROLOGY

## 2023-12-08 PROCEDURE — 6370000000 HC RX 637 (ALT 250 FOR IP): Performed by: NURSE PRACTITIONER

## 2023-12-08 PROCEDURE — 94640 AIRWAY INHALATION TREATMENT: CPT

## 2023-12-08 PROCEDURE — 2580000003 HC RX 258: Performed by: NURSE PRACTITIONER

## 2023-12-08 RX ORDER — AMLODIPINE BESYLATE 10 MG/1
5 TABLET ORAL DAILY
Qty: 45 TABLET | Refills: 0 | Status: SHIPPED
Start: 2023-12-08

## 2023-12-08 RX ORDER — METOPROLOL SUCCINATE 50 MG/1
50 TABLET, EXTENDED RELEASE ORAL DAILY
Status: DISCONTINUED | OUTPATIENT
Start: 2023-12-08 | End: 2023-12-08 | Stop reason: HOSPADM

## 2023-12-08 RX ADMIN — ESCITALOPRAM OXALATE 10 MG: 10 TABLET ORAL at 10:36

## 2023-12-08 RX ADMIN — ACETAMINOPHEN 650 MG: 325 TABLET ORAL at 00:16

## 2023-12-08 RX ADMIN — Medication 1 CAPSULE: at 10:43

## 2023-12-08 RX ADMIN — PREDNISONE 5 MG: 5 TABLET ORAL at 10:37

## 2023-12-08 RX ADMIN — MEROPENEM 1000 MG: 1 INJECTION, POWDER, FOR SOLUTION INTRAVENOUS at 05:52

## 2023-12-08 RX ADMIN — Medication 250 MG: at 10:54

## 2023-12-08 RX ADMIN — METOPROLOL SUCCINATE 50 MG: 50 TABLET, EXTENDED RELEASE ORAL at 10:43

## 2023-12-08 RX ADMIN — PANTOPRAZOLE SODIUM 40 MG: 40 INJECTION, POWDER, FOR SOLUTION INTRAVENOUS at 10:37

## 2023-12-08 RX ADMIN — Medication 10 ML: at 10:49

## 2023-12-08 RX ADMIN — TIOTROPIUM BROMIDE INHALATION SPRAY 2 PUFF: 3.12 SPRAY, METERED RESPIRATORY (INHALATION) at 08:42

## 2023-12-08 RX ADMIN — Medication 400 UNITS: at 10:37

## 2023-12-08 RX ADMIN — SULFAMETHOXAZOLE AND TRIMETHOPRIM 1 TABLET: 800; 160 TABLET ORAL at 10:53

## 2023-12-08 RX ADMIN — Medication 2 PUFF: at 08:42

## 2023-12-08 ASSESSMENT — PAIN SCALES - GENERAL: PAINLEVEL_OUTOF10: 2

## 2023-12-08 ASSESSMENT — PAIN DESCRIPTION - ORIENTATION: ORIENTATION: RIGHT

## 2023-12-08 ASSESSMENT — PAIN DESCRIPTION - LOCATION: LOCATION: ANKLE

## 2023-12-08 ASSESSMENT — PAIN DESCRIPTION - DESCRIPTORS: DESCRIPTORS: ACHING;SHARP

## 2023-12-08 NOTE — CARE COORDINATION
[address] home care referral. Spoke with pt and re: home care plan of care/services. Agreeable. Demographic's verified. Aware of discharge with home care. Home care orders sent to [facility]. Discharge planner notified.

## 2023-12-08 NOTE — PROGRESS NOTES
RN discharge summary from [address] [place] to home. This patient has had a discharge order placed. They are returning home and being picked up in the lobby. Discharge paperwork has been printed, highlighted, and gone over with the patient by this RN. Patient understands teaching and has no further questions at this time. IV has been removed with no complications. Telemetry has been removed. Pt has all belongings present.

## 2023-12-08 NOTE — PROGRESS NOTES
Pt assessment completed, Vital signs are within normal range. Pt in bed with eyes close. Call light is within reach. No action needed at this time.

## 2023-12-08 NOTE — TELEPHONE ENCOUNTER
Isael Coyne with [facility] is calling for verbal order for PT, OT, nursing    [phone] okay to leave vm on secured line

## 2023-12-08 NOTE — PLAN OF CARE
Problem: Safety - Adult  Goal: Free from fall injury  Outcome: Progressing     Problem: Pain  Goal: Verbalizes/displays adequate comfort level or baseline comfort level  Outcome: Progressing     Problem: ABCDS Injury Assessment  Goal: Absence of physical injury  Outcome: Progressing     Problem: Chronic Conditions and Co-morbidities  Goal: Patient's chronic conditions and co-morbidity symptoms are monitored and maintained or improved  Outcome: Progressing     Problem: Discharge Planning  Goal: Discharge to home or other facility with appropriate resources  Outcome: Progressing     Problem: Gastrointestinal - Adult  Goal: Maintains or returns to baseline bowel function  Outcome: Progressing     Problem: Genitourinary - Adult  Goal: Urinary catheter remains patent  Outcome: Progressing     Problem: Metabolic/Fluid and Electrolytes - Adult  Goal: Electrolytes maintained within normal limits  Outcome: Progressing     Problem: Hematologic - Adult  Goal: Maintains hematologic stability  Outcome: Progressing

## 2023-12-09 LAB — STFR SERPL-SCNC: 2.8 MG/L (ref 2.2–5)

## 2023-12-11 ENCOUNTER — CARE COORDINATION (OUTPATIENT)
Dept: CASE MANAGEMENT | Age: 83
End: 2023-12-11

## 2023-12-11 LAB — PATH INTERP BLD-IMP: NORMAL

## 2023-12-11 NOTE — CARE COORDINATION
Care Transitions Initial Follow Up Call    Call within 2 business days of discharge: Yes    First attempt at 24 hour discharge call, no answer, CTN left  with contact information and request for return call. CTN attempted home number, that number has been disconnected. CTN will continue with outreach call attempts. CTN reached out to [facility] and confirmed that they do have orders for [facility] AT [facility].       Follow Up  Future Appointments   Date Time Provider [address]   3/12/2024  8:30 AM Miky Guillaume MD PULM & CC [facility]   5/29/2024  1:00 PM Maryana García MD [address] [place] [address]       Yue Forbes RN

## 2023-12-12 ENCOUNTER — CARE COORDINATION (OUTPATIENT)
Dept: CASE MANAGEMENT | Age: 83
End: 2023-12-12

## 2023-12-12 DIAGNOSIS — C61 PROSTATE CANCER METASTATIC TO BONE (HCC): Primary | ICD-10-CM

## 2023-12-12 DIAGNOSIS — C79.51 PROSTATE CANCER METASTATIC TO BONE (HCC): Primary | ICD-10-CM

## 2023-12-12 PROCEDURE — 1111F DSCHRG MED/CURRENT MED MERGE: CPT | Performed by: FAMILY MEDICINE

## 2023-12-12 NOTE — CARE COORDINATION
Care Transitions Initial Follow Up Call    Call within 2 business days of discharge: Yes    Patient Current Location:  Home: [address] [place] [address] [address]    Care Transition Nurse contacted the patient by telephone to perform post hospital discharge assessment. Verified name and  with patient as identifiers. Provided introduction to self, and explanation of the Care Transition Nurse role. Patient: Maureen Aguila Patient : 1940   MRN: [id number]  Reason for Admission: fever, prostate cancer with mets, on chemo, endorsing temp of 103 at home today, diarrhea, fatigue. Discharge Date: 23 RARS: Readmission Risk Score: 17.9      Last Discharge Facility       Date Complaint Diagnosis Description Type Department Provider    23 Fever; Fatigue Acute pyelonephritis . .. ED to Hosp-Admission (Discharged) (ADMITTED) [facility] Paige Alberto MD; Harini Cervantes. .. Was this an external facility discharge? No Discharge Facility:     Challenges to be reviewed by the provider   Additional needs identified to be addressed with provider: No  none               Method of communication with provider: none. Patient reports that he is doing well, afebrile, and denies any diarrhea. He is resting well, having normal bowel movements and denies any questions or concerns at this time. [facility] has been out to visit and he does not have a follow up scheduled with Dr. Kevin Salazar. CTN will route message to PCP. Discussed discharge instructions and reviewed medications, 1111F completed. Patient has all of his medications and is taking them as prescribed. CTN will continue with outreach follow up calls. Care Transition Nurse reviewed discharge instructions, medical action plan, and red flags with patient who verbalized understanding. The patient was given an opportunity to ask questions and does not have any further questions or concerns at this time.  Were discharge instructions available to

## 2023-12-14 ENCOUNTER — TELEPHONE (OUTPATIENT)
Dept: FAMILY MEDICINE CLINIC | Age: 83
End: 2023-12-14

## 2023-12-14 LAB
ALBUMIN SERPL-MCNC: 3.7 G/DL (ref 3.4–5)
ALBUMIN/GLOB SERPL: 1.8 {RATIO} (ref 1.1–2.2)
ALP SERPL-CCNC: 73 U/L (ref 40–129)
ALT SERPL-CCNC: 31 U/L (ref 10–40)
ANION GAP SERPL CALCULATED.3IONS-SCNC: 10 MMOL/L (ref 3–16)
AST SERPL-CCNC: 30 U/L (ref 15–37)
BASOPHILS # BLD: 0 K/UL (ref 0–0.2)
BASOPHILS NFR BLD: 0 %
BILIRUB SERPL-MCNC: 0.4 MG/DL (ref 0–1)
BUN SERPL-MCNC: 11 MG/DL (ref 7–20)
BURR CELLS BLD QL SMEAR: ABNORMAL
CALCIUM SERPL-MCNC: 8.5 MG/DL (ref 8.3–10.6)
CHLORIDE SERPL-SCNC: 101 MMOL/L (ref 99–110)
CO2 SERPL-SCNC: 25 MMOL/L (ref 21–32)
CREAT SERPL-MCNC: 1.1 MG/DL (ref 0.8–1.3)
DEPRECATED RDW RBC AUTO: 19.5 % (ref 12.4–15.4)
EOSINOPHIL # BLD: 0 K/UL (ref 0–0.6)
EOSINOPHIL NFR BLD: 0 %
GFR SERPLBLD CREATININE-BSD FMLA CKD-EPI: >60 ML/MIN/{1.73_M2}
GLUCOSE SERPL-MCNC: 103 MG/DL (ref 70–99)
HCT VFR BLD AUTO: 29.6 % (ref 40.5–52.5)
HGB BLD-MCNC: 9.8 G/DL (ref 13.5–17.5)
LYMPHOCYTES # BLD: 2.5 K/UL (ref 1–5.1)
LYMPHOCYTES NFR BLD: 24 %
MCH RBC QN AUTO: 30.1 PG (ref 26–34)
MCHC RBC AUTO-ENTMCNC: 33.2 G/DL (ref 31–36)
MCV RBC AUTO: 90.7 FL (ref 80–100)
METAMYELOCYTES NFR BLD MANUAL: 1 %
MONOCYTES # BLD: 0.5 K/UL (ref 0–1.3)
MONOCYTES NFR BLD: 5 %
NEUTROPHILS # BLD: 7.5 K/UL (ref 1.7–7.7)
NEUTROPHILS NFR BLD: 65 %
NEUTS BAND NFR BLD MANUAL: 5 % (ref 0–7)
OVALOCYTES BLD QL SMEAR: ABNORMAL
PLATELET # BLD AUTO: 503 K/UL (ref 135–450)
PMV BLD AUTO: 7 FL (ref 5–10.5)
POIKILOCYTOSIS BLD QL SMEAR: ABNORMAL
POLYCHROMASIA BLD QL SMEAR: ABNORMAL
POTASSIUM SERPL-SCNC: 4.8 MMOL/L (ref 3.5–5.1)
PROT SERPL-MCNC: 5.8 G/DL (ref 6.4–8.2)
RBC # BLD AUTO: 3.27 M/UL (ref 4.2–5.9)
SLIDE REVIEW: ABNORMAL
SODIUM SERPL-SCNC: 136 MMOL/L (ref 136–145)
SPHEROCYTES BLD QL SMEAR: ABNORMAL
WBC # BLD AUTO: 10.5 K/UL (ref 4–11)

## 2023-12-14 NOTE — TELEPHONE ENCOUNTER
bakari Yin will need a hospital follow up appointment with Dr. Kylee Schmitt. Would someone please reach out and assist with scheduling this appointment? He/She was discharged from [facility] 12/8/23.   Thank you,  Beverly Myers

## 2023-12-14 NOTE — TELEPHONE ENCOUNTER
Care Transitions Initial Follow Up Call    Outreach made within 2 business days of discharge: Yes    Patient: Lily Henderson Patient : 1940   MRN: [id number]  Reason for Admission: There are no discharge diagnoses documented for the most recent discharge.   Discharge Date: 23       Spoke with: lvm Karla Fleischer, [address] [place] [address]

## 2023-12-27 ENCOUNTER — CARE COORDINATION (OUTPATIENT)
Dept: CARE COORDINATION | Age: 83
End: 2023-12-27

## 2023-12-27 NOTE — CARE COORDINATION
Care Transitions Outreach Attempt    Attempted to reach patient for transitions of care follow up. Unable to reach patient. Patient: Chidi Webber Patient : 1940 MRN: [id number]    Last Discharge Facility       Date Complaint Diagnosis Description Type Department Provider    23 Fever; Fatigue Acute pyelonephritis . .. ED to Hosp-Admission (Discharged) (ADMITTED) [address] Jami Whalen MD; Terrance Alvarez. ..           Noted following upcoming appointments from discharge chart review:   [facility] follow up appointment(s):   Future Appointments   Date Time Provider [address]   3/12/2024  8:30 AM Ann-Marie Gonzalez MD P[facility]   2024  1:00 PM MD MELANIE Laura     Non-[facility]  follow up appointment(s): NA

## 2024-01-02 PROBLEM — N39.0 UTI (URINARY TRACT INFECTION): Status: RESOLVED | Noted: 2023-12-03 | Resolved: 2024-01-02

## 2024-01-03 ENCOUNTER — CARE COORDINATION (OUTPATIENT)
Dept: CASE MANAGEMENT | Age: 84
End: 2024-01-03

## 2024-01-03 NOTE — CARE COORDINATION
Second & final follow up outreach call attempt, no answer.  CTN left VM with contact information and request for return call.  CTN will close program & transition to AC for continued disease management.

## 2024-01-05 ENCOUNTER — CARE COORDINATION (OUTPATIENT)
Dept: CARE COORDINATION | Age: 84
End: 2024-01-05

## 2024-01-05 NOTE — CARE COORDINATION
cc outreach ctn referral. Episode created. Left vm.Waiting on return call to finish initial cc screening,fr,edu,goals.

## 2024-01-09 ENCOUNTER — TELEPHONE (OUTPATIENT)
Dept: FAMILY MEDICINE CLINIC | Age: 84
End: 2024-01-09

## 2024-01-09 NOTE — TELEPHONE ENCOUNTER
Maria Teresa called from [facility] and wanted to leave this message with Dr García: Nursing has been discharged but therapy stays with him

## 2024-01-17 NOTE — PROGRESS NOTES
Name_______________________________________Printed:____________________  Date and time of surgery__1/25/24  1600______________________Arrival Time:__1430  MAIN______________   1. The instructions given regarding when and if a patient needs to stop oral intake prior to surgery varies.Follow the specific instructions you were given                  __x_Nothing to eat or to drink after Midnight the night before.                   ____Carbo loading or instructions will be given to select patients-if you have been given those instructions -please do the following                           The evening before your surgery after dinner before midnight drink 40 ounces of gatorade.If you are diabetic use sugar free.  The morning of surgery drink 40 ounces of water.This needs to be finished 3 hours prior to your surgery start time.    2. Take the following pills with a small sip of water on the morning of surgery__escitalopram, metoprolol, prednisone, pantoprazole_________________________________________________                  Do not take blood pressure medications ending in pril or sartan the mary prior to surgery or the morning of surgery. Dr Blanco's patient are not to take any medications the AM of surgery.         3. Aspirin, Ibuprofen, Advil, Naproxen, Vitamin E and other Anti-inflammatory products and supplements should be stopped for 5 -7days before surgery or as directed by your physician.   4. Check with your Doctor regarding stopping Plavix, Coumadin,Eliquis, Lovenox,Effient,Pradaxa,Xarelto, Fragmin or other blood thinners and follow their instructions.   5. Do not smoke, and do not drink any alcoholic beverages 24 hours prior to surgery.  This includes NA Beer.Refrain from the usage of any recreational drugs.   6. You may brush your teeth and gargle the morning of surgery.  DO NOT SWALLOW WATER   7. You MUST make arrangements for a responsible adult to stay on site while you are here and take you home after your  given time.             18.  Please bring picture ID and insurance card.             19.  Visit our web site for additional information:  [email or web address]              20.During flu season no children under the age of 14 are permitted in the hospital for the safety of all patients.                              21. If you take a long acting insulin in the evening only  take half of your usual  dose the night  before your procedure              22. If you use a c-pap please bring DOS if staying overnight,             23.For your convenience [facility] has a pharmacy on site to fill your prescriptions.             24. If you use oxygen and have a portable tank please bring it  with you the DOS             25. Bring a complete list of all your medications with name and dose include any supplements.             26. Other__Use inhalers as prescribed, terrie albuterol inhaler with you and use nebulizers as needed day of surgery________________________________________   *Please call pre admission testing if you any further questions   [place]         [phone]   [place] [phone]   [place]            [phone]    [facility]  [phone]   [facility] [place]   [phone]       VISITOR POLICY(subject to change)    Current policy is 2 visitors per patient. No children. Mask is  at the discretion of the facility. Visiting hours are 8a-8p. Overnight visitors will be at the discretion of the nurse. All policies subject to change.      All above information reviewed with patient in person or by phone.Patient verbalizes understanding.All questions and concerns addressed.                                                                                                 Patient/Rep__patient__________________                                                                                                                                    PRE OP INSTRUCTIONS

## 2024-01-25 ENCOUNTER — ANESTHESIA EVENT (OUTPATIENT)
Dept: OPERATING ROOM | Age: 84
End: 2024-01-25
Payer: MEDICARE

## 2024-01-25 ENCOUNTER — ANESTHESIA (OUTPATIENT)
Dept: OPERATING ROOM | Age: 84
End: 2024-01-25
Payer: MEDICARE

## 2024-01-25 ENCOUNTER — HOSPITAL ENCOUNTER (OUTPATIENT)
Age: 84
Setting detail: OUTPATIENT SURGERY
Discharge: HOME | End: 2024-01-25
Attending: UROLOGY | Admitting: UROLOGY
Payer: MEDICARE

## 2024-01-25 VITALS
HEIGHT: 70 IN | RESPIRATION RATE: 16 BRPM | DIASTOLIC BLOOD PRESSURE: 77 MMHG | SYSTOLIC BLOOD PRESSURE: 145 MMHG | OXYGEN SATURATION: 96 % | WEIGHT: 143.4 LBS | BODY MASS INDEX: 20.53 KG/M2 | HEART RATE: 79 BPM | TEMPERATURE: 97.8 F

## 2024-01-25 DIAGNOSIS — N99.112 POSTPROCEDURAL MEMBRANOUS URETHRAL STRICTURE: Primary | ICD-10-CM

## 2024-01-25 PROCEDURE — 2500000003 HC RX 250 WO HCPCS: Performed by: NURSE ANESTHETIST, CERTIFIED REGISTERED

## 2024-01-25 PROCEDURE — 6360000002 HC RX W HCPCS: Performed by: NURSE ANESTHETIST, CERTIFIED REGISTERED

## 2024-01-25 PROCEDURE — 7100000010 HC PHASE II RECOVERY - FIRST 15 MIN: Performed by: UROLOGY

## 2024-01-25 PROCEDURE — 3600000004 HC SURGERY LEVEL 4 BASE: Performed by: UROLOGY

## 2024-01-25 PROCEDURE — 7100000001 HC PACU RECOVERY - ADDTL 15 MIN: Performed by: UROLOGY

## 2024-01-25 PROCEDURE — 2709999900 HC NON-CHARGEABLE SUPPLY: Performed by: UROLOGY

## 2024-01-25 PROCEDURE — C1894 INTRO/SHEATH, NON-LASER: HCPCS | Performed by: UROLOGY

## 2024-01-25 PROCEDURE — 3600000014 HC SURGERY LEVEL 4 ADDTL 15MIN: Performed by: UROLOGY

## 2024-01-25 PROCEDURE — 3700000000 HC ANESTHESIA ATTENDED CARE: Performed by: UROLOGY

## 2024-01-25 PROCEDURE — 2580000003 HC RX 258: Performed by: UROLOGY

## 2024-01-25 PROCEDURE — 3700000001 HC ADD 15 MINUTES (ANESTHESIA): Performed by: UROLOGY

## 2024-01-25 PROCEDURE — 7100000000 HC PACU RECOVERY - FIRST 15 MIN: Performed by: UROLOGY

## 2024-01-25 PROCEDURE — 7100000011 HC PHASE II RECOVERY - ADDTL 15 MIN: Performed by: UROLOGY

## 2024-01-25 PROCEDURE — 6360000002 HC RX W HCPCS: Performed by: UROLOGY

## 2024-01-25 RX ORDER — MAGNESIUM HYDROXIDE 1200 MG/15ML
LIQUID ORAL
Status: COMPLETED | OUTPATIENT
Start: 2024-01-25 | End: 2024-01-25

## 2024-01-25 RX ORDER — OXYCODONE HYDROCHLORIDE 5 MG/1
5 TABLET ORAL
Status: DISCONTINUED | OUTPATIENT
Start: 2024-01-25 | End: 2024-01-25 | Stop reason: HOSPADM

## 2024-01-25 RX ORDER — SODIUM CHLORIDE 0.9 % (FLUSH) 0.9 %
5-40 SYRINGE (ML) INJECTION EVERY 12 HOURS SCHEDULED
Status: CANCELLED | OUTPATIENT
Start: 2024-01-25

## 2024-01-25 RX ORDER — SODIUM CHLORIDE 9 MG/ML
INJECTION, SOLUTION INTRAVENOUS PRN
Status: CANCELLED | OUTPATIENT
Start: 2024-01-25

## 2024-01-25 RX ORDER — SODIUM CHLORIDE, SODIUM LACTATE, POTASSIUM CHLORIDE, CALCIUM CHLORIDE 600; 310; 30; 20 MG/100ML; MG/100ML; MG/100ML; MG/100ML
INJECTION, SOLUTION INTRAVENOUS CONTINUOUS
Status: CANCELLED | OUTPATIENT
Start: 2024-01-25

## 2024-01-25 RX ORDER — SODIUM CHLORIDE 0.9 % (FLUSH) 0.9 %
5-40 SYRINGE (ML) INJECTION PRN
Status: CANCELLED | OUTPATIENT
Start: 2024-01-25

## 2024-01-25 RX ORDER — LEVOFLOXACIN 5 MG/ML
500 INJECTION, SOLUTION INTRAVENOUS
Status: COMPLETED | OUTPATIENT
Start: 2024-01-25 | End: 2024-01-25

## 2024-01-25 RX ORDER — SODIUM CHLORIDE 0.9 % (FLUSH) 0.9 %
5-40 SYRINGE (ML) INJECTION PRN
Status: DISCONTINUED | OUTPATIENT
Start: 2024-01-25 | End: 2024-01-25 | Stop reason: HOSPADM

## 2024-01-25 RX ORDER — ONDANSETRON 2 MG/ML
INJECTION INTRAMUSCULAR; INTRAVENOUS PRN
Status: DISCONTINUED | OUTPATIENT
Start: 2024-01-25 | End: 2024-01-25 | Stop reason: SDUPTHER

## 2024-01-25 RX ORDER — APREPITANT 40 MG/1
40 CAPSULE ORAL ONCE
Status: CANCELLED | OUTPATIENT
Start: 2024-01-25

## 2024-01-25 RX ORDER — CIPROFLOXACIN 2 MG/ML
400 INJECTION, SOLUTION INTRAVENOUS
Status: DISCONTINUED | OUTPATIENT
Start: 2024-01-25 | End: 2024-01-25 | Stop reason: RX

## 2024-01-25 RX ORDER — GLYCOPYRROLATE 0.2 MG/ML
INJECTION INTRAMUSCULAR; INTRAVENOUS PRN
Status: DISCONTINUED | OUTPATIENT
Start: 2024-01-25 | End: 2024-01-25 | Stop reason: SDUPTHER

## 2024-01-25 RX ORDER — SODIUM CHLORIDE 9 MG/ML
INJECTION, SOLUTION INTRAVENOUS PRN
Status: DISCONTINUED | OUTPATIENT
Start: 2024-01-25 | End: 2024-01-25 | Stop reason: HOSPADM

## 2024-01-25 RX ORDER — SODIUM CHLORIDE 0.9 % (FLUSH) 0.9 %
5-40 SYRINGE (ML) INJECTION EVERY 12 HOURS SCHEDULED
Status: DISCONTINUED | OUTPATIENT
Start: 2024-01-25 | End: 2024-01-25 | Stop reason: HOSPADM

## 2024-01-25 RX ORDER — HYDROMORPHONE HYDROCHLORIDE 2 MG/ML
0.5 INJECTION, SOLUTION INTRAMUSCULAR; INTRAVENOUS; SUBCUTANEOUS EVERY 5 MIN PRN
Status: DISCONTINUED | OUTPATIENT
Start: 2024-01-25 | End: 2024-01-25 | Stop reason: HOSPADM

## 2024-01-25 RX ORDER — GLYCOPYRROLATE 0.2 MG/ML
INJECTION INTRAMUSCULAR; INTRAVENOUS PRN
Status: DISCONTINUED | OUTPATIENT
Start: 2024-01-25 | End: 2024-01-25

## 2024-01-25 RX ORDER — BUPIVACAINE HYDROCHLORIDE 5 MG/ML
INJECTION, SOLUTION EPIDURAL; INTRACAUDAL
Status: COMPLETED | OUTPATIENT
Start: 2024-01-25 | End: 2024-01-25

## 2024-01-25 RX ORDER — FENTANYL CITRATE 50 UG/ML
50 INJECTION, SOLUTION INTRAMUSCULAR; INTRAVENOUS EVERY 5 MIN PRN
Status: DISCONTINUED | OUTPATIENT
Start: 2024-01-25 | End: 2024-01-25 | Stop reason: HOSPADM

## 2024-01-25 RX ORDER — ACETAMINOPHEN 325 MG/1
650 TABLET ORAL ONCE
Status: CANCELLED | OUTPATIENT
Start: 2024-01-25 | End: 2024-01-25

## 2024-01-25 RX ORDER — HYDROCODONE BITARTRATE AND ACETAMINOPHEN 5; 325 MG/1; MG/1
1 TABLET ORAL EVERY 6 HOURS PRN
Qty: 12 TABLET | Refills: 0 | Status: SHIPPED | OUTPATIENT
Start: 2024-01-25 | End: 2024-01-28

## 2024-01-25 RX ORDER — SODIUM CHLORIDE, SODIUM LACTATE, POTASSIUM CHLORIDE, CALCIUM CHLORIDE 600; 310; 30; 20 MG/100ML; MG/100ML; MG/100ML; MG/100ML
INJECTION, SOLUTION INTRAVENOUS CONTINUOUS
Status: DISCONTINUED | OUTPATIENT
Start: 2024-01-25 | End: 2024-01-25 | Stop reason: HOSPADM

## 2024-01-25 RX ORDER — PROPOFOL 10 MG/ML
INJECTION, EMULSION INTRAVENOUS PRN
Status: DISCONTINUED | OUTPATIENT
Start: 2024-01-25 | End: 2024-01-25 | Stop reason: SDUPTHER

## 2024-01-25 RX ORDER — FENTANYL CITRATE 50 UG/ML
INJECTION, SOLUTION INTRAMUSCULAR; INTRAVENOUS PRN
Status: DISCONTINUED | OUTPATIENT
Start: 2024-01-25 | End: 2024-01-25 | Stop reason: SDUPTHER

## 2024-01-25 RX ORDER — DEXAMETHASONE SODIUM PHOSPHATE 4 MG/ML
INJECTION, SOLUTION INTRA-ARTICULAR; INTRALESIONAL; INTRAMUSCULAR; INTRAVENOUS; SOFT TISSUE PRN
Status: DISCONTINUED | OUTPATIENT
Start: 2024-01-25 | End: 2024-01-25 | Stop reason: SDUPTHER

## 2024-01-25 RX ORDER — ONDANSETRON 2 MG/ML
4 INJECTION INTRAMUSCULAR; INTRAVENOUS
Status: DISCONTINUED | OUTPATIENT
Start: 2024-01-25 | End: 2024-01-25 | Stop reason: HOSPADM

## 2024-01-25 RX ORDER — LIDOCAINE HYDROCHLORIDE 20 MG/ML
INJECTION, SOLUTION EPIDURAL; INFILTRATION; INTRACAUDAL; PERINEURAL PRN
Status: DISCONTINUED | OUTPATIENT
Start: 2024-01-25 | End: 2024-01-25 | Stop reason: SDUPTHER

## 2024-01-25 RX ORDER — LIDOCAINE HYDROCHLORIDE 10 MG/ML
0.5 INJECTION, SOLUTION EPIDURAL; INFILTRATION; INTRACAUDAL; PERINEURAL ONCE
Status: DISCONTINUED | OUTPATIENT
Start: 2024-01-25 | End: 2024-01-25 | Stop reason: HOSPADM

## 2024-01-25 RX ADMIN — LEVOFLOXACIN 500 MG: 500 INJECTION, SOLUTION INTRAVENOUS at 15:53

## 2024-01-25 RX ADMIN — FENTANYL CITRATE 50 MCG: 50 INJECTION, SOLUTION INTRAMUSCULAR; INTRAVENOUS at 16:16

## 2024-01-25 RX ADMIN — PHENYLEPHRINE HYDROCHLORIDE 100 MCG: 10 INJECTION INTRAVENOUS at 16:21

## 2024-01-25 RX ADMIN — DEXAMETHASONE SODIUM PHOSPHATE 8 MG: 4 INJECTION, SOLUTION INTRAMUSCULAR; INTRAVENOUS at 16:08

## 2024-01-25 RX ADMIN — GLYCOPYRROLATE 0.2 MG: 0.2 INJECTION, SOLUTION INTRAMUSCULAR; INTRAVENOUS at 16:15

## 2024-01-25 RX ADMIN — FENTANYL CITRATE 50 MCG: 50 INJECTION, SOLUTION INTRAMUSCULAR; INTRAVENOUS at 16:03

## 2024-01-25 RX ADMIN — PROPOFOL 50 MG: 10 INJECTION, EMULSION INTRAVENOUS at 16:11

## 2024-01-25 RX ADMIN — PHENYLEPHRINE HYDROCHLORIDE 100 MCG: 10 INJECTION INTRAVENOUS at 16:11

## 2024-01-25 RX ADMIN — SODIUM CHLORIDE, POTASSIUM CHLORIDE, SODIUM LACTATE AND CALCIUM CHLORIDE: 600; 310; 30; 20 INJECTION, SOLUTION INTRAVENOUS at 15:06

## 2024-01-25 RX ADMIN — LIDOCAINE HYDROCHLORIDE 50 MG: 20 INJECTION, SOLUTION EPIDURAL; INFILTRATION; INTRACAUDAL; PERINEURAL at 16:05

## 2024-01-25 RX ADMIN — PHENYLEPHRINE HYDROCHLORIDE 100 MCG: 10 INJECTION INTRAVENOUS at 16:16

## 2024-01-25 RX ADMIN — ONDANSETRON 4 MG: 2 INJECTION INTRAMUSCULAR; INTRAVENOUS at 16:09

## 2024-01-25 RX ADMIN — PROPOFOL 100 MG: 10 INJECTION, EMULSION INTRAVENOUS at 16:05

## 2024-01-25 ASSESSMENT — LIFESTYLE VARIABLES: SMOKING_STATUS: 0

## 2024-01-25 ASSESSMENT — PAIN - FUNCTIONAL ASSESSMENT: PAIN_FUNCTIONAL_ASSESSMENT: 0-10

## 2024-01-25 ASSESSMENT — ENCOUNTER SYMPTOMS: SHORTNESS OF BREATH: 0

## 2024-01-25 NOTE — H&P
Urology History and Physical  Inpatient Setting - [facility]    Provider: Jozef Patel MD  Patient ID:  Admission Date: 2024 Name: Clinton Bennett  OR Date: n/a MRN: [id number]   Patient Location: OR/NONE : 1940  Attending: Jozef Patel MD Date of Service: 2024  PCP: Oskar García MD     Diagnoses:  Chronic urinary retention    Assessment/Plan:  Continue to the OR as planned for SP tube placement with cystoscopy    All the patients questions were answered in detail. He understands the plan as listed above.                                                                                                                                               _____________________________________________________________    CC: Pre-op    HPI: Clinton Bennett is a 83 y.o. male.  The history and physical exam was reviewed. The patient was seen and examined in pre-op. He had a chance to ask questions which were answered. There has been no interval changes. Plan to continue to the OR    Past Medical History:   He has a past medical history of Carotid artery occlusion and stenosis (2012), Centrilobular emphysema ([facility]) (2014), COPD (chronic obstructive pulmonary disease) (), ED (erectile dysfunction), Emphysema lung ([facility]) (2014), GERD (gastroesophageal reflux disease), Hyperlipidemia, Hypertension, Prostate CA ([facility]) (), Prostate cancer metastatic to lung ([facility]) (2023), Psoriasis, Pulmonary nodules, Radiation (), and Urinary incontinence.    Past Surgical History:  He has a past surgical history that includes Prostatectomy (); Tonsillectomy; Carotid endarterectomy (5/3/12); Cystoscopy (2017); Colonoscopy; Endoscopy, colon, diagnostic; Carotid endarterectomy (Right, 10/25/2017); Cholecystectomy, laparoscopic (2018); skin biopsy (); Esophagus dilation (N/A, 1/3/2022); Upper gastrointestinal endoscopy (N/A, 1/3/2022); Colonoscopy (N/A,

## 2024-01-25 NOTE — FLOWSHEET NOTE
Discharge note: Discharge order obtained, and discharge instructions reviewed. Patient educated, using the teach back method, about follow up instructions and discharge instructions. A completed copy of the AVS instructions given to patient and all questions answered. IV catheter removed without complaints, catheter intact, site WNL. Discharged to lobby via wheel chair per RN.

## 2024-01-25 NOTE — ANESTHESIA PRE PROCEDURE
Anesthesia Plan      general     ASA 3     (  Pt seen and examined, chart reviewed (including anesthesia, drug and allergy history).  No interval changes to history and physical examination.  Anesthetic plan, risks, benefits, alternatives, and personnel involved discussed with patient.  Patient verbalized an understanding and agrees to proceed.   )  Induction: intravenous.    MIPS: Postoperative opioids intended.  Anesthetic plan and risks discussed with patient.      Plan discussed with CRNA.                  Isaiah Ferguson MD   1/25/2024

## 2024-01-25 NOTE — ANESTHESIA POSTPROCEDURE EVALUATION
Department of Anesthesiology  Postprocedure Note    Patient: Clinton Bennett  MRN: [id number]  YOB: 1940  Date of evaluation: 1/25/2024    Procedure Summary       Date: 01/25/24 Room / Location: [address]    Anesthesia Start: 1559 Anesthesia Stop: 1643    Procedure: CYSTOSCOPY WITH SUPRAPUBIC TUBE PLACEMENT Diagnosis:       Retention of urine      (Retention of urine [R33.9])    Surgeons: Jozef Patel MD Responsible Provider: Tiara Carrasco MD    Anesthesia Type: general ASA Status: 3            Anesthesia Type: No value filed.    Mabel Phase I: Mabel Score: 10    Mabel Phase II:      Anesthesia Post Evaluation    Patient location during evaluation: bedside  Patient participation: complete - patient participated  Level of consciousness: awake and alert  Pain score: 1  Airway patency: patent  Nausea & Vomiting: no vomiting  Cardiovascular status: hemodynamically stable  Respiratory status: nonlabored ventilation  Hydration status: stable  Multimodal analgesia pain management approach  Pain management: adequate    No notable events documented.

## 2024-01-25 NOTE — PROGRESS NOTES
Pt arrived to PACU from OR, VSS, pt arouses to voice. No urethral drainage noted, penile excoriation noted. Rivera catheter notes bloody urine. Will continue to monitor.

## 2024-01-25 NOTE — OP NOTE
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 Cystoscopy  2. Percutaneous SP tube placement  3. Trans-abdominal US for guidance of SP tube placement, intra-operative.     Surgeon:   Jozef Patel MD    Anesthesia: Monitored Local Anesthesia with Sedation    Indications: Clinton Bennett is a 83 y.o. male who presents for the above named surgery. Informed consent was obtained and the risks, benefits, and details of the procedure were explained to the patient who elected to proceed.    Details of Procedure:  The patient was brought to the operating room and placed in the supine position on the operating room table. SCDs were placed on the lower extremities. Following induction of anesthesia the patient was positioned in a lithotomy position, all pressure points were padded, and the genitals and abdomen were prepped and draped in the usual sterile fashion. A routine timeout was performed, confirming the patient, procedure, site, risk of fire, patient allergies and confirming that preoperative antibiotics had been administered prior to beginning.      The previously placed Rivera catheter had been removed. A 21 fr rigid cystoscope was advance via a normal distal urethra. The urethra and bladder were inspected with findings as noted below. The 70 degree lens was left facing anteriorly so the SP tube placement could be monitored visually. The abdomen was palpated in the midline. There were no incisions in the location. An US probe was used to identify the

## 2024-01-25 NOTE — PROGRESS NOTES
Phase 1 complete, pt seen by anesthesiologist. VSS, pt resting comfortably. Incision site x1 is CDI, ice applied. Suprapubic catheter notes bloody, clear urine. Will transition to phase 2 for d/c, family at bedside.

## 2024-01-25 NOTE — DISCHARGE INSTRUCTIONS
boosts blood flow and helps prevent pneumonia and constipation.     Avoid strenuous activities, such as bicycle riding, jogging, weight lifting, or aerobic exercise, until your doctor says it is okay.     Ask your doctor when you can drive again.     Most people are able to return to work within 1 or 2 days after the procedure.     You may shower and take baths as usual.     Ask your doctor when it is okay for you to have sex.   Diet    You can eat your normal diet. If your stomach is upset, try bland, low-fat foods like plain rice, broiled chicken, toast, and yogurt.     Drink plenty of fluids (unless your doctor tells you not to).   Medicines    Take pain medicines exactly as directed.  If the doctor gave you a prescription medicine for pain, take it as prescribed.  If you are not taking a prescription pain medicine, ask your doctor if you can take an over-the-counter medicine.     If you think your pain medicine is making you sick to your stomach:  Take your medicine after meals (unless your doctor has told you not to).  Ask your doctor for a different pain medicine.     If your doctor prescribed antibiotics, take them as directed. Do not stop taking them just because you feel better. You need to take the full course of antibiotics.   Follow-up care is a key part of your treatment and safety. Be sure to make and go to all appointments, and call your doctor if you are having problems. It's also a good idea to know your test results and keep a list of the medicines you take.  When should you call for help?   Call 911 anytime you think you may need emergency care. For example, call if:    You passed out (lost consciousness).     You have severe trouble breathing.     You have sudden chest pain and shortness of breath, or you cough up blood.     You have severe belly pain.   Call your doctor now or seek immediate medical care if:    You are sick to your stomach or cannot keep fluids down.     Your urine is still red  acetaminophen may cause a severe skin reaction that can be fatal. This could occur even if you have taken acetaminophen in the past and had no reaction. Stop taking this medicine and call your doctor right away if you have skin redness or a rash that spreads and causes blistering and peeling.   Call your doctor at once if you have:  noisy breathing, sighing, shallow breathing, breathing that stops;  a light-headed feeling, like you might pass out;  liver problems --nausea, upper stomach pain, tiredness, loss of appetite, dark urine, willis-colored stools, jaundice (yellowing of the skin or eyes);  low cortisol levels -- nausea, vomiting, loss of appetite, dizziness, worsening tiredness or weakness; o  high levels of serotonin in the body --agitation, hallucinations, fever, sweating, shivering, fast heart rate, muscle stiffness, twitching, loss of coordination, nausea, vomiting, diarrhea.  Serious breathing problems may be more likely in older adults and in those who are debilitated or have wasting syndrome or chronic breathing disorders.   Common side effects include:  dizziness, drowsiness, feeling tired;  nausea, vomiting, stomach pain;  constipation; or  headache.  This is not a complete list of side effects and others may occur. Call your doctor for medical advice about side effects. You may report side effects to FDA at 7-471-FDA-6996.  What other drugs will affect acetaminophen and hydrocodone?  You may have breathing problems or withdrawal symptoms if you start or stop taking certain other medicines. Tell your doctor if you also use an antibiotic, antifungal medication, heart or blood pressure medication, seizure medication, or medicine to treat HIV or hepatitis C.  Opioid medication can interact with many other drugs and cause dangerous side effects or death. Be sure your doctor knows if you also use:  cold or allergy medicines, bronchodilator asthma/COPD medication, or a diuretic (\"water pill\");  medicines

## 2024-01-25 NOTE — PROGRESS NOTES
Patients pre-op assessment, checklist, H&P, and problem list reviewed during patient interview prior to going to surgery.

## 2024-01-31 ENCOUNTER — CARE COORDINATION (OUTPATIENT)
Dept: CARE COORDINATION | Age: 84
End: 2024-01-31

## 2024-02-10 ENCOUNTER — PATIENT MESSAGE (OUTPATIENT)
Dept: FAMILY MEDICINE CLINIC | Age: 84
End: 2024-02-10

## 2024-02-12 RX ORDER — GABAPENTIN 100 MG/1
100 CAPSULE ORAL 2 TIMES DAILY
Qty: 60 CAPSULE | Refills: 0 | Status: SHIPPED | OUTPATIENT
Start: 2024-02-12 | End: 2024-03-13

## 2024-02-12 NOTE — TELEPHONE ENCOUNTER
From: Clinton Bennett  To: Dr. Oskar García  Sent: 2/10/2024 11:12 AM EST  Subject: New prescription     Now taking gabapentin 200mg with good results. Need new prescription sent to pharmacy. [facility] at [address].

## 2024-02-15 ENCOUNTER — CARE COORDINATION (OUTPATIENT)
Dept: CARE COORDINATION | Age: 84
End: 2024-02-15

## 2024-02-24 ENCOUNTER — APPOINTMENT (OUTPATIENT)
Dept: GENERAL RADIOLOGY | Age: 84
DRG: 699 | End: 2024-02-24
Payer: MEDICARE

## 2024-02-24 ENCOUNTER — HOSPITAL ENCOUNTER (INPATIENT)
Age: 84
LOS: 4 days | Discharge: HOME | DRG: 699 | End: 2024-02-28
Attending: INTERNAL MEDICINE | Admitting: INTERNAL MEDICINE
Payer: MEDICARE

## 2024-02-24 DIAGNOSIS — C61 PROSTATE CANCER METASTATIC TO LUNG (HCC): ICD-10-CM

## 2024-02-24 DIAGNOSIS — A41.9 SEPTICEMIA (HCC): ICD-10-CM

## 2024-02-24 DIAGNOSIS — C78.00 PROSTATE CANCER METASTATIC TO LUNG (HCC): ICD-10-CM

## 2024-02-24 DIAGNOSIS — R11.2 NAUSEA AND VOMITING, UNSPECIFIED VOMITING TYPE: ICD-10-CM

## 2024-02-24 DIAGNOSIS — R53.1 GENERAL WEAKNESS: Primary | ICD-10-CM

## 2024-02-24 DIAGNOSIS — Z51.5 HOSPICE CARE PATIENT: ICD-10-CM

## 2024-02-24 DIAGNOSIS — E86.0 DEHYDRATION: ICD-10-CM

## 2024-02-24 LAB
ALBUMIN SERPL-MCNC: 3.3 G/DL (ref 3.4–5)
ALBUMIN/GLOB SERPL: 1.4 {RATIO} (ref 1.1–2.2)
ALP SERPL-CCNC: 166 U/L (ref 40–129)
ALT SERPL-CCNC: 17 U/L (ref 10–40)
ANION GAP SERPL CALCULATED.3IONS-SCNC: 13 MMOL/L (ref 3–16)
ANISOCYTOSIS BLD QL SMEAR: ABNORMAL
APTT BLD: 30.8 SEC (ref 22.7–35.9)
AST SERPL-CCNC: 33 U/L (ref 15–37)
BACTERIA URNS QL MICRO: ABNORMAL /HPF
BASOPHILS # BLD: 0 K/UL (ref 0–0.2)
BASOPHILS NFR BLD: 0 %
BILIRUB SERPL-MCNC: 1.4 MG/DL (ref 0–1)
BILIRUB UR QL STRIP.AUTO: ABNORMAL
BUN SERPL-MCNC: 10 MG/DL (ref 7–20)
CALCIUM SERPL-MCNC: 8.5 MG/DL (ref 8.3–10.6)
CHLORIDE SERPL-SCNC: 99 MMOL/L (ref 99–110)
CLARITY UR: ABNORMAL
CO2 SERPL-SCNC: 24 MMOL/L (ref 21–32)
COLOR UR: ABNORMAL
CREAT SERPL-MCNC: 1 MG/DL (ref 0.8–1.3)
DEPRECATED RDW RBC AUTO: 20.7 % (ref 12.4–15.4)
EOSINOPHIL # BLD: 0 K/UL (ref 0–0.6)
EOSINOPHIL NFR BLD: 0 %
EPI CELLS #/AREA URNS AUTO: 1 /HPF (ref 0–5)
GFR SERPLBLD CREATININE-BSD FMLA CKD-EPI: >60 ML/MIN/{1.73_M2}
GLUCOSE SERPL-MCNC: 104 MG/DL (ref 70–99)
GLUCOSE UR STRIP.AUTO-MCNC: ABNORMAL MG/DL
HCT VFR BLD AUTO: 27.2 % (ref 40.5–52.5)
HGB BLD-MCNC: 8.3 G/DL (ref 13.5–17.5)
HGB UR QL STRIP.AUTO: ABNORMAL
HYALINE CASTS #/AREA URNS AUTO: 5 /LPF (ref 0–8)
INR PPP: 1.21 (ref 0.84–1.16)
KETONES UR STRIP.AUTO-MCNC: ABNORMAL MG/DL
LACTATE BLDV-SCNC: 1.4 MMOL/L (ref 0.4–1.9)
LEUKOCYTE ESTERASE UR QL STRIP.AUTO: ABNORMAL
LYMPHOCYTES # BLD: 1.3 K/UL (ref 1–5.1)
LYMPHOCYTES NFR BLD: 5 %
MAGNESIUM SERPL-MCNC: 1.8 MG/DL (ref 1.8–2.4)
MCH RBC QN AUTO: 27 PG (ref 26–34)
MCHC RBC AUTO-ENTMCNC: 30.5 G/DL (ref 31–36)
MCV RBC AUTO: 88.5 FL (ref 80–100)
MONOCYTES # BLD: 1.3 K/UL (ref 0–1.3)
MONOCYTES NFR BLD: 5 %
NEUTROPHILS # BLD: 24.2 K/UL (ref 1.7–7.7)
NEUTROPHILS NFR BLD: 88 %
NEUTS BAND NFR BLD MANUAL: 2 % (ref 0–7)
NITRITE UR QL STRIP.AUTO: ABNORMAL
NRBC BLD-RTO: 1 /100 WBC
NT-PROBNP SERPL-MCNC: 630 PG/ML (ref 0–449)
PH UR STRIP.AUTO: ABNORMAL [PH] (ref 5–8)
PLATELET # BLD AUTO: 249 K/UL (ref 135–450)
PLATELET BLD QL SMEAR: ADEQUATE
PMV BLD AUTO: 7.9 FL (ref 5–10.5)
POTASSIUM SERPL-SCNC: 3.9 MMOL/L (ref 3.5–5.1)
PROCALCITONIN SERPL IA-MCNC: 0.39 NG/ML (ref 0–0.15)
PROT SERPL-MCNC: 5.7 G/DL (ref 6.4–8.2)
PROT UR STRIP.AUTO-MCNC: ABNORMAL MG/DL
PROTHROMBIN TIME: 15.3 SEC (ref 11.5–14.8)
RBC # BLD AUTO: 3.07 M/UL (ref 4.2–5.9)
RBC CLUMPS #/AREA URNS AUTO: 7846 /HPF (ref 0–4)
SLIDE REVIEW: ABNORMAL
SODIUM SERPL-SCNC: 136 MMOL/L (ref 136–145)
SP GR UR STRIP.AUTO: 1.01 (ref 1–1.03)
TROPONIN, HIGH SENSITIVITY: 37 NG/L (ref 0–22)
TROPONIN, HIGH SENSITIVITY: 42 NG/L (ref 0–22)
UA COMPLETE W REFLEX CULTURE PNL UR: YES
UA DIPSTICK W REFLEX MICRO PNL UR: YES
URN SPEC COLLECT METH UR: ABNORMAL
UROBILINOGEN UR STRIP-ACNC: ABNORMAL E.U./DL
WBC # BLD AUTO: 26.9 K/UL (ref 4–11)
WBC #/AREA URNS AUTO: 582 /HPF (ref 0–5)

## 2024-02-24 PROCEDURE — 87077 CULTURE AEROBIC IDENTIFY: CPT

## 2024-02-24 PROCEDURE — 87040 BLOOD CULTURE FOR BACTERIA: CPT

## 2024-02-24 PROCEDURE — 84484 ASSAY OF TROPONIN QUANT: CPT

## 2024-02-24 PROCEDURE — 83880 ASSAY OF NATRIURETIC PEPTIDE: CPT

## 2024-02-24 PROCEDURE — 94640 AIRWAY INHALATION TREATMENT: CPT

## 2024-02-24 PROCEDURE — 83735 ASSAY OF MAGNESIUM: CPT

## 2024-02-24 PROCEDURE — 80053 COMPREHEN METABOLIC PANEL: CPT

## 2024-02-24 PROCEDURE — 71045 X-RAY EXAM CHEST 1 VIEW: CPT

## 2024-02-24 PROCEDURE — 6370000000 HC RX 637 (ALT 250 FOR IP): Performed by: INTERNAL MEDICINE

## 2024-02-24 PROCEDURE — 81001 URINALYSIS AUTO W/SCOPE: CPT

## 2024-02-24 PROCEDURE — 96365 THER/PROPH/DIAG IV INF INIT: CPT

## 2024-02-24 PROCEDURE — 6360000002 HC RX W HCPCS: Performed by: INTERNAL MEDICINE

## 2024-02-24 PROCEDURE — 6360000002 HC RX W HCPCS: Performed by: PHYSICIAN ASSISTANT

## 2024-02-24 PROCEDURE — 87186 SC STD MICRODIL/AGAR DIL: CPT

## 2024-02-24 PROCEDURE — 36415 COLL VENOUS BLD VENIPUNCTURE: CPT

## 2024-02-24 PROCEDURE — 2580000003 HC RX 258: Performed by: INTERNAL MEDICINE

## 2024-02-24 PROCEDURE — 84145 PROCALCITONIN (PCT): CPT

## 2024-02-24 PROCEDURE — 93005 ELECTROCARDIOGRAM TRACING: CPT | Performed by: INTERNAL MEDICINE

## 2024-02-24 PROCEDURE — 2580000003 HC RX 258: Performed by: PHYSICIAN ASSISTANT

## 2024-02-24 PROCEDURE — 99285 EMERGENCY DEPT VISIT HI MDM: CPT

## 2024-02-24 PROCEDURE — 85025 COMPLETE CBC W/AUTO DIFF WBC: CPT

## 2024-02-24 PROCEDURE — 2060000000 HC ICU INTERMEDIATE R&B

## 2024-02-24 PROCEDURE — 85610 PROTHROMBIN TIME: CPT

## 2024-02-24 PROCEDURE — 96375 TX/PRO/DX INJ NEW DRUG ADDON: CPT

## 2024-02-24 PROCEDURE — 85730 THROMBOPLASTIN TIME PARTIAL: CPT

## 2024-02-24 PROCEDURE — 87086 URINE CULTURE/COLONY COUNT: CPT

## 2024-02-24 PROCEDURE — 83605 ASSAY OF LACTIC ACID: CPT

## 2024-02-24 PROCEDURE — 74018 RADEX ABDOMEN 1 VIEW: CPT

## 2024-02-24 RX ORDER — SODIUM CHLORIDE 0.9 % (FLUSH) 0.9 %
5-40 SYRINGE (ML) INJECTION PRN
Status: DISCONTINUED | OUTPATIENT
Start: 2024-02-24 | End: 2024-02-28 | Stop reason: HOSPADM

## 2024-02-24 RX ORDER — SODIUM CHLORIDE 0.9 % (FLUSH) 0.9 %
5-40 SYRINGE (ML) INJECTION EVERY 12 HOURS SCHEDULED
Status: DISCONTINUED | OUTPATIENT
Start: 2024-02-24 | End: 2024-02-28 | Stop reason: HOSPADM

## 2024-02-24 RX ORDER — ENOXAPARIN SODIUM 100 MG/ML
40 INJECTION SUBCUTANEOUS NIGHTLY
Status: DISCONTINUED | OUTPATIENT
Start: 2024-02-24 | End: 2024-02-28 | Stop reason: HOSPADM

## 2024-02-24 RX ORDER — 0.9 % SODIUM CHLORIDE 0.9 %
1000 INTRAVENOUS SOLUTION INTRAVENOUS ONCE
Status: COMPLETED | OUTPATIENT
Start: 2024-02-24 | End: 2024-02-24

## 2024-02-24 RX ORDER — PANTOPRAZOLE SODIUM 40 MG/1
40 TABLET, DELAYED RELEASE ORAL DAILY
Status: DISCONTINUED | OUTPATIENT
Start: 2024-02-25 | End: 2024-02-28 | Stop reason: HOSPADM

## 2024-02-24 RX ORDER — ONDANSETRON 2 MG/ML
4 INJECTION INTRAMUSCULAR; INTRAVENOUS EVERY 6 HOURS PRN
Status: DISCONTINUED | OUTPATIENT
Start: 2024-02-24 | End: 2024-02-28 | Stop reason: HOSPADM

## 2024-02-24 RX ORDER — SODIUM CHLORIDE 9 MG/ML
INJECTION, SOLUTION INTRAVENOUS PRN
Status: DISCONTINUED | OUTPATIENT
Start: 2024-02-24 | End: 2024-02-28 | Stop reason: HOSPADM

## 2024-02-24 RX ORDER — ESCITALOPRAM OXALATE 10 MG/1
10 TABLET ORAL DAILY
Status: DISCONTINUED | OUTPATIENT
Start: 2024-02-25 | End: 2024-02-28 | Stop reason: HOSPADM

## 2024-02-24 RX ORDER — ALBUTEROL SULFATE 90 UG/1
2 AEROSOL, METERED RESPIRATORY (INHALATION) EVERY 6 HOURS PRN
Status: DISCONTINUED | OUTPATIENT
Start: 2024-02-24 | End: 2024-02-28 | Stop reason: HOSPADM

## 2024-02-24 RX ORDER — ONDANSETRON 2 MG/ML
4 INJECTION INTRAMUSCULAR; INTRAVENOUS ONCE
Status: COMPLETED | OUTPATIENT
Start: 2024-02-24 | End: 2024-02-24

## 2024-02-24 RX ORDER — GABAPENTIN 100 MG/1
100 CAPSULE ORAL EVERY EVENING
Status: DISCONTINUED | OUTPATIENT
Start: 2024-02-24 | End: 2024-02-24

## 2024-02-24 RX ORDER — ACETAMINOPHEN 650 MG/1
650 SUPPOSITORY RECTAL EVERY 6 HOURS PRN
Status: DISCONTINUED | OUTPATIENT
Start: 2024-02-24 | End: 2024-02-28 | Stop reason: HOSPADM

## 2024-02-24 RX ORDER — GABAPENTIN 100 MG/1
100 CAPSULE ORAL 2 TIMES DAILY
Status: DISCONTINUED | OUTPATIENT
Start: 2024-02-24 | End: 2024-02-28 | Stop reason: HOSPADM

## 2024-02-24 RX ORDER — TRAZODONE HYDROCHLORIDE 50 MG/1
50 TABLET ORAL NIGHTLY
Status: DISCONTINUED | OUTPATIENT
Start: 2024-02-24 | End: 2024-02-24

## 2024-02-24 RX ORDER — IPRATROPIUM BROMIDE AND ALBUTEROL SULFATE 2.5; .5 MG/3ML; MG/3ML
1 SOLUTION RESPIRATORY (INHALATION)
Status: DISCONTINUED | OUTPATIENT
Start: 2024-02-24 | End: 2024-02-26

## 2024-02-24 RX ORDER — PREDNISONE 5 MG/1
5 TABLET ORAL DAILY
Status: DISCONTINUED | OUTPATIENT
Start: 2024-02-25 | End: 2024-02-28 | Stop reason: HOSPADM

## 2024-02-24 RX ORDER — AMLODIPINE BESYLATE 5 MG/1
5 TABLET ORAL DAILY
Status: DISCONTINUED | OUTPATIENT
Start: 2024-02-24 | End: 2024-02-28 | Stop reason: HOSPADM

## 2024-02-24 RX ORDER — POLYETHYLENE GLYCOL 3350 17 G/17G
17 POWDER, FOR SOLUTION ORAL DAILY PRN
Status: DISCONTINUED | OUTPATIENT
Start: 2024-02-24 | End: 2024-02-28 | Stop reason: HOSPADM

## 2024-02-24 RX ORDER — METOPROLOL SUCCINATE 50 MG/1
50 TABLET, EXTENDED RELEASE ORAL DAILY
Status: DISCONTINUED | OUTPATIENT
Start: 2024-02-25 | End: 2024-02-28 | Stop reason: HOSPADM

## 2024-02-24 RX ORDER — TRAZODONE HYDROCHLORIDE 50 MG/1
100 TABLET ORAL NIGHTLY
Status: DISCONTINUED | OUTPATIENT
Start: 2024-02-24 | End: 2024-02-28 | Stop reason: HOSPADM

## 2024-02-24 RX ORDER — SODIUM CHLORIDE, SODIUM LACTATE, POTASSIUM CHLORIDE, CALCIUM CHLORIDE 600; 310; 30; 20 MG/100ML; MG/100ML; MG/100ML; MG/100ML
INJECTION, SOLUTION INTRAVENOUS CONTINUOUS
Status: DISCONTINUED | OUTPATIENT
Start: 2024-02-24 | End: 2024-02-27

## 2024-02-24 RX ORDER — ACETAMINOPHEN 325 MG/1
650 TABLET ORAL EVERY 6 HOURS PRN
Status: DISCONTINUED | OUTPATIENT
Start: 2024-02-24 | End: 2024-02-28 | Stop reason: HOSPADM

## 2024-02-24 RX ADMIN — SODIUM CHLORIDE 1000 ML: 9 INJECTION, SOLUTION INTRAVENOUS at 12:05

## 2024-02-24 RX ADMIN — SODIUM CHLORIDE, POTASSIUM CHLORIDE, SODIUM LACTATE AND CALCIUM CHLORIDE: 600; 310; 30; 20 INJECTION, SOLUTION INTRAVENOUS at 14:06

## 2024-02-24 RX ADMIN — Medication 2 PUFF: at 20:40

## 2024-02-24 RX ADMIN — ACETAMINOPHEN 650 MG: 325 TABLET ORAL at 17:01

## 2024-02-24 RX ADMIN — AMLODIPINE BESYLATE 5 MG: 5 TABLET ORAL at 17:01

## 2024-02-24 RX ADMIN — ONDANSETRON 4 MG: 2 INJECTION INTRAMUSCULAR; INTRAVENOUS at 10:58

## 2024-02-24 RX ADMIN — SODIUM CHLORIDE, PRESERVATIVE FREE 10 ML: 5 INJECTION INTRAVENOUS at 22:23

## 2024-02-24 RX ADMIN — GABAPENTIN 100 MG: 100 CAPSULE ORAL at 22:22

## 2024-02-24 RX ADMIN — ENOXAPARIN SODIUM 40 MG: 100 INJECTION SUBCUTANEOUS at 22:22

## 2024-02-24 RX ADMIN — IPRATROPIUM BROMIDE AND ALBUTEROL SULFATE 1 DOSE: 2.5; .5 SOLUTION RESPIRATORY (INHALATION) at 20:40

## 2024-02-24 RX ADMIN — TRAZODONE HYDROCHLORIDE 100 MG: 50 TABLET ORAL at 22:22

## 2024-02-24 RX ADMIN — CEFTRIAXONE SODIUM 1000 MG: 1 INJECTION, POWDER, FOR SOLUTION INTRAMUSCULAR; INTRAVENOUS at 12:03

## 2024-02-24 RX ADMIN — ONDANSETRON 4 MG: 2 INJECTION INTRAMUSCULAR; INTRAVENOUS at 17:01

## 2024-02-24 ASSESSMENT — PAIN SCALES - GENERAL
PAINLEVEL_OUTOF10: 0
PAINLEVEL_OUTOF10: 0

## 2024-02-24 ASSESSMENT — LIFESTYLE VARIABLES
HOW MANY STANDARD DRINKS CONTAINING ALCOHOL DO YOU HAVE ON A TYPICAL DAY: 1 OR 2
HOW OFTEN DO YOU HAVE A DRINK CONTAINING ALCOHOL: MONTHLY OR LESS

## 2024-02-24 ASSESSMENT — PAIN - FUNCTIONAL ASSESSMENT: PAIN_FUNCTIONAL_ASSESSMENT: 0-10

## 2024-02-24 NOTE — ED PROVIDER NOTES
[facility] EMERGENCY DEPARTMENT  EMERGENCY DEPARTMENT ENCOUNTER        Pt Name: Clinton Bennett  MRN: [id number]  Birthdate 1940  Date of evaluation: 2/24/2024  Provider: Jaylan Frances PA-C  PCP: Oskar García MD  Note Started: 10:28 AM EST 2/24/24      TAPAN. I have evaluated this patient.        CHIEF COMPLAINT       Chief Complaint   Patient presents with    Fatigue     Pt in from home, pt with weakness, emesis and nausea, pt in treatment for prostate CA, due for treatment Monday unsure of last treatment but they are stopping them due to intolerance,pt with lots of blood in superpubic cath as well       HISTORY OF PRESENT ILLNESS: 1 or more Elements     History from : Patient    Limitations to history : None    Clinton Bennett is a 83 y.o. 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  He has not been tolerating chemotherapy very well.  Therefore, he is unsure if he is going to get the scheduled chemotherapy on Monday.  He does not have a port or PICC.  He typically receives the infusion with a peripheral IV.  He has no acute pain at this time.  He has a suprapubic catheter and has had hematuria for months.  He denies documented fever or chills.  He feels like he is dehydrated.    Nursing Notes were all reviewed and agreed with or any disagreements were addressed in the HPI.    REVIEW OF SYSTEMS :      Review of Systems   Constitutional:  Positive for fatigue. Negative for chills and fever.   HENT: Negative.     Eyes:  Negative for visual disturbance.   Respiratory:  Negative for cough, shortness of breath, wheezing and stridor.    Cardiovascular:  Negative for chest pain, palpitations and leg swelling.   Gastrointestinal:  Positive for nausea and vomiting. Negative for abdominal distention, abdominal pain,  and agree with plan for admission for further management.  His pulse rate is improving with fluids and nausea control. Troponin is slightly elevated but he has no chest pain or shortness of breath.    Disposition Considerations (include 1 Tests not done, Shared Decision Making, Pt Expectation of Test or Tx.): admit         I am the Primary Clinician of Record.    FINAL IMPRESSION      1. General weakness    2. Nausea and vomiting, unspecified vomiting type    3. Dehydration    4. Septicemia (HCC)    5. 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    DISCHARGE MEDICATIONS:  New Prescriptions    No medications on file       DISCONTINUED MEDICATIONS:  Discontinued Medications    No medications on file              (Please note that portions of this note were completed with a voice recognition program.  Efforts were made to edit the dictations but occasionally words are mis-transcribed.)    Jaylan Frances PA-C (electronically signed)            Jaylan Frances PA-C  02/24/24 [id number]

## 2024-02-24 NOTE — ED NOTES
ED TO INPATIENT SBAR HANDOFF    Patient Name: Clinton Bennett   :  1940  83 y.o.   MRN:  [id number]  Preferred Name  [place]  ED Room #:  ED-0011/11  Family/Caregiver Present yes   Restraints no   Sitter no   Sepsis Risk Score Sepsis Risk Score: 13.79    Situation  Code Status: Prior No additional code details.    Allergies: Lisinopril  Weight: Patient Vitals for the past 96 hrs (Last 3 readings):   Weight   24 1024 65.8 kg (145 lb)     Arrived from: home  Chief Complaint:   Chief Complaint   Patient presents with    Fatigue     Pt in from home, pt with weakness, emesis and nausea, pt in treatment for prostate CA, due for treatment Monday unsure of last treatment but they are stopping them due to intolerance,pt with lots of blood in superpubic cath as well     Hospital Problem/Diagnosis:  Principal Problem:    Intractable nausea and vomiting  Resolved Problems:    * No resolved hospital problems. *    Imaging:   XR CHEST PORTABLE   Preliminary Result   1. No acute cardiopulmonary process.   2. Right upper lobe nodule measuring 2.9 cm which appears more prominent   compared to the previous evaluation.  A CT chest is recommended for further   evaluation.         XR ABDOMEN (KUB) (SINGLE AP VIEW)    (Results Pending)     Abnormal labs:   Abnormal Labs Reviewed   PROCALCITONIN - Abnormal; Notable for the following components:       Result Value    Procalcitonin 0.39 (*)     All other components within normal limits   CBC WITH AUTO DIFFERENTIAL - Abnormal; Notable for the following components:    WBC 26.9 (*)     RBC 3.07 (*)     Hemoglobin 8.3 (*)     Hematocrit 27.2 (*)     MCHC 30.5 (*)     RDW 20.7 (*)     Neutrophils Absolute 24.2 (*)     nRBC 1 (*)     Anisocytosis 2+ (*)     All other components within normal limits   COMPREHENSIVE METABOLIC PANEL - Abnormal; Notable for the following components:    Glucose 104 (*)     Total Protein 5.7 (*)     Albumin 3.3 (*)     Total Bilirubin 1.4 (*)     Alkaline  Phosphatase 166 (*)     All other components within normal limits   URINALYSIS WITH REFLEX TO CULTURE - Abnormal; Notable for the following components:    Color, UA RED (*)     Clarity, UA TURBID (*)     Glucose, Ur Color Interfer (*)     Bilirubin Urine Color Interfer (*)     Ketones, Urine Color Interfer (*)     Blood, Urine Color Interfer (*)     pH, UA Color Interfer (*)     Protein, UA Color Interfer (*)     Urobilinogen, Urine Color Interfer (*)     Nitrite, Urine Color Interfer (*)     Leukocyte Esterase, Urine Color Interfer (*)     All other components within normal limits   TROPONIN - Abnormal; Notable for the following components:    Troponin, High Sensitivity 42 (*)     All other components within normal limits   PROTIME-INR - Abnormal; Notable for the following components:    Protime 15.3 (*)     INR 1.21 (*)     All other components within normal limits   BRAIN NATRIURETIC PEPTIDE - Abnormal; Notable for the following components:    Pro- (*)     All other components within normal limits   MICROSCOPIC URINALYSIS - Abnormal; Notable for the following components:    Bacteria, UA 1+ (*)     WBC,  (*)     RBC, UA 7846 (*)     All other components within normal limits     Critical values: no     Abnormal Assessment Findings: bloody urine in super pubic cath, weakness bilaterally and all extremities    Background  History:   Past Medical History:   Diagnosis Date    Carotid artery occlusion and stenosis 05/03/2012    Centrilobular emphysema ([facility]) 08/08/2014    COPD (chronic obstructive pulmonary disease) ([facility])     ED (erectile dysfunction)     Emphysema lung ([facility]) 08/08/2014    GERD (gastroesophageal reflux disease)     Hyperlipidemia     pt denies    Hypertension     Prostate CA ([facility]) 2003    surgery then radiation    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

## 2024-02-24 NOTE — H&P
HOSPITALISTS HISTORY AND PHYSICAL    2/24/2024 1:33 PM    Patient Information:  CLINTON BENNETT is a 83 y.o. male [id number]  PCP:  Oskar García MD (Tel: [phone] )    Chief complaint:    Chief Complaint   Patient presents with    Fatigue     Pt in from home, pt with weakness, emesis and nausea, pt in treatment for prostate CA, due for treatment Monday unsure of last treatment but they are stopping them due to intolerance,pt with lots of blood in superpubic cath as well     History of Present Illness:  Clinton Bennett is a 83 y.o. male who who who gets chemotherapy for prostate cancer with mets to the lung has been having nausea and vomiting unable to keep anything down last bowel movement 4 days ago.  Patient denies any fevers cough chest pain shortness of breath has been having some chills does have hematuria which is old has a suprapubic catheter placed.    REVIEW OF SYSTEMS:   ENT: Negative for rhinorrhea, epistaxis, hoarseness, sore throat.  Respiratory: Negative for shortness of breath,wheezing  Cardiovascular: Negative for chest pain, palpitations   Genitourinary: Negative for polyuria, dysuria   Hematologic/Lymphatic: Negative for bleeding tendency, easy bruising  Musculoskeletal: Negative for myalgias and arthralgias  Neurologic: Negative for confusion,dysarthria.  Skin: Negative for itching,rash  Psychiatric: Negative for depression,anxiety, agitation.  Endocrine: Negative for polydipsia,polyuria,heat /cold intolerance.    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

## 2024-02-24 NOTE — ACP (ADVANCE CARE PLANNING)
Advanced Care Planning Note.    Purpose of Encounter: Advanced care planning in light of hospitalization  Parties In Attendance: Patient,  Son  Decisional Capacity: Yes  Subjective: Patient  understand that this conversation is to address long term care goal  Objective: Patient admitted to the hospital with intractable nausea vomiting history of prostate cancer with mets  Goals of Care Determination: Patient would pursue CPR and Intubation if required.  Will consider only short-term intubation no trach or long-term ventilation   Code Status: full code  Time spent on Advanced care Plannin minutes  Advanced Care Planning Documents: documented patient's wishes, would like Wife DonaldMarysol  to make medical decisions if unable to make decisions    Bessie Syed MD  2024 1:36 PM

## 2024-02-24 NOTE — ED PROVIDER NOTES
EKG  The Ekg interpreted by me shows  sinus tachycardia, [id number]    Axis is   Left axis deviation  QTc is  normal  Intervals and Durations are unremarkable.      ST Segments: no acute change  No significant change from prior EKG dated 2 dec 2023        Yon Webster MD  02/24/24 1108

## 2024-02-25 LAB
ANION GAP SERPL CALCULATED.3IONS-SCNC: 12 MMOL/L (ref 3–16)
ANISOCYTOSIS BLD QL SMEAR: ABNORMAL
BASOPHILS # BLD: 0.2 K/UL (ref 0–0.2)
BASOPHILS NFR BLD: 1 %
BUN SERPL-MCNC: 10 MG/DL (ref 7–20)
BURR CELLS BLD QL SMEAR: ABNORMAL
CALCIUM SERPL-MCNC: 8.2 MG/DL (ref 8.3–10.6)
CHLORIDE SERPL-SCNC: 97 MMOL/L (ref 99–110)
CO2 SERPL-SCNC: 25 MMOL/L (ref 21–32)
CREAT SERPL-MCNC: 0.9 MG/DL (ref 0.8–1.3)
DEPRECATED RDW RBC AUTO: 21 % (ref 12.4–15.4)
EKG ATRIAL RATE: 113 BPM
EKG DIAGNOSIS: NORMAL
EKG P AXIS: 73 DEGREES
EKG P-R INTERVAL: 126 MS
EKG Q-T INTERVAL: 332 MS
EKG QRS DURATION: 78 MS
EKG QTC CALCULATION (BAZETT): 455 MS
EKG R AXIS: -72 DEGREES
EKG T AXIS: 26 DEGREES
EKG VENTRICULAR RATE: 113 BPM
EOSINOPHIL # BLD: 0 K/UL (ref 0–0.6)
EOSINOPHIL NFR BLD: 0 %
GFR SERPLBLD CREATININE-BSD FMLA CKD-EPI: >60 ML/MIN/{1.73_M2}
GLUCOSE SERPL-MCNC: 88 MG/DL (ref 70–99)
HCT VFR BLD AUTO: 24 % (ref 40.5–52.5)
HGB BLD-MCNC: 7.4 G/DL (ref 13.5–17.5)
LYMPHOCYTES # BLD: 1.6 K/UL (ref 1–5.1)
LYMPHOCYTES NFR BLD: 7 %
MCH RBC QN AUTO: 27.3 PG (ref 26–34)
MCHC RBC AUTO-ENTMCNC: 30.9 G/DL (ref 31–36)
MCV RBC AUTO: 88.1 FL (ref 80–100)
METAMYELOCYTES NFR BLD MANUAL: 1 %
MONOCYTES # BLD: 2.4 K/UL (ref 0–1.3)
MONOCYTES NFR BLD: 10 %
NEUTROPHILS # BLD: 19.3 K/UL (ref 1.7–7.7)
NEUTROPHILS NFR BLD: 79 %
NEUTS BAND NFR BLD MANUAL: 2 % (ref 0–7)
PLATELET # BLD AUTO: 241 K/UL (ref 135–450)
PLATELET BLD QL SMEAR: ADEQUATE
PMV BLD AUTO: 7.6 FL (ref 5–10.5)
POLYCHROMASIA BLD QL SMEAR: ABNORMAL
POTASSIUM SERPL-SCNC: 4.2 MMOL/L (ref 3.5–5.1)
RBC # BLD AUTO: 2.72 M/UL (ref 4.2–5.9)
SLIDE REVIEW: ABNORMAL
SODIUM SERPL-SCNC: 134 MMOL/L (ref 136–145)
WBC # BLD AUTO: 23.5 K/UL (ref 4–11)

## 2024-02-25 PROCEDURE — 2060000000 HC ICU INTERMEDIATE R&B

## 2024-02-25 PROCEDURE — 6370000000 HC RX 637 (ALT 250 FOR IP): Performed by: INTERNAL MEDICINE

## 2024-02-25 PROCEDURE — 2700000000 HC OXYGEN THERAPY PER DAY

## 2024-02-25 PROCEDURE — 85025 COMPLETE CBC W/AUTO DIFF WBC: CPT

## 2024-02-25 PROCEDURE — 94640 AIRWAY INHALATION TREATMENT: CPT

## 2024-02-25 PROCEDURE — 6360000002 HC RX W HCPCS: Performed by: INTERNAL MEDICINE

## 2024-02-25 PROCEDURE — 80048 BASIC METABOLIC PNL TOTAL CA: CPT

## 2024-02-25 PROCEDURE — 2580000003 HC RX 258: Performed by: INTERNAL MEDICINE

## 2024-02-25 PROCEDURE — 94761 N-INVAS EAR/PLS OXIMETRY MLT: CPT

## 2024-02-25 PROCEDURE — 93010 ELECTROCARDIOGRAM REPORT: CPT | Performed by: INTERNAL MEDICINE

## 2024-02-25 PROCEDURE — 36415 COLL VENOUS BLD VENIPUNCTURE: CPT

## 2024-02-25 RX ADMIN — TIOTROPIUM BROMIDE INHALATION SPRAY 2 PUFF: 3.12 SPRAY, METERED RESPIRATORY (INHALATION) at 08:43

## 2024-02-25 RX ADMIN — IPRATROPIUM BROMIDE AND ALBUTEROL SULFATE 1 DOSE: 2.5; .5 SOLUTION RESPIRATORY (INHALATION) at 08:38

## 2024-02-25 RX ADMIN — IPRATROPIUM BROMIDE AND ALBUTEROL SULFATE 1 DOSE: 2.5; .5 SOLUTION RESPIRATORY (INHALATION) at 13:39

## 2024-02-25 RX ADMIN — SODIUM CHLORIDE, POTASSIUM CHLORIDE, SODIUM LACTATE AND CALCIUM CHLORIDE: 600; 310; 30; 20 INJECTION, SOLUTION INTRAVENOUS at 11:14

## 2024-02-25 RX ADMIN — IPRATROPIUM BROMIDE AND ALBUTEROL SULFATE 1 DOSE: 2.5; .5 SOLUTION RESPIRATORY (INHALATION) at 16:44

## 2024-02-25 RX ADMIN — PANTOPRAZOLE SODIUM 40 MG: 40 TABLET, DELAYED RELEASE ORAL at 06:19

## 2024-02-25 RX ADMIN — ESCITALOPRAM OXALATE 10 MG: 10 TABLET ORAL at 08:51

## 2024-02-25 RX ADMIN — METOPROLOL SUCCINATE 50 MG: 50 TABLET, EXTENDED RELEASE ORAL at 08:50

## 2024-02-25 RX ADMIN — AMLODIPINE BESYLATE 5 MG: 5 TABLET ORAL at 08:51

## 2024-02-25 RX ADMIN — SODIUM CHLORIDE, PRESERVATIVE FREE 10 ML: 5 INJECTION INTRAVENOUS at 20:50

## 2024-02-25 RX ADMIN — TRAZODONE HYDROCHLORIDE 100 MG: 50 TABLET ORAL at 20:49

## 2024-02-25 RX ADMIN — CEFTRIAXONE SODIUM 1000 MG: 1 INJECTION, POWDER, FOR SOLUTION INTRAMUSCULAR; INTRAVENOUS at 11:48

## 2024-02-25 RX ADMIN — Medication 2 PUFF: at 19:18

## 2024-02-25 RX ADMIN — Medication 2 PUFF: at 08:39

## 2024-02-25 RX ADMIN — PREDNISONE 5 MG: 5 TABLET ORAL at 08:51

## 2024-02-25 RX ADMIN — ENOXAPARIN SODIUM 40 MG: 100 INJECTION SUBCUTANEOUS at 20:49

## 2024-02-25 RX ADMIN — GABAPENTIN 100 MG: 100 CAPSULE ORAL at 08:50

## 2024-02-25 RX ADMIN — IPRATROPIUM BROMIDE AND ALBUTEROL SULFATE 1 DOSE: 2.5; .5 SOLUTION RESPIRATORY (INHALATION) at 19:18

## 2024-02-25 RX ADMIN — GABAPENTIN 100 MG: 100 CAPSULE ORAL at 20:49

## 2024-02-25 ASSESSMENT — PAIN SCALES - GENERAL: PAINLEVEL_OUTOF10: 0

## 2024-02-25 NOTE — ACP (ADVANCE CARE PLANNING)
Advance Care Planning     Advance Care Planning Inpatient Note  Spiritual Care Department    Today's Date: 2/25/2024  Unit: [facility]    Received request from HealthCare Provider and family.  Upon review of chart and communication with care team, Spiritual Care will defer advance care planning with patient at this time.. Patient and Spouse was/were present in the room during visit.    Goals of ACP Conversation:  Discuss advance care planning documents  Patient and spouse desired to discuss code status with physician.     Health Care Decision Makers:       Primary Decision Maker: Marysol Bennett - Spouse - [phone]  Summary:  Verified Healthcare Decision Maker  Patient's wife is listed as primary decision maker.   Advance Care Planning Documents (Patient Wishes):  Patient indicated he has Living Will at home and he and his spouse want to update code status with physician.       Assessment:  Patient is supported by family and adam community.    Interventions:  Requested patient/family to submit existing document for our records: Living Will/Advance Directive  Patient has Living Will and wife will plan to bring to hospital to provide for patient's chart.  Provided education on documents for clarity and greater understanding  Discussed and provided education on state decision maker hierarchy  Patient and family desire to update code status. Patient's nurse will notify physician.     Care Preferences Communicated:   No    Outcomes/Plan:  ACP Discussion: Patient and spouse desire to update code status with physician.  Patient's wife is listed and confirmed as primary decision maker.     Electronically signed by Chaplain Malick on 2/25/2024 at 10:42 AM

## 2024-02-25 NOTE — PROGRESS NOTES
Clinton Bennett is a 83 y.o. male patient.   1. General weakness    2. Nausea and vomiting, unspecified vomiting type    3. Dehydration    4. Septicemia (HCC)    5. 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  Scheduled Meds:   amLODIPine  5 mg Oral Daily    escitalopram  10 mg Oral Daily    ipratropium 0.5 mg-albuterol 2.5 mg  1 Dose Inhalation 4x Daily RT    metoprolol succinate  50 mg Oral Daily    pantoprazole  40 mg Oral Daily    predniSONE  5 mg Oral Daily    cefTRIAXone (ROCEPHIN) IV  1,000 mg IntraVENous Q24H    sodium chloride flush  5-40 mL IntraVENous 2 times per day    enoxaparin  40 mg SubCUTAneous Nightly    mometasone-formoterol  2 puff Inhalation BID RT    tiotropium  2 puff Inhalation Daily RT    gabapentin  100 mg Oral BID    traZODone  100 mg Oral Nightly     Continuous Infusions:   sodium chloride      lactated ringers IV soln 100 mL/hr at 02/24/24 1637     PRN Meds:albuterol sulfate HFA, sodium chloride flush, sodium chloride, polyethylene glycol, acetaminophen **OR** acetaminophen, ondansetron    Allergies   Allergen Reactions    Lisinopril      cough     Principal Problem:    Intractable nausea and vomiting  Resolved Problems:    * No resolved hospital problems. *    Blood pressure (!) 144/66, pulse (!) 115, temperature 98.4 °F (36.9 °C), resp. rate 17, height 1.778 m (5' 10\"), weight 65.8 kg (145 lb), SpO2 96

## 2024-02-25 NOTE — PROGRESS NOTES
Patient and wife at bedside requesting  to discuss advanced directives/code status. Voicemail left with spiritual services. MD notified.

## 2024-02-25 NOTE — CONSULTS
[facility]    Consult Note      Requesting Physician:  martha    CHIEF COMPLAINT:  weaknes nv       HISTORY OF PRESENT ILLNESS:    Mr. Bennett  is a 83 y.o. male we are seeing in consultation for known prostate cancer pt of dr jaquan KIMBALL copied part of last note below -  He has been getting procrit and zarxioin the last few weeks for count support   He felt weak and nasueated  He doesn't offer much complaint today  Its noted he has fevers and there is a suggestion on ct of hydronephrosis related to infection??-but creat is stable   Pt denies cough sob   Sirena bowel issues         HPI  Mr. Bennett is a pleasant 82 years old white male presented with progressive metastatic prostate carcinoma.  He had prostatectomy by Dr. Steele in 2003.  A year later his PSA went up.  Therefore he was treated with radiation.  His PSA went up again in 2007 or 2008.  He was placed on Lupron.  He then started on Erleada a year ago because of progressive disease.  He has chronic back pain which is not worse.  He remains active.  He just had [employer] test ordered by Dr. Alexander last Tuesday.  He denies family history of cancer.  He also had PSMA PET.  He is accompanied by his wife who is a retired nurse.     Previous Therapies      Current Therapy  Cabazitaxel + Prednisone Q21D Cycle Length: 21 Number Cycles: 20 Start: C1D1 on 06/12/2023 Assoc Dx: Primary malignant neoplasm of prostate (disorder) LOT: Metastatic CRPC-symptomatic, 3rd line Stage: IVB 01/16/2024 C7 D1  Cabazitaxel IV, 38 mg (20 mg/m2)  Prednisone Oral, 10 mg  Epoetin marcela Q7D (Chemotherapy-Induced Anemia) Cycle Length: 14 Number Cycles: 20 Start: C1D1 on 12/04/2023 Assoc Dx: Antineoplastic chemotherapy induced anemia LOT: Other 12/04/2023 C16 D1  Retacrit (Epoetin marcela-epbx Subcutaneous), 40,000 unit qweek  OHC Hydration Cycle Length: 1 Number

## 2024-02-25 NOTE — PROGRESS NOTES
[facility]    2/25/2024 12:55 PM        Name: Clinton Bennett .              Admitted: 2/24/2024  Primary Care Provider: Oskar García MD (Tel: [phone])      Subjective:    Patient lying in bed nausea vomiting is improved still no bowel movement some abdominal discomfort    Reviewed interval ancillary notes    Current Medications  albuterol sulfate HFA (PROVENTIL;VENTOLIN;PROAIR) 108 (90 Base) MCG/ACT inhaler 2 puff, Q6H PRN  amLODIPine (NORVASC) tablet 5 mg, Daily  escitalopram (LEXAPRO) tablet 10 mg, Daily  ipratropium 0.5 mg-albuterol 2.5 mg (DUONEB) nebulizer solution 1 Dose, 4x Daily RT  metoprolol succinate (TOPROL XL) extended release tablet 50 mg, Daily  pantoprazole (PROTONIX) tablet 40 mg, Daily  predniSONE (DELTASONE) tablet 5 mg, Daily  cefTRIAXone (ROCEPHIN) 1,000 mg in sodium chloride 0.9 % 50 mL IVPB (mini-bag), Q24H  sodium chloride flush 0.9 % injection 5-40 mL, 2 times per day  sodium chloride flush 0.9 % injection 5-40 mL, PRN  0.9 % sodium chloride infusion, PRN  enoxaparin (LOVENOX) injection 40 mg, Nightly  polyethylene glycol (GLYCOLAX) packet 17 g, Daily PRN  acetaminophen (TYLENOL) tablet 650 mg, Q6H PRN   Or  acetaminophen (TYLENOL) suppository 650 mg, Q6H PRN  ondansetron (ZOFRAN) injection 4 mg, Q6H PRN  lactated ringers IV soln infusion, Continuous  mometasone-formoterol (DULERA) 200-5 MCG/ACT inhaler 2 puff, BID RT  tiotropium (SPIRIVA RESPIMAT) 2.5 MCG/ACT inhaler 2 puff, Daily RT  gabapentin (NEURONTIN) capsule 100 mg, BID  traZODone (DESYREL) tablet 100 mg, Nightly        Objective:  BP (!) 103/56   Pulse (!) 106   Temp 98.9 °F (37.2 °C) (Oral)   Resp 16   Ht 1.778 m (5' 10\")   Wt 65.8 kg (145 lb)   SpO2 94%   BMI 20.81 kg/m²     Intake/Output Summary (Last 24 hours) at 2/25/2024 1255  Last data filed at 2/25/2024 0522  Gross per 24 hour   Intake 1047.38 ml   Output

## 2024-02-25 NOTE — ACP (ADVANCE CARE PLANNING)
Advanced Care Planning Note.    Purpose of Encounter: Advanced care planning in light of hospitalization  Parties In Attendance: Patient,  wife  Decisional Capacity: Yes  Subjective: Patient  understand that this conversation is to address long term care goal  Objective: Patient with ileus and metastatic prostate cancer not doing well with chemotherapy  Goals of Care Determination: Patient and patient's wife given patient's cancer prognosis and not doing well with chemotherapy may be best to pursue as much time as home family would like to pursue hospice at home would not want CPR or intubation  Code Status: dnr cc  Time spent on Advanced care Plannin minutes    Bessie Syed MD  2024 12:57 PM

## 2024-02-25 NOTE — CARE COORDINATION
Discharge Planning:     (CM) spoke with Dr. Syed who advised that Patient would like to go home with Hospice and asked for CM to make a referral to Hospice.     CM faxed a referral to [facility] and the fax went through successfully.       Electronically signed by VARGHESE Rosenthal on 2/25/2024 at 1:33 PM

## 2024-02-25 NOTE — RT PROTOCOL NOTE
RT Inhaler-Nebulizer Bronchodilator Protocol Note    There is a bronchodilator order in the chart from a provider indicating to follow the RT Bronchodilator Protocol and there is an “Initiate RT Inhaler-Nebulizer Bronchodilator Protocol” order as well (see protocol at bottom of note).    CXR Findings:  XR CHEST PORTABLE    Result Date: 2/24/2024  1. No acute cardiopulmonary process. 2. Right upper lobe nodule measuring 2.9 cm which appears more prominent compared to the previous evaluation.  A CT chest is recommended for further evaluation.       The findings from the last RT Protocol Assessment were as follows:   History Pulmonary Disease: Chronic pulmonary disease  Respiratory Pattern: Dyspnea on exertion or RR 21-25 bpm  Breath Sounds: Slightly diminished and/or crackles  Cough: Weak, productive  Indication for Bronchodilator Therapy: Wheezing associated with pulm disorder  Bronchodilator Assessment Score: 8    Aerosolized bronchodilator medication orders have been revised according to the RT Inhaler-Nebulizer Bronchodilator Protocol below.    Respiratory Therapist to perform RT Therapy Protocol Assessment initially then follow the protocol.  Repeat RT Therapy Protocol Assessment PRN for score 0-3 or on second treatment, BID, and PRN for scores above 3.    No Indications - adjust the frequency to every 6 hours PRN wheezing or bronchospasm, if no treatments needed after 48 hours then discontinue using Per Protocol order mode.     If indication present, adjust the RT bronchodilator orders based on the Bronchodilator Assessment Score as indicated below.  Use Inhaler orders unless patient has one or more of the following: on home nebulizer, not able to hold breath for 10 seconds, is not alert and oriented, cannot activate and use MDI correctly, or respiratory rate 25 breaths per minute or more, then use the equivalent nebulizer order(s) with same Frequency and PRN reasons based on the score.  If a patient is on this  medication at home then do not decrease Frequency below that used at home.    0-3 - enter or revise RT bronchodilator order(s) to equivalent RT Bronchodilator order with Frequency of every 4 hours PRN for wheezing or increased work of breathing using Per Protocol order mode.        4-6 - enter or revise RT Bronchodilator order(s) to two equivalent RT bronchodilator orders with one order with BID Frequency and one order with Frequency of every 4 hours PRN wheezing or increased work of breathing using Per Protocol order mode.        7-10 - enter or revise RT Bronchodilator order(s) to two equivalent RT bronchodilator orders with one order with TID Frequency and one order with Frequency of every 4 hours PRN wheezing or increased work of breathing using Per Protocol order mode.       11-13 - enter or revise RT Bronchodilator order(s) to one equivalent RT bronchodilator order with QID Frequency and an Albuterol order with Frequency of every 4 hours PRN wheezing or increased work of breathing using Per Protocol order mode.      Greater than 13 - enter or revise RT Bronchodilator order(s) to one equivalent RT bronchodilator order with every 4 hours Frequency and an Albuterol order with Frequency of every 2 hours PRN wheezing or increased work of breathing using Per Protocol order mode.     RT to enter RT Home Evaluation for COPD & MDI Assessment order using Per Protocol order mode.    Electronically signed by GO TERAN RCP on 2/24/2024 at 10:49 PM

## 2024-02-26 ENCOUNTER — APPOINTMENT (OUTPATIENT)
Dept: CT IMAGING | Age: 84
DRG: 699 | End: 2024-02-26
Payer: MEDICARE

## 2024-02-26 LAB
ABO + RH BLD: NORMAL
ANION GAP SERPL CALCULATED.3IONS-SCNC: 11 MMOL/L (ref 3–16)
ANISOCYTOSIS BLD QL SMEAR: ABNORMAL
BACTERIA UR CULT: ABNORMAL
BACTERIA UR CULT: ABNORMAL
BASOPHILS # BLD: 0 K/UL (ref 0–0.2)
BASOPHILS NFR BLD: 0 %
BLD GP AB SCN SERPL QL: NORMAL
BLOOD BANK DISPENSE STATUS: NORMAL
BLOOD BANK PRODUCT CODE: NORMAL
BPU ID: NORMAL
BUN SERPL-MCNC: 8 MG/DL (ref 7–20)
CALCIUM SERPL-MCNC: 7.9 MG/DL (ref 8.3–10.6)
CHLORIDE SERPL-SCNC: 100 MMOL/L (ref 99–110)
CO2 SERPL-SCNC: 24 MMOL/L (ref 21–32)
CREAT SERPL-MCNC: 0.7 MG/DL (ref 0.8–1.3)
DEPRECATED RDW RBC AUTO: 20.9 % (ref 12.4–15.4)
DESCRIPTION BLOOD BANK: NORMAL
EOSINOPHIL # BLD: 0 K/UL (ref 0–0.6)
EOSINOPHIL NFR BLD: 0 %
GFR SERPLBLD CREATININE-BSD FMLA CKD-EPI: >60 ML/MIN/{1.73_M2}
GLUCOSE SERPL-MCNC: 95 MG/DL (ref 70–99)
HCT VFR BLD AUTO: 20.7 % (ref 40.5–52.5)
HCT VFR BLD AUTO: 25.7 % (ref 40.5–52.5)
HGB BLD-MCNC: 6.6 G/DL (ref 13.5–17.5)
HGB BLD-MCNC: 8.3 G/DL (ref 13.5–17.5)
LYMPHOCYTES # BLD: 0.6 K/UL (ref 1–5.1)
LYMPHOCYTES NFR BLD: 5 %
MCH RBC QN AUTO: 27.9 PG (ref 26–34)
MCHC RBC AUTO-ENTMCNC: 31.8 G/DL (ref 31–36)
MCV RBC AUTO: 87.7 FL (ref 80–100)
METAMYELOCYTES NFR BLD MANUAL: 2 %
MONOCYTES # BLD: 0.8 K/UL (ref 0–1.3)
MONOCYTES NFR BLD: 7 %
MYELOCYTES NFR BLD MANUAL: 5 %
NEUTROPHILS # BLD: 10.2 K/UL (ref 1.7–7.7)
NEUTROPHILS NFR BLD: 80 %
NEUTS BAND NFR BLD MANUAL: 1 % (ref 0–7)
NRBC BLD-RTO: 2 /100 WBC
ORGANISM: ABNORMAL
OVALOCYTES BLD QL SMEAR: ABNORMAL
PLATELET # BLD AUTO: 203 K/UL (ref 135–450)
PLATELET BLD QL SMEAR: ADEQUATE
PMV BLD AUTO: 7.9 FL (ref 5–10.5)
POLYCHROMASIA BLD QL SMEAR: ABNORMAL
POTASSIUM SERPL-SCNC: 3.9 MMOL/L (ref 3.5–5.1)
RBC # BLD AUTO: 2.36 M/UL (ref 4.2–5.9)
SLIDE REVIEW: ABNORMAL
SODIUM SERPL-SCNC: 135 MMOL/L (ref 136–145)
TOXIC GRANULES BLD QL SMEAR: PRESENT
WBC # BLD AUTO: 11.6 K/UL (ref 4–11)

## 2024-02-26 PROCEDURE — 36430 TRANSFUSION BLD/BLD COMPNT: CPT

## 2024-02-26 PROCEDURE — APPNB30 APP NON BILLABLE TIME 0-30 MINS: Performed by: NURSE PRACTITIONER

## 2024-02-26 PROCEDURE — 85018 HEMOGLOBIN: CPT

## 2024-02-26 PROCEDURE — 2700000000 HC OXYGEN THERAPY PER DAY

## 2024-02-26 PROCEDURE — 6360000004 HC RX CONTRAST MEDICATION: Performed by: INTERNAL MEDICINE

## 2024-02-26 PROCEDURE — 86850 RBC ANTIBODY SCREEN: CPT

## 2024-02-26 PROCEDURE — 2060000000 HC ICU INTERMEDIATE R&B

## 2024-02-26 PROCEDURE — 86901 BLOOD TYPING SEROLOGIC RH(D): CPT

## 2024-02-26 PROCEDURE — 86900 BLOOD TYPING SEROLOGIC ABO: CPT

## 2024-02-26 PROCEDURE — 30233N1 TRANSFUSION OF NONAUTOLOGOUS RED BLOOD CELLS INTO PERIPHERAL VEIN, PERCUTANEOUS APPROACH: ICD-10-PCS | Performed by: INTERNAL MEDICINE

## 2024-02-26 PROCEDURE — 6370000000 HC RX 637 (ALT 250 FOR IP): Performed by: INTERNAL MEDICINE

## 2024-02-26 PROCEDURE — 86923 COMPATIBILITY TEST ELECTRIC: CPT

## 2024-02-26 PROCEDURE — 2580000003 HC RX 258: Performed by: INTERNAL MEDICINE

## 2024-02-26 PROCEDURE — APPSS15 APP SPLIT SHARED TIME 0-15 MINUTES: Performed by: NURSE PRACTITIONER

## 2024-02-26 PROCEDURE — P9016 RBC LEUKOCYTES REDUCED: HCPCS

## 2024-02-26 PROCEDURE — 85025 COMPLETE CBC W/AUTO DIFF WBC: CPT

## 2024-02-26 PROCEDURE — 6360000002 HC RX W HCPCS: Performed by: INTERNAL MEDICINE

## 2024-02-26 PROCEDURE — 36415 COLL VENOUS BLD VENIPUNCTURE: CPT

## 2024-02-26 PROCEDURE — 30233N1 TRANSFUSION OF NONAUTOLOGOUS RED BLOOD CELLS INTO PERIPHERAL VEIN, PERCUTANEOUS APPROACH: ICD-10-PCS

## 2024-02-26 PROCEDURE — 6370000000 HC RX 637 (ALT 250 FOR IP): Performed by: SURGERY

## 2024-02-26 PROCEDURE — 85014 HEMATOCRIT: CPT

## 2024-02-26 PROCEDURE — 6360000002 HC RX W HCPCS: Performed by: SURGERY

## 2024-02-26 PROCEDURE — 74177 CT ABD & PELVIS W/CONTRAST: CPT

## 2024-02-26 PROCEDURE — 94640 AIRWAY INHALATION TREATMENT: CPT

## 2024-02-26 PROCEDURE — 80048 BASIC METABOLIC PNL TOTAL CA: CPT

## 2024-02-26 PROCEDURE — 6360000004 HC RX CONTRAST MEDICATION: Performed by: NURSE PRACTITIONER

## 2024-02-26 PROCEDURE — 94761 N-INVAS EAR/PLS OXIMETRY MLT: CPT

## 2024-02-26 PROCEDURE — APPSS60 APP SPLIT SHARED TIME 46-60 MINUTES: Performed by: NURSE PRACTITIONER

## 2024-02-26 RX ORDER — POLYETHYLENE GLYCOL 3350 17 G/17G
17 POWDER, FOR SOLUTION ORAL DAILY
Status: COMPLETED | OUTPATIENT
Start: 2024-02-26 | End: 2024-02-28

## 2024-02-26 RX ORDER — SODIUM CHLORIDE 9 MG/ML
INJECTION, SOLUTION INTRAVENOUS PRN
Status: COMPLETED | OUTPATIENT
Start: 2024-02-26 | End: 2024-02-27

## 2024-02-26 RX ORDER — IPRATROPIUM BROMIDE AND ALBUTEROL SULFATE 2.5; .5 MG/3ML; MG/3ML
1 SOLUTION RESPIRATORY (INHALATION) EVERY 4 HOURS PRN
Status: DISCONTINUED | OUTPATIENT
Start: 2024-02-26 | End: 2024-02-28 | Stop reason: HOSPADM

## 2024-02-26 RX ORDER — DOCUSATE SODIUM 100 MG/1
100 CAPSULE, LIQUID FILLED ORAL DAILY
Status: DISCONTINUED | OUTPATIENT
Start: 2024-02-26 | End: 2024-02-28 | Stop reason: HOSPADM

## 2024-02-26 RX ORDER — IPRATROPIUM BROMIDE AND ALBUTEROL SULFATE 2.5; .5 MG/3ML; MG/3ML
1 SOLUTION RESPIRATORY (INHALATION)
Status: DISCONTINUED | OUTPATIENT
Start: 2024-02-26 | End: 2024-02-28 | Stop reason: HOSPADM

## 2024-02-26 RX ORDER — METOCLOPRAMIDE HYDROCHLORIDE 5 MG/ML
10 INJECTION INTRAMUSCULAR; INTRAVENOUS EVERY 6 HOURS
Status: DISCONTINUED | OUTPATIENT
Start: 2024-02-26 | End: 2024-02-28 | Stop reason: HOSPADM

## 2024-02-26 RX ADMIN — IPRATROPIUM BROMIDE AND ALBUTEROL SULFATE 1 DOSE: 2.5; .5 SOLUTION RESPIRATORY (INHALATION) at 20:48

## 2024-02-26 RX ADMIN — METOCLOPRAMIDE 10 MG: 5 INJECTION, SOLUTION INTRAMUSCULAR; INTRAVENOUS at 22:26

## 2024-02-26 RX ADMIN — GABAPENTIN 100 MG: 100 CAPSULE ORAL at 09:00

## 2024-02-26 RX ADMIN — Medication 2 PUFF: at 20:48

## 2024-02-26 RX ADMIN — SODIUM CHLORIDE, POTASSIUM CHLORIDE, SODIUM LACTATE AND CALCIUM CHLORIDE: 600; 310; 30; 20 INJECTION, SOLUTION INTRAVENOUS at 00:02

## 2024-02-26 RX ADMIN — Medication 2 PUFF: at 09:42

## 2024-02-26 RX ADMIN — DIATRIZOATE MEGLUMINE AND DIATRIZOATE SODIUM 20 ML: 660; 100 LIQUID ORAL; RECTAL at 12:11

## 2024-02-26 RX ADMIN — PANTOPRAZOLE SODIUM 40 MG: 40 TABLET, DELAYED RELEASE ORAL at 08:12

## 2024-02-26 RX ADMIN — GABAPENTIN 100 MG: 100 CAPSULE ORAL at 22:25

## 2024-02-26 RX ADMIN — SODIUM CHLORIDE, POTASSIUM CHLORIDE, SODIUM LACTATE AND CALCIUM CHLORIDE: 600; 310; 30; 20 INJECTION, SOLUTION INTRAVENOUS at 17:02

## 2024-02-26 RX ADMIN — IPRATROPIUM BROMIDE AND ALBUTEROL SULFATE 1 DOSE: 2.5; .5 SOLUTION RESPIRATORY (INHALATION) at 09:41

## 2024-02-26 RX ADMIN — TIOTROPIUM BROMIDE INHALATION SPRAY 2 PUFF: 3.12 SPRAY, METERED RESPIRATORY (INHALATION) at 09:42

## 2024-02-26 RX ADMIN — SODIUM CHLORIDE, PRESERVATIVE FREE 10 ML: 5 INJECTION INTRAVENOUS at 22:26

## 2024-02-26 RX ADMIN — METOPROLOL SUCCINATE 50 MG: 50 TABLET, EXTENDED RELEASE ORAL at 09:00

## 2024-02-26 RX ADMIN — IOPAMIDOL 75 ML: 755 INJECTION, SOLUTION INTRAVENOUS at 14:47

## 2024-02-26 RX ADMIN — ESCITALOPRAM OXALATE 10 MG: 10 TABLET ORAL at 09:00

## 2024-02-26 RX ADMIN — ENOXAPARIN SODIUM 40 MG: 100 INJECTION SUBCUTANEOUS at 22:25

## 2024-02-26 RX ADMIN — PREDNISONE 5 MG: 5 TABLET ORAL at 09:00

## 2024-02-26 RX ADMIN — POLYETHYLENE GLYCOL 3350 17 G: 17 POWDER, FOR SOLUTION ORAL at 16:55

## 2024-02-26 RX ADMIN — CEFTRIAXONE SODIUM 1000 MG: 1 INJECTION, POWDER, FOR SOLUTION INTRAMUSCULAR; INTRAVENOUS at 12:11

## 2024-02-26 RX ADMIN — METOCLOPRAMIDE 10 MG: 5 INJECTION, SOLUTION INTRAMUSCULAR; INTRAVENOUS at 16:55

## 2024-02-26 RX ADMIN — AMLODIPINE BESYLATE 5 MG: 5 TABLET ORAL at 09:00

## 2024-02-26 RX ADMIN — DOCUSATE SODIUM 100 MG: 100 CAPSULE, LIQUID FILLED ORAL at 16:55

## 2024-02-26 RX ADMIN — TRAZODONE HYDROCHLORIDE 100 MG: 50 TABLET ORAL at 22:25

## 2024-02-26 NOTE — CONSULTS
Palliative Care:   a 83 y.o. male who who who gets chemotherapy for prostate cancer with mets to the lung has been having nausea and vomiting unable to keep anything down last bowel movement 4 days ago.  Patient denies any fevers cough chest pain shortness of breath has been having some chills does have hematuria which is old has a suprapubic catheter placed. Admitted 2/24/24 for further evaluation/treatment. Palliative consult placed 2/25/24 along with hospice consult. Pending meeting set for 2/26/24.     CBC:          Recent Labs     02/26/24  0458 02/25/24  0511 02/24/24  1101   WBC 11.6* 23.5* 26.9*   NEUTROABS 10.2* 19.3* 24.2*   LYMPHOPCT 5.0 7.0 5.0   RBC 2.36* 2.72* 3.07*   HGB 6.6* 7.4* 8.3*   HCT 20.7* 24.0* 27.2*   MCV 87.7 88.1 88.5   MCH 27.9 27.3 27.0   MCHC 31.8 30.9* 30.5*   RDW 20.9* 21.0* 20.7*    241 249        Past Medical History:   has a past medical history of Carotid artery occlusion and stenosis, Centrilobular emphysema (HCC), COPD (chronic obstructive pulmonary disease) (HCC), ED (erectile dysfunction), Emphysema lung (HCC), GERD (gastroesophageal reflux disease), Hyperlipidemia, Hypertension, Prostate CA (HCC), 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    Past Surgical History:   has a past surgical history that includes Prostatectomy (2003); Tonsillectomy; Carotid endarterectomy (5/3/12); Cystoscopy (07/2017); Colonoscopy; Endoscopy, colon, diagnostic; Carotid endarterectomy (Right, 10/25/2017); Cholecystectomy, laparoscopic (02/12/2018); skin biopsy (2021); Esophagus dilation (N/A, 1/3/2022); Upper gastrointestinal endoscopy (N/A, 1/3/2022); Colonoscopy (N/A, 6/1/2023); Colonoscopy (6/1/2023); Cystoscopy (N/A, 12/3/2023); and Cystoscopy (N/A, 1/25/2024).    Advance Directives:    DNR-CC. Confirmed with Dr. Syed on 2/25/24. No ACP paperwork on file. Wife Marysol acting DPOA if/when indicated.      Problem Severity:  Danelle/liaison @ 0900 and states she has received intake information. Danelle attempted to visit with patient and wife. They want to pursue surgery consult at this time.     Wife of patient today wants to pursue surgery consult. Dr. Syed to follow up with surgery to see patient. Will continue to follow as needed.     Electronically signed by Amelie Hickman RN, BSN, [facility] [place] (Palliative Care) [phone]

## 2024-02-26 NOTE — FLOWSHEET NOTE
02/26/24 0632   Treatment Team Notification   Reason for Communication Critical results   Type of Critical Result Laboratory   Critical Lab Information h/h 6.6/20.7   Person Result Received From Liya TOVAR   Critical Lab Result Type Hemoglobin and hematocrit   Name of Team Member Notified    Treatment Team Role Attending Provider   Method of Communication Secure Message   Response Waiting for response   Notification Time 0632     Blood transfusion protocol ordered.

## 2024-02-26 NOTE — PROGRESS NOTES
[facility]    2/26/2024 12:30 PM        Name: Clinton Bennett .              Admitted: 2/24/2024  Primary Care Provider: Oskar García MD (Tel: [phone])      Subjective:    Patient lying in bed no nausea vomiting no bowel movement yet feels much better today    Reviewed interval ancillary notes    Current Medications  0.9 % sodium chloride infusion, PRN  diatrizoate meglumine-sodium (GASTROGRAFIN) 66-10 % solution 20 mL, ONCE PRN  albuterol sulfate HFA (PROVENTIL;VENTOLIN;PROAIR) 108 (90 Base) MCG/ACT inhaler 2 puff, Q6H PRN  amLODIPine (NORVASC) tablet 5 mg, Daily  escitalopram (LEXAPRO) tablet 10 mg, Daily  ipratropium 0.5 mg-albuterol 2.5 mg (DUONEB) nebulizer solution 1 Dose, 4x Daily RT  metoprolol succinate (TOPROL XL) extended release tablet 50 mg, Daily  pantoprazole (PROTONIX) tablet 40 mg, Daily  predniSONE (DELTASONE) tablet 5 mg, Daily  cefTRIAXone (ROCEPHIN) 1,000 mg in sodium chloride 0.9 % 50 mL IVPB (mini-bag), Q24H  sodium chloride flush 0.9 % injection 5-40 mL, 2 times per day  sodium chloride flush 0.9 % injection 5-40 mL, PRN  0.9 % sodium chloride infusion, PRN  enoxaparin (LOVENOX) injection 40 mg, Nightly  polyethylene glycol (GLYCOLAX) packet 17 g, Daily PRN  acetaminophen (TYLENOL) tablet 650 mg, Q6H PRN   Or  acetaminophen (TYLENOL) suppository 650 mg, Q6H PRN  ondansetron (ZOFRAN) injection 4 mg, Q6H PRN  lactated ringers IV soln infusion, Continuous  mometasone-formoterol (DULERA) 200-5 MCG/ACT inhaler 2 puff, BID RT  tiotropium (SPIRIVA RESPIMAT) 2.5 MCG/ACT inhaler 2 puff, Daily RT  gabapentin (NEURONTIN) capsule 100 mg, BID  traZODone (DESYREL) tablet 100 mg, Nightly        Objective:  BP (!) 144/58   Pulse (!) 109   Temp 99.9 °F (37.7 °C) (Axillary)   Resp 17   Ht 1.778 m (5' 10\")   Wt 65.5 kg (144 lb 6.4 oz)   SpO2 93%   BMI 20.72 kg/m²     Intake/Output Summary (Last

## 2024-02-26 NOTE — PROGRESS NOTES
Physical Therapy/Occupational Therapy Attempt Note  Clinton Bennett      Attempted patient in AM and patient requested therapy return after lunch.  Patient currently off floor at CT scan.  Plan to attempt again tomorrow.    Aime Bell PT, DPT, [id number] [id number]  Ellie Shankar, OTR/L, [id number] 2/26/2024 2:51 PM

## 2024-02-26 NOTE — PLAN OF CARE
Problem: Discharge Planning  Goal: Discharge to home or other facility with appropriate resources  2/25/2024 2243 by Nadiya Hernandez, RN  Outcome: Progressing  Flowsheets (Taken 2/25/2024 2000)  Discharge to home or other facility with appropriate resources:   Identify barriers to discharge with patient and caregiver   Arrange for needed discharge resources and transportation as appropriate   Identify discharge learning needs (meds, wound care, etc)   Refer to discharge planning if patient needs post-hospital services based on physician order or complex needs related to functional status, cognitive ability or social support system     Problem: Pain  Goal: Verbalizes/displays adequate comfort level or baseline comfort level  2/25/2024 2243 by Nadiya Hernandez, RN  Outcome: Progressing  Flowsheets (Taken 2/25/2024 2000)  Verbalizes/displays adequate comfort level or baseline comfort level:   Encourage patient to monitor pain and request assistance   Assess pain using appropriate pain scale   Administer analgesics based on type and severity of pain and evaluate response   Implement non-pharmacological measures as appropriate and evaluate response   Consider cultural and social influences on pain and pain management   Notify Licensed Independent Practitioner if interventions unsuccessful or patient reports new pain  Note: Denies pain, encouraging pt reposition often to decrease risk of pain in an area of pressure.     Problem: Skin/Tissue Integrity  Goal: Absence of new skin breakdown  Description: 1.  Monitor for areas of redness and/or skin breakdown  2.  Assess vascular access sites hourly  3.  Every 4-6 hours minimum:  Change oxygen saturation probe site  4.  Every 4-6 hours:  If on nasal continuous positive airway pressure, respiratory therapy assess nares and determine need for appliance change or resting period.  2/25/2024 2243 by Nadiya Hernandez, RN  Outcome: Progressing  Note: Encouraging pt reposition

## 2024-02-26 NOTE — PROGRESS NOTES
Called and spoke to wife, she advised that she has spoken to oncology and that the patient has a blockage that may be resolved, in which case they will not need hospice services. Wife is in agreement for hospice to continue to follow while the patient is in the hospital for support and any questions that arise. Coordinated with [facility].         Thank you for allowing [facility] to be a part of the [facility] family's care.    MAHSA Celaya RN  [facility] Liaison  [email or web address]  M-[phone]  Cell: [phone]  Main: [phone]

## 2024-02-26 NOTE — PLAN OF CARE
[facility]        Assessment & Plan of Care    History of Present Illness: Mr. Bennett is a 83 y.o. male who presented to the ED 2 days ago with a chief complaint of fatigue that started following his last dose of chemotherapy (on 2/5/2024) and has progressively worsened.  Associated nausea, vomiting, poor PO intake, dehydration, and generalized weakness.  He reports chronic diarrhea but as of recently has been constipated and reports pain with attempting to pass stool.  Last BM was prior to admission, but has been passing flatus, none today however.  Small amount of blood noted at the beginning of a BM.  He denies abdominal pain, fevers, chills, or similar symptoms in the past.  His medical history is significant for prostate carcinoma with metastasis to lung and bone for which he has been undergoing chemotherapy since May 2023; he follows with Dr. Diego Ferguson.  Since starting chemotherapy, he has lost 20 pounds.  Medical history includes hypertension, hyperlipidemia, GERD, COPD, and chronic UTI.  Prior abdominal surgery includes prostatectomy 2003 (radiation therapy) and laparoscopic cholecystectomy.  He underwent suprapubic catheter placement in January 2024.  Last colonoscopy with polypectomy was with Dr. Mccabe in June 2023.  No blood thinners.  Former smoker.    Physical Exam:  CONSTITUTIONAL:  alert, no apparent distress and normal weight  NEUROLOGIC:  Mental Status Exam:  Level of Alertness:   awake  Orientation:   person, place, time  EYES:  sclera clear  ENT:  normocepalic, without obvious abnormality  NECK:  supple, symmetrical, trachea midline  LUNGS:  clear to auscultation  CARDIOVASCULAR:  regular rate and rhythm and no murmur noted  ABDOMEN: soft, non-distended, non-tender, voluntary guarding absent, no masses palpated, normal bowel sounds, and hernia absent  Extremities: no edema  SKIN:  no bruising or bleeding and normal skin color, texture,

## 2024-02-26 NOTE — PLAN OF CARE
Problem: Discharge Planning  Goal: Discharge to home or other facility with appropriate resources  2/26/2024 0920 by Elizabeth Sandoval  Outcome: Progressing  2/25/2024 2243 by Nadiya Hernandez RN  Outcome: Progressing  Flowsheets (Taken 2/25/2024 2000)  Discharge to home or other facility with appropriate resources:   Identify barriers to discharge with patient and caregiver   Arrange for needed discharge resources and transportation as appropriate   Identify discharge learning needs (meds, wound care, etc)   Refer to discharge planning if patient needs post-hospital services based on physician order or complex needs related to functional status, cognitive ability or social support system     Problem: Pain  Goal: Verbalizes/displays adequate comfort level or baseline comfort level  2/26/2024 0920 by Elizabeth Sandoval  Outcome: Progressing  2/25/2024 2243 by Nadiya Hernandez RN  Outcome: Progressing  Flowsheets (Taken 2/25/2024 2000)  Verbalizes/displays adequate comfort level or baseline comfort level:   Encourage patient to monitor pain and request assistance   Assess pain using appropriate pain scale   Administer analgesics based on type and severity of pain and evaluate response   Implement non-pharmacological measures as appropriate and evaluate response   Consider cultural and social influences on pain and pain management   Notify Licensed Independent Practitioner if interventions unsuccessful or patient reports new pain  Note: Denies pain, encouraging pt reposition often to decrease risk of pain in an area of pressure.     Problem: Skin/Tissue Integrity  Goal: Absence of new skin breakdown  Description: 1.  Monitor for areas of redness and/or skin breakdown  2.  Assess vascular access sites hourly  3.  Every 4-6 hours minimum:  Change oxygen saturation probe site  4.  Every 4-6 hours:  If on nasal continuous positive airway pressure, respiratory therapy assess nares and determine need for appliance

## 2024-02-26 NOTE — PROGRESS NOTES
ONCOLOGY HEMATOLOGY CARE PROGRESS NOTE      SUBJECTIVE:  Feeling weak.  Developed nausea vomiting a few days after last chemotherapy.      ROS:     Constitutional:  No weight loss, No fever, No chills, No night sweats.  Generalized weakness.  Eyes:  No impairment or change in vision  ENT / Mouth:  No pain, abnormal ulceration, bleeding, nasal drip or change in voice or hearing  Cardiovascular:  No chest pain, palpitations, new edema, or calf discomfort  Respiratory:  No pain, hemoptysis, change to breathing  Breast:  No pain, discharge, change in appearance or texture  Gastrointestinal:  Nausea and vomiting.  Denies abdominal pain at present.  Trouble moving bowels.  Urinary:  No pain, bleeding or change in continence  Genitalia: No pain, bleeding or discharge  Musculoskeletal:  No redness, pain, edema or weakness  Skin:  No pruritus, rash, change to nodules or lesions  Neurologic:  No discomfort, change in mental status, speech, sensory or motor activity  Psychiatric:  No change in concentration or change to affect or mood  Endocrine:  No hot flashes, increased thirst, or change to urine production  Hematologic: No petechiae, ecchymosis or bleeding  Lymphatic:  No lymphadenopathy or lymphedema  Allergy / Immunologic:  No eczema, hives, frequent or recurrent infections    OBJECTIVE        Physical    VITALS:  Patient Vitals for the past 24 hrs:   BP Temp Temp src Pulse Resp SpO2 Weight   02/26/24 0813 (!) 182/90 98.4 °F (36.9 °C) Oral (!) 102 16 97 % 65.5 kg (144 lb 6.4 oz)   02/26/24 0411 (!) 143/64 98.1 °F (36.7 °C) Axillary 87 15 96 % --   02/26/24 0017 -- -- -- -- -- 97 % --   02/26/24 0012 -- -- -- -- -- 96 % --   02/26/24 0008 -- -- -- -- -- 93 % --   02/26/24 0003 127/65 98.6 °F (37 °C) Oral 90 14 98 % --   02/25/24 2000 (!) 119/49 97.9 °F (36.6 °C) Oral (!) 115 18 96 % --   02/25/24 1921 -- -- -- (!) 108 18 93 % --   02/25/24 1545 (!) 101/55 97.9 °F (36.6 °C) Oral 98 17  --   --  3.3*   AGRATIO  --   --  1.4   BILITOT  --   --  1.4*   ALKPHOS  --   --  166*   ALT  --   --  17   AST  --   --  33   MG  --   --  1.80       Lab Results   Component Value Date    CALCIUM 7.9 (L) 02/26/2024    PHOS 2.2 (L) 12/04/2023       LDH:No results for input(s): \"LDH\" in the last 720 hours.    Radiology Review:  XR CHEST PORTABLE  Narrative: EXAMINATION:  ONE XRAY VIEW OF THE CHEST    2/24/2024 10:28 am    COMPARISON:  December 2, 2023    HISTORY:  ORDERING SYSTEM PROVIDED HISTORY: tachy  TECHNOLOGIST PROVIDED HISTORY:  Reason for exam:->tachy  Reason for Exam: tachy    FINDINGS:  No active infiltrates, pleural effusion or pneumothorax.  Heart and  mediastinum appear stable.  There is an impression of a right upper lobe  nodule measuring 2.9 cm which partly overlaps ribs.  This appears more  prominent compared to the previous evaluation.  Impression: 1. No acute cardiopulmonary process.  2. Right upper lobe nodule measuring 2.9 cm which appears more prominent  compared to the previous evaluation.  A CT chest is recommended for further  evaluation.      Problem List  Patient Active Problem List   Diagnosis    Psoriasis    Prostate cancer metastatic to bone (HCC)    GERD (gastroesophageal reflux disease)    ED (erectile dysfunction)    Essential hypertension    Hyperlipidemia    Occlusion and stenosis of carotid artery    Centrilobular emphysema (HCC)    Hypoxia    Pulmonary nodules    Chronic obstructive pulmonary disease (HCC)    Hematuria    Cystitis    Atherosclerosis of both carotid arteries    Abdominal pain    Biliary colic    Abdominal pain, epigastric    Acute cholecystitis    Cholecystitis    Hospital-acquired pneumonia    Failure of outpatient treatment    Pneumonia    Carotid artery occlusion and stenosis    Malignant neoplasm of prostate metastatic to lymph nodes of multiple sites (HCC)    Prostate cancer (HCC)    Diarrhea    Sepsis (HCC)    Hydronephrosis    Postprocedural membranous

## 2024-02-26 NOTE — PROGRESS NOTES
Improved orientation this shift, no calling out for spouse and able to follow commands and fully express wants/needs/concerns. Suprapubic cath care and damir care performed, continues to have some urine leakage from urethra. Denies pain, no c/o nausea or vomiting, tolerating water intake well. No s/s of distress noted, call light within reach.

## 2024-02-26 NOTE — CONSULTS
Goals of care have shifted and patient is now interested in discharging home with hospice.  Will follow as needed.  Please re-consult if necessary.    Discussed with ASAD Syed MD.      Signed:  ADELAIDA Nugent - CNP  2/26/2024 8:35 AM

## 2024-02-27 ENCOUNTER — TELEPHONE (OUTPATIENT)
Dept: FAMILY MEDICINE CLINIC | Age: 84
End: 2024-02-27

## 2024-02-27 PROBLEM — K56.7 ILEUS (HCC): Status: ACTIVE | Noted: 2024-02-27

## 2024-02-27 PROBLEM — R53.1 GENERAL WEAKNESS: Status: ACTIVE | Noted: 2023-12-05

## 2024-02-27 PROBLEM — E44.0 MODERATE MALNUTRITION (HCC): Status: ACTIVE | Noted: 2024-02-27

## 2024-02-27 LAB
ANION GAP SERPL CALCULATED.3IONS-SCNC: 9 MMOL/L (ref 3–16)
ANISOCYTOSIS BLD QL SMEAR: ABNORMAL
BASOPHILS # BLD: 0 K/UL (ref 0–0.2)
BASOPHILS NFR BLD: 0 %
BUN SERPL-MCNC: 5 MG/DL (ref 7–20)
CALCIUM SERPL-MCNC: 7.9 MG/DL (ref 8.3–10.6)
CHLORIDE SERPL-SCNC: 101 MMOL/L (ref 99–110)
CO2 SERPL-SCNC: 27 MMOL/L (ref 21–32)
CREAT SERPL-MCNC: 0.7 MG/DL (ref 0.8–1.3)
DEPRECATED RDW RBC AUTO: 19 % (ref 12.4–15.4)
EOSINOPHIL # BLD: 0 K/UL (ref 0–0.6)
EOSINOPHIL NFR BLD: 0 %
GFR SERPLBLD CREATININE-BSD FMLA CKD-EPI: >60 ML/MIN/{1.73_M2}
GLUCOSE SERPL-MCNC: 98 MG/DL (ref 70–99)
HCT VFR BLD AUTO: 23.9 % (ref 40.5–52.5)
HGB BLD-MCNC: 7.7 G/DL (ref 13.5–17.5)
LYMPHOCYTES # BLD: 1.5 K/UL (ref 1–5.1)
LYMPHOCYTES NFR BLD: 13 %
MCH RBC QN AUTO: 27.9 PG (ref 26–34)
MCHC RBC AUTO-ENTMCNC: 32.4 G/DL (ref 31–36)
MCV RBC AUTO: 86.2 FL (ref 80–100)
MONOCYTES # BLD: 0.6 K/UL (ref 0–1.3)
MONOCYTES NFR BLD: 5 %
MYELOCYTES NFR BLD MANUAL: 1 %
NEUTROPHILS # BLD: 9.8 K/UL (ref 1.7–7.7)
NEUTROPHILS NFR BLD: 81 %
PLATELET # BLD AUTO: 215 K/UL (ref 135–450)
PLATELET BLD QL SMEAR: ADEQUATE
PMV BLD AUTO: 7.6 FL (ref 5–10.5)
POLYCHROMASIA BLD QL SMEAR: ABNORMAL
POTASSIUM SERPL-SCNC: 3.8 MMOL/L (ref 3.5–5.1)
RBC # BLD AUTO: 2.77 M/UL (ref 4.2–5.9)
SLIDE REVIEW: ABNORMAL
SODIUM SERPL-SCNC: 137 MMOL/L (ref 136–145)
WBC # BLD AUTO: 11.9 K/UL (ref 4–11)

## 2024-02-27 PROCEDURE — 80048 BASIC METABOLIC PNL TOTAL CA: CPT

## 2024-02-27 PROCEDURE — APPNB30 APP NON BILLABLE TIME 0-30 MINS: Performed by: NURSE PRACTITIONER

## 2024-02-27 PROCEDURE — 97161 PT EVAL LOW COMPLEX 20 MIN: CPT

## 2024-02-27 PROCEDURE — 6360000002 HC RX W HCPCS: Performed by: SURGERY

## 2024-02-27 PROCEDURE — 85025 COMPLETE CBC W/AUTO DIFF WBC: CPT

## 2024-02-27 PROCEDURE — 6370000000 HC RX 637 (ALT 250 FOR IP): Performed by: INTERNAL MEDICINE

## 2024-02-27 PROCEDURE — 6360000002 HC RX W HCPCS: Performed by: INTERNAL MEDICINE

## 2024-02-27 PROCEDURE — 94640 AIRWAY INHALATION TREATMENT: CPT

## 2024-02-27 PROCEDURE — APPSS15 APP SPLIT SHARED TIME 0-15 MINUTES: Performed by: NURSE PRACTITIONER

## 2024-02-27 PROCEDURE — 2580000003 HC RX 258: Performed by: INTERNAL MEDICINE

## 2024-02-27 PROCEDURE — 97165 OT EVAL LOW COMPLEX 30 MIN: CPT

## 2024-02-27 PROCEDURE — 99231 SBSQ HOSP IP/OBS SF/LOW 25: CPT | Performed by: SURGERY

## 2024-02-27 PROCEDURE — 36415 COLL VENOUS BLD VENIPUNCTURE: CPT

## 2024-02-27 PROCEDURE — 6370000000 HC RX 637 (ALT 250 FOR IP): Performed by: SURGERY

## 2024-02-27 PROCEDURE — 99223 1ST HOSP IP/OBS HIGH 75: CPT | Performed by: SURGERY

## 2024-02-27 PROCEDURE — 6370000000 HC RX 637 (ALT 250 FOR IP): Performed by: PHYSICIAN ASSISTANT

## 2024-02-27 PROCEDURE — 2060000000 HC ICU INTERMEDIATE R&B

## 2024-02-27 RX ORDER — LORAZEPAM 0.5 MG/1
0.5 TABLET ORAL EVERY 4 HOURS PRN
Status: DISCONTINUED | OUTPATIENT
Start: 2024-02-27 | End: 2024-02-28 | Stop reason: HOSPADM

## 2024-02-27 RX ORDER — LACTULOSE 10 G/15ML
30 SOLUTION ORAL
Status: DISPENSED | OUTPATIENT
Start: 2024-02-27 | End: 2024-02-27

## 2024-02-27 RX ADMIN — PREDNISONE 5 MG: 5 TABLET ORAL at 08:41

## 2024-02-27 RX ADMIN — TIOTROPIUM BROMIDE INHALATION SPRAY 2 PUFF: 3.12 SPRAY, METERED RESPIRATORY (INHALATION) at 09:36

## 2024-02-27 RX ADMIN — PANTOPRAZOLE SODIUM 40 MG: 40 TABLET, DELAYED RELEASE ORAL at 05:55

## 2024-02-27 RX ADMIN — ENOXAPARIN SODIUM 40 MG: 100 INJECTION SUBCUTANEOUS at 21:28

## 2024-02-27 RX ADMIN — Medication 2 PUFF: at 22:01

## 2024-02-27 RX ADMIN — METOPROLOL SUCCINATE 50 MG: 50 TABLET, EXTENDED RELEASE ORAL at 08:41

## 2024-02-27 RX ADMIN — SODIUM CHLORIDE, PRESERVATIVE FREE 10 ML: 5 INJECTION INTRAVENOUS at 21:28

## 2024-02-27 RX ADMIN — POLYETHYLENE GLYCOL 3350 17 G: 17 POWDER, FOR SOLUTION ORAL at 08:42

## 2024-02-27 RX ADMIN — CEFTRIAXONE SODIUM 1000 MG: 1 INJECTION, POWDER, FOR SOLUTION INTRAMUSCULAR; INTRAVENOUS at 12:01

## 2024-02-27 RX ADMIN — GABAPENTIN 100 MG: 100 CAPSULE ORAL at 08:41

## 2024-02-27 RX ADMIN — METOCLOPRAMIDE 10 MG: 5 INJECTION, SOLUTION INTRAMUSCULAR; INTRAVENOUS at 05:56

## 2024-02-27 RX ADMIN — IPRATROPIUM BROMIDE AND ALBUTEROL SULFATE 1 DOSE: 2.5; .5 SOLUTION RESPIRATORY (INHALATION) at 09:35

## 2024-02-27 RX ADMIN — DOCUSATE SODIUM 100 MG: 100 CAPSULE, LIQUID FILLED ORAL at 08:41

## 2024-02-27 RX ADMIN — METOCLOPRAMIDE 10 MG: 5 INJECTION, SOLUTION INTRAMUSCULAR; INTRAVENOUS at 11:47

## 2024-02-27 RX ADMIN — ACETAMINOPHEN 650 MG: 325 TABLET ORAL at 08:46

## 2024-02-27 RX ADMIN — LACTULOSE 30 G: 20 SOLUTION ORAL at 12:37

## 2024-02-27 RX ADMIN — IPRATROPIUM BROMIDE AND ALBUTEROL SULFATE 1 DOSE: 2.5; .5 SOLUTION RESPIRATORY (INHALATION) at 22:01

## 2024-02-27 RX ADMIN — SODIUM CHLORIDE, POTASSIUM CHLORIDE, SODIUM LACTATE AND CALCIUM CHLORIDE: 600; 310; 30; 20 INJECTION, SOLUTION INTRAVENOUS at 02:49

## 2024-02-27 RX ADMIN — AMLODIPINE BESYLATE 5 MG: 5 TABLET ORAL at 08:41

## 2024-02-27 RX ADMIN — Medication 2 PUFF: at 09:35

## 2024-02-27 RX ADMIN — GABAPENTIN 100 MG: 100 CAPSULE ORAL at 21:28

## 2024-02-27 RX ADMIN — Medication 240 ML: at 15:27

## 2024-02-27 RX ADMIN — SODIUM CHLORIDE 30 ML: 9 INJECTION, SOLUTION INTRAVENOUS at 11:57

## 2024-02-27 RX ADMIN — SODIUM CHLORIDE, PRESERVATIVE FREE 10 ML: 5 INJECTION INTRAVENOUS at 05:56

## 2024-02-27 RX ADMIN — METOCLOPRAMIDE 10 MG: 5 INJECTION, SOLUTION INTRAMUSCULAR; INTRAVENOUS at 17:18

## 2024-02-27 RX ADMIN — ESCITALOPRAM OXALATE 10 MG: 10 TABLET ORAL at 08:41

## 2024-02-27 RX ADMIN — METOCLOPRAMIDE 10 MG: 5 INJECTION, SOLUTION INTRAMUSCULAR; INTRAVENOUS at 23:36

## 2024-02-27 RX ADMIN — TRAZODONE HYDROCHLORIDE 100 MG: 50 TABLET ORAL at 21:27

## 2024-02-27 ASSESSMENT — PAIN SCALES - GENERAL
PAINLEVEL_OUTOF10: 1
PAINLEVEL_OUTOF10: 3
PAINLEVEL_OUTOF10: 3

## 2024-02-27 ASSESSMENT — PAIN DESCRIPTION - LOCATION
LOCATION: BACK

## 2024-02-27 ASSESSMENT — ENCOUNTER SYMPTOMS
BACK PAIN: 0
APNEA: 0
VOMITING: 1
EYE DISCHARGE: 0
NAUSEA: 1
CHEST TIGHTNESS: 0
EYE ITCHING: 0

## 2024-02-27 ASSESSMENT — PAIN DESCRIPTION - DESCRIPTORS
DESCRIPTORS: ACHING

## 2024-02-27 NOTE — PROGRESS NOTES
Met with patient, his wife, son and daughter at bedside. Discussed patient's disease process, prognosis and goals of care. Discussed hospice plan of care and philosophy, DME, medications, levels of care, IPU for short stay for acute symptom management and respite, hygiene supplies, care team, support team, and bereavement support. Patient and family expressed understanding and agreement to all. Patient would like to go home with [facility] services. DME ordered for delivery between 10-1 on 2/28 as the family needs some time to move a few things in the home before they can accept the equipment. Transport home scheduled for 3:00 P with [facility]. Packet created for transport. Coordinated with SHELBY Madsen, Elizabeth blandon RN and with CLEMENTINE Mark.       Thank you for allowing [facility] to be a part of the Donald family's care.    MAHSA Celaya RN  [facility] Liaison  [email or web address]  M-F [phone]  Cell: [phone]  Main: [phone]

## 2024-02-27 NOTE — PROGRESS NOTES
Physician Progress Note      PATIENT:               DORITA ADRIAN  CSN #:                  [id number]  :                       1940  ADMIT DATE:       2024 10:19 AM  DISCH DATE:  RESPONDING  PROVIDER #:        Vika Negron MD          QUERY TEXT:    Pt admitted with UTI.  Noted documentation of sepsis in URO consult on .    If possible, please document in progress notes and discharge summary:    The medical record reflects the following:  Risk Factors: 84yo with UTI, malignant neoplasm    Clinical Indicators:  - WBC 26.9, procal 0.39, temp 101.5, RR 18-26, HR     Uro , \"Had spt changed Friday,  likely sepsis from gu source\"    Treatment: cefTRIAXone  Options provided:  -- sepsis due to UTI confirmed present on admission  -- sepsis ruled out  -- Other - I will add my own diagnosis  -- Disagree - Not applicable / Not valid  -- Disagree - Clinically unable to determine / Unknown  -- Refer to Clinical Documentation Reviewer    PROVIDER RESPONSE TEXT:    The diagnosis of sepsis was ruled out.    Query created by: Chasidy Steele on 2024 12:28 PM      Electronically signed by:  Vika Negron MD 2024 6:00 PM

## 2024-02-27 NOTE — PROGRESS NOTES
[facility]    2/27/2024 6:56 PM        Name: Clinton Bennett .              Admitted: 2/24/2024  Primary Care Provider: Oskar García MD (Tel: [phone])      Subjective:    Patient in bed. Getting ready to get up in chair. They have met and signed on with hospice. Planning for dc tomorrow. He is constipated. He states he is a little overwhelmed mentally    Reviewed interval ancillary notes    Current Medications  lactulose (CHRONULAC) 10 GM/15ML solution 30 g, 6 times per day  LORazepam (ATIVAN) tablet 0.5 mg, Q4H PRN  diatrizoate meglumine-sodium (GASTROGRAFIN) 66-10 % solution 20 mL, ONCE PRN  ipratropium 0.5 mg-albuterol 2.5 mg (DUONEB) nebulizer solution 1 Dose, BID RT  ipratropium 0.5 mg-albuterol 2.5 mg (DUONEB) nebulizer solution 1 Dose, Q4H PRN  metoclopramide (REGLAN) injection 10 mg, Q6H  docusate sodium (COLACE) capsule 100 mg, Daily  polyethylene glycol (GLYCOLAX) packet 17 g, Daily  albuterol sulfate HFA (PROVENTIL;VENTOLIN;PROAIR) 108 (90 Base) MCG/ACT inhaler 2 puff, Q6H PRN  amLODIPine (NORVASC) tablet 5 mg, Daily  escitalopram (LEXAPRO) tablet 10 mg, Daily  metoprolol succinate (TOPROL XL) extended release tablet 50 mg, Daily  pantoprazole (PROTONIX) tablet 40 mg, Daily  predniSONE (DELTASONE) tablet 5 mg, Daily  cefTRIAXone (ROCEPHIN) 1,000 mg in sodium chloride 0.9 % 50 mL IVPB (mini-bag), Q24H  sodium chloride flush 0.9 % injection 5-40 mL, 2 times per day  sodium chloride flush 0.9 % injection 5-40 mL, PRN  0.9 % sodium chloride infusion, PRN  enoxaparin (LOVENOX) injection 40 mg, Nightly  polyethylene glycol (GLYCOLAX) packet 17 g, Daily PRN  acetaminophen (TYLENOL) tablet 650 mg, Q6H PRN   Or  acetaminophen (TYLENOL) suppository 650 mg, Q6H PRN  ondansetron (ZOFRAN) injection 4 mg, Q6H PRN  mometasone-formoterol (DULERA) 200-5 MCG/ACT inhaler 2 puff, BID RT  tiotropium (SPIRIVA RESPIMAT)

## 2024-02-27 NOTE — PROGRESS NOTES
Nutrition Note    RECOMMENDATIONS  PO Diet: Regular as tolerated  Offer supportive care per hospice guidelines/ monitor for change in status.    NUTRITION ASSESSMENT   Pt with metastatic prostate CA.  Planning to d/c home with hospice.  Nutrition intervention triggered for wt loss & poor appetite, however pt declines need.  Refuses nutrition supplements.  Family reports pt likes to eat but recently c/o constipation.  Will support pt as needed per hospice philosophy.     Nutrition Related Findings: LBM 2/21; labs reviewed  Wounds: None  Nutrition Education:  Education not indicated   Nutrition Goals: other (specify) (comfort measures)     MALNUTRITION ASSESSMENT   Acute Illness  Malnutrition Status: Moderate malnutrition  Findings of the 6 clinical characteristics of malnutrition:  Energy Intake:  50% or less of estimated energy requirements for 5 or more days  Weight Loss:  Greater than 7.5% over 3 months (10% loss in 2 months)     Body Fat Loss:  No significant body fat loss     Muscle Mass Loss:  No significant muscle mass loss    Fluid Accumulation:  No significant fluid accumulation     Strength:  Not Performed      NUTRITION DIAGNOSIS   Moderate malnutrition related to catabolic illness as evidenced by poor intake prior to admission, weight loss 7.5% in 3 months      CURRENT NUTRITION THERAPIES  ADULT DIET; Regular     PO Intake: Unable to assess   PO Supplement Intake:None Ordered (pt declines)    ANTHROPOMETRICS  Current Height: 177.8 cm (5' 10\")  Current Weight - Scale: 64.4 kg (141 lb 15.6 oz)    Ideal Body Weight (IBW): 166 lbs  (75 kg)    Usual Bodyweight 71.2 kg (157 lb) (taken 2 months ago.)       BMI: 20.2    The patient will be monitored per nutrition standards of care. Consult dietitian if additional nutrition interventions are needed prior to RD reassessment.     Yenny Altamirano RD, LD    Contact: [phone]

## 2024-02-27 NOTE — PROGRESS NOTES
training    Goals  Patient Goals: not applicable    Short Term Goals:  Time Frame: upon discharge  Patient will complete toileting at Independent   Patient will complete functional mobility at modified independent   Patient to maintain standing at stand by assistance for 10 minutes.    Above goals reviewed on 2/27/2024.  All goals are ongoing at this time unless indicated above.       Therapy Session Time     Individual Group Co-treatment   Time In    1330   Time Out    1347   Minutes    17        Timed Code Treatment Minutes:   2  Total Treatment Minutes:  17       Electronically Signed By: LUIS Ruvalcaba OTR/L [id number] (I have reviewed and approve the above documentation. I provided supervision throughout session and guidance in clinical decision making as needed. )

## 2024-02-27 NOTE — PROGRESS NOTES
Late Entry 0600;  Pt ambulated with contact guard assist to bathroom to attempt to have a BM, unsuccessful, flats only. Gait is slow and unsteady, denies need for walker although has suprapubic catheter that has a significant output, hence unable to walk and carry drainage bag. PT/OT likely to evaluate in AM/day shift. Catheter care performed, bathed, bedding and gown changed. Pt slept well, no pain, vitals were stable.

## 2024-02-27 NOTE — PROGRESS NOTES
amLODIPine  5 mg Oral Daily    escitalopram  10 mg Oral Daily    metoprolol succinate  50 mg Oral Daily    pantoprazole  40 mg Oral Daily    predniSONE  5 mg Oral Daily    cefTRIAXone (ROCEPHIN) IV  1,000 mg IntraVENous Q24H    sodium chloride flush  5-40 mL IntraVENous 2 times per day    enoxaparin  40 mg SubCUTAneous Nightly    mometasone-formoterol  2 puff Inhalation BID RT    tiotropium  2 puff Inhalation Daily RT    gabapentin  100 mg Oral BID    traZODone  100 mg Oral Nightly     Continuous Infusions:   sodium chloride       PRN Meds:.LORazepam, diatrizoate meglumine-sodium, ipratropium 0.5 mg-albuterol 2.5 mg, albuterol sulfate HFA, sodium chloride flush, sodium chloride, polyethylene glycol, acetaminophen **OR** acetaminophen, ondansetron      Assessment:  83 y.o. male admitted with   1. General weakness    2. Nausea and vomiting, unspecified vomiting type    3. Dehydration    4. Septicemia (HCC)    5. 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 No abdominal pain, benign abdominal exam, no nausea or vomiting, tolerating diet, passing flatus, no stool, vitals stable, slightly increased leukocytosis, labs otherwise stable, CT imaging reviewed; continued supportive care, continue current bowel regimen  2. Regular diet as tolerated  3. Antibiotics for UTI  4. Activity as tolerated  5. PRN analgesics and antiemetics--minimizing narcotics as tolerated  6. Management of medical comorbid etiologies per primary team and consulting services  7. Disposition: Anticipate discharge home with hospice tomorrow    Clinton Bennett is stable from surgical standpoint.  Will follow as needed.  Please call with questions or concerns.  Thank you for allowing us to participate in Clinton Bennett's care.    EDUCATION:  Educated patient on plan of care and disease process--all questions answered.    Plans  discussed with patient and nursing.  Reviewed and discussed with Dr. Torres.      Signed:  Maine Mendoza, APRN - CNP  2/27/2024 3:26 PM    Pleasant 83-year-old male with metastatic prostate cancer  Seen in follow-up for an ileus  CAT scan yesterday shows a normal bowel gas pattern with moderate constipation  Patient on Colace and lactulose  Can continue diet as tolerated  Plan is for discharge home with outpatient hospice  Patient would like to have a bowel movement prior to discharge  Anticipate discharge tomorrow

## 2024-02-27 NOTE — PROGRESS NOTES
[facility] - Inpatient Rehabilitation Department   Phone: [phone]    Physical Therapy    [x] Initial Evaluation            [] Daily Treatment Note         [] Discharge Summary      Patient: Clinton Bennett   : 1940   MRN: [id number]   Date of Service:  2024  Admitting Diagnosis: Intractable nausea and vomiting  Current Admission Summary: Pt to ED with n/v and progressive weakness following last dose of chemotherapy on 24.   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  Past Surgical History:  has a past surgical history that includes Prostatectomy (); Tonsillectomy; Carotid endarterectomy (5/3/12); Cystoscopy (2017); Colonoscopy; Endoscopy, colon, diagnostic; Carotid endarterectomy (Right, 10/25/2017); Cholecystectomy, laparoscopic (2018); skin biopsy (); Esophagus dilation (N/A, 1/3/2022); Upper gastrointestinal endoscopy (N/A, 1/3/2022); Colonoscopy (N/A, 2023); Colonoscopy (2023); Cystoscopy (N/A, 12/3/2023); and Cystoscopy (N/A, 2024).    Discharge Recommendations: Clinton Bennett scored a  on the AM-PAC short mobility form.  At this time, no further PT is recommended upon discharge due to pt being near baseline mobility and plans to discharge home with hospice care.  Recommend patient returns to prior setting with prior services.      DME Required For Discharge: DME to be determined pending patient progress (pt reporting hospice planning to provide DME upon d/c home)  Precautions/Restrictions: high fall risk, contact precautions  Weight Bearing Restrictions: no  restrictions  [] Right Upper Extremity  [] Left Upper Extremity [] Right Lower Extremity  [] Left Lower Extremity     Required Braces/Orthotics: no braces required   [] Right  [] Left  Positional Restrictions:no positional restrictions    Pre-Admission Information   Lives With: spouse                  Type of Home: house  Home Layout: two level, hospital bed in living room being set up by hospice   Home Access:  2 step to enter without rails   Bathroom Layout: tub only, walk in shower (Patient uses walk-in shower)  Toilet Height: standard height  Bathroom Equipment: grab bars in shower, built in shower seat  Home Equipment: reacher, rollator, manual wheelchair   Transfer Assistance: Independent without use of device  Ambulation Assistance:Independent without use of device  ADL Assistance: independent with all ADL's  IADL Assistance: requires help with homemaking tasks  Active :        [x] Yes                 [] No  Hand Dominance: [] Left                 [x] Right  Current Employment: retired.  Occupation: Self-employed    Hobbies: Hiking, golf  Recent Falls: Denies    Examination   Vision:   Vision Gross Assessment: Impaired and Vision Corrective Device: wears glasses at all times  Hearing:   WFL  Observation:   General Observation:  colón catheter in place, 2L O2 in place via nasal cannula  Posture:   Fair, rounded shoulders  Sensation:   reports numbness and tingling in (B) LE  Tone:   Normotonic  ROM:   (B) LE AROM WFL  Strength:   (B) LE strength grossly +3  Therapist Clinical Decision Making (Complexity): low complexity  Clinical Presentation: stable      Subjective  General: Pt seated in chair upon arrival. Agreeable to PT/OT eval, reporting fatigue after sitting in chair, requesting to return to bed.   Pain: 4/10.  Location: lower back  Pain Interventions: RN notified       Functional Mobility  Bed Mobility:  Sit to Supine: stand by assistance  Scooting: stand by

## 2024-02-27 NOTE — TELEPHONE ENCOUNTER
[facility] would like Dr to attend for the Pt at [facility].    Please call [facility] at [phone]

## 2024-02-27 NOTE — PROGRESS NOTES
No biliary ductal dilatation.  The pancreas, spleen, and  bilateral adrenal glands are unremarkable.  Mild right and mild-to-moderate  left hydroureteronephrosis improved from 12/02/2023.  No obstructing stone  identified.    GI/Bowel: Mildly increased stool in the descending colon.  No evidence of  acute appendicitis.  Oral contrast reaches the ascending colon.  The stomach  and small bowel are normal in appearance.  No obstruction or wall thickening  identified.    Pelvis: Interval placement of a percutaneous suprapubic catheter with the  balloon inflated in the decompressed urinary bladder lumen.  Mild pericystic  stranding.  Limited evaluation for urinary bladder wall thickening.  Prostatomegaly with surgical clips again seen.  Persistent linear extension  of extraluminal fluid along the left prostate margin communicating with the  urinary bladder lumen likely not significantly changed from 12/02/2023.  No  free fluid in the pelvis.  No pelvic or inguinal lymphadenopathy.    Peritoneum/Retroperitoneum: The abdominal aorta is normal in caliber with  mild atherosclerosis.  No retroperitoneal or mesenteric lymphadenopathy is  identified.  No free air or fluid is seen in the abdomen.  No acute vascular  abnormality identified.    Bones/Soft Tissues: Blastic metastases in the thoracolumbar spine and pelvic  bones without significant change.  Impression: 1. No bowel obstruction or ileus.  2. Multiple new low-density lesions in the liver the largest of which  measures 5.4 cm in the right hepatic lobe suspicious for metastases.  3. Mildly increased stool in the descending colon suggesting constipation.  4. Mild right and mild-to-moderate left hydroureteronephrosis mildly improved  from 12/02/2023.  5. Persistent linear extension of fluid along the left prostatic margin  communicating with the bladder lumen likely not significantly changed from  12/02/2023.  6. Moderate right and small left pleural effusions with mild  bibasilar  atelectasis.  XR CHEST PORTABLE  Narrative: EXAMINATION:  ONE XRAY VIEW OF THE CHEST    2/24/2024 10:28 am    COMPARISON:  December 2, 2023    HISTORY:  ORDERING SYSTEM PROVIDED HISTORY: tachy  TECHNOLOGIST PROVIDED HISTORY:  Reason for exam:->tachy  Reason for Exam: tachy    FINDINGS:  No active infiltrates, pleural effusion or pneumothorax.  Heart and  mediastinum appear stable.  There is an impression of a right upper lobe  nodule measuring 2.9 cm which partly overlaps ribs.  This appears more  prominent compared to the previous evaluation.  Impression: 1. No acute cardiopulmonary process.  2. Right upper lobe nodule measuring 2.9 cm which appears more prominent  compared to the previous evaluation.  A CT chest is recommended for further  evaluation.      Problem List  Patient Active Problem List   Diagnosis    Psoriasis    Prostate cancer metastatic to bone (HCC)    GERD (gastroesophageal reflux disease)    ED (erectile dysfunction)    Essential hypertension    Hyperlipidemia    Occlusion and stenosis of carotid artery    Centrilobular emphysema (HCC)    Hypoxia    Pulmonary nodules    Chronic obstructive pulmonary disease (HCC)    Hematuria    Cystitis    Atherosclerosis of both carotid arteries    Abdominal pain    Biliary colic    Abdominal pain, epigastric    Acute cholecystitis    Cholecystitis    Hospital-acquired pneumonia    Failure of outpatient treatment    Pneumonia    Carotid artery occlusion and stenosis    Malignant neoplasm of prostate metastatic to lymph nodes of multiple sites (HCC)    Prostate cancer (HCC)    Diarrhea    Sepsis (HCC)    Hydronephrosis    Postprocedural membranous urethral stricture    Immunocompromised (HCC)    Anemia    Fever    Gastrointestinal hemorrhage    Other fatigue    Tachycardia    Intractable nausea and vomiting       ASSESSMENT AND PLAN:  Obstipation  Severe anemia.  Requires packed red blood cell transfusion.  Metastatic castration resistant prostate

## 2024-02-28 VITALS
TEMPERATURE: 98.3 F | DIASTOLIC BLOOD PRESSURE: 68 MMHG | HEIGHT: 70 IN | WEIGHT: 155 LBS | SYSTOLIC BLOOD PRESSURE: 148 MMHG | BODY MASS INDEX: 22.19 KG/M2 | RESPIRATION RATE: 18 BRPM | OXYGEN SATURATION: 94 % | HEART RATE: 97 BPM

## 2024-02-28 LAB
ANION GAP SERPL CALCULATED.3IONS-SCNC: 10 MMOL/L (ref 3–16)
ANISOCYTOSIS BLD QL SMEAR: ABNORMAL
BACTERIA BLD CULT ORG #2: NORMAL
BACTERIA BLD CULT: NORMAL
BASOPHILS # BLD: 0 K/UL (ref 0–0.2)
BASOPHILS NFR BLD: 0 %
BUN SERPL-MCNC: 7 MG/DL (ref 7–20)
CALCIUM SERPL-MCNC: 8.1 MG/DL (ref 8.3–10.6)
CHLORIDE SERPL-SCNC: 99 MMOL/L (ref 99–110)
CO2 SERPL-SCNC: 26 MMOL/L (ref 21–32)
CREAT SERPL-MCNC: 0.7 MG/DL (ref 0.8–1.3)
DACRYOCYTES BLD QL SMEAR: ABNORMAL
DEPRECATED RDW RBC AUTO: 19.3 % (ref 12.4–15.4)
EOSINOPHIL # BLD: 0 K/UL (ref 0–0.6)
EOSINOPHIL NFR BLD: 0 %
GFR SERPLBLD CREATININE-BSD FMLA CKD-EPI: >60 ML/MIN/{1.73_M2}
GLUCOSE SERPL-MCNC: 97 MG/DL (ref 70–99)
HCT VFR BLD AUTO: 24.4 % (ref 40.5–52.5)
HGB BLD-MCNC: 7.9 G/DL (ref 13.5–17.5)
LYMPHOCYTES # BLD: 2.1 K/UL (ref 1–5.1)
LYMPHOCYTES NFR BLD: 16 %
MCH RBC QN AUTO: 28 PG (ref 26–34)
MCHC RBC AUTO-ENTMCNC: 32.3 G/DL (ref 31–36)
MCV RBC AUTO: 86.7 FL (ref 80–100)
MONOCYTES # BLD: 0.8 K/UL (ref 0–1.3)
MONOCYTES NFR BLD: 6 %
NEUTROPHILS # BLD: 10.1 K/UL (ref 1.7–7.7)
NEUTROPHILS NFR BLD: 69 %
NEUTS BAND NFR BLD MANUAL: 9 % (ref 0–7)
NRBC BLD-RTO: 1 /100 WBC
OVALOCYTES BLD QL SMEAR: ABNORMAL
PLATELET # BLD AUTO: 223 K/UL (ref 135–450)
PLATELET BLD QL SMEAR: ADEQUATE
PMV BLD AUTO: 7.7 FL (ref 5–10.5)
POLYCHROMASIA BLD QL SMEAR: ABNORMAL
POTASSIUM SERPL-SCNC: 3.9 MMOL/L (ref 3.5–5.1)
RBC # BLD AUTO: 2.82 M/UL (ref 4.2–5.9)
SLIDE REVIEW: ABNORMAL
SODIUM SERPL-SCNC: 135 MMOL/L (ref 136–145)
TOXIC GRANULES BLD QL SMEAR: PRESENT
WBC # BLD AUTO: 13 K/UL (ref 4–11)

## 2024-02-28 PROCEDURE — 85025 COMPLETE CBC W/AUTO DIFF WBC: CPT

## 2024-02-28 PROCEDURE — 94761 N-INVAS EAR/PLS OXIMETRY MLT: CPT

## 2024-02-28 PROCEDURE — 6370000000 HC RX 637 (ALT 250 FOR IP): Performed by: SURGERY

## 2024-02-28 PROCEDURE — 2580000003 HC RX 258: Performed by: INTERNAL MEDICINE

## 2024-02-28 PROCEDURE — 6370000000 HC RX 637 (ALT 250 FOR IP): Performed by: INTERNAL MEDICINE

## 2024-02-28 PROCEDURE — 36415 COLL VENOUS BLD VENIPUNCTURE: CPT

## 2024-02-28 PROCEDURE — 2700000000 HC OXYGEN THERAPY PER DAY

## 2024-02-28 PROCEDURE — 94640 AIRWAY INHALATION TREATMENT: CPT

## 2024-02-28 PROCEDURE — 80048 BASIC METABOLIC PNL TOTAL CA: CPT

## 2024-02-28 PROCEDURE — 6360000002 HC RX W HCPCS: Performed by: SURGERY

## 2024-02-28 RX ORDER — LORAZEPAM 0.5 MG/1
0.5 TABLET ORAL EVERY 4 HOURS PRN
Qty: 30 TABLET | Refills: 0 | Status: SHIPPED | OUTPATIENT
Start: 2024-02-28 | End: 2024-03-29

## 2024-02-28 RX ORDER — CEFDINIR 300 MG/1
300 CAPSULE ORAL 2 TIMES DAILY
Qty: 6 CAPSULE | Refills: 0 | Status: SHIPPED | OUTPATIENT
Start: 2024-02-28 | End: 2024-03-02

## 2024-02-28 RX ORDER — MORPHINE SULFATE 100 MG/5ML
10 SOLUTION ORAL
Qty: 30 ML | Refills: 0 | Status: SHIPPED | OUTPATIENT
Start: 2024-02-28 | End: 2024-03-02

## 2024-02-28 RX ADMIN — METOCLOPRAMIDE 10 MG: 5 INJECTION, SOLUTION INTRAMUSCULAR; INTRAVENOUS at 09:59

## 2024-02-28 RX ADMIN — ACETAMINOPHEN 650 MG: 325 TABLET ORAL at 04:34

## 2024-02-28 RX ADMIN — METOCLOPRAMIDE 10 MG: 5 INJECTION, SOLUTION INTRAMUSCULAR; INTRAVENOUS at 04:35

## 2024-02-28 RX ADMIN — Medication 2 PUFF: at 08:27

## 2024-02-28 RX ADMIN — PREDNISONE 5 MG: 5 TABLET ORAL at 09:59

## 2024-02-28 RX ADMIN — METOPROLOL SUCCINATE 50 MG: 50 TABLET, EXTENDED RELEASE ORAL at 09:59

## 2024-02-28 RX ADMIN — PANTOPRAZOLE SODIUM 40 MG: 40 TABLET, DELAYED RELEASE ORAL at 05:48

## 2024-02-28 RX ADMIN — GABAPENTIN 100 MG: 100 CAPSULE ORAL at 09:59

## 2024-02-28 RX ADMIN — SODIUM CHLORIDE, PRESERVATIVE FREE 10 ML: 5 INJECTION INTRAVENOUS at 09:59

## 2024-02-28 RX ADMIN — AMLODIPINE BESYLATE 5 MG: 5 TABLET ORAL at 09:59

## 2024-02-28 RX ADMIN — TIOTROPIUM BROMIDE INHALATION SPRAY 2 PUFF: 3.12 SPRAY, METERED RESPIRATORY (INHALATION) at 08:27

## 2024-02-28 RX ADMIN — ESCITALOPRAM OXALATE 10 MG: 10 TABLET ORAL at 09:59

## 2024-02-28 RX ADMIN — DOCUSATE SODIUM 100 MG: 100 CAPSULE, LIQUID FILLED ORAL at 09:59

## 2024-02-28 RX ADMIN — POLYETHYLENE GLYCOL 3350 17 G: 17 POWDER, FOR SOLUTION ORAL at 09:58

## 2024-02-28 RX ADMIN — IPRATROPIUM BROMIDE AND ALBUTEROL SULFATE 1 DOSE: 2.5; .5 SOLUTION RESPIRATORY (INHALATION) at 08:26

## 2024-02-28 ASSESSMENT — PAIN DESCRIPTION - ORIENTATION: ORIENTATION: LOWER

## 2024-02-28 ASSESSMENT — PAIN SCALES - WONG BAKER: WONGBAKER_NUMERICALRESPONSE: 0

## 2024-02-28 ASSESSMENT — PAIN DESCRIPTION - DESCRIPTORS: DESCRIPTORS: ACHING;DISCOMFORT

## 2024-02-28 ASSESSMENT — PAIN DESCRIPTION - LOCATION: LOCATION: BACK

## 2024-02-28 ASSESSMENT — PAIN - FUNCTIONAL ASSESSMENT: PAIN_FUNCTIONAL_ASSESSMENT: ACTIVITIES ARE NOT PREVENTED

## 2024-02-28 ASSESSMENT — PAIN SCALES - GENERAL
PAINLEVEL_OUTOF10: 0
PAINLEVEL_OUTOF10: 4

## 2024-02-28 NOTE — PROGRESS NOTES
Physical Therapy  Discharge Summary  Clinton Bennett    Attempted to see pt for PT treatment. Chart reviewed. Patient discharging home with hospice services this date. Will discharge acute PT at this time. Thanks, Beatris Catherine, PT, DPT [id number]

## 2024-02-28 NOTE — PLAN OF CARE
Problem: Discharge Planning  Goal: Discharge to home or other facility with appropriate resources  Outcome: Progressing  Flowsheets (Taken 2/27/2024 2000)  Discharge to home or other facility with appropriate resources: Identify barriers to discharge with patient and caregiver     Problem: Pain  Goal: Verbalizes/displays adequate comfort level or baseline comfort level  Outcome: Progressing  Flowsheets (Taken 2/27/2024 2000)  Verbalizes/displays adequate comfort level or baseline comfort level: Encourage patient to monitor pain and request assistance     Problem: Safety - Adult  Goal: Free from fall injury  Outcome: Progressing     Problem: ABCDS Injury Assessment  Goal: Absence of physical injury  Outcome: Progressing

## 2024-02-28 NOTE — CONSULTS
Clinton Bennett     Chief complaint-nausea and vomiting    HPI: Pleasant 83-year-old male with metastatic prostate cancer who presented to the ED 2 days ago with a chief complaint of fatigue.  Associated symptoms include nausea, vomiting, anorexia and generalized weakness.  Last dose of chemotherapy was 11 days ago.  He has not had problems with chemotherapy in the past.  No complaints of abdominal pain.  No fevers, chills or urinary symptoms.      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 Zacarias at [facility]     CHOLECYSTECTOMY, LAPAROSCOPIC  02/12/2018    Intraoperative Cholangiogram    COLONOSCOPY      COLONOSCOPY N/A 6/1/2023    COLONOSCOPY POLYPECTOMY SNARE/COLD BIOPSY performed by Noah Mccabe MD at [facility] ENDOSCOPY    COLONOSCOPY  6/1/2023    COLONOSCOPY WITH BIOPSY performed by Noah Mccabe MD at [facility] ENDOSCOPY    CYSTOSCOPY  07/2017    CYSTOSCOPY N/A 12/3/2023    CYSTOSCOPY, PLACEMENT OF FRASER CATHETER, URETHRAL DILATION OF URETHRAL STRICTURE, CYSTOGRAM performed by Xavier Richardson MD at [facility] OR    CYSTOSCOPY N/A 1/25/2024    CYSTOSCOPY WITH SUPRAPUBIC TUBE PLACEMENT performed by Jozef Patel MD at [facility] OR    ENDOSCOPY, COLON, DIAGNOSTIC      ESOPHAGEAL DILATATION N/A 1/3/2022    ESOPHAGEAL DILATION GILL performed by Tho Valentin MD at [facility]  MD  2/27/2024

## 2024-02-28 NOTE — DISCHARGE INSTR - COC
Continuity of Care Form    Patient Name: Clinton Bennett   :  1940  MRN:  [id number]    Admit date:  2024  Discharge date:  2024    Code Status Order: DNR-CC   Advance Directives:     Admitting Physician:  Bessie Syed MD  PCP: Oskar García MD    Discharging Nurse: GUY Little   Discharging Hospital Unit/Room#: 3TN-3378/3378-01  Discharging Unit Phone Number: [phone]    Emergency Contact:   Extended Emergency Contact Information  Primary Emergency Contact: Marysol Bennett  Address:  [place] [address] [facility]  Home Phone: [phone]  Relation: Spouse  Secondary Emergency Contact: Luis E Bennett  Address: [address] [address]  Home Phone: [phone]  Relation: Child    Past Surgical History:  Past Surgical History:   Procedure Laterality Date    CAROTID ENDARTERECTOMY  5/3/12    left    CAROTID ENDARTERECTOMY Right 10/25/2017    Dr. Zacarias at [facility]     CHOLECYSTECTOMY, LAPAROSCOPIC  2018    Intraoperative Cholangiogram    COLONOSCOPY      COLONOSCOPY N/A 2023    COLONOSCOPY POLYPECTOMY SNARE/COLD BIOPSY performed by Noah Mccabe MD at [facility] ENDOSCOPY    COLONOSCOPY  2023    COLONOSCOPY WITH BIOPSY performed by Noah Mccabe MD at [facility] ENDOSCOPY    CYSTOSCOPY  2017    CYSTOSCOPY N/A 12/3/2023    CYSTOSCOPY, PLACEMENT OF FRASER CATHETER, URETHRAL DILATION OF URETHRAL STRICTURE, CYSTOGRAM performed by Xavier Richardson MD at [facility] OR    CYSTOSCOPY N/A 2024    CYSTOSCOPY WITH SUPRAPUBIC TUBE PLACEMENT performed by Jozef Patel MD at [facility] OR    ENDOSCOPY, COLON, DIAGNOSTIC      ESOPHAGEAL DILATATION N/A 1/3/2022    ESOPHAGEAL DILATION GILL performed by Tho Valentin MD at [facility] ENDOSCOPY    PROSTATECTOMY  2003    SKIN BIOPSY      melanoma left arm    TONSILLECTOMY      UPPER GASTROINTESTINAL ENDOSCOPY N/A 1/3/2022    EGD POLYP SNARE performed by Tho Valentin MD at [facility] ENDOSCOPY  0449  Gross per 24 hour   Intake 5 ml   Output 2750 ml   Net -2745 ml     I/O last 3 completed shifts:  In: 5 [I.V.:5]  Out: 5000 [Urine:5000]    Safety Concerns:     At Risk for Falls    Impairments/Disabilities:      None    Nutrition Therapy:  Current Nutrition Therapy:   - Oral Diet:  General    Routes of Feeding: Oral  Liquids: Thin Liquids  Daily Fluid Restriction: no  Last Modified Barium Swallow with Video (Video Swallowing Test): not done    Treatments at the Time of Hospital Discharge:   Respiratory Treatments: HHN, oxygen  Oxygen Therapy:  is on oxygen at 1-2 L/min per nasal cannula.  Ventilator:    - No ventilator support    Rehab Therapies: none  Weight Bearing Status/Restrictions: No weight bearing restrictions  Other Medical Equipment (for information only, NOT a DME order):  per hospice recommendations  Other Treatments: na    Patient's personal belongings (please select all that are sent with patient):  Sree    RN SIGNATURE:  Electronically signed by Lanny Gusman RN on 2/28/24 at 7:59 AM EST    CASE MANAGEMENT/SOCIAL WORK SECTION    Inpatient Status Date: ***    Readmission Risk Assessment Score:  Readmission Risk              Risk of Unplanned Readmission:  33           Discharging to Facility/ Agency   Name:   Address:  Phone:  Fax:    Dialysis Facility (if applicable)   Name:  Address:  Dialysis Schedule:  Phone:  Fax:    / signature: {Esignature:[id number]}    PHYSICIAN SECTION    Prognosis: {Prognosis:2153841519}    Condition at Discharge: { Patient Condition:807111861}    Rehab Potential (if transferring to Rehab): {Prognosis:4373698505}    Recommended Labs or Other Treatments After Discharge: ***    Physician Certification: I certify the above information and transfer of Clinton Bennett  is necessary for the continuing treatment of the diagnosis listed and that he requires {Admit to Appropriate Level of Care:28173} for {GREATER/LESS:188856157} 30 days.     Update

## 2024-02-28 NOTE — PROGRESS NOTES
Physician Progress Note      PATIENT:               DORITA ADRIAN  CSN #:                  [id number]  :                       1940  ADMIT DATE:       2024 10:19 AM  DISCH DATE:  RESPONDING  PROVIDER #:        BENNIE HAMILTON PA-C          QUERY TEXT:    Pt admitted with UTI.  Pt noted to have  suprapubic catheter present on   admission. If possible, please document in the progress notes and discharge   summary if you are evaluating and/or treating any of the following:      The medical record reflects the following:  Risk Factors: 82yo with prostrate cancer, urine culture positive  Clinical Indicators: URO , \"Had spt changed Friday,  likely sepsis from gu   source\"  Treatment: IV Rocephin  Options provided:  -- UTI due to suprapubic catheter  -- UTI not due to suprapubic catheter  -- Other - I will add my own diagnosis  -- Disagree - Not applicable / Not valid  -- Disagree - Clinically unable to determine / Unknown  -- Refer to Clinical Documentation Reviewer    PROVIDER RESPONSE TEXT:    UTI is due to suprapubic catheter.    Query created by: Chasidy Steele on 2024 12:29 PM      Electronically signed by:  BENNIE HAMILTON PA-C 2024 10:43 AM

## 2024-02-28 NOTE — PROGRESS NOTES
ONCOLOGY HEMATOLOGY CARE PROGRESS NOTE      SUBJECTIVE:  Feeling comfortable this morning.  Denies pain.    ROS:     Constitutional:  No weight loss, No fever, No chills, No night sweats.  Generalized weakness.  Eyes:  No impairment or change in vision  ENT / Mouth:  No pain, abnormal ulceration, bleeding, nasal drip or change in voice or hearing  Cardiovascular:  No chest pain, palpitations, new edema, or calf discomfort  Respiratory:  No pain, hemoptysis, change to breathing  Breast:  No pain, discharge, change in appearance or texture  Gastrointestinal:  Nausea and vomiting.  Denies abdominal pain at present.  Trouble moving bowels.  Urinary:  No pain, bleeding or change in continence  Genitalia: No pain, bleeding or discharge  Musculoskeletal:  No redness, pain, edema or weakness  Skin:  No pruritus, rash, change to nodules or lesions  Neurologic:  No discomfort, change in mental status, speech, sensory or motor activity  Psychiatric:  No change in concentration or change to affect or mood  Endocrine:  No hot flashes, increased thirst, or change to urine production  Hematologic: No petechiae, ecchymosis or bleeding  Lymphatic:  No lymphadenopathy or lymphedema  Allergy / Immunologic:  No eczema, hives, frequent or recurrent infections    OBJECTIVE        Physical    VITALS:  Patient Vitals for the past 24 hrs:   BP Temp Temp src Pulse Resp SpO2 Height Weight   02/28/24 0845 (!) 148/68 98.3 °F (36.8 °C) Oral 97 18 94 % -- --   02/28/24 0827 -- -- -- 99 16 95 % -- --   02/28/24 0813 -- -- -- -- -- -- -- 70.3 kg (155 lb)   02/28/24 0434 (!) 157/78 98.2 °F (36.8 °C) Oral 100 16 96 % -- --   02/27/24 2000 131/65 98.3 °F (36.8 °C) Oral 90 16 97 % -- --   02/27/24 1605 135/65 97.3 °F (36.3 °C) Oral 85 17 95 % -- --   02/27/24 1210 -- -- -- -- -- -- 1.778 m (5' 10\") --   02/27/24 1133 118/66 98.1 °F (36.7 °C) Oral 96 -- 93 % -- --       24HR INTAKE/OUTPUT:    Intake/Output Summary

## 2024-02-28 NOTE — PROGRESS NOTES
Data- discharge order received, pt verbalized agreement to discharge, needs for hospice care as he will admit when he arrives and equipment has been delivered, MICHAEL reviewed and signed by MD, to be completed by RN.    Action- AVS prepared, discharge instructions prepared and given to pt with transporters, medication information packet given r/t NEW or CHANGED prescriptions, pt verbalized understanding further self-review.   D/C instruction summary: Diet- reg, Activity- as tolerated, follow up with Primary Care Physician Oskar García -[phone] appointment per hospice recommendations, immunizations reviewed, medications prescriptions to be filled by hospice. Contact information provided to above agencies used.    Response- Case Management/ reported faxing completed MICHAEL and AVS to needed HHC/DME services stated above.   Pt belongings gathered, IV removed, pt dressed. Disposition is home with hospice as stated above, transported with personal belongings, taken to lobby via w/c with RN, no complications.     1. WEIGHT: Admit Weight - Scale: 65.8 kg (145 lb) (02/24/24 1024)        Today  Weight - Scale: 70.3 kg (155 lb) (02/28/24 0813)       2. O2 SAT.: SpO2: 94 % (02/28/24 0845)

## 2024-03-01 NOTE — DISCHARGE SUMMARY
Hospital Medicine Discharge Summary    Patient: Clinton Bennett     Gender: male  : 1940   Age: 83 y.o.  MRN: [id number]    Admitting Physician: Bessie Syed MD  Discharge Physician: Deedee Quintanilla PA-C     Code Status: DNR CC    Admit Date: 2024   Discharge Date: 2024      Disposition:  Home with hospice  Time spent arranging discharge: 24 minutes    Discharge Diagnoses:    Active Hospital Problems    Diagnosis Date Noted    Moderate malnutrition (HCC) [E44.0] 2024    Ileus (HCC) [K56.7] 2024    Intractable nausea and vomiting [R11.2] 2024    General weakness [R53.1] 2023       Recommendations:     Condition at Discharge:  Stable    Hospital Course:   Patient is a pleasant 83-year-old male who is currently getting chemotherapy for metastatic prostate cancer who presented with nausea and vomiting.  He has been having some chills and does have hematuria.  Is a chronic suprapubic catheter.  Patient was admitted hydrated and started on antiemetics as well as fluids.  UA was suggestive of UTI and he was started on antibiotics.  He was seen by urology, surgery, oncology and palliative care.  He did have a CT scan of the abdomen pelvis revealing new metastatic lesions to the liver.  He also had constipation.  Patient family ultimately decided to enroll in hospice he was discharged home with [facility].    Discharge Exam:    BP (!) 148/68   Pulse 97   Temp 98.3 °F (36.8 °C) (Oral)   Resp 18   Ht 1.778 m (5' 10\")   Wt 70.3 kg (155 lb)   SpO2 94%   BMI 22.24 kg/m²   General appearance:  Appears comfortable. AAOx3  HEENT: atraumatic, Pupils equal, muscous membranes moist, no masses appreciated  Cardiovascular: Regular rate and rhythm no murmurs appreciated  Respiratory: CTAB no wheezing  Gastrointestinal: Abdomen soft, non-tender, BS+  EXT: no edema  Neurology: no gross focal deficts  Psychiatry: Appropriate affect. Not agitated  Skin: Warm, dry, no

## 2024-03-15 ENCOUNTER — TELEPHONE (OUTPATIENT)
Dept: FAMILY MEDICINE CLINIC | Age: 84
End: 2024-03-15

## 2024-03-15 NOTE — TELEPHONE ENCOUNTER
Dr. García is out of town until Tuesday but looking through the chart it appears that he saw the patient regularly and should be able to sign the death certificate.

## 2024-03-15 NOTE — TELEPHONE ENCOUNTER
Mitzi from [facility] called and needs to see if Dr García will sign Death Certificate for this patient     Patient passed at home   03/15/2024 8:45 am     Please call Mitzi at [phone]

## 2024-03-18 ENCOUNTER — TELEPHONE (OUTPATIENT)
Dept: FAMILY MEDICINE CLINIC | Age: 84
End: 2024-03-18

## 2024-03-18 NOTE — TELEPHONE ENCOUNTER
[facility] dropped off death certificate for patient. Please call [phone] once ready for pickup. Form is placed in matt's mailbox up front
